# Patient Record
Sex: MALE | Race: WHITE | NOT HISPANIC OR LATINO
[De-identification: names, ages, dates, MRNs, and addresses within clinical notes are randomized per-mention and may not be internally consistent; named-entity substitution may affect disease eponyms.]

---

## 2018-10-04 ENCOUNTER — FORM ENCOUNTER (OUTPATIENT)
Age: 53
End: 2018-10-04

## 2018-10-05 ENCOUNTER — OUTPATIENT (OUTPATIENT)
Dept: OUTPATIENT SERVICES | Facility: HOSPITAL | Age: 53
LOS: 1 days | End: 2018-10-05
Payer: COMMERCIAL

## 2018-10-05 ENCOUNTER — APPOINTMENT (OUTPATIENT)
Dept: RADIOLOGY | Facility: CLINIC | Age: 53
End: 2018-10-05

## 2018-10-05 ENCOUNTER — APPOINTMENT (OUTPATIENT)
Dept: ORTHOPEDIC SURGERY | Facility: CLINIC | Age: 53
End: 2018-10-05
Payer: COMMERCIAL

## 2018-10-05 VITALS — WEIGHT: 204 LBS | HEIGHT: 71 IN | BODY MASS INDEX: 28.56 KG/M2 | RESPIRATION RATE: 16 BRPM

## 2018-10-05 DIAGNOSIS — Z78.9 OTHER SPECIFIED HEALTH STATUS: ICD-10-CM

## 2018-10-05 DIAGNOSIS — Z80.9 FAMILY HISTORY OF MALIGNANT NEOPLASM, UNSPECIFIED: ICD-10-CM

## 2018-10-05 DIAGNOSIS — F17.200 NICOTINE DEPENDENCE, UNSPECIFIED, UNCOMPLICATED: ICD-10-CM

## 2018-10-05 DIAGNOSIS — Z82.61 FAMILY HISTORY OF ARTHRITIS: ICD-10-CM

## 2018-10-05 DIAGNOSIS — Z86.79 PERSONAL HISTORY OF OTHER DISEASES OF THE CIRCULATORY SYSTEM: ICD-10-CM

## 2018-10-05 DIAGNOSIS — M23.52 CHRONIC INSTABILITY OF KNEE, LEFT KNEE: ICD-10-CM

## 2018-10-05 DIAGNOSIS — S83.512A SPRAIN OF ANTERIOR CRUCIATE LIGAMENT OF LEFT KNEE, INITIAL ENCOUNTER: ICD-10-CM

## 2018-10-05 PROCEDURE — 73564 X-RAY EXAM KNEE 4 OR MORE: CPT | Mod: 26,LT

## 2018-10-05 PROCEDURE — 73564 X-RAY EXAM KNEE 4 OR MORE: CPT

## 2018-10-05 PROCEDURE — 99204 OFFICE O/P NEW MOD 45 MIN: CPT

## 2018-10-09 PROBLEM — M23.52: Status: ACTIVE | Noted: 2018-10-09

## 2018-10-09 PROBLEM — S83.512A CHRONIC RUPTURE OF ACL OF LEFT KNEE: Status: ACTIVE | Noted: 2018-10-09

## 2018-10-09 RX ORDER — AMLODIPINE BESYLATE AND BENAZEPRIL HYDROCHLORIDE 10; 40 MG/1; MG/1
CAPSULE ORAL
Refills: 0 | Status: ACTIVE | COMMUNITY

## 2018-10-09 RX ORDER — LOSARTAN POTASSIUM 100 MG/1
TABLET, FILM COATED ORAL
Refills: 0 | Status: ACTIVE | COMMUNITY

## 2018-10-21 ENCOUNTER — FORM ENCOUNTER (OUTPATIENT)
Age: 53
End: 2018-10-21

## 2018-10-22 ENCOUNTER — APPOINTMENT (OUTPATIENT)
Dept: MRI IMAGING | Facility: CLINIC | Age: 53
End: 2018-10-22
Payer: COMMERCIAL

## 2018-10-22 ENCOUNTER — APPOINTMENT (OUTPATIENT)
Dept: ORTHOPEDIC SURGERY | Facility: CLINIC | Age: 53
End: 2018-10-22
Payer: COMMERCIAL

## 2018-10-22 ENCOUNTER — OUTPATIENT (OUTPATIENT)
Dept: OUTPATIENT SERVICES | Facility: HOSPITAL | Age: 53
LOS: 1 days | End: 2018-10-22

## 2018-10-22 VITALS — BODY MASS INDEX: 28.56 KG/M2 | WEIGHT: 204 LBS | RESPIRATION RATE: 16 BRPM | HEIGHT: 71 IN

## 2018-10-22 PROCEDURE — 73721 MRI JNT OF LWR EXTRE W/O DYE: CPT | Mod: 26,LT

## 2018-10-22 PROCEDURE — 99214 OFFICE O/P EST MOD 30 MIN: CPT

## 2018-11-05 ENCOUNTER — TRANSCRIPTION ENCOUNTER (OUTPATIENT)
Age: 53
End: 2018-11-05

## 2018-11-05 RX ORDER — DOCUSATE SODIUM 100 MG/1
100 CAPSULE ORAL TWICE DAILY
Qty: 30 | Refills: 0 | Status: ACTIVE | COMMUNITY
Start: 2018-11-05 | End: 1900-01-01

## 2018-11-05 RX ORDER — HYDROCODONE BITARTRATE AND ACETAMINOPHEN 5; 325 MG/1; MG/1
5-325 TABLET ORAL
Qty: 20 | Refills: 0 | Status: ACTIVE | COMMUNITY
Start: 2018-11-05 | End: 1900-01-01

## 2018-11-05 RX ORDER — ASPIRIN 325 MG/1
325 TABLET, FILM COATED ORAL DAILY
Qty: 15 | Refills: 0 | Status: ACTIVE | COMMUNITY
Start: 2018-11-05 | End: 1900-01-01

## 2018-11-06 ENCOUNTER — OUTPATIENT (OUTPATIENT)
Dept: OUTPATIENT SERVICES | Facility: HOSPITAL | Age: 53
LOS: 1 days | Discharge: HOME | End: 2018-11-06
Payer: COMMERCIAL

## 2018-11-06 ENCOUNTER — APPOINTMENT (OUTPATIENT)
Dept: ORTHOPEDIC SURGERY | Facility: AMBULATORY SURGERY CENTER | Age: 53
End: 2018-11-06

## 2018-11-06 PROCEDURE — 29880 ARTHRS KNE SRG MNISECTMY M&L: CPT | Mod: RT

## 2018-11-06 RX ORDER — ASPIRIN 325 MG/1
325 TABLET, FILM COATED ORAL DAILY
Qty: 15 | Refills: 0 | Status: ACTIVE | COMMUNITY
Start: 2018-11-06 | End: 1900-01-01

## 2018-11-06 RX ORDER — HYDROCODONE BITARTRATE AND ACETAMINOPHEN 5; 325 MG/1; MG/1
5-325 TABLET ORAL
Qty: 20 | Refills: 0 | Status: ACTIVE | COMMUNITY
Start: 2018-11-06 | End: 1900-01-01

## 2018-11-06 RX ORDER — DOCUSATE SODIUM 100 MG/1
100 CAPSULE ORAL TWICE DAILY
Qty: 30 | Refills: 0 | Status: ACTIVE | COMMUNITY
Start: 2018-11-06 | End: 1900-01-01

## 2018-11-30 ENCOUNTER — APPOINTMENT (OUTPATIENT)
Dept: ORTHOPEDIC SURGERY | Facility: CLINIC | Age: 53
End: 2018-11-30
Payer: COMMERCIAL

## 2018-11-30 VITALS — RESPIRATION RATE: 16 BRPM | BODY MASS INDEX: 28.56 KG/M2 | HEIGHT: 71 IN | WEIGHT: 204 LBS

## 2018-11-30 DIAGNOSIS — S83.272A COMPLEX TEAR OF LATERAL MENISCUS, CURRENT INJURY, LEFT KNEE, INITIAL ENCOUNTER: ICD-10-CM

## 2018-11-30 DIAGNOSIS — S83.232A COMPLEX TEAR OF MEDIAL MENISCUS, CURRENT INJURY, LEFT KNEE, INITIAL ENCOUNTER: ICD-10-CM

## 2018-11-30 DIAGNOSIS — M23.42 LOOSE BODY IN KNEE, LEFT KNEE: ICD-10-CM

## 2018-11-30 PROCEDURE — 99024 POSTOP FOLLOW-UP VISIT: CPT

## 2019-01-04 ENCOUNTER — APPOINTMENT (OUTPATIENT)
Dept: ORTHOPEDIC SURGERY | Facility: CLINIC | Age: 54
End: 2019-01-04

## 2019-11-06 ENCOUNTER — INPATIENT (INPATIENT)
Facility: HOSPITAL | Age: 54
LOS: 1 days | Discharge: HOME | DRG: 894 | End: 2019-11-08
Attending: HOSPITALIST | Admitting: HOSPITALIST
Payer: COMMERCIAL

## 2019-11-06 VITALS
RESPIRATION RATE: 18 BRPM | HEIGHT: 72 IN | TEMPERATURE: 97 F | OXYGEN SATURATION: 98 % | HEART RATE: 83 BPM | SYSTOLIC BLOOD PRESSURE: 153 MMHG | DIASTOLIC BLOOD PRESSURE: 92 MMHG | WEIGHT: 184.97 LBS

## 2019-11-06 LAB
ALBUMIN SERPL ELPH-MCNC: 4.2 G/DL — SIGNIFICANT CHANGE UP (ref 3.3–5)
ALP SERPL-CCNC: 133 U/L — HIGH (ref 40–120)
ALT FLD-CCNC: 47 U/L — HIGH (ref 10–45)
ANION GAP SERPL CALC-SCNC: 16 MMOL/L — SIGNIFICANT CHANGE UP (ref 5–17)
APAP SERPL-MCNC: <15 UG/ML — SIGNIFICANT CHANGE UP (ref 10–30)
AST SERPL-CCNC: 77 U/L — HIGH (ref 10–40)
BASOPHILS # BLD AUTO: 0.03 K/UL — SIGNIFICANT CHANGE UP (ref 0–0.2)
BASOPHILS NFR BLD AUTO: 0.4 % — SIGNIFICANT CHANGE UP (ref 0–2)
BILIRUB SERPL-MCNC: 0.3 MG/DL — SIGNIFICANT CHANGE UP (ref 0.2–1.2)
BUN SERPL-MCNC: 4 MG/DL — LOW (ref 7–23)
CALCIUM SERPL-MCNC: 9.3 MG/DL — SIGNIFICANT CHANGE UP (ref 8.4–10.5)
CHLORIDE SERPL-SCNC: 97 MMOL/L — SIGNIFICANT CHANGE UP (ref 96–108)
CO2 SERPL-SCNC: 22 MMOL/L — SIGNIFICANT CHANGE UP (ref 22–31)
CREAT SERPL-MCNC: 0.8 MG/DL — SIGNIFICANT CHANGE UP (ref 0.5–1.3)
EOSINOPHIL # BLD AUTO: 0.06 K/UL — SIGNIFICANT CHANGE UP (ref 0–0.5)
EOSINOPHIL NFR BLD AUTO: 0.8 % — SIGNIFICANT CHANGE UP (ref 0–6)
ETHANOL SERPL-MCNC: 332 MG/DL — HIGH (ref 0–10)
GAS PNL BLDV: SIGNIFICANT CHANGE UP
GLUCOSE SERPL-MCNC: 88 MG/DL — SIGNIFICANT CHANGE UP (ref 70–99)
HCT VFR BLD CALC: 49.6 % — SIGNIFICANT CHANGE UP (ref 39–50)
HGB BLD-MCNC: 17.2 G/DL — HIGH (ref 13–17)
IMM GRANULOCYTES NFR BLD AUTO: 0.3 % — SIGNIFICANT CHANGE UP (ref 0–1.5)
LYMPHOCYTES # BLD AUTO: 4.17 K/UL — HIGH (ref 1–3.3)
LYMPHOCYTES # BLD AUTO: 53.6 % — HIGH (ref 13–44)
MCHC RBC-ENTMCNC: 33.3 PG — SIGNIFICANT CHANGE UP (ref 27–34)
MCHC RBC-ENTMCNC: 34.7 GM/DL — SIGNIFICANT CHANGE UP (ref 32–36)
MCV RBC AUTO: 96.1 FL — SIGNIFICANT CHANGE UP (ref 80–100)
MONOCYTES # BLD AUTO: 0.35 K/UL — SIGNIFICANT CHANGE UP (ref 0–0.9)
MONOCYTES NFR BLD AUTO: 4.5 % — SIGNIFICANT CHANGE UP (ref 2–14)
NEUTROPHILS # BLD AUTO: 3.15 K/UL — SIGNIFICANT CHANGE UP (ref 1.8–7.4)
NEUTROPHILS NFR BLD AUTO: 40.4 % — LOW (ref 43–77)
NRBC # BLD: 0 /100 WBCS — SIGNIFICANT CHANGE UP (ref 0–0)
PLATELET # BLD AUTO: 182 K/UL — SIGNIFICANT CHANGE UP (ref 150–400)
POTASSIUM SERPL-MCNC: 3.6 MMOL/L — SIGNIFICANT CHANGE UP (ref 3.5–5.3)
POTASSIUM SERPL-SCNC: 3.6 MMOL/L — SIGNIFICANT CHANGE UP (ref 3.5–5.3)
PROT SERPL-MCNC: 7.8 G/DL — SIGNIFICANT CHANGE UP (ref 6–8.3)
RBC # BLD: 5.16 M/UL — SIGNIFICANT CHANGE UP (ref 4.2–5.8)
RBC # FLD: 13.4 % — SIGNIFICANT CHANGE UP (ref 10.3–14.5)
SALICYLATES SERPL-MCNC: <2 MG/DL — LOW (ref 15–30)
SODIUM SERPL-SCNC: 135 MMOL/L — SIGNIFICANT CHANGE UP (ref 135–145)
WBC # BLD: 7.78 K/UL — SIGNIFICANT CHANGE UP (ref 3.8–10.5)
WBC # FLD AUTO: 7.78 K/UL — SIGNIFICANT CHANGE UP (ref 3.8–10.5)

## 2019-11-06 PROCEDURE — 99285 EMERGENCY DEPT VISIT HI MDM: CPT

## 2019-11-06 PROCEDURE — 93010 ELECTROCARDIOGRAM REPORT: CPT

## 2019-11-06 RX ORDER — SODIUM CHLORIDE 9 MG/ML
1000 INJECTION INTRAMUSCULAR; INTRAVENOUS; SUBCUTANEOUS ONCE
Refills: 0 | Status: COMPLETED | OUTPATIENT
Start: 2019-11-06 | End: 2019-11-06

## 2019-11-06 RX ADMIN — SODIUM CHLORIDE 1000 MILLILITER(S): 9 INJECTION INTRAMUSCULAR; INTRAVENOUS; SUBCUTANEOUS at 21:18

## 2019-11-06 NOTE — ED PROVIDER NOTE - NS ED ROS FT
GENERAL: No fever or chills  EYES: no change in vision  HEENT: no trouble swallowing or speaking  CARDIAC: no chest pain or palpitations  PULMONARY: no cough or SOB  GI: no abdominal pain, nausea, vomiting, diarrhea, or constipation   : No changes in urination  SKIN: no rashes  NEURO: no headache, numbness, or weakness  MSK: No joint pain

## 2019-11-06 NOTE — ED PROVIDER NOTE - PROGRESS NOTE DETAILS
patient resting comfortably. No signs of withdrawal Patient clinically sober, steady gait. States he does not want to stay for detox any more. Wants to go home. Patient with capacity. Medicine team made aware.

## 2019-11-06 NOTE — ED PROVIDER NOTE - OBJECTIVE STATEMENT
54M with pmh HTN, HLD, ETOH abuse presenting with request of detox and withdrawal symptoms. Patient states he tried quitting on his own and began having shakes. Drank several glasses of wine to calm down 5 hours prior to arrival. Endorses hx of withdrawal seizures in the past, most recently 4months ago. Denies fever, chills, cp, sob, n/v/d, dizziness, headache, dysuria

## 2019-11-06 NOTE — ED ADULT NURSE NOTE - NSIMPLEMENTINTERV_GEN_ALL_ED
Implemented All Universal Safety Interventions:  [place] to call system. Call bell, personal items and telephone within reach. Instruct patient to call for assistance. Room bathroom lighting operational. Non-slip footwear when patient is off stretcher. Physically safe environment: no spills, clutter or unnecessary equipment. Stretcher in lowest position, wheels locked, appropriate side rails in place.

## 2019-11-06 NOTE — ED ADULT NURSE NOTE - OBJECTIVE STATEMENT
55 y/o male A&Ox3 presents to ED with family for detox. As per wife and family friend at bedside, pt drinks excessively daily and has "night terrors"/ hallucinations with "shaking" episodes. Wife reports that pt had a seizure in August while detox- ing. Currently, pt denies any shaking or hallucinations. Pt reports last drink was 1 hour ago and included wine and spiked seltzers. Wife reports that pt drinks hard liquor daily, either whiskey and vodka. Pt denies any thoughts or hurting self or others, no depressive or hopeless thoughts. Non labored respirations, +productive cough, no noted wheezing or cyanosis. Abdomen soft, non distended and non tender, no n/v/d. Pt denies CP, SOB, fevers, weakness and numbness. Safety measures maintained with wife and friend at bedside.

## 2019-11-06 NOTE — ED ADULT TRIAGE NOTE - CHIEF COMPLAINT QUOTE
requesting detox. last drink 5 hours ago. denies headache, anxiety, NVD. hx of seizures from withdrawal.

## 2019-11-06 NOTE — ED PROVIDER NOTE - PHYSICAL EXAMINATION
GEN: NAD, intoxicated   HEENT: NCAT, MMM, normal conjunctiva, perrl  CHEST/LUNGS: Non-tachypneic, CTAB, bilateral breath sounds  CARDIAC: Non-tachycardic, s1s2, normal perfusion, no peripheral edema  ABDOMEN: Soft, NTND, No rebound/guarding  MSK: No joint tenderness, no gross deformity of extremities  SKIN: No rashes, no petechiae, no vesicles  NEURO: CN grossly intact, normal coordination, no focal motor or sensory deficits

## 2019-11-06 NOTE — ED PROVIDER NOTE - ATTENDING CONTRIBUTION TO CARE
pt is a 53 y/o male with h/o etoh abuse acutely intoxicated last drink 5 hrs ago wants detox but actively intoxicated, h/o etoh withdrawal sz, ivf given, no signs of withdrawal yet, labs, etoh level, reassess. vss. pt is a 53 y/o male with h/o etoh abuse acutely intoxicated last drink 5 hrs ago wants detox but actively intoxicated, h/o etoh withdrawal sz, ivf given, no signs of withdrawal yet, labs, etoh level, reassess. vss. pt wants to be admitted since he couldn't handle withdrawal at home and drank more wine.

## 2019-11-07 DIAGNOSIS — Z90.49 ACQUIRED ABSENCE OF OTHER SPECIFIED PARTS OF DIGESTIVE TRACT: Chronic | ICD-10-CM

## 2019-11-07 DIAGNOSIS — I10 ESSENTIAL (PRIMARY) HYPERTENSION: ICD-10-CM

## 2019-11-07 DIAGNOSIS — F10.929 ALCOHOL USE, UNSPECIFIED WITH INTOXICATION, UNSPECIFIED: ICD-10-CM

## 2019-11-07 DIAGNOSIS — D12.6 BENIGN NEOPLASM OF COLON, UNSPECIFIED: ICD-10-CM

## 2019-11-07 DIAGNOSIS — F10.230 ALCOHOL DEPENDENCE WITH WITHDRAWAL, UNCOMPLICATED: ICD-10-CM

## 2019-11-07 DIAGNOSIS — R20.0 ANESTHESIA OF SKIN: ICD-10-CM

## 2019-11-07 DIAGNOSIS — R74.0 NONSPECIFIC ELEVATION OF LEVELS OF TRANSAMINASE AND LACTIC ACID DEHYDROGENASE [LDH]: ICD-10-CM

## 2019-11-07 LAB
24R-OH-CALCIDIOL SERPL-MCNC: 32 NG/ML — SIGNIFICANT CHANGE UP (ref 30–80)
FOLATE SERPL-MCNC: >20 NG/ML — SIGNIFICANT CHANGE UP
TSH SERPL-MCNC: 1.53 UIU/ML — SIGNIFICANT CHANGE UP (ref 0.27–4.2)
VIT B12 SERPL-MCNC: 479 PG/ML — SIGNIFICANT CHANGE UP (ref 232–1245)

## 2019-11-07 PROCEDURE — 99223 1ST HOSP IP/OBS HIGH 75: CPT

## 2019-11-07 RX ORDER — AMLODIPINE BESYLATE 2.5 MG/1
10 TABLET ORAL DAILY
Refills: 0 | Status: DISCONTINUED | OUTPATIENT
Start: 2019-11-07 | End: 2019-11-08

## 2019-11-07 RX ORDER — LOSARTAN POTASSIUM 100 MG/1
100 TABLET, FILM COATED ORAL DAILY
Refills: 0 | Status: DISCONTINUED | OUTPATIENT
Start: 2019-11-07 | End: 2019-11-08

## 2019-11-07 RX ORDER — METOPROLOL TARTRATE 50 MG
25 TABLET ORAL DAILY
Refills: 0 | Status: DISCONTINUED | OUTPATIENT
Start: 2019-11-07 | End: 2019-11-08

## 2019-11-07 RX ORDER — LANOLIN ALCOHOL/MO/W.PET/CERES
3 CREAM (GRAM) TOPICAL AT BEDTIME
Refills: 0 | Status: DISCONTINUED | OUTPATIENT
Start: 2019-11-07 | End: 2019-11-08

## 2019-11-07 RX ORDER — SODIUM CHLORIDE 9 MG/ML
1000 INJECTION, SOLUTION INTRAVENOUS
Refills: 0 | Status: DISCONTINUED | OUTPATIENT
Start: 2019-11-07 | End: 2019-11-08

## 2019-11-07 RX ORDER — THIAMINE MONONITRATE (VIT B1) 100 MG
100 TABLET ORAL DAILY
Refills: 0 | Status: DISCONTINUED | OUTPATIENT
Start: 2019-11-07 | End: 2019-11-08

## 2019-11-07 RX ADMIN — LOSARTAN POTASSIUM 100 MILLIGRAM(S): 100 TABLET, FILM COATED ORAL at 06:07

## 2019-11-07 RX ADMIN — Medication 2 MILLIGRAM(S): at 20:02

## 2019-11-07 RX ADMIN — Medication 2 MILLIGRAM(S): at 03:42

## 2019-11-07 RX ADMIN — Medication 2 MILLIGRAM(S): at 08:28

## 2019-11-07 RX ADMIN — Medication 100 MILLIGRAM(S): at 12:20

## 2019-11-07 RX ADMIN — Medication 2 MILLIGRAM(S): at 12:20

## 2019-11-07 RX ADMIN — Medication 3 MILLIGRAM(S): at 21:36

## 2019-11-07 RX ADMIN — Medication 2 MILLIGRAM(S): at 16:44

## 2019-11-07 RX ADMIN — SODIUM CHLORIDE 100 MILLILITER(S): 9 INJECTION, SOLUTION INTRAVENOUS at 06:07

## 2019-11-07 RX ADMIN — Medication 3 MILLIGRAM(S): at 02:41

## 2019-11-07 RX ADMIN — Medication 25 MILLIGRAM(S): at 06:07

## 2019-11-07 RX ADMIN — AMLODIPINE BESYLATE 10 MILLIGRAM(S): 2.5 TABLET ORAL at 06:06

## 2019-11-07 RX ADMIN — Medication 2 MILLIGRAM(S): at 03:39

## 2019-11-07 NOTE — PROGRESS NOTE ADULT - SUBJECTIVE AND OBJECTIVE BOX
PROGRESS NOTE:   Authoted by Dr. Karli Cotto MD  Pager [phone]     Patient is a 54y old  Male who presents with a chief complaint of Alcohol withdrawal (07 Nov 2019 04:06)      SUBJECTIVE / OVERNIGHT EVENTS: Patient seen and examined at bedside - resting in bed, says he feels a little bit better but very tired and shaky. c/o LLE 2nd and 3rd toe numbness for a few months    ADDITIONAL REVIEW OF SYSTEMS: as above    MEDICATIONS  (STANDING):  amLODIPine   Tablet 10 milliGRAM(s) Oral daily  LORazepam   Injectable 2 milliGRAM(s) IV Push every 4 hours  LORazepam   Injectable   IV Push   losartan 100 milliGRAM(s) Oral daily  melatonin 3 milliGRAM(s) Oral at bedtime  metoprolol succinate ER 25 milliGRAM(s) Oral daily  multivitamin/thiamine/folic acid in sodium chloride 0.9% 1000 milliLiter(s) (100 mL/Hr) IV Continuous <Continuous>  thiamine 100 milliGRAM(s) Oral daily    MEDICATIONS  (PRN):  LORazepam   Injectable 2 milliGRAM(s) IV Push every 2 hours PRN CIWA-Ar score increase by 2 points and a total score of 7 or less  LORazepam   Injectable 2 milliGRAM(s) IV Push every 1 hour PRN CIWA-Ar score 8 or greater      CAPILLARY BLOOD GLUCOSE        I&O's Summary    07 Nov 2019 07:01  -  07 Nov 2019 12:10  --------------------------------------------------------  IN: 120 mL / OUT: 0 mL / NET: 120 mL        PHYSICAL EXAM:  Vital Signs Last 24 Hrs  T(C): 36.8 (07 Nov 2019 10:55), Max: 37 (06 Nov 2019 22:42)  T(F): 98.2 (07 Nov 2019 10:55), Max: 98.6 (06 Nov 2019 22:42)  HR: 74 (07 Nov 2019 10:55) (74 - 96)  BP: 131/74 (07 Nov 2019 10:55) (106/68 - 153/92)  BP(mean): --  RR: 18 (07 Nov 2019 10:55) (16 - 18)  SpO2: 94% (07 Nov 2019 10:55) (94% - 98%)    CONSTITUTIONAL: NAD  RESPIRATORY: Normal respiratory effort; lungs are clear to auscultation bilaterally  CARDIOVASCULAR: Regular rate and rhythm, normal S1 and S2, no murmur/rub/gallop; No lower extremity edema; Peripheral pulses are 2+ bilaterally  ABDOMEN: Nontender to palpation, normoactive bowel sounds, no rebound/guarding; No hepatosplenomegaly  MUSCLOSKELETAL: no clubbing or cyanosis of digits; no joint swelling or tenderness to palpation  PSYCH: A+O to person, place, and time; affect appropriate    CIWA 4-5    LABS:                        17.2   7.78  )-----------( 182      ( 06 Nov 2019 21:20 )             49.6     11-06    135  |  97  |  4<L>  ----------------------------<  88  3.6   |  22  |  0.80    Ca    9.3      06 Nov 2019 21:20    TPro  7.8  /  Alb  4.2  /  TBili  0.3  /  DBili  x   /  AST  77<H>  /  ALT  47<H>  /  AlkPhos  133<H>  11-06                RADIOLOGY & ADDITIONAL TESTS:  Results Reviewed:   Imaging Personally Reviewed:  Electrocardiogram Personally Reviewed:    COORDINATION OF CARE:  Care Discussed with Consultants/Other Providers [Y/N]:  Prior or Outpatient Records Reviewed [Y/N]:

## 2019-11-07 NOTE — H&P ADULT - PROBLEM SELECTOR PLAN 1
Pt with alcohol withdrawal despite high alcohol level in blood  started on taper with ativan, prn given as well for total 4mg iv now  social work consult

## 2019-11-07 NOTE — ED ADULT NURSE REASSESSMENT NOTE - NS ED NURSE REASSESS COMMENT FT1
Pt requesting medication to aid in sleeping, MAR resident at bedside to discuss plan of care with pt.

## 2019-11-07 NOTE — H&P ADULT - NSHPPHYSICALEXAM_GEN_ALL_CORE
Vital Signs Last 24 Hrs  T(C): 36.5 (07 Nov 2019 03:41), Max: 37 (06 Nov 2019 22:42)  T(F): 97.7 (07 Nov 2019 03:41), Max: 98.6 (06 Nov 2019 22:42)  HR: 85 (07 Nov 2019 03:41) (77 - 96)  BP: 137/86 (07 Nov 2019 03:41) (111/67 - 153/92)  BP(mean): --  RR: 18 (07 Nov 2019 03:41) (16 - 18)  SpO2: 94% (07 Nov 2019 03:41) (94% - 98%)    GENERAL: Pt agitated at time of interview, upset that he cannot sleep due to withdrawal and is tremulous  HEAD:  Atraumatic, Normocephalic  EYES: EOMI, PERRLA, conjunctiva and sclera clear  ENT: Oral mucosa moist  NECK: Neck supple  CHEST/LUNG: Clear to auscultation bilaterally; No wheeze, no rales, no rhonchi.    HEART: Regular rate and rhythm; No murmurs, rubs, or gallops  ABDOMEN: Soft, Nontender, Nondistended; Bowel sounds present  EXTREMITIES:  No clubbing, cyanosis, or edema  VASCULAR: Posterior tibialis pulses intact bilaterally  PSYCH: pt is agitated as above but otherwise with appropriate affect and behavior  NEUROLOGY: AAOx3  SKIN: grossly warm and dry

## 2019-11-07 NOTE — H&P ADULT - ASSESSMENT
53yo M w/pmhx of HTN, FAP s/p colectomy, ETOH with hx of withdrawal seizures now presenting with ETOH withdrawal in the setting of 2 days of attempting to cut down ETOH use.

## 2019-11-07 NOTE — H&P ADULT - NSHPLABSRESULTS_GEN_ALL_CORE
Labs personally reviewed:                        17.2   7.78  )-----------( 182      ( 06 Nov 2019 21:20 )             49.6       11-06    135  |  97  |  4<L>  ----------------------------<  88  3.6   |  22  |  0.80    Ca    9.3      06 Nov 2019 21:20    TPro  7.8  /  Alb  4.2  /  TBili  0.3  /  DBili  x   /  AST  77<H>  /  ALT  47<H>  /  AlkPhos  133<H>  11-06    LIVER FUNCTIONS - ( 06 Nov 2019 21:20 )  Alb: 4.2 g/dL / Pro: 7.8 g/dL / ALK PHOS: 133 U/L / ALT: 47 U/L / AST: 77 U/L / GGT: x    EKG

## 2019-11-07 NOTE — H&P ADULT - HISTORY OF PRESENT ILLNESS
55yo M w/pmhx of HTN, FAP s/p colectomy, ETOH with hx of withdrawal seizures now presenting with ETOH withdrawal in the setting of 2 days of attempting to cut down ETOH use. Patient reports that at baseline he drinks "5-6" drinks daily-however these drinks are glasses of vodka or whiskey and not single shots that he mixes with some mixers. In addition patient admits to drinking significantly more on weekends though there is no specific number. Patient reports that with his attempt to cut down he had significant insomnia and then today began to feel very shaky and tremulous. He also had intermittent nausea. Because of his worsening tremulousness today he became worried that he was going into withdrawal and was worried about seizing. Patient then drank about 6 glasses of wine to self treat the symptoms and then came to the ED for further help. Denies fevers/chills. Some mild chronic loose stools s/p colectomy but well controlled. No vomiting today. No palpitations or chest pain or sob.

## 2019-11-08 ENCOUNTER — TRANSCRIPTION ENCOUNTER (OUTPATIENT)
Age: 54
End: 2019-11-08

## 2019-11-08 VITALS
SYSTOLIC BLOOD PRESSURE: 141 MMHG | HEART RATE: 71 BPM | RESPIRATION RATE: 17 BRPM | DIASTOLIC BLOOD PRESSURE: 89 MMHG | OXYGEN SATURATION: 97 % | TEMPERATURE: 98 F

## 2019-11-08 DIAGNOSIS — T78.3XXA ANGIONEUROTIC EDEMA, INITIAL ENCOUNTER: ICD-10-CM

## 2019-11-08 DIAGNOSIS — J04.30 SUPRAGLOTTITIS, UNSPECIFIED, WITHOUT OBSTRUCTION: ICD-10-CM

## 2019-11-08 DIAGNOSIS — T78.3XXD ANGIONEUROTIC EDEMA, SUBSEQUENT ENCOUNTER: ICD-10-CM

## 2019-11-08 DIAGNOSIS — K21.9 GASTRO-ESOPHAGEAL REFLUX DISEASE WITHOUT ESOPHAGITIS: ICD-10-CM

## 2019-11-08 LAB
ALBUMIN SERPL ELPH-MCNC: 3.5 G/DL — SIGNIFICANT CHANGE UP (ref 3.3–5)
ALP SERPL-CCNC: 102 U/L — SIGNIFICANT CHANGE UP (ref 40–120)
ALT FLD-CCNC: 36 U/L — SIGNIFICANT CHANGE UP (ref 10–45)
ANION GAP SERPL CALC-SCNC: 11 MMOL/L — SIGNIFICANT CHANGE UP (ref 5–17)
AST SERPL-CCNC: 51 U/L — HIGH (ref 10–40)
BILIRUB SERPL-MCNC: 0.4 MG/DL — SIGNIFICANT CHANGE UP (ref 0.2–1.2)
BUN SERPL-MCNC: 12 MG/DL — SIGNIFICANT CHANGE UP (ref 7–23)
CALCIUM SERPL-MCNC: 9.4 MG/DL — SIGNIFICANT CHANGE UP (ref 8.4–10.5)
CHLORIDE SERPL-SCNC: 103 MMOL/L — SIGNIFICANT CHANGE UP (ref 96–108)
CO2 SERPL-SCNC: 21 MMOL/L — LOW (ref 22–31)
CREAT SERPL-MCNC: 0.73 MG/DL — SIGNIFICANT CHANGE UP (ref 0.5–1.3)
GLUCOSE SERPL-MCNC: 89 MG/DL — SIGNIFICANT CHANGE UP (ref 70–99)
HCT VFR BLD CALC: 44.9 % — SIGNIFICANT CHANGE UP (ref 39–50)
HCV AB S/CO SERPL IA: 0.2 S/CO — SIGNIFICANT CHANGE UP (ref 0–0.99)
HCV AB SERPL-IMP: SIGNIFICANT CHANGE UP
HGB BLD-MCNC: 15.6 G/DL — SIGNIFICANT CHANGE UP (ref 13–17)
MAGNESIUM SERPL-MCNC: 1.3 MG/DL — LOW (ref 1.6–2.6)
MCHC RBC-ENTMCNC: 34.1 PG — HIGH (ref 27–34)
MCHC RBC-ENTMCNC: 34.7 GM/DL — SIGNIFICANT CHANGE UP (ref 32–36)
MCV RBC AUTO: 98.2 FL — SIGNIFICANT CHANGE UP (ref 80–100)
PHOSPHATE SERPL-MCNC: 3.3 MG/DL — SIGNIFICANT CHANGE UP (ref 2.5–4.5)
PLATELET # BLD AUTO: 148 K/UL — LOW (ref 150–400)
POTASSIUM SERPL-MCNC: 3.2 MMOL/L — LOW (ref 3.5–5.3)
POTASSIUM SERPL-SCNC: 3.2 MMOL/L — LOW (ref 3.5–5.3)
PROT SERPL-MCNC: 6.9 G/DL — SIGNIFICANT CHANGE UP (ref 6–8.3)
RBC # BLD: 4.57 M/UL — SIGNIFICANT CHANGE UP (ref 4.2–5.8)
RBC # FLD: 13.4 % — SIGNIFICANT CHANGE UP (ref 10.3–14.5)
SODIUM SERPL-SCNC: 135 MMOL/L — SIGNIFICANT CHANGE UP (ref 135–145)
WBC # BLD: 7.65 K/UL — SIGNIFICANT CHANGE UP (ref 3.8–10.5)
WBC # FLD AUTO: 7.65 K/UL — SIGNIFICANT CHANGE UP (ref 3.8–10.5)

## 2019-11-08 PROCEDURE — 82947 ASSAY GLUCOSE BLOOD QUANT: CPT

## 2019-11-08 PROCEDURE — 82330 ASSAY OF CALCIUM: CPT

## 2019-11-08 PROCEDURE — 82607 VITAMIN B-12: CPT

## 2019-11-08 PROCEDURE — 84443 ASSAY THYROID STIM HORMONE: CPT

## 2019-11-08 PROCEDURE — 82746 ASSAY OF FOLIC ACID SERUM: CPT

## 2019-11-08 PROCEDURE — 93005 ELECTROCARDIOGRAM TRACING: CPT

## 2019-11-08 PROCEDURE — 31575 DIAGNOSTIC LARYNGOSCOPY: CPT

## 2019-11-08 PROCEDURE — 82306 VITAMIN D 25 HYDROXY: CPT

## 2019-11-08 PROCEDURE — 85027 COMPLETE CBC AUTOMATED: CPT

## 2019-11-08 PROCEDURE — 99223 1ST HOSP IP/OBS HIGH 75: CPT | Mod: 25

## 2019-11-08 PROCEDURE — 82435 ASSAY OF BLOOD CHLORIDE: CPT

## 2019-11-08 PROCEDURE — 80307 DRUG TEST PRSMV CHEM ANLYZR: CPT

## 2019-11-08 PROCEDURE — 84295 ASSAY OF SERUM SODIUM: CPT

## 2019-11-08 PROCEDURE — 99233 SBSQ HOSP IP/OBS HIGH 50: CPT

## 2019-11-08 PROCEDURE — 80053 COMPREHEN METABOLIC PANEL: CPT

## 2019-11-08 PROCEDURE — 85014 HEMATOCRIT: CPT

## 2019-11-08 PROCEDURE — 83735 ASSAY OF MAGNESIUM: CPT

## 2019-11-08 PROCEDURE — 83605 ASSAY OF LACTIC ACID: CPT

## 2019-11-08 PROCEDURE — 84132 ASSAY OF SERUM POTASSIUM: CPT

## 2019-11-08 PROCEDURE — 82803 BLOOD GASES ANY COMBINATION: CPT

## 2019-11-08 PROCEDURE — 86803 HEPATITIS C AB TEST: CPT

## 2019-11-08 PROCEDURE — 99285 EMERGENCY DEPT VISIT HI MDM: CPT

## 2019-11-08 PROCEDURE — 84100 ASSAY OF PHOSPHORUS: CPT

## 2019-11-08 RX ORDER — FAMOTIDINE 10 MG/ML
20 INJECTION INTRAVENOUS ONCE
Refills: 0 | Status: DISCONTINUED | OUTPATIENT
Start: 2019-11-08 | End: 2019-11-08

## 2019-11-08 RX ORDER — HYDROCHLOROTHIAZIDE 25 MG
1 TABLET ORAL
Qty: 30 | Refills: 0
Start: 2019-11-08 | End: 2019-12-07

## 2019-11-08 RX ORDER — FAMOTIDINE 10 MG/ML
20 INJECTION INTRAVENOUS ONCE
Refills: 0 | Status: COMPLETED | OUTPATIENT
Start: 2019-11-08 | End: 2019-11-08

## 2019-11-08 RX ORDER — POTASSIUM CHLORIDE 20 MEQ
40 PACKET (EA) ORAL ONCE
Refills: 0 | Status: COMPLETED | OUTPATIENT
Start: 2019-11-08 | End: 2019-11-08

## 2019-11-08 RX ORDER — CEFTRIAXONE 500 MG/1
INJECTION, POWDER, FOR SOLUTION INTRAMUSCULAR; INTRAVENOUS
Refills: 0 | Status: DISCONTINUED | OUTPATIENT
Start: 2019-11-08 | End: 2019-11-08

## 2019-11-08 RX ORDER — DIPHENHYDRAMINE HCL 50 MG
25 CAPSULE ORAL EVERY 6 HOURS
Refills: 0 | Status: DISCONTINUED | OUTPATIENT
Start: 2019-11-08 | End: 2019-11-08

## 2019-11-08 RX ORDER — MAGNESIUM SULFATE 500 MG/ML
1 VIAL (ML) INJECTION ONCE
Refills: 0 | Status: COMPLETED | OUTPATIENT
Start: 2019-11-08 | End: 2019-11-08

## 2019-11-08 RX ORDER — INFLUENZA VIRUS VACCINE 15; 15; 15; 15 UG/.5ML; UG/.5ML; UG/.5ML; UG/.5ML
0.5 SUSPENSION INTRAMUSCULAR ONCE
Refills: 0 | Status: DISCONTINUED | OUTPATIENT
Start: 2019-11-08 | End: 2019-11-08

## 2019-11-08 RX ORDER — CEFTRIAXONE 500 MG/1
1000 INJECTION, POWDER, FOR SOLUTION INTRAMUSCULAR; INTRAVENOUS EVERY 24 HOURS
Refills: 0 | Status: DISCONTINUED | OUTPATIENT
Start: 2019-11-09 | End: 2019-11-08

## 2019-11-08 RX ORDER — AZITHROMYCIN 500 MG/1
500 TABLET, FILM COATED ORAL ONCE
Refills: 0 | Status: COMPLETED | OUTPATIENT
Start: 2019-11-08 | End: 2019-11-08

## 2019-11-08 RX ORDER — FAMOTIDINE 10 MG/ML
20 INJECTION INTRAVENOUS
Refills: 0 | Status: DISCONTINUED | OUTPATIENT
Start: 2019-11-08 | End: 2019-11-08

## 2019-11-08 RX ORDER — DIPHENHYDRAMINE HCL 50 MG
50 CAPSULE ORAL ONCE
Refills: 0 | Status: DISCONTINUED | OUTPATIENT
Start: 2019-11-08 | End: 2019-11-08

## 2019-11-08 RX ORDER — AZITHROMYCIN 500 MG/1
1 TABLET, FILM COATED ORAL
Qty: 7 | Refills: 0
Start: 2019-11-08 | End: 2019-11-14

## 2019-11-08 RX ORDER — AZITHROMYCIN 500 MG/1
500 TABLET, FILM COATED ORAL EVERY 24 HOURS
Refills: 0 | Status: DISCONTINUED | OUTPATIENT
Start: 2019-11-09 | End: 2019-11-08

## 2019-11-08 RX ORDER — DEXAMETHASONE 0.5 MG/5ML
10 ELIXIR ORAL EVERY 8 HOURS
Refills: 0 | Status: DISCONTINUED | OUTPATIENT
Start: 2019-11-08 | End: 2019-11-08

## 2019-11-08 RX ORDER — CEFTRIAXONE 500 MG/1
1000 INJECTION, POWDER, FOR SOLUTION INTRAMUSCULAR; INTRAVENOUS ONCE
Refills: 0 | Status: COMPLETED | OUTPATIENT
Start: 2019-11-08 | End: 2019-11-08

## 2019-11-08 RX ORDER — DIPHENHYDRAMINE HCL 50 MG
50 CAPSULE ORAL ONCE
Refills: 0 | Status: COMPLETED | OUTPATIENT
Start: 2019-11-08 | End: 2019-11-08

## 2019-11-08 RX ORDER — AZITHROMYCIN 500 MG/1
TABLET, FILM COATED ORAL
Refills: 0 | Status: DISCONTINUED | OUTPATIENT
Start: 2019-11-08 | End: 2019-11-08

## 2019-11-08 RX ORDER — FOLIC ACID 0.8 MG
1 TABLET ORAL DAILY
Refills: 0 | Status: DISCONTINUED | OUTPATIENT
Start: 2019-11-08 | End: 2019-11-08

## 2019-11-08 RX ORDER — HYDROCHLOROTHIAZIDE 25 MG
25 TABLET ORAL DAILY
Refills: 0 | Status: DISCONTINUED | OUTPATIENT
Start: 2019-11-09 | End: 2019-11-08

## 2019-11-08 RX ORDER — LOSARTAN POTASSIUM 100 MG/1
1 TABLET, FILM COATED ORAL
Qty: 0 | Refills: 0 | DISCHARGE

## 2019-11-08 RX ORDER — SODIUM CHLORIDE 9 MG/ML
1000 INJECTION, SOLUTION INTRAVENOUS
Refills: 0 | Status: DISCONTINUED | OUTPATIENT
Start: 2019-11-08 | End: 2019-11-08

## 2019-11-08 RX ADMIN — Medication 1.5 MILLIGRAM(S): at 09:26

## 2019-11-08 RX ADMIN — Medication 50 MILLIGRAM(S): at 06:27

## 2019-11-08 RX ADMIN — Medication 40 MILLIEQUIVALENT(S): at 11:06

## 2019-11-08 RX ADMIN — Medication 1.5 MILLIGRAM(S): at 12:52

## 2019-11-08 RX ADMIN — FAMOTIDINE 20 MILLIGRAM(S): 10 INJECTION INTRAVENOUS at 09:53

## 2019-11-08 RX ADMIN — Medication 25 MILLIGRAM(S): at 05:02

## 2019-11-08 RX ADMIN — LOSARTAN POTASSIUM 100 MILLIGRAM(S): 100 TABLET, FILM COATED ORAL at 05:02

## 2019-11-08 RX ADMIN — Medication 102 MILLIGRAM(S): at 14:29

## 2019-11-08 RX ADMIN — CEFTRIAXONE 100 MILLIGRAM(S): 500 INJECTION, POWDER, FOR SOLUTION INTRAMUSCULAR; INTRAVENOUS at 11:07

## 2019-11-08 RX ADMIN — Medication 100 MILLIGRAM(S): at 12:52

## 2019-11-08 RX ADMIN — Medication 2 MILLIGRAM(S): at 00:04

## 2019-11-08 RX ADMIN — Medication 100 GRAM(S): at 09:53

## 2019-11-08 RX ADMIN — AMLODIPINE BESYLATE 10 MILLIGRAM(S): 2.5 TABLET ORAL at 05:02

## 2019-11-08 RX ADMIN — Medication 1.5 MILLIGRAM(S): at 05:02

## 2019-11-08 RX ADMIN — SODIUM CHLORIDE 100 MILLILITER(S): 9 INJECTION, SOLUTION INTRAVENOUS at 06:07

## 2019-11-08 RX ADMIN — AZITHROMYCIN 250 MILLIGRAM(S): 500 TABLET, FILM COATED ORAL at 11:41

## 2019-11-08 NOTE — PROGRESS NOTE ADULT - PROBLEM SELECTOR PLAN 6
- c/o RLE toe numbness ; on exam R foot is warm , palpable pulses  -  Likely neuropathy due to ETOH and/or vitamin deficiencies  -  B12/folate/TSH/ vitamin D wnl

## 2019-11-08 NOTE — PROGRESS NOTE ADULT - PROBLEM SELECTOR PLAN 5
likely due to ETOH - as AST>ALT - monitor LFTs
Losartan d/samuel for angioedema  Metoprolol 25mg er daily  Amlodipine 10mg po daily

## 2019-11-08 NOTE — DISCHARGE NOTE PROVIDER - NSDCCPCAREPLAN_GEN_ALL_CORE_FT
PRINCIPAL DISCHARGE DIAGNOSIS  Diagnosis: Alcohol intoxication  Assessment and Plan of Treatment: You came to the hospital due to alcohol intoxication. You were given medications to help you during your withdrawal period. You should use the resources the  gave you regarding alcohol cessation.      SECONDARY DISCHARGE DIAGNOSES  Diagnosis: Supraglottitis without airway obstruction  Assessment and Plan of Treatment: Your airway became swollen while you were in the hospital. You were seen by the ENT doctors who prescribed steroids and antibiotics, which you should take as directed. Make a follow-up appointment with the ENT doctors within 1 week.

## 2019-11-08 NOTE — CHART NOTE - NSCHARTNOTEFT_GEN_A_CORE
Was called by RN due to patient wanting to sign out AMA. Asked patient why she wanted to leave, reports pt no longer wants to receive treatment in hospital.     Patient is AAO x 3. I explained at length to the patient the risks of signing out AMA including but not limited to harm, injury or death. I explained the risks, benefits and alternatives to treatment as well as the attendant risks of refusing treatment at this time. I offered to answer any questions and fully answered any such questions. We believe that the patient fully understands what has been explained and answered. I informed hospitalist Dr. Reyes, of this, aware. Patient signed form to sign out AMA and accepts responsibility for any and all results of this decision.      Margarita Arzate MD, PGY-1  Department of Medicine Was called by RN due to patient wanting to sign out AMA. Asked patient why he wanted to leave, reports pt no longer wants to receive treatment in hospital.     Patient is AAO x 3. I explained at length to the patient the risks of signing out AMA including but not limited to harm, injury or death. I explained the risks, benefits and alternatives to treatment as well as the attendant risks of refusing treatment at this time. I offered to answer any questions and fully answered any such questions. We believe that the patient fully understands what has been explained and answered. I informed hospitalist Dr. Reyes, of this, aware. Patient signed form to sign out AMA and accepts responsibility for any and all results of this decision.      Margarita Arzate MD, PGY-1  Department of Medicine

## 2019-11-08 NOTE — DISCHARGE NOTE PROVIDER - NSDCCAREPROVSEEN_GEN_ALL_CORE_FT
[facility], 5M Care Model Team A

## 2019-11-08 NOTE — CONSULT NOTE ADULT - ASSESSMENT
53yo male with right lip and facial swelling. Indirect laryngoscopy shows right BOT and epiglottic edema. No arytenoid or vocal cord edema noted. Airway patent. Pachydermia also noted, likely due to GERD. 55yo male with right lip and facial swelling. Indirect laryngoscopy shows epiglottic edema and erythema. Possible supraglottitis. No arytenoid or vocal cord edema noted. Airway patent. Pachydermia also noted, likely due to GERD. 55yo male with right lip and facial swelling. Indirect laryngoscopy shows epiglottic edema and erythema. Possible supraglottitis. No arytenoid or vocal cord edema noted. Airway patent. Pachydermia also noted, likely due to GERD. Plan to rescope in afternoon.

## 2019-11-08 NOTE — CONSULT NOTE ADULT - ATTENDING COMMENTS
clinical history consistent with angioedema either bradykinin mediated, allergic or hereditary.  However, swelling on scope exam is consistent with infectious supraglottitis appearance.  Given presence of airway edema would treat for both as described above.  Pt needs continuous pulse ox and should be maintained on clears only until we scope again later this afternoon.  If repeat scope shows no progression or improvement can resume diet and c/w treatment.  If repeat scope shows progression of airway edema, will make NPO and recommend MICU eval.

## 2019-11-08 NOTE — CHART NOTE - NSCHARTNOTEFT_GEN_A_CORE
Patient is a 54y old  Male who presents with a chief complaint of Alcohol withdrawal (07 Nov 2019 12:10)      Vital Signs Last 24 Hrs  T(C): 36.5 (08 Nov 2019 05:00), Max: 36.8 (07 Nov 2019 10:55)  T(F): 97.7 (08 Nov 2019 05:00), Max: 98.2 (07 Nov 2019 10:55)  HR: 67 (08 Nov 2019 05:00) (67 - 77)  BP: 154/97 (08 Nov 2019 05:00) (131/74 - 154/97)  BP(mean): --  RR: 18 (08 Nov 2019 05:00) (18 - 18)  SpO2: 94% (08 Nov 2019 05:00) (94% - 96%)      Labs:                          17.2   7.78  )-----------( 182      ( 06 Nov 2019 21:20 )             49.6     11-06    135  |  97  |  4<L>  ----------------------------<  88  3.6   |  22  |  0.80    Ca    9.3      06 Nov 2019 21:20    TPro  7.8  /  Alb  4.2  /  TBili  0.3  /  DBili  x   /  AST  77<H>  /  ALT  47<H>  /  AlkPhos  133<H>  11-06          Physical Exam:  General: WN/WD NAD  Neurology: A&Ox3, nonfocal, FRYE x 4  Head:  Normocephalic, atraumatic, lips and tongue mildly swollen and lightly blue  Respiratory: CTA B/L  CV: RRR, S1S2, no murmur  Abdominal: Soft, NT, ND no palpable mass  MSK: No edema, + peripheral pulses, FROM all 4 extremity    Assessment & Plan:  HPI:  55yo M w/pmhx of HTN, FAP s/p colectomy, ETOH with hx of withdrawal seizures now presenting with ETOH withdrawal in the setting of 2 days of attempting to cut down ETOH use. Patient reports that at baseline he drinks "5-6" drinks daily-however these drinks are glasses of vodka or whiskey and not single shots that he mixes with some mixers. In addition patient admits to drinking significantly more on weekends though there is no specific number. Patient reports that with his attempt to cut down he had significant insomnia and then today began to feel very shaky and tremulous. He also had intermittent nausea. Because of his worsening tremulousness today he became worried that he was going into withdrawal and was worried about seizing. Patient then drank about 6 glasses of wine to self treat the symptoms and then came to the ED for further help. Denies fevers/chills. Some mild chronic loose stools s/p colectomy but well controlled. No vomiting today. No palpitations or chest pain or sob. (07 Nov 2019 04:06)      Problem #1- Facial and lip swelling   - Rn to [place]- Pt is c/o swollen lips   - Pt reports he has swelling in the face and throat 1-2x a month for unknown reasons. Swelling proceeds to get worse to the point where he finds it difficult to breathe and then alleviates on it's own. Pt has seen his PMD multiple times for this issue with no resolution. Pt at this moment is able to breathe normally.   - D/w hospitalist- stat IV benadryl and call for ENT consult  - ENT consult called  - monitor Pt's breathing and facial swelling status  - F/u with morning team in AM    TAWANDA Kowalski  [id number]

## 2019-11-08 NOTE — CONSULT NOTE ADULT - PROBLEM SELECTOR RECOMMENDATION 9
- decadron 10mg q 8h  - benadryl 50mg bid  - pepcid 40mg bid  - HOB elevation  - call ENT [id number] if symptoms worsen or pt develops stridor - ceftriaxone and azithromycin   - decadron 10mg q 8h  - benadryl 50mg bid  - pepcid 40mg bid  - continuous pulse ox   - HOB elevation  - call ENT [id number] if symptoms worsen or pt develops stridor

## 2019-11-08 NOTE — PROGRESS NOTE ADULT - PROBLEM SELECTOR PLAN 3
Losartan 100mg po daily  Metoprolol 25mg er daily  Amlodipine 10mg po daily
- noted on fiberoptic Indirect laryngoscopy  - start Ceftriaxone, Azithromycin per ENT reccs  - for transfer to Medical floor with continous pulse oximetry capability as above

## 2019-11-08 NOTE — DISCHARGE NOTE PROVIDER - NSDCMRMEDTOKEN_GEN_ALL_CORE_FT
amLODIPine 10 mg oral tablet: 1 tab(s) orally once a day  Augmentin 875 mg-125 mg oral tablet: 1 tab(s) orally every 12 hours  hydroCHLOROthiazide 25 mg oral tablet: 1 tab(s) orally once a day  metoprolol succinate 25 mg oral tablet, extended release: 1 tab(s) orally once a day  predniSONE 20 mg oral tablet: 2 tab(s) orally once a day x 7 days   Zithromax 500 mg oral tablet: 1 tab(s) orally once a day x 7 days

## 2019-11-08 NOTE — DISCHARGE NOTE PROVIDER - HOSPITAL COURSE
Pt was transferred to [address] [place] [address] for observation following angioedema and EtOH withdrawal. Pt left AMA and was discharged on 7-day course of abx and 3-day course of steroids with outpatient follow-up with ENT. 53yo M w/pmhx of HTN, FAP s/p colectomy, ETOH with hx of withdrawal seizures now presenting with ETOH withdrawal in the setting of 2 days of attempting to cut down ETOH use.         Admitted for ETOH withdrawal; placed on CIWA protocol. Found to have angioedema & supraglotitis on scope by ENT; anti-inflammatory & antibiotics started as per ENT reccs. Patient txfrd to [address] [place] [address] for observation and continuous pulse ox monitoring.        Pt was transferred to [address] [place] [address] for observation following angioedema and EtOH withdrawal. Pt left AMA and was discharged on 7-day course of abx and 3-day course of steroids with outpatient follow-up with ENT.

## 2019-11-08 NOTE — DISCHARGE NOTE PROVIDER - CARE PROVIDER_API CALL
ENT,   Otolaryngology (ENT)  [address] [place] [address]  Phone: [phone]  Fax: (   )    -  Follow Up Time: 1 week

## 2019-11-08 NOTE — CHART NOTE - NSCHARTNOTEFT_GEN_A_CORE
Patient c/o facial swelling overnight; seen by ENT; dx with angioedema  ENT consult appreciated    55yo M w/pmhx of HTN, FAP s/p colectomy, ETOH with hx of withdrawal seizures now presenting with ETOH withdrawal in the setting of 2 days of attempting to cut down ETOH use.    Denies SOB, difficulty swallowing    P/E:  AA&O, conversant & appropriate, no focal neuro deficits  LCTA B/L; No dyspnea observed    Vital Signs Last 24 Hrs  T(C): 36.7 (08 Nov 2019 10:47), Max: 36.7 (08 Nov 2019 10:47)  T(F): 98.1 (08 Nov 2019 10:47), Max: 98.1 (08 Nov 2019 10:47)  HR: 70 (08 Nov 2019 10:47) (67 - 77)  BP: 132/89 (08 Nov 2019 10:47) (124/36 - 154/97)  BP(mean): --  RR: 18 (08 Nov 2019 10:47) (18 - 18)  SpO2: 94% (08 Nov 2019 10:47) (94% - 96%) on Room air    A/P:  Angioedema- Decadron x24H; GI prophylaxis; & benadryl  Supraglotitis- Ceftriaxone & Zmax started at ENT reccs  ETOH w/d on CIWA protocol  HTN- Losartan dc'd; HCTZ to start 11/9  Continuous pulse Ox    Above d/w Med Attdg covering today (Dr Mark) and pt's wife at bedside    NP Deejay [id number]

## 2019-11-08 NOTE — DISCHARGE NOTE NURSING/CASE MANAGEMENT/SOCIAL WORK - NSDCPEEMAIL_GEN_ALL_CORE
[facility] for Tobacco Control email [email or web address]

## 2019-11-08 NOTE — CONSULT NOTE ADULT - PROBLEM SELECTOR RECOMMENDATION 2
- recommend PPI  - reflux precautions (avoid spicy foods and caffeine, sit upright for 3 hours after eating, etc)

## 2019-11-08 NOTE — CHART NOTE - NSCHARTNOTEFT_GEN_A_CORE
Repeat Laryngoscopy 4pm:  Patient was unable to cooperate with mirror.  Nasopharynx, oropharynx, and hypopharynx clear, no bleeding. Improved epiglottic edema and erythema from this am - almost resolved. Posterior pharyngeal wall, vallecula, and subglottis appear normal. + pachydermia (likely due to GERD). No other erythema, edema, pooling of secretions, masses or lesions. Airway patent, no foreign body visualized. True vocal cords, arytenoids, vestibular folds, ventricles, pyriform sinuses, and aryepiglottic folds appear normal bilaterally. Vocal cords mobile with good contact b/l.    Scope improved from this morning and afternoon. Continue abx and decadron as pt is responding well. - plan discussed with team

## 2019-11-08 NOTE — CONSULT NOTE ADULT - SUBJECTIVE AND OBJECTIVE BOX
CC: lip welling, sensation of throat closing    HPI: 55yo male with PMHx HTN, FAP s/p colectomy, ETOH with hx of withdrawal seizures, admitted for ETOH withdrawal. ENT called for airway evaluation and lip swelling. Pt states he feels like the right side of his face and lip are swollen. He states he gets these episodes of lip and facial swelling every 2 months, treated with meds from PCP ([facility] name of meds). He denies any known allergies. He states his PCP has changed his medications a few times, but he continues to experience these episodes. He currently admits to mild hoarseness, but denies any pain, difficulty breathing or noisy breathing. He is tolerating a regular diet. Pt denies fever, chills, n/v, HA, SOB, dysphagia, odynophagia, hemoptysis, unintentional weight loss.     PAST MEDICAL & SURGICAL HISTORY:  FAP (familial adenomatous polyposis)  HTN (hypertension)  H/O colectomy    Allergies    No Known Allergies    Intolerances      MEDICATIONS  (STANDING):  amLODIPine   Tablet 10 milliGRAM(s) Oral daily  famotidine Injectable 20 milliGRAM(s) IV Push once  LORazepam   Injectable 1.5 milliGRAM(s) IV Push every 4 hours  LORazepam   Injectable   IV Push   losartan 100 milliGRAM(s) Oral daily  melatonin 3 milliGRAM(s) Oral at bedtime  metoprolol succinate ER 25 milliGRAM(s) Oral daily  multivitamin/thiamine/folic acid in sodium chloride 0.9% 1000 milliLiter(s) (100 mL/Hr) IV Continuous <Continuous>  predniSONE   Tablet 10 milliGRAM(s) Oral every 8 hours  thiamine 100 milliGRAM(s) Oral daily    MEDICATIONS  (PRN):  LORazepam   Injectable 2 milliGRAM(s) IV Push every 2 hours PRN CIWA-Ar score increase by 2 points and a total score of 7 or less  LORazepam   Injectable 2 milliGRAM(s) IV Push every 1 hour PRN CIWA-Ar score 8 or greater      Social History: ETOH abuse    Family history: Pt denies any significant family history     ROS:   ENT: all negative except as noted in HPI   CV: denies palpitations  Pulm: denies SOB, cough, hemoptysis  GI: denies change in apetite, indigestion, n/v  : denies pertinent urinary symptoms, urgency  Neuro: denies numbness/tingling, loss of sensation  Psych: denies anxiety  MS: denies muscle weakness, instability  Heme: denies easy bruising or bleeding  Endo: denies heat/cold intolerance, excessive sweating  Vascular: denies LE edema    Vital Signs Last 24 Hrs  T(C): 36.5 (08 Nov 2019 05:00), Max: 36.8 (07 Nov 2019 10:55)  T(F): 97.7 (08 Nov 2019 05:00), Max: 98.2 (07 Nov 2019 10:55)  HR: 67 (08 Nov 2019 05:00) (67 - 77)  BP: 154/97 (08 Nov 2019 05:00) (131/74 - 154/97)  BP(mean): --  RR: 18 (08 Nov 2019 05:00) (18 - 18)  SpO2: 94% (08 Nov 2019 05:00) (94% - 96%)                          17.2   7.78  )-----------( 182      ( 06 Nov 2019 21:20 )             49.6    11-06    135  |  97  |  4<L>  ----------------------------<  88  3.6   |  22  |  0.80    Ca    9.3      06 Nov 2019 21:20    TPro  7.8  /  Alb  4.2  /  TBili  0.3  /  DBili  x   /  AST  77<H>  /  ALT  47<H>  /  AlkPhos  133<H>  11-06       PHYSICAL EXAM:  Gen: NAD  Skin: No rashes, bruises, or lesions  Head: Normocephalic, Atraumatic  Face: + mild edema of right side of face. No erythema, or fluctuance. Parotid glands soft without mass  Eyes: no scleral injection  Nose: Nares bilaterally patent, no discharge  Mouth: + mild edema of right upper lip. No Stridor / Drooling / Trismus. No FOM edema. Mucosa dry, tongue/uvula midline without edema, oropharynx clear, FOM soft  Neck: Flat, supple, no lymphadenopathy, trachea midline, no masses  Lymphatic: No lymphadenopathy  Resp: breathing easily, no stridor  CV: no peripheral edema/cyanosis  GI: nondistended   Peripheral vascular: no JVD or edema  Neuro: facial nerve intact, no obvious facial droop      Fiberoptic Indirect laryngoscopy:  (Scope #2 used)  Reason for Laryngoscopy: airway evaluation    Patient was unable to cooperate with mirror.  Nasopharynx, oropharynx, and hypopharynx clear, no bleeding. + right epiglottic edema and right BOT edema. Posterior pharyngeal wall, vallecula, and subglottis appear normal. + pachydermia (likely due to GERD). No other erythema, edema, pooling of secretions, masses or lesions. Airway patent, no foreign body visualized. True vocal cords, arytenoids, vestibular folds, ventricles, pyriform sinuses, and aryepiglottic folds appear normal bilaterally. Vocal cords mobile with good contact b/l. CC: lip welling, sensation of throat closing    HPI: 55yo male with PMHx HTN, FAP s/p colectomy, ETOH with hx of withdrawal seizures, admitted for ETOH withdrawal. ENT called for airway evaluation and lip swelling. Pt states he feels like the right side of his face and lip are swollen. He states he gets these episodes of lip and facial swelling every 2 months, treated with meds from PCP ([facility] name of meds). He denies any known allergies. He states his PCP has changed his medications a few times, but he continues to experience these episodes. He currently admits to mild hoarseness, but denies any pain, difficulty breathing or noisy breathing. He is tolerating a regular diet. Pt denies fever, chills, n/v, HA, SOB, dysphagia, odynophagia, hemoptysis, unintentional weight loss.     PAST MEDICAL & SURGICAL HISTORY:  FAP (familial adenomatous polyposis)  HTN (hypertension)  H/O colectomy    Allergies    No Known Allergies    Intolerances      MEDICATIONS  (STANDING):  amLODIPine   Tablet 10 milliGRAM(s) Oral daily  famotidine Injectable 20 milliGRAM(s) IV Push once  LORazepam   Injectable 1.5 milliGRAM(s) IV Push every 4 hours  LORazepam   Injectable   IV Push   losartan 100 milliGRAM(s) Oral daily  melatonin 3 milliGRAM(s) Oral at bedtime  metoprolol succinate ER 25 milliGRAM(s) Oral daily  multivitamin/thiamine/folic acid in sodium chloride 0.9% 1000 milliLiter(s) (100 mL/Hr) IV Continuous <Continuous>  predniSONE   Tablet 10 milliGRAM(s) Oral every 8 hours  thiamine 100 milliGRAM(s) Oral daily    MEDICATIONS  (PRN):  LORazepam   Injectable 2 milliGRAM(s) IV Push every 2 hours PRN CIWA-Ar score increase by 2 points and a total score of 7 or less  LORazepam   Injectable 2 milliGRAM(s) IV Push every 1 hour PRN CIWA-Ar score 8 or greater      Social History: ETOH abuse    Family history: Pt denies any significant family history     ROS:   ENT: all negative except as noted in HPI   CV: denies palpitations  Pulm: denies SOB, cough, hemoptysis  GI: denies change in apetite, indigestion, n/v  : denies pertinent urinary symptoms, urgency  Neuro: denies numbness/tingling, loss of sensation  Psych: denies anxiety  MS: denies muscle weakness, instability  Heme: denies easy bruising or bleeding  Endo: denies heat/cold intolerance, excessive sweating  Vascular: denies LE edema    Vital Signs Last 24 Hrs  T(C): 36.5 (08 Nov 2019 05:00), Max: 36.8 (07 Nov 2019 10:55)  T(F): 97.7 (08 Nov 2019 05:00), Max: 98.2 (07 Nov 2019 10:55)  HR: 67 (08 Nov 2019 05:00) (67 - 77)  BP: 154/97 (08 Nov 2019 05:00) (131/74 - 154/97)  BP(mean): --  RR: 18 (08 Nov 2019 05:00) (18 - 18)  SpO2: 94% (08 Nov 2019 05:00) (94% - 96%)                          17.2   7.78  )-----------( 182      ( 06 Nov 2019 21:20 )             49.6    11-06    135  |  97  |  4<L>  ----------------------------<  88  3.6   |  22  |  0.80    Ca    9.3      06 Nov 2019 21:20    TPro  7.8  /  Alb  4.2  /  TBili  0.3  /  DBili  x   /  AST  77<H>  /  ALT  47<H>  /  AlkPhos  133<H>  11-06       PHYSICAL EXAM:  Gen: NAD  Skin: No rashes, bruises, or lesions  Head: Normocephalic, Atraumatic  Face: + mild edema of right side of face. No erythema, or fluctuance. Parotid glands soft without mass  Eyes: no scleral injection  Nose: Nares bilaterally patent, no discharge  Mouth: + mild edema of right upper lip. No Stridor / Drooling / Trismus. No FOM edema. Mucosa dry, tongue/uvula midline without edema, oropharynx clear, FOM soft  Neck: Flat, supple, no lymphadenopathy, trachea midline, no masses  Lymphatic: No lymphadenopathy  Resp: breathing easily, no stridor  CV: no peripheral edema/cyanosis  GI: nondistended   Peripheral vascular: no JVD or edema  Neuro: facial nerve intact, no obvious facial droop      Fiberoptic Indirect laryngoscopy:  (Scope #2 used)  Reason for Laryngoscopy: airway evaluation    Patient was unable to cooperate with mirror.  Nasopharynx, oropharynx, and hypopharynx clear, no bleeding. + epiglottic edema and erythema, + scattered purulence. Posterior pharyngeal wall, vallecula, and subglottis appear normal. + pachydermia (likely due to GERD). No other erythema, edema, pooling of secretions, masses or lesions. Airway patent, no foreign body visualized. True vocal cords, arytenoids, vestibular folds, ventricles, pyriform sinuses, and aryepiglottic folds appear normal bilaterally. Vocal cords mobile with good contact b/l. CC: lip welling, sensation of throat closing    HPI: 53yo male with PMHx HTN, FAP s/p colectomy, ETOH with hx of withdrawal seizures, admitted for ETOH withdrawal. ENT called for airway evaluation and lip swelling. Pt states he feels like the right side of his face and lip are swollen. He states he gets these episodes of lip and facial swelling every 2 months, treated with meds from PCP ([facility] name of meds). He denies any known allergies. He states his PCP has changed his medications a few times, but he continues to experience these episodes. He currently admits to mild hoarseness, but denies any pain, difficulty breathing or noisy breathing. He is tolerating a regular diet. Pt denies fever, chills, n/v, HA, SOB, dysphagia, odynophagia, hemoptysis, unintentional weight loss.     PAST MEDICAL & SURGICAL HISTORY:  FAP (familial adenomatous polyposis)  HTN (hypertension)  H/O colectomy    Allergies    No Known Allergies    Intolerances      MEDICATIONS  (STANDING):  amLODIPine   Tablet 10 milliGRAM(s) Oral daily  famotidine Injectable 20 milliGRAM(s) IV Push once  LORazepam   Injectable 1.5 milliGRAM(s) IV Push every 4 hours  LORazepam   Injectable   IV Push   losartan 100 milliGRAM(s) Oral daily  melatonin 3 milliGRAM(s) Oral at bedtime  metoprolol succinate ER 25 milliGRAM(s) Oral daily  multivitamin/thiamine/folic acid in sodium chloride 0.9% 1000 milliLiter(s) (100 mL/Hr) IV Continuous <Continuous>  predniSONE   Tablet 10 milliGRAM(s) Oral every 8 hours  thiamine 100 milliGRAM(s) Oral daily    MEDICATIONS  (PRN):  LORazepam   Injectable 2 milliGRAM(s) IV Push every 2 hours PRN CIWA-Ar score increase by 2 points and a total score of 7 or less  LORazepam   Injectable 2 milliGRAM(s) IV Push every 1 hour PRN CIWA-Ar score 8 or greater      Social History: ETOH abuse    Family history: Pt denies any significant family history     ROS:   ENT: all negative except as noted in HPI   CV: denies palpitations  Pulm: denies SOB, cough, hemoptysis  GI: denies change in apetite, indigestion, n/v  : denies pertinent urinary symptoms, urgency  Neuro: denies numbness/tingling, loss of sensation  Psych: denies anxiety  MS: denies muscle weakness, instability  Heme: denies easy bruising or bleeding  Endo: denies heat/cold intolerance, excessive sweating  Vascular: denies LE edema    Vital Signs Last 24 Hrs  T(C): 36.5 (08 Nov 2019 05:00), Max: 36.8 (07 Nov 2019 10:55)  T(F): 97.7 (08 Nov 2019 05:00), Max: 98.2 (07 Nov 2019 10:55)  HR: 67 (08 Nov 2019 05:00) (67 - 77)  BP: 154/97 (08 Nov 2019 05:00) (131/74 - 154/97)  BP(mean): --  RR: 18 (08 Nov 2019 05:00) (18 - 18)  SpO2: 94% (08 Nov 2019 05:00) (94% - 96%)                          17.2   7.78  )-----------( 182      ( 06 Nov 2019 21:20 )             49.6    11-06    135  |  97  |  4<L>  ----------------------------<  88  3.6   |  22  |  0.80    Ca    9.3      06 Nov 2019 21:20    TPro  7.8  /  Alb  4.2  /  TBili  0.3  /  DBili  x   /  AST  77<H>  /  ALT  47<H>  /  AlkPhos  133<H>  11-06       PHYSICAL EXAM:  Gen: NAD  Skin: No rashes, bruises, or lesions  Head: Normocephalic, Atraumatic  Face: + mild edema of right side of face. No erythema, or fluctuance. Parotid glands soft without mass  Eyes: no scleral injection  Nose: Nares bilaterally patent, no discharge  Mouth: + mild edema of right upper lip. No Stridor / Drooling / Trismus. No FOM edema. Mucosa dry, tongue/uvula midline without edema, oropharynx clear, FOM soft  Neck: Flat, supple, no lymphadenopathy, trachea midline, no masses  Lymphatic: No lymphadenopathy  Resp: breathing easily, no stridor  CV: no peripheral edema/cyanosis  GI: nondistended   Peripheral vascular: no JVD or edema  Neuro: facial nerve intact, no obvious facial droop      Fiberoptic Indirect laryngoscopy:  (Scope #2 used)  Reason for Laryngoscopy: airway evaluation    Patient was unable to cooperate with mirror.  Nasopharynx, oropharynx, and hypopharynx clear, no bleeding. + mild epiglottic edema and erythema, + scattered purulence. Posterior pharyngeal wall, vallecula, and subglottis appear normal. + pachydermia (likely due to GERD). No other erythema, edema, pooling of secretions, masses or lesions. Airway patent, no foreign body visualized. True vocal cords, arytenoids, vestibular folds, ventricles, pyriform sinuses, and aryepiglottic folds appear normal bilaterally. Vocal cords mobile with good contact b/l.

## 2019-11-08 NOTE — PROGRESS NOTE ADULT - PROBLEM SELECTOR PLAN 4
RLE toe numbness - good blood flow, warn. Likely neuropathy due to ETOH and/or vitamin deficiencies, check B12, folate, TSH and vitamin D
s/p colectomy  doing well, monitor for diarrhea

## 2019-11-08 NOTE — PROGRESS NOTE ADULT - SUBJECTIVE AND OBJECTIVE BOX
Patient is a 54y old  Male who presents with a chief complaint of Alcohol withdrawal (08 Nov 2019 07:37)      SUBJECTIVE / OVERNIGHT EVENTS:    MEDICATIONS  (STANDING):  amLODIPine   Tablet 10 milliGRAM(s) Oral daily  azithromycin  IVPB      cefTRIAXone   IVPB      dexAMETHasone  IVPB 10 milliGRAM(s) IV Intermittent every 8 hours  famotidine    Tablet 20 milliGRAM(s) Oral two times a day  LORazepam   Injectable 1.5 milliGRAM(s) IV Push every 4 hours  LORazepam   Injectable   IV Push   melatonin 3 milliGRAM(s) Oral at bedtime  metoprolol succinate ER 25 milliGRAM(s) Oral daily  multivitamin/thiamine/folic acid in sodium chloride 0.9% 1000 milliLiter(s) (100 mL/Hr) IV Continuous <Continuous>  potassium chloride    Tablet ER 40 milliEquivalent(s) Oral once  thiamine 100 milliGRAM(s) Oral daily    MEDICATIONS  (PRN):  diphenhydrAMINE   Injectable 25 milliGRAM(s) IV Push every 6 hours PRN Allergy symptoms  LORazepam   Injectable 2 milliGRAM(s) IV Push every 2 hours PRN CIWA-Ar score increase by 2 points and a total score of 7 or less  LORazepam   Injectable 2 milliGRAM(s) IV Push every 1 hour PRN CIWA-Ar score 8 or greater      Vital Signs Last 24 Hrs  T(C): 36.7 (08 Nov 2019 10:47), Max: 36.8 (07 Nov 2019 10:55)  T(F): 98.1 (08 Nov 2019 10:47), Max: 98.2 (07 Nov 2019 10:55)  HR: 70 (08 Nov 2019 10:47) (67 - 77)  BP: 132/89 (08 Nov 2019 10:47) (124/36 - 154/97)  BP(mean): --  RR: 18 (08 Nov 2019 10:47) (18 - 18)  SpO2: 94% (08 Nov 2019 10:47) (94% - 96%)  CAPILLARY BLOOD GLUCOSE        I&O's Summary    07 Nov 2019 07:01  -  08 Nov 2019 07:00  --------------------------------------------------------  IN: 1480 mL / OUT: 2 mL / NET: 1478 mL        PHYSICAL EXAM:  GENERAL: NAD, well-developed  HEAD:  Atraumatic, Normocephalic  EYES: EOMI, PERRLA, conjunctiva and sclera clear  NECK: Supple, No JVD  CHEST/LUNG: Clear to auscultation bilaterally; No wheeze  HEART: Regular rate and rhythm; No murmurs, rubs, or gallops  ABDOMEN: Soft, Nontender, Nondistended; Bowel sounds present  EXTREMITIES:  2+ Peripheral Pulses, No clubbing, cyanosis, or edema  PSYCH: AAOx3  NEUROLOGY: non-focal  SKIN: No rashes or lesions    LABS:                        17.2   7.78  )-----------( 182      ( 06 Nov 2019 21:20 )             49.6     11-08    135  |  103  |  12  ----------------------------<  89  3.2<L>   |  21<L>  |  0.73    Ca    9.4      08 Nov 2019 07:41  Phos  3.3     11-08  Mg     1.3     11-08    TPro  6.9  /  Alb  3.5  /  TBili  0.4  /  DBili  x   /  AST  51<H>  /  ALT  36  /  AlkPhos  102  11-08              RADIOLOGY & ADDITIONAL TESTS:    Imaging Personally Reviewed:    Consultant(s) Notes Reviewed:      Care Discussed with Consultants/Other Providers: Patient is a 54y old  Male who presents with a chief complaint of Alcohol withdrawal (08 Nov 2019 07:37)      SUBJECTIVE / OVERNIGHT EVENTS:  Pt seen and examined. No acute events overnight. He reports improvement in lip and tongue swelling. Denies sob. Seen by ENT ; found to have supraglottitis on indirect laryngoscopy. Reccs that pt should be on continuous pulse oximetry.    MEDICATIONS  (STANDING):  amLODIPine   Tablet 10 milliGRAM(s) Oral daily  azithromycin  IVPB      cefTRIAXone   IVPB      dexAMETHasone  IVPB 10 milliGRAM(s) IV Intermittent every 8 hours  famotidine    Tablet 20 milliGRAM(s) Oral two times a day  LORazepam   Injectable 1.5 milliGRAM(s) IV Push every 4 hours  LORazepam   Injectable   IV Push   melatonin 3 milliGRAM(s) Oral at bedtime  metoprolol succinate ER 25 milliGRAM(s) Oral daily  multivitamin/thiamine/folic acid in sodium chloride 0.9% 1000 milliLiter(s) (100 mL/Hr) IV Continuous <Continuous>  potassium chloride    Tablet ER 40 milliEquivalent(s) Oral once  thiamine 100 milliGRAM(s) Oral daily    MEDICATIONS  (PRN):  diphenhydrAMINE   Injectable 25 milliGRAM(s) IV Push every 6 hours PRN Allergy symptoms  LORazepam   Injectable 2 milliGRAM(s) IV Push every 2 hours PRN CIWA-Ar score increase by 2 points and a total score of 7 or less  LORazepam   Injectable 2 milliGRAM(s) IV Push every 1 hour PRN CIWA-Ar score 8 or greater      Vital Signs Last 24 Hrs  T(C): 36.7 (08 Nov 2019 10:47), Max: 36.8 (07 Nov 2019 10:55)  T(F): 98.1 (08 Nov 2019 10:47), Max: 98.2 (07 Nov 2019 10:55)  HR: 70 (08 Nov 2019 10:47) (67 - 77)  BP: 132/89 (08 Nov 2019 10:47) (124/36 - 154/97)  BP(mean): --  RR: 18 (08 Nov 2019 10:47) (18 - 18)  SpO2: 94% (08 Nov 2019 10:47) (94% - 96%)  CAPILLARY BLOOD GLUCOSE        I&O's Summary    07 Nov 2019 07:01  -  08 Nov 2019 07:00  --------------------------------------------------------  IN: 1480 mL / OUT: 2 mL / NET: 1478 mL        PHYSICAL EXAM:  GENERAL: NAD, anicteric, afebrile  HEAD:  Atraumatic, Normocephalic  EYES: EOMI, PERRLA, conjunctiva and sclera clear  ENT : +tongue fasciculations  NECK: Supple, No JVD  CHEST/LUNG: Clear to auscultation bilaterally; No wheeze, no stridor  HEART: Regular rate and rhythm; No murmurs, rubs, or gallops  ABDOMEN: Soft, Nontender, Nondistended; Bowel sounds present  EXTREMITIES:  2+ Peripheral Pulses, No clubbing, cyanosis, or edema  PSYCH: AAOx3  NEUROLOGY: non-focal , mild tremors  SKIN: No rashes or lesions    LABS:                        17.2   7.78  )-----------( 182      ( 06 Nov 2019 21:20 )             49.6     11-08    135  |  103  |  12  ----------------------------<  89  3.2<L>   |  21<L>  |  0.73    Ca    9.4      08 Nov 2019 07:41  Phos  3.3     11-08  Mg     1.3     11-08    TPro  6.9  /  Alb  3.5  /  TBili  0.4  /  DBili  x   /  AST  51<H>  /  ALT  36  /  AlkPhos  102  11-08                Consultant(s) Notes Reviewed:  ENT ; found to have supraglottitis on indirect laryngoscopy. Reccs that pt should be on continuous pulse oximetry.

## 2019-11-08 NOTE — PROGRESS NOTE ADULT - PROBLEM SELECTOR PLAN 1
DISPLAY PLAN FREE TEXT
Pt with alcohol withdrawal despite high alcohol level in blood  started on standing ativan taper as well as symptom triggered ativan  social work consult still pending
Pt with alcohol withdrawal despite high alcohol level in blood  c/w  ativan taper as well as symptom triggered ativan  social work consult to facilitate with Etoh cessation program

## 2019-11-08 NOTE — DISCHARGE NOTE PROVIDER - PROVIDER TOKENS
FREE:[LAST:[ENT],PHONE:[phone]],FAX:[(   [phone]    -],ADDRESS:[Otolaryngology (ENT)  [address] [place] [address]],FOLLOWUP:[1 week]]

## 2019-11-08 NOTE — DISCHARGE NOTE NURSING/CASE MANAGEMENT/SOCIAL WORK - PATIENT PORTAL LINK FT
You can access the FollowMyHealth Patient Portal offered by [facility] by registering at the following website: [email or web address]. By joining [facility]’s FollowMyHealth portal, you will also be able to view your health information using other applications (apps) compatible with our system.

## 2019-11-08 NOTE — PROGRESS NOTE ADULT - PROBLEM SELECTOR PLAN 2
s/p colectomy  doing well, monitor for diarrhea
-  lip, tongue swelling much improved  - ? 2/2 ARB ; Losartan d/samuel  - ENT reccs appreciated  - c/w decadron  - for transfer to Medical floor with continous pulse oximetry capability per ENT reccs

## 2019-11-08 NOTE — DISCHARGE NOTE NURSING/CASE MANAGEMENT/SOCIAL WORK - NSDCPEWEB_GEN_ALL_CORE
[place] website --- [email or web address] Tobacco [email or web address] website --- [email or web address]

## 2019-11-08 NOTE — DISCHARGE NOTE NURSING/CASE MANAGEMENT/SOCIAL WORK - NSFLUVACAGEDISCH_IMM_ALL_CORE
Health Maintenance Summary     Topic Due On Due Status Completed On    MAMMOGRAM - BREAST CANCER SCREENING Nov 18, 2018 Not Due Nov 18, 2016    Colorectal Cancer Screening - Colonoscopy May 14, 2024 Not Due May 14, 2014    Pap Smear - Cervical Cancer Screening  Mar 3, 2021 Not Due Mar 3, 2016    Immunization - TDAP Pregnancy  Hidden     IMMUNIZATION - DTaP/Tdap/Td Jul 21, 2020 Not Due Jul 21, 2010    Immunization-Influenza  Completed Nov 1, 2016          Patient is up to date, no discussion needed .       Adult

## 2019-11-08 NOTE — PROGRESS NOTE ADULT - ASSESSMENT
53yo M w/pmhx of HTN, FAP s/p colectomy, ETOH with hx of withdrawal seizures a/w ETOH withdrawal in the setting of 2 days of attempting to cut down ETOH use.
53yo M w/pmhx of HTN, FAP s/p colectomy, ETOH with hx of withdrawal seizures a/w ETOH withdrawal in the setting of 2 days of attempting to cut down ETOH use.

## 2019-11-08 NOTE — PROVIDER CONTACT NOTE (OTHER) - ASSESSMENT
pt a&ox4. pt c/o of swelling on his lips and mouth as per pt this happens before and as when it did happen it affected his breathing.

## 2021-06-02 NOTE — PATIENT PROFILE ADULT - NSASFALLNEEDSASSISTWITH_GEN_A_NUR
Anesthesia Evaluation     Patient summary reviewed   no history of anesthetic complications:  NPO Solid Status: > 8 hours  NPO Liquid Status: > 8 hours           Airway   Mallampati: III  TM distance: >3 FB  Neck ROM: full  Dental    (+) poor dentition        Pulmonary - normal exam    breath sounds clear to auscultation  (+) a smoker (1 ppd) Current Abstained day of surgery, COPD,   (-) asthma, recent URI, sleep apnea, no home oxygen  Cardiovascular - normal exam  Exercise tolerance: good (4-7 METS)    ECG reviewed  Rhythm: regular  Rate: normal    (-) pacemaker, hypertension, past MI, angina, cardiac stents, CABG      Neuro/Psych  (-) seizures, TIA, CVA  GI/Hepatic/Renal/Endo    (+)  GERD,    (-) liver disease, no renal disease, diabetes    Musculoskeletal     Abdominal    Substance History      OB/GYN          Other                          Anesthesia Plan    ASA 2     MAC     intravenous induction     Anesthetic plan, all risks, benefits, and alternatives have been provided, discussed and informed consent has been obtained with: patient.      
standing

## 2022-09-21 ENCOUNTER — OUTPATIENT (OUTPATIENT)
Dept: OUTPATIENT SERVICES | Facility: HOSPITAL | Age: 57
LOS: 1 days | Discharge: STILL ADMITTED OR ADMINISTRATIVE | End: 2022-09-21

## 2022-09-21 DIAGNOSIS — Z90.49 ACQUIRED ABSENCE OF OTHER SPECIFIED PARTS OF DIGESTIVE TRACT: Chronic | ICD-10-CM

## 2022-09-21 PROCEDURE — 99214 OFFICE O/P EST MOD 30 MIN: CPT | Mod: 95

## 2022-09-21 PROCEDURE — 90839 PSYTX CRISIS INITIAL 60 MIN: CPT | Mod: 95

## 2022-09-22 DIAGNOSIS — F10.20 ALCOHOL DEPENDENCE, UNCOMPLICATED: ICD-10-CM

## 2022-10-27 NOTE — CHART NOTE - NSCHARTNOTEFT_GEN_A_CORE
Impression: Age-related nuclear cataract, bilateral: H25.13. Plan: Discussed cataract diagnosis with the patient. Discussed and reviewed treatment options for cataracts. Surgical treatment is required for cataracts. Risks and benefits of surgical treatment were discussed and understood. Patient elects surgical treatment. Recommend surgery OU, OD first. PLAN STD LENS, DISTANCE AIM, NO UPGRADES, NO DINA, PLAN LRI. PATIENT UNDERSTANDS THE NEED FOR GLASSES POST-OP FOR BEST OVER ALL VISION. Discussed limitations to vision post op due to ERM OD, VIT DEGEN, NIDDM,  If first eye doing well, ok to proceed with second eye surgery. Dianne Dong Repeat Laryngoscopy 12pm:   Patient was unable to cooperate with mirror.  Nasopharynx, oropharynx, and hypopharynx clear, no bleeding. + mild epiglottic edema and erythema, + scattered purulence. Posterior pharyngeal wall, vallecula, and subglottis appear normal. + pachydermia (likely due to GERD). No other erythema, edema, pooling of secretions, masses or lesions. Airway patent, no foreign body visualized. True vocal cords, arytenoids, vestibular folds, ventricles, pyriform sinuses, and aryepiglottic folds appear normal bilaterally. Vocal cords mobile with good contact b/l.     Scope is unchanged from this am. Continue abx and decadron treatment Repeat Laryngoscopy 12pm:   Patient was unable to cooperate with mirror.  Nasopharynx, oropharynx, and hypopharynx clear, no bleeding. + mild epiglottic edema and erythema, + scattered purulence. Posterior pharyngeal wall, vallecula, and subglottis appear normal. + pachydermia (likely due to GERD). No other erythema, edema, pooling of secretions, masses or lesions. Airway patent, no foreign body visualized. True vocal cords, arytenoids, vestibular folds, ventricles, pyriform sinuses, and aryepiglottic folds appear normal bilaterally. Vocal cords mobile with good contact b/l.     Scope is unchanged from this am. Continue abx and decadron treatment - Plan discussed with team

## 2022-12-01 NOTE — ED ADULT NURSE NOTE - TEMPLATE LIST FOR HEAD TO TOE ASSESSMENT
Toxicology Number Of Freeze-Thaw Cycles: 2 freeze-thaw cycles Render Post-Care Instructions In Note?: no Consent: The patient's consent was obtained including but not limited to risks of crusting, scabbing, blistering, scarring, darker or lighter pigmentary change, recurrence, incomplete removal and infection. Show Aperture Variable?: Yes Detail Level: Detailed Duration Of Freeze Thaw-Cycle (Seconds): 10 Post-Care Instructions: I reviewed with the patient in detail post-care instructions. Patient is to wear sunprotection, and avoid picking at any of the treated lesions. Pt may apply Vaseline to crusted or scabbing areas.

## 2023-11-29 ENCOUNTER — INPATIENT (INPATIENT)
Facility: HOSPITAL | Age: 58
LOS: 5 days | Discharge: HOME | DRG: 287 | End: 2023-12-05
Attending: STUDENT IN AN ORGANIZED HEALTH CARE EDUCATION/TRAINING PROGRAM | Admitting: STUDENT IN AN ORGANIZED HEALTH CARE EDUCATION/TRAINING PROGRAM
Payer: COMMERCIAL

## 2023-11-29 VITALS
DIASTOLIC BLOOD PRESSURE: 89 MMHG | HEIGHT: 73 IN | RESPIRATION RATE: 16 BRPM | OXYGEN SATURATION: 98 % | HEART RATE: 104 BPM | SYSTOLIC BLOOD PRESSURE: 127 MMHG | TEMPERATURE: 98 F | WEIGHT: 179.9 LBS

## 2023-11-29 DIAGNOSIS — Z90.49 ACQUIRED ABSENCE OF OTHER SPECIFIED PARTS OF DIGESTIVE TRACT: Chronic | ICD-10-CM

## 2023-11-29 PROBLEM — D12.6 BENIGN NEOPLASM OF COLON, UNSPECIFIED: Chronic | Status: ACTIVE | Noted: 2019-11-07

## 2023-11-29 PROBLEM — I10 ESSENTIAL (PRIMARY) HYPERTENSION: Chronic | Status: ACTIVE | Noted: 2019-11-06

## 2023-11-29 LAB
ALBUMIN SERPL ELPH-MCNC: 4.8 G/DL — SIGNIFICANT CHANGE UP (ref 3.3–5)
ALBUMIN SERPL ELPH-MCNC: 4.8 G/DL — SIGNIFICANT CHANGE UP (ref 3.3–5)
ALP SERPL-CCNC: 167 U/L — HIGH (ref 40–120)
ALP SERPL-CCNC: 167 U/L — HIGH (ref 40–120)
ALT FLD-CCNC: 68 U/L — HIGH (ref 10–45)
ALT FLD-CCNC: 68 U/L — HIGH (ref 10–45)
ANION GAP SERPL CALC-SCNC: 19 MMOL/L — HIGH (ref 5–17)
ANION GAP SERPL CALC-SCNC: 19 MMOL/L — HIGH (ref 5–17)
APTT BLD: 33.6 SEC — SIGNIFICANT CHANGE UP (ref 24.5–35.6)
APTT BLD: 33.6 SEC — SIGNIFICANT CHANGE UP (ref 24.5–35.6)
AST SERPL-CCNC: 53 U/L — HIGH (ref 10–40)
AST SERPL-CCNC: 53 U/L — HIGH (ref 10–40)
BASE EXCESS BLDV CALC-SCNC: -4.5 MMOL/L — LOW (ref -2–3)
BASE EXCESS BLDV CALC-SCNC: -4.5 MMOL/L — LOW (ref -2–3)
BASOPHILS # BLD AUTO: 0.11 K/UL — SIGNIFICANT CHANGE UP (ref 0–0.2)
BASOPHILS # BLD AUTO: 0.11 K/UL — SIGNIFICANT CHANGE UP (ref 0–0.2)
BASOPHILS NFR BLD AUTO: 0.9 % — SIGNIFICANT CHANGE UP (ref 0–2)
BASOPHILS NFR BLD AUTO: 0.9 % — SIGNIFICANT CHANGE UP (ref 0–2)
BILIRUB SERPL-MCNC: 0.3 MG/DL — SIGNIFICANT CHANGE UP (ref 0.2–1.2)
BILIRUB SERPL-MCNC: 0.3 MG/DL — SIGNIFICANT CHANGE UP (ref 0.2–1.2)
BLD GP AB SCN SERPL QL: NEGATIVE — SIGNIFICANT CHANGE UP
BLD GP AB SCN SERPL QL: NEGATIVE — SIGNIFICANT CHANGE UP
BUN SERPL-MCNC: 7 MG/DL — SIGNIFICANT CHANGE UP (ref 7–23)
BUN SERPL-MCNC: 7 MG/DL — SIGNIFICANT CHANGE UP (ref 7–23)
CA-I SERPL-SCNC: 1.18 MMOL/L — SIGNIFICANT CHANGE UP (ref 1.15–1.33)
CA-I SERPL-SCNC: 1.18 MMOL/L — SIGNIFICANT CHANGE UP (ref 1.15–1.33)
CALCIUM SERPL-MCNC: 9.7 MG/DL — SIGNIFICANT CHANGE UP (ref 8.4–10.5)
CALCIUM SERPL-MCNC: 9.7 MG/DL — SIGNIFICANT CHANGE UP (ref 8.4–10.5)
CHLORIDE BLDV-SCNC: 108 MMOL/L — SIGNIFICANT CHANGE UP (ref 96–108)
CHLORIDE BLDV-SCNC: 108 MMOL/L — SIGNIFICANT CHANGE UP (ref 96–108)
CHLORIDE SERPL-SCNC: 107 MMOL/L — SIGNIFICANT CHANGE UP (ref 96–108)
CHLORIDE SERPL-SCNC: 107 MMOL/L — SIGNIFICANT CHANGE UP (ref 96–108)
CO2 BLDV-SCNC: 22 MMOL/L — SIGNIFICANT CHANGE UP (ref 22–26)
CO2 BLDV-SCNC: 22 MMOL/L — SIGNIFICANT CHANGE UP (ref 22–26)
CO2 SERPL-SCNC: 19 MMOL/L — LOW (ref 22–31)
CO2 SERPL-SCNC: 19 MMOL/L — LOW (ref 22–31)
CREAT SERPL-MCNC: 0.73 MG/DL — SIGNIFICANT CHANGE UP (ref 0.5–1.3)
CREAT SERPL-MCNC: 0.73 MG/DL — SIGNIFICANT CHANGE UP (ref 0.5–1.3)
EGFR: 105 ML/MIN/1.73M2 — SIGNIFICANT CHANGE UP
EGFR: 105 ML/MIN/1.73M2 — SIGNIFICANT CHANGE UP
EOSINOPHIL # BLD AUTO: 0.22 K/UL — SIGNIFICANT CHANGE UP (ref 0–0.5)
EOSINOPHIL # BLD AUTO: 0.22 K/UL — SIGNIFICANT CHANGE UP (ref 0–0.5)
EOSINOPHIL NFR BLD AUTO: 1.7 % — SIGNIFICANT CHANGE UP (ref 0–6)
EOSINOPHIL NFR BLD AUTO: 1.7 % — SIGNIFICANT CHANGE UP (ref 0–6)
GAS PNL BLDV: 142 MMOL/L — SIGNIFICANT CHANGE UP (ref 136–145)
GAS PNL BLDV: 142 MMOL/L — SIGNIFICANT CHANGE UP (ref 136–145)
GAS PNL BLDV: SIGNIFICANT CHANGE UP
GLUCOSE BLDV-MCNC: 79 MG/DL — SIGNIFICANT CHANGE UP (ref 70–99)
GLUCOSE BLDV-MCNC: 79 MG/DL — SIGNIFICANT CHANGE UP (ref 70–99)
GLUCOSE SERPL-MCNC: 84 MG/DL — SIGNIFICANT CHANGE UP (ref 70–99)
GLUCOSE SERPL-MCNC: 84 MG/DL — SIGNIFICANT CHANGE UP (ref 70–99)
HCO3 BLDV-SCNC: 21 MMOL/L — LOW (ref 22–29)
HCO3 BLDV-SCNC: 21 MMOL/L — LOW (ref 22–29)
HCT VFR BLD CALC: 49.4 % — SIGNIFICANT CHANGE UP (ref 39–50)
HCT VFR BLD CALC: 49.4 % — SIGNIFICANT CHANGE UP (ref 39–50)
HCT VFR BLDA CALC: 51 % — SIGNIFICANT CHANGE UP (ref 39–51)
HCT VFR BLDA CALC: 51 % — SIGNIFICANT CHANGE UP (ref 39–51)
HGB BLD CALC-MCNC: 17 G/DL — SIGNIFICANT CHANGE UP (ref 12.6–17.4)
HGB BLD CALC-MCNC: 17 G/DL — SIGNIFICANT CHANGE UP (ref 12.6–17.4)
HGB BLD-MCNC: 16.9 G/DL — SIGNIFICANT CHANGE UP (ref 13–17)
HGB BLD-MCNC: 16.9 G/DL — SIGNIFICANT CHANGE UP (ref 13–17)
INR BLD: 1.02 RATIO — SIGNIFICANT CHANGE UP (ref 0.85–1.18)
INR BLD: 1.02 RATIO — SIGNIFICANT CHANGE UP (ref 0.85–1.18)
LACTATE BLDV-MCNC: 3.9 MMOL/L — HIGH (ref 0.5–2)
LACTATE BLDV-MCNC: 3.9 MMOL/L — HIGH (ref 0.5–2)
LYMPHOCYTES # BLD AUTO: 37.3 % — SIGNIFICANT CHANGE UP (ref 13–44)
LYMPHOCYTES # BLD AUTO: 37.3 % — SIGNIFICANT CHANGE UP (ref 13–44)
LYMPHOCYTES # BLD AUTO: 4.76 K/UL — HIGH (ref 1–3.3)
LYMPHOCYTES # BLD AUTO: 4.76 K/UL — HIGH (ref 1–3.3)
MAGNESIUM SERPL-MCNC: 2 MG/DL — SIGNIFICANT CHANGE UP (ref 1.6–2.6)
MAGNESIUM SERPL-MCNC: 2 MG/DL — SIGNIFICANT CHANGE UP (ref 1.6–2.6)
MANUAL SMEAR VERIFICATION: SIGNIFICANT CHANGE UP
MANUAL SMEAR VERIFICATION: SIGNIFICANT CHANGE UP
MCHC RBC-ENTMCNC: 31.7 PG — SIGNIFICANT CHANGE UP (ref 27–34)
MCHC RBC-ENTMCNC: 31.7 PG — SIGNIFICANT CHANGE UP (ref 27–34)
MCHC RBC-ENTMCNC: 34.2 GM/DL — SIGNIFICANT CHANGE UP (ref 32–36)
MCHC RBC-ENTMCNC: 34.2 GM/DL — SIGNIFICANT CHANGE UP (ref 32–36)
MCV RBC AUTO: 92.7 FL — SIGNIFICANT CHANGE UP (ref 80–100)
MCV RBC AUTO: 92.7 FL — SIGNIFICANT CHANGE UP (ref 80–100)
MONOCYTES # BLD AUTO: 0.32 K/UL — SIGNIFICANT CHANGE UP (ref 0–0.9)
MONOCYTES # BLD AUTO: 0.32 K/UL — SIGNIFICANT CHANGE UP (ref 0–0.9)
MONOCYTES NFR BLD AUTO: 2.5 % — SIGNIFICANT CHANGE UP (ref 2–14)
MONOCYTES NFR BLD AUTO: 2.5 % — SIGNIFICANT CHANGE UP (ref 2–14)
NEUTROPHILS # BLD AUTO: 7.36 K/UL — SIGNIFICANT CHANGE UP (ref 1.8–7.4)
NEUTROPHILS # BLD AUTO: 7.36 K/UL — SIGNIFICANT CHANGE UP (ref 1.8–7.4)
NEUTROPHILS NFR BLD AUTO: 57.6 % — SIGNIFICANT CHANGE UP (ref 43–77)
NEUTROPHILS NFR BLD AUTO: 57.6 % — SIGNIFICANT CHANGE UP (ref 43–77)
PCO2 BLDV: 40 MMHG — LOW (ref 42–55)
PCO2 BLDV: 40 MMHG — LOW (ref 42–55)
PH BLDV: 7.33 — SIGNIFICANT CHANGE UP (ref 7.32–7.43)
PH BLDV: 7.33 — SIGNIFICANT CHANGE UP (ref 7.32–7.43)
PLAT MORPH BLD: NORMAL — SIGNIFICANT CHANGE UP
PLAT MORPH BLD: NORMAL — SIGNIFICANT CHANGE UP
PLATELET # BLD AUTO: 424 K/UL — HIGH (ref 150–400)
PLATELET # BLD AUTO: 424 K/UL — HIGH (ref 150–400)
PO2 BLDV: 54 MMHG — HIGH (ref 25–45)
PO2 BLDV: 54 MMHG — HIGH (ref 25–45)
POTASSIUM BLDV-SCNC: 4.2 MMOL/L — SIGNIFICANT CHANGE UP (ref 3.5–5.1)
POTASSIUM BLDV-SCNC: 4.2 MMOL/L — SIGNIFICANT CHANGE UP (ref 3.5–5.1)
POTASSIUM SERPL-MCNC: 4.1 MMOL/L — SIGNIFICANT CHANGE UP (ref 3.5–5.3)
POTASSIUM SERPL-MCNC: 4.1 MMOL/L — SIGNIFICANT CHANGE UP (ref 3.5–5.3)
POTASSIUM SERPL-SCNC: 4.1 MMOL/L — SIGNIFICANT CHANGE UP (ref 3.5–5.3)
POTASSIUM SERPL-SCNC: 4.1 MMOL/L — SIGNIFICANT CHANGE UP (ref 3.5–5.3)
PROT SERPL-MCNC: 8.8 G/DL — HIGH (ref 6–8.3)
PROT SERPL-MCNC: 8.8 G/DL — HIGH (ref 6–8.3)
PROTHROM AB SERPL-ACNC: 11.2 SEC — SIGNIFICANT CHANGE UP (ref 9.5–13)
PROTHROM AB SERPL-ACNC: 11.2 SEC — SIGNIFICANT CHANGE UP (ref 9.5–13)
RBC # BLD: 5.33 M/UL — SIGNIFICANT CHANGE UP (ref 4.2–5.8)
RBC # BLD: 5.33 M/UL — SIGNIFICANT CHANGE UP (ref 4.2–5.8)
RBC # FLD: 13.3 % — SIGNIFICANT CHANGE UP (ref 10.3–14.5)
RBC # FLD: 13.3 % — SIGNIFICANT CHANGE UP (ref 10.3–14.5)
RBC BLD AUTO: SIGNIFICANT CHANGE UP
RBC BLD AUTO: SIGNIFICANT CHANGE UP
RH IG SCN BLD-IMP: POSITIVE — SIGNIFICANT CHANGE UP
RH IG SCN BLD-IMP: POSITIVE — SIGNIFICANT CHANGE UP
SAO2 % BLDV: 79.1 % — SIGNIFICANT CHANGE UP (ref 67–88)
SAO2 % BLDV: 79.1 % — SIGNIFICANT CHANGE UP (ref 67–88)
SODIUM SERPL-SCNC: 145 MMOL/L — SIGNIFICANT CHANGE UP (ref 135–145)
SODIUM SERPL-SCNC: 145 MMOL/L — SIGNIFICANT CHANGE UP (ref 135–145)
WBC # BLD: 12.77 K/UL — HIGH (ref 3.8–10.5)
WBC # BLD: 12.77 K/UL — HIGH (ref 3.8–10.5)
WBC # FLD AUTO: 12.77 K/UL — HIGH (ref 3.8–10.5)
WBC # FLD AUTO: 12.77 K/UL — HIGH (ref 3.8–10.5)

## 2023-11-29 PROCEDURE — 75635 CT ANGIO ABDOMINAL ARTERIES: CPT | Mod: 26,MA

## 2023-11-29 PROCEDURE — 93926 LOWER EXTREMITY STUDY: CPT | Mod: 26,LT

## 2023-11-29 PROCEDURE — 93971 EXTREMITY STUDY: CPT | Mod: 26,LT

## 2023-11-29 PROCEDURE — 99291 CRITICAL CARE FIRST HOUR: CPT

## 2023-11-29 NOTE — ED PROVIDER NOTE - ATTENDING CONTRIBUTION TO CARE
Anesthesia Post Evaluation    Patient: Josefina Izquierdo    Procedure(s) Performed: Procedure(s) (LRB):  LAPAROSCOPY, DIAGNOSTIC (N/A)    Final Anesthesia Type: general      Patient location during evaluation: PACU  Patient participation: Yes- Able to Participate  Level of consciousness: awake and sedated  Post-procedure vital signs: reviewed and stable  Pain management: adequate  Airway patency: patent    PONV status at discharge: No PONV  Anesthetic complications: no      Cardiovascular status: blood pressure returned to baseline  Respiratory status: unassisted  Hydration status: euvolemic  Follow-up not needed.          Vitals Value Taken Time   /90 01/05/22 1310   Temp 37 °C (98.6 °F) 01/05/22 1245   Pulse 76 01/05/22 1311   Resp 14 01/05/22 1330   SpO2 94 % 01/05/22 1311   Vitals shown include unvalidated device data.      Event Time   Out of Recovery 01/05/2022 13:12:00         Pain/Soniya Score: Pain Rating Prior to Med Admin: 8 (1/5/2022  1:30 PM)  Pain Rating Post Med Admin: 3 (1/5/2022  4:40 AM)  Soniya Score: 10 (1/5/2022  1:10 PM)        
58-year-old gentleman with history of hypertension with 4 to 5 months of progressive left leg pain and discomfort feeling cold presented here with left leg pain and discomfort and was sent for an ultrasound which reveals acute occlusive disease of the left arterial side patient feels the extremity is cold however does not feel loss of sensation also able to ambulate on physical examination the leg appears pale hold no gangrenous changes but has some mild skin changes in the sole of the foot unable to palpate the popliteal artery  Given this I have entered a consult was placed with vascular surgery who recommended CT angiography to better evaluate we will discuss whether we should start heparinized and will be notified obtain that.  Is a frequent consumer of alcohol and is an active smoker patient will likely need surgical intervention

## 2023-11-29 NOTE — ED ADULT NURSE NOTE - OBJECTIVE STATEMENT
58y M A&Ox4 c/o left lower leg pain and discoloration. PT states for the past 2 weeks he has been having left lower calf pain with numbness. Pt also states that his left food has been cold and has become discolored. Upon assessment pt endorsing pain to the left calf with movement. Pt left foot appearing modeled however pulse motor and sensor intact. Denies any fever,N/V/D/CP/SOB/Gi/Gu symptoms. PMH of HTN. PSH of colon removal and left knee repair. VSS

## 2023-11-29 NOTE — ED PROVIDER NOTE - CLINICAL SUMMARY MEDICAL DECISION MAKING FREE TEXT BOX
59 y/o M with PMHx of HTN, familial adenomatous polyposis s/p colectomy in 2019 presents to ED for evaluation of L lower extremity pain x 6 months. Physical exam concerning for acute arterial insufficiency. Labs and duplex done by initial team, found to have SFA occlusion and severe stenosis in left external iliac and left common femoral. Will order CTA with aorta and consult vascular. +/- heparin based on vascular recs. Will require admission.

## 2023-11-29 NOTE — ED PROVIDER NOTE - PHYSICAL EXAMINATION
Gen: well appearing, in no acute distress   Head: normal appearing  HEENT: normal conjunctiva, oral mucosa moist, vision grossly intact   Lung: no respiratory distress, speaking in full sentences, CTA b/l, no wheeze, crackles or rhonchi   CV: regular rate and rhythm, no murmurs  Abd: soft, non distended, non tender   MSK: no visible deformities, LLE with muscle wasting compared to RLE, DP/PT pulses faint but palpable in LLE and cooler to touch compared to right   Neuro: No focal deficits, AAOx3  Skin: Warm, no rashes   Psych: normal affect

## 2023-11-29 NOTE — ED PROVIDER NOTE - PROGRESS NOTE DETAILS
Denisa PGY1: vascular surgery consulted, pending CTA aorta with runoff to further assess. duplex with L SFA occlusion, stenosis of the distal left external iliac and left common femoral. will require admission. Denisa PGY1: re-contacted general surg/vasc surg to see if they would recommend heparin. resident is going to speak with fellow, and then will call back with further recs. Denisa PGY1: still awaiting recommendations from vascular surgery, made patient NPO. resident okay with heparin, stated drip but no bolus Denisa PGY1: received call back, would rather hold heparin drip, will admit to vascular surgery

## 2023-11-29 NOTE — ED PROVIDER NOTE - OBJECTIVE STATEMENT
59 y/o M with PMHx of HTN, familial adenomatous polyposis s/p colectomy in 2019 presents to ED for evaluation of L lower extremity pain x 6 months. States the pain initially began as an occasional ache, worse with movement. Now the pain also occurs at rest, the left foot is cold to touch and the left leg appears smaller than the right. Is a daily smoker, about 1ppd. Denies fevers, chills, syncope, chest pain/tightness, shortness of breath, palpitations, dizziness, recent falls, recent long travel, abdominal pain or n/v/d. NKDA.

## 2023-11-29 NOTE — ED PROVIDER NOTE - IV ALTEPLASE EXCL ABS HIDDEN
hematologic stability: Assess for signs and symptoms of bleeding or hemorrhage     Problem: Chronic Conditions and Co-morbidities  Goal: Patient's chronic conditions and co-morbidity symptoms are monitored and maintained or improved  Outcome: Progressing  Flowsheets (Taken 8/1/2022 1130)  Care Plan - Patient's Chronic Conditions and Co-Morbidity Symptoms are Monitored and Maintained or Improved: Monitor and assess patient's chronic conditions and comorbid symptoms for stability, deterioration, or improvement     Problem: Nutrition Deficit:  Goal: Optimize nutritional status  Outcome: Progressing     Problem: Skin/Tissue Integrity  Goal: Absence of new skin breakdown  Description: 1. Monitor for areas of redness and/or skin breakdown  2. Assess vascular access sites hourly  3. Every 4-6 hours minimum:  Change oxygen saturation probe site  4. Every 4-6 hours:  If on nasal continuous positive airway pressure, respiratory therapy assess nares and determine need for appliance change or resting period.   Outcome: Progressing show

## 2023-11-29 NOTE — ED PROVIDER NOTE - RAPID ASSESSMENT
58-year-old male with no significant past medical history, daily smoker presenting with left lower extremity pain.  Patient reports that he has had intermittent left lower extremity pain for the past 5 months.  Symptoms have been worsening over the last 2 months.  Patient complaining of coldness to the left foot, left calf pain and decreased size of the left lower extremity compared to the right.  Patient states pain is worse with ambulation.    **Patient was rapidly assessed by Luis Carlos iyer PA-C. A limited history was obtained. The patient will be seen and further examined/worked up in the main ED and their care will be completed by the main ED team. Receiving team will follow up on labs, analgesia, any clinical imaging, and perform reassessment and disposition of the patient as clinically indicated. All decisions regarding the progression of care will be made at their discretion. 58-year-old male with no significant past medical history, daily smoker presenting with left lower extremity pain.  Patient reports that he has had intermittent left lower extremity pain for the past 5 months.  Symptoms have been worsening over the last 2 months.  Patient complaining of coldness to the left foot, left calf pain and decreased size of the left lower extremity compared to the right.  Patient states pain is worse with ambulation.    **Patient was rapidly assessed by me, Luis Carlos Storm PA-C. A limited history was obtained. The patient will be seen and further examined/worked up in the main ED and their care will be completed by the main ED team. Receiving team will follow up on labs, analgesia, any clinical imaging, and perform reassessment and disposition of the patient as clinically indicated. All decisions regarding the progression of care will be made at their discretion.    Franchesca YUAN: This patient was seen and orders were placed by the PA as per our department's QPA model.  I was not consulted in regards to this patient although I was present and available in the Emergency Department to the PA.  Patient was to be sent to main ED for full medical evaluation and receiving team was to follow up on any labs, analgesia, clinical imaging ordered by the PA.  Any reassessment and disposition decisions were to be made by receiving team as clinically indicated, all decisions regarding the progression of care to be made at their discretion.  I did not perform a comprehensive history and physical on this patient unless stated otherwise in this note.

## 2023-11-30 DIAGNOSIS — I99.8 OTHER DISORDER OF CIRCULATORY SYSTEM: ICD-10-CM

## 2023-11-30 DIAGNOSIS — I10 ESSENTIAL (PRIMARY) HYPERTENSION: ICD-10-CM

## 2023-11-30 DIAGNOSIS — F10.11 ALCOHOL ABUSE, IN REMISSION: ICD-10-CM

## 2023-11-30 DIAGNOSIS — F10.10 ALCOHOL ABUSE, UNCOMPLICATED: ICD-10-CM

## 2023-11-30 LAB
ANION GAP SERPL CALC-SCNC: 13 MMOL/L — SIGNIFICANT CHANGE UP (ref 5–17)
ANION GAP SERPL CALC-SCNC: 13 MMOL/L — SIGNIFICANT CHANGE UP (ref 5–17)
BUN SERPL-MCNC: 14 MG/DL — SIGNIFICANT CHANGE UP (ref 7–23)
BUN SERPL-MCNC: 14 MG/DL — SIGNIFICANT CHANGE UP (ref 7–23)
CALCIUM SERPL-MCNC: 9.4 MG/DL — SIGNIFICANT CHANGE UP (ref 8.4–10.5)
CALCIUM SERPL-MCNC: 9.4 MG/DL — SIGNIFICANT CHANGE UP (ref 8.4–10.5)
CHLORIDE SERPL-SCNC: 105 MMOL/L — SIGNIFICANT CHANGE UP (ref 96–108)
CHLORIDE SERPL-SCNC: 105 MMOL/L — SIGNIFICANT CHANGE UP (ref 96–108)
CO2 SERPL-SCNC: 23 MMOL/L — SIGNIFICANT CHANGE UP (ref 22–31)
CO2 SERPL-SCNC: 23 MMOL/L — SIGNIFICANT CHANGE UP (ref 22–31)
CREAT SERPL-MCNC: 0.76 MG/DL — SIGNIFICANT CHANGE UP (ref 0.5–1.3)
CREAT SERPL-MCNC: 0.76 MG/DL — SIGNIFICANT CHANGE UP (ref 0.5–1.3)
EGFR: 104 ML/MIN/1.73M2 — SIGNIFICANT CHANGE UP
EGFR: 104 ML/MIN/1.73M2 — SIGNIFICANT CHANGE UP
ETHANOL SERPL-MCNC: <10 MG/DL — SIGNIFICANT CHANGE UP (ref 0–10)
ETHANOL SERPL-MCNC: <10 MG/DL — SIGNIFICANT CHANGE UP (ref 0–10)
GLUCOSE SERPL-MCNC: 89 MG/DL — SIGNIFICANT CHANGE UP (ref 70–99)
GLUCOSE SERPL-MCNC: 89 MG/DL — SIGNIFICANT CHANGE UP (ref 70–99)
HCT VFR BLD CALC: 42.5 % — SIGNIFICANT CHANGE UP (ref 39–50)
HCT VFR BLD CALC: 42.5 % — SIGNIFICANT CHANGE UP (ref 39–50)
HGB BLD-MCNC: 14.8 G/DL — SIGNIFICANT CHANGE UP (ref 13–17)
HGB BLD-MCNC: 14.8 G/DL — SIGNIFICANT CHANGE UP (ref 13–17)
MCHC RBC-ENTMCNC: 32.3 PG — SIGNIFICANT CHANGE UP (ref 27–34)
MCHC RBC-ENTMCNC: 32.3 PG — SIGNIFICANT CHANGE UP (ref 27–34)
MCHC RBC-ENTMCNC: 34.8 GM/DL — SIGNIFICANT CHANGE UP (ref 32–36)
MCHC RBC-ENTMCNC: 34.8 GM/DL — SIGNIFICANT CHANGE UP (ref 32–36)
MCV RBC AUTO: 92.8 FL — SIGNIFICANT CHANGE UP (ref 80–100)
MCV RBC AUTO: 92.8 FL — SIGNIFICANT CHANGE UP (ref 80–100)
NRBC # BLD: 0 /100 WBCS — SIGNIFICANT CHANGE UP (ref 0–0)
NRBC # BLD: 0 /100 WBCS — SIGNIFICANT CHANGE UP (ref 0–0)
PLATELET # BLD AUTO: 353 K/UL — SIGNIFICANT CHANGE UP (ref 150–400)
PLATELET # BLD AUTO: 353 K/UL — SIGNIFICANT CHANGE UP (ref 150–400)
POTASSIUM SERPL-MCNC: 3.9 MMOL/L — SIGNIFICANT CHANGE UP (ref 3.5–5.3)
POTASSIUM SERPL-MCNC: 3.9 MMOL/L — SIGNIFICANT CHANGE UP (ref 3.5–5.3)
POTASSIUM SERPL-SCNC: 3.9 MMOL/L — SIGNIFICANT CHANGE UP (ref 3.5–5.3)
POTASSIUM SERPL-SCNC: 3.9 MMOL/L — SIGNIFICANT CHANGE UP (ref 3.5–5.3)
RBC # BLD: 4.58 M/UL — SIGNIFICANT CHANGE UP (ref 4.2–5.8)
RBC # BLD: 4.58 M/UL — SIGNIFICANT CHANGE UP (ref 4.2–5.8)
RBC # FLD: 13.4 % — SIGNIFICANT CHANGE UP (ref 10.3–14.5)
RBC # FLD: 13.4 % — SIGNIFICANT CHANGE UP (ref 10.3–14.5)
SODIUM SERPL-SCNC: 141 MMOL/L — SIGNIFICANT CHANGE UP (ref 135–145)
SODIUM SERPL-SCNC: 141 MMOL/L — SIGNIFICANT CHANGE UP (ref 135–145)
WBC # BLD: 9.29 K/UL — SIGNIFICANT CHANGE UP (ref 3.8–10.5)
WBC # BLD: 9.29 K/UL — SIGNIFICANT CHANGE UP (ref 3.8–10.5)
WBC # FLD AUTO: 9.29 K/UL — SIGNIFICANT CHANGE UP (ref 3.8–10.5)
WBC # FLD AUTO: 9.29 K/UL — SIGNIFICANT CHANGE UP (ref 3.8–10.5)

## 2023-11-30 PROCEDURE — 99223 1ST HOSP IP/OBS HIGH 75: CPT

## 2023-11-30 PROCEDURE — 99221 1ST HOSP IP/OBS SF/LOW 40: CPT

## 2023-11-30 PROCEDURE — 93010 ELECTROCARDIOGRAM REPORT: CPT

## 2023-11-30 RX ORDER — AMLODIPINE BESYLATE 2.5 MG/1
10 TABLET ORAL DAILY
Refills: 0 | Status: DISCONTINUED | OUTPATIENT
Start: 2023-11-30 | End: 2023-12-05

## 2023-11-30 RX ORDER — ASPIRIN/CALCIUM CARB/MAGNESIUM 324 MG
81 TABLET ORAL DAILY
Refills: 0 | Status: DISCONTINUED | OUTPATIENT
Start: 2023-11-30 | End: 2023-12-05

## 2023-11-30 RX ORDER — NICOTINE POLACRILEX 2 MG
1 GUM BUCCAL DAILY
Refills: 0 | Status: DISCONTINUED | OUTPATIENT
Start: 2023-11-30 | End: 2023-12-05

## 2023-11-30 RX ORDER — HEPARIN SODIUM 5000 [USP'U]/ML
5000 INJECTION INTRAVENOUS; SUBCUTANEOUS EVERY 8 HOURS
Refills: 0 | Status: DISCONTINUED | OUTPATIENT
Start: 2023-11-30 | End: 2023-12-05

## 2023-11-30 RX ORDER — METOPROLOL TARTRATE 50 MG
1 TABLET ORAL
Qty: 0 | Refills: 0 | DISCHARGE

## 2023-11-30 RX ORDER — OXYCODONE HYDROCHLORIDE 5 MG/1
2.5 TABLET ORAL EVERY 4 HOURS
Refills: 0 | Status: DISCONTINUED | OUTPATIENT
Start: 2023-11-30 | End: 2023-12-05

## 2023-11-30 RX ORDER — INFLUENZA VIRUS VACCINE 15; 15; 15; 15 UG/.5ML; UG/.5ML; UG/.5ML; UG/.5ML
0.5 SUSPENSION INTRAMUSCULAR ONCE
Refills: 0 | Status: DISCONTINUED | OUTPATIENT
Start: 2023-11-30 | End: 2023-12-05

## 2023-11-30 RX ORDER — ATORVASTATIN CALCIUM 80 MG/1
80 TABLET, FILM COATED ORAL AT BEDTIME
Refills: 0 | Status: DISCONTINUED | OUTPATIENT
Start: 2023-11-30 | End: 2023-12-05

## 2023-11-30 RX ORDER — HEPARIN SODIUM 5000 [USP'U]/ML
1400 INJECTION INTRAVENOUS; SUBCUTANEOUS
Qty: 25000 | Refills: 0 | Status: DISCONTINUED | OUTPATIENT
Start: 2023-11-30 | End: 2023-11-30

## 2023-11-30 RX ORDER — ACETAMINOPHEN 500 MG
1000 TABLET ORAL EVERY 6 HOURS
Refills: 0 | Status: DISCONTINUED | OUTPATIENT
Start: 2023-11-30 | End: 2023-11-30

## 2023-11-30 RX ORDER — ACETAMINOPHEN 500 MG
1000 TABLET ORAL EVERY 6 HOURS
Refills: 0 | Status: DISCONTINUED | OUTPATIENT
Start: 2023-11-30 | End: 2023-12-05

## 2023-11-30 RX ORDER — OXYCODONE HYDROCHLORIDE 5 MG/1
5 TABLET ORAL EVERY 4 HOURS
Refills: 0 | Status: DISCONTINUED | OUTPATIENT
Start: 2023-11-30 | End: 2023-12-05

## 2023-11-30 RX ADMIN — ATORVASTATIN CALCIUM 80 MILLIGRAM(S): 80 TABLET, FILM COATED ORAL at 22:29

## 2023-11-30 RX ADMIN — Medication 1 PATCH: at 13:55

## 2023-11-30 RX ADMIN — Medication 1000 MILLIGRAM(S): at 08:43

## 2023-11-30 RX ADMIN — Medication 1000 MILLIGRAM(S): at 06:04

## 2023-11-30 RX ADMIN — Medication 81 MILLIGRAM(S): at 12:32

## 2023-11-30 RX ADMIN — HEPARIN SODIUM 5000 UNIT(S): 5000 INJECTION INTRAVENOUS; SUBCUTANEOUS at 13:55

## 2023-11-30 RX ADMIN — AMLODIPINE BESYLATE 10 MILLIGRAM(S): 2.5 TABLET ORAL at 06:04

## 2023-11-30 RX ADMIN — HEPARIN SODIUM 5000 UNIT(S): 5000 INJECTION INTRAVENOUS; SUBCUTANEOUS at 22:29

## 2023-11-30 RX ADMIN — HEPARIN SODIUM 5000 UNIT(S): 5000 INJECTION INTRAVENOUS; SUBCUTANEOUS at 06:04

## 2023-11-30 NOTE — H&P ADULT - ATTENDING COMMENTS
as above  rest pain  near occluded L FUNMILAYO, bilat femoral stenosis  aorta is ectatic    pt may be a candidate for aortobifemoral bypass, needs cardiac optimization

## 2023-11-30 NOTE — CONSULT NOTE ADULT - SUBJECTIVE AND OBJECTIVE BOX
Date of service 11/30/23    · Subjective and Objective:   Patient is a 58y old  Male who presents with a chief complaint of limb ischemia     HPI:  59yo M PMHx of HTN, EToH abuse c/b DTs did not require intubation and daily smoker who presents with 3 month hx of intermittent LLE pain and claudication. Pt endorses these symptoms have worsened over last 3 weeks, complains of additional coolness to his L leg. Can only walk about 1 block before having pain.    Of note patient was recently admitted to inpatient alcohol rehab ~2 months ago.     PMHx:  HTN  EtOH abuse   familial adenomatous polyposis    PSHx:  s/p colectomy     FHx:  colon ca    SHx:  currently smoked 1 PPD for the last ~40 years  h/o alcohol abuse, sober for ~ 2month  no illicit drug use     REVIEW OF SYSTEMS:  CONSTITUTIONAL: No weakness, fevers or chills  EYES: no blurry vision or eye pain.   ENT: No throat pain. No dysphagia.    NECK: No pain or stiffness  RESPIRATORY: No cough, wheezing, hemoptysis; No shortness of breath  CARDIOVASCULAR: No chest pain or palpitations.  GASTROINTESTINAL: No abdominal pain. No nausea or vomiting; No diarrhea or constipation. No melena or hematochezia.  GENITOURINARY: No dysuria, frequency or hematuria  NEUROLOGICAL: No numbness or weakness. No dizziness or falls.   SKIN: No itching, burning, rashes, or lesions.   LYMPHATIC: No masses or swelling.   All other review of systems is negative unless indicated above.    Allergies  No Known Allergies    Intolerances        MEDICATIONS  (STANDING):  acetaminophen     Tablet .. 1000 milliGRAM(s) Oral every 6 hours  amLODIPine   Tablet 10 milliGRAM(s) Oral daily  aspirin  chewable 81 milliGRAM(s) Oral daily  atorvastatin 80 milliGRAM(s) Oral at bedtime  heparin   Injectable 5000 Unit(s) SubCutaneous every 8 hours  influenza   Vaccine 0.5 milliLiter(s) IntraMuscular once    MEDICATIONS  (PRN):  oxyCODONE    IR 2.5 milliGRAM(s) Oral every 4 hours PRN Moderate Pain (4 - 6)  oxyCODONE    IR 5 milliGRAM(s) Oral every 4 hours PRN Severe Pain (7 - 10)      Vital Signs Last 24 Hrs  T(C): 36.7 (30 Nov 2023 11:40), Max: 37 (30 Nov 2023 03:58)  T(F): 98.1 (30 Nov 2023 11:40), Max: 98.6 (30 Nov 2023 03:58)  HR: 69 (30 Nov 2023 11:40) (69 - 104)  BP: 143/82 (30 Nov 2023 11:40) (126/78 - 143/82)  BP(mean): --  RR: 16 (30 Nov 2023 11:40) (16 - 18)  SpO2: 96% (30 Nov 2023 11:40) (96% - 99%)    Parameters below as of 30 Nov 2023 11:40  Patient On (Oxygen Delivery Method): room air      CAPILLARY BLOOD GLUCOSE        I&O's Summary      PHYSICAL EXAM:  GENERAL: NAD, well-developed  HEAD:  Atraumatic, Normocephalic  EYES: EOMI, PERRLA, conjunctiva and sclera clear  NECK: Supple, No JVD  CHEST/LUNG: Clear to auscultation bilaterally; No wheeze  HEART: Regular rate and rhythm; No murmurs, rubs, or gallops  ABDOMEN: Soft, Nontender, Nondistended; Bowel sounds present  EXTREMITIES: No edema  NEUROLOGY: AAOx3, non-focal      LABS:                        14.8   9.29  )-----------( 353      ( 30 Nov 2023 06:56 )             42.5     11-30    141  |  105  |  14  ----------------------------<  89  3.9   |  23  |  0.76    Ca    9.4      30 Nov 2023 06:56  Mg     2.0     11-29    TPro  8.8<H>  /  Alb  4.8  /  TBili  0.3  /  DBili  x   /  AST  53<H>  /  ALT  68<H>  /  AlkPhos  167<H>  11-29    PT/INR - ( 29 Nov 2023 17:51 )   PT: 11.2 sec;   INR: 1.02 ratio         PTT - ( 29 Nov 2023 17:51 )  PTT:33.6 sec      Urinalysis Basic - ( 30 Nov 2023 06:56 )    Color: x / Appearance: x / SG: x / pH: x  Gluc: 89 mg/dL / Ketone: x  / Bili: x / Urobili: x   Blood: x / Protein: x / Nitrite: x   Leuk Esterase: x / RBC: x / WBC x   Sq Epi: x / Non Sq Epi: x / Bacteria: x              Assessment and Recommendation:   59yo M PMHx of HTN, EToH abuse c/b DTs did not require intubation and daily smoker who presents with 3 month hx of intermittent LLE pain and claudication         Problem/Recommendation - 1:  ·  Problem: Preop risk stratification  ·  Plan: Reports functional capacity limited by claudication   - risk factors include heavy smoking history and PAD  - EKG and echo ordered and pending   - if planned for open bypass will require ischemic eval, CTA heart  - RCRI score 0: 3.9 % 30-day risk of MACE     Problem/Recommendation - 2:  ·  Problem: HTN (hypertension).   ·  Plan: - c/w home Amlodipine     Problem/Recommendation - 3:  ·  Problem: Smoking.   ·  Plan: Nicotine patch

## 2023-11-30 NOTE — CONSULT NOTE ADULT - ASSESSMENT
59yo M PMHx of HTN, EToH abuse c/b DTs did not require intubation and daily smoker who presents with 3 month hx of intermittent LLE pain and claudication

## 2023-11-30 NOTE — PATIENT PROFILE ADULT - FALL HARM RISK - HARM RISK INTERVENTIONS

## 2023-11-30 NOTE — CONSULT NOTE ADULT - PROBLEM SELECTOR RECOMMENDATION 9
also noted to have mild stenosis of renal artery stenosis and mild infrarenal AAA 2.8 cm  - RCRI score 0: 3.9 % 30-day risk of death, MI, or cardiac arrest  - reached out to PCP Dr. Velazco for baseline EKG, routine outpt labs awaiting call back   - rec checking EKG given significant PAD and long time h/o smoking  - start nicotine patch

## 2023-11-30 NOTE — H&P ADULT - HISTORY OF PRESENT ILLNESS
Patient is a 58y old  Male who presents with a chief complaint of     HPI: 57yo M w pmhx of HLD and daily smoker who presents with 3 month hx of intermittent LLE pain and claudication. Pt endorses these symptoms have worsened over last 3 weeks, complains of additional coolness to his L leg. Can only walk approx 1 block before having pain.    Patient denies fevers/chills, denies lightheadedness/dizziness, denies SOB/chest pain, denies nausea/vomiting, denies constipation/diarrhea.        PAST MEDICAL & SURGICAL HISTORY:  HTN (hypertension)      FAP (familial adenomatous polyposis)      H/O colectomy        FAMILY HISTORY:  Family history of FAP (familial adenomatous polyposis)      [] Family history not pertinent as reviewed with the patient and family    SOCIAL HISTORY:  alcohol use    ALLERGIES: No Known Allergies      HOME MEDICATIONS: ASA 81    ROS: 10-system review is otherwise negative except HPI above.      T(C): 36.5 (11-29-23 @ 17:25), Max: 36.5 (11-29-23 @ 17:25)  HR: 104 (11-29-23 @ 17:25) (104 - 104)  BP: 127/89 (11-29-23 @ 17:25) (127/89 - 127/89)  RR: 16 (11-29-23 @ 17:25) (16 - 16)  SpO2: 98% (11-29-23 @ 17:25) (98% - 98%)    PHYSICAL EXAM  GENERAL: NAD, lying in bed comfortably  CHEST: nonlabored, no increased WOB  ABDOMEN: Soft, Nontender, Nondistended. Incision sites clean, dry with no erythema or induration.  EXTREMITIES:   - RLE: palpable fem, popliteal with DP, PT signals. No wounds.   - LLE: nonpalpable fem, pt with signals, no DP, PT, or AT signal. evidence of hair loss on LLE, no wounds  NERVOUS SYSTEM:  Alert & Oriented X3      OBJECTIVE  No Known Allergies    I&O's Summary    I&O's Detail    LABS                        16.9   12.77 )-----------( 424      ( 29 Nov 2023 17:51 )             49.4     11-29    145  |  107  |  7   ----------------------------<  84  4.1   |  19<L>  |  0.73    Ca    9.7      29 Nov 2023 17:51  Mg     2.0     11-29    TPro  8.8<H>  /  Alb  4.8  /  TBili  0.3  /  DBili  x   /  AST  53<H>  /  ALT  68<H>  /  AlkPhos  167<H>  11-29    PT/INR - ( 29 Nov 2023 17:51 )   PT: 11.2 sec;   INR: 1.02 ratio         PTT - ( 29 Nov 2023 17:51 )  PTT:33.6 sec  Urinalysis Basic - ( 29 Nov 2023 17:51 )    Color: x / Appearance: x / SG: x / pH: x  Gluc: 84 mg/dL / Ketone: x  / Bili: x / Urobili: x   Blood: x / Protein: x / Nitrite: x   Leuk Esterase: x / RBC: x / WBC x   Sq Epi: x / Non Sq Epi: x / Bacteria: x          IMAGING  < from: CT Angio Abd Aorta w/run-off w/ IV Cont (11.29.23 @ 22:28) >  IMPRESSION:  1.   Left lower extremity demonstrate severe stenosis versus occlusion   focally  involving the proximal left common iliac artery with diffuse moderate   stenosis  of the left external iliac artery. Diffuse thrombosis of the left   superficial  femoral artery which may be chronic. Reconstitution markedly stenotic and  heavily calcified popliteal artery. There may be three-vessel runoff to   the  left foot though evaluation limited by heavy calcification proximally.  2.   Right lower extremity demonstrate moderate stenosis focally   involving the  proximal right external iliac artery with moderate diffuse stenosis of the  right SMA and popliteal artery. There appears to be three-vessel runoff   to the  right foot though evaluation slightly limited by of running of the   contrast  bolus involving the distal peroneal artery.  3.   Mild aneurysmal dilatation infrarenal abdominal aorta up to 2.8 cm   which  demonstrates moderate atherosclerotic wall calcification.  4.   Mild stenosis involving the origin of the celiac axis, SMA and renal  arteries.        ******PRELIMINARY REPORT******      ******PRELIMINARY REPORT******         LONNIE DOTSON M.D.;GINA RADIOLOGIST  This document is a PRELIMINARY interpretation and is pending final   attending approval. Nov 29 2023 11:01PM    < end of copied text >

## 2023-11-30 NOTE — CONSULT NOTE ADULT - SUBJECTIVE AND OBJECTIVE BOX
Patient is a 58y old  Male who presents with a chief complaint of     HPI:  57yo M PMHx of HTN, EToH abuse c/b DTs did not require intubation and daily smoker who presents with 3 month hx of intermittent LLE pain and claudication. Pt endorses these symptoms have worsened over last 3 weeks, complains of additional coolness to his L leg. Can only walk about 1 block before having pain.    Of note patient was recently admitted to inpatient alcohol rehab ~2 months ago.     PMHx:  HTN  EtOH abuse   familial adenomatous polyposis  PSHx:  s/p colectomy     FHx:  colon ca    SHx:  currently smoked 1 PPD for the last ~40 years  h/o alcohol abuse, sober for ~ 2month  no illicit drug use     REVIEW OF SYSTEMS:  CONSTITUTIONAL: No weakness, fevers or chills  EYES: no blurry vision or eye pain.   ENT: No throat pain. No dysphagia.    NECK: No pain or stiffness  RESPIRATORY: No cough, wheezing, hemoptysis; No shortness of breath  CARDIOVASCULAR: No chest pain or palpitations.  GASTROINTESTINAL: No abdominal pain. No nausea or vomiting; No diarrhea or constipation. No melena or hematochezia.  GENITOURINARY: No dysuria, frequency or hematuria  NEUROLOGICAL: No numbness or weakness. No dizziness or falls.   SKIN: No itching, burning, rashes, or lesions.   LYMPHATIC: No masses or swelling.   All other review of systems is negative unless indicated above.    Allergies  No Known Allergies    Intolerances        MEDICATIONS  (STANDING):  acetaminophen     Tablet .. 1000 milliGRAM(s) Oral every 6 hours  amLODIPine   Tablet 10 milliGRAM(s) Oral daily  aspirin  chewable 81 milliGRAM(s) Oral daily  atorvastatin 80 milliGRAM(s) Oral at bedtime  heparin   Injectable 5000 Unit(s) SubCutaneous every 8 hours  influenza   Vaccine 0.5 milliLiter(s) IntraMuscular once    MEDICATIONS  (PRN):  oxyCODONE    IR 2.5 milliGRAM(s) Oral every 4 hours PRN Moderate Pain (4 - 6)  oxyCODONE    IR 5 milliGRAM(s) Oral every 4 hours PRN Severe Pain (7 - 10)      Vital Signs Last 24 Hrs  T(C): 36.7 (30 Nov 2023 11:40), Max: 37 (30 Nov 2023 03:58)  T(F): 98.1 (30 Nov 2023 11:40), Max: 98.6 (30 Nov 2023 03:58)  HR: 69 (30 Nov 2023 11:40) (69 - 104)  BP: 143/82 (30 Nov 2023 11:40) (126/78 - 143/82)  BP(mean): --  RR: 16 (30 Nov 2023 11:40) (16 - 18)  SpO2: 96% (30 Nov 2023 11:40) (96% - 99%)    Parameters below as of 30 Nov 2023 11:40  Patient On (Oxygen Delivery Method): room air      CAPILLARY BLOOD GLUCOSE        I&O's Summary      PHYSICAL EXAM:  GENERAL: NAD, well-developed  HEAD:  Atraumatic, Normocephalic  EYES: EOMI, PERRLA, conjunctiva and sclera clear  NECK: Supple, No JVD  CHEST/LUNG: Clear to auscultation bilaterally; No wheeze  HEART: Regular rate and rhythm; No murmurs, rubs, or gallops  ABDOMEN: Soft, Nontender, Nondistended; Bowel sounds present  EXTREMITIES: No edema  NEUROLOGY: AAOx3, non-focal      LABS:                        14.8   9.29  )-----------( 353      ( 30 Nov 2023 06:56 )             42.5     11-30    141  |  105  |  14  ----------------------------<  89  3.9   |  23  |  0.76    Ca    9.4      30 Nov 2023 06:56  Mg     2.0     11-29    TPro  8.8<H>  /  Alb  4.8  /  TBili  0.3  /  DBili  x   /  AST  53<H>  /  ALT  68<H>  /  AlkPhos  167<H>  11-29    PT/INR - ( 29 Nov 2023 17:51 )   PT: 11.2 sec;   INR: 1.02 ratio         PTT - ( 29 Nov 2023 17:51 )  PTT:33.6 sec      Urinalysis Basic - ( 30 Nov 2023 06:56 )    Color: x / Appearance: x / SG: x / pH: x  Gluc: 89 mg/dL / Ketone: x  / Bili: x / Urobili: x   Blood: x / Protein: x / Nitrite: x   Leuk Esterase: x / RBC: x / WBC x   Sq Epi: x / Non Sq Epi: x / Bacteria: x         Patient is a 58y old  Male who presents with a chief complaint of     HPI:  59yo M PMHx of HTN, EToH abuse c/b DTs did not require intubation and daily smoker who presents with 3 month hx of intermittent LLE pain and claudication. Pt endorses these symptoms have worsened over last 3 weeks, complains of additional coolness to his L leg. Can only walk about 1 block before having pain.    Of note patient was recently admitted to inpatient alcohol rehab ~2 months ago.     PMHx:  HTN  EtOH abuse   familial adenomatous polyposis    PSHx:  s/p colectomy     FHx:  colon ca    SHx:  currently smoked 1 PPD for the last ~40 years  h/o alcohol abuse, sober for ~ 2month  no illicit drug use     REVIEW OF SYSTEMS:  CONSTITUTIONAL: No weakness, fevers or chills  EYES: no blurry vision or eye pain.   ENT: No throat pain. No dysphagia.    NECK: No pain or stiffness  RESPIRATORY: No cough, wheezing, hemoptysis; No shortness of breath  CARDIOVASCULAR: No chest pain or palpitations.  GASTROINTESTINAL: No abdominal pain. No nausea or vomiting; No diarrhea or constipation. No melena or hematochezia.  GENITOURINARY: No dysuria, frequency or hematuria  NEUROLOGICAL: No numbness or weakness. No dizziness or falls.   SKIN: No itching, burning, rashes, or lesions.   LYMPHATIC: No masses or swelling.   All other review of systems is negative unless indicated above.    Allergies  No Known Allergies    Intolerances        MEDICATIONS  (STANDING):  acetaminophen     Tablet .. 1000 milliGRAM(s) Oral every 6 hours  amLODIPine   Tablet 10 milliGRAM(s) Oral daily  aspirin  chewable 81 milliGRAM(s) Oral daily  atorvastatin 80 milliGRAM(s) Oral at bedtime  heparin   Injectable 5000 Unit(s) SubCutaneous every 8 hours  influenza   Vaccine 0.5 milliLiter(s) IntraMuscular once    MEDICATIONS  (PRN):  oxyCODONE    IR 2.5 milliGRAM(s) Oral every 4 hours PRN Moderate Pain (4 - 6)  oxyCODONE    IR 5 milliGRAM(s) Oral every 4 hours PRN Severe Pain (7 - 10)      Vital Signs Last 24 Hrs  T(C): 36.7 (30 Nov 2023 11:40), Max: 37 (30 Nov 2023 03:58)  T(F): 98.1 (30 Nov 2023 11:40), Max: 98.6 (30 Nov 2023 03:58)  HR: 69 (30 Nov 2023 11:40) (69 - 104)  BP: 143/82 (30 Nov 2023 11:40) (126/78 - 143/82)  BP(mean): --  RR: 16 (30 Nov 2023 11:40) (16 - 18)  SpO2: 96% (30 Nov 2023 11:40) (96% - 99%)    Parameters below as of 30 Nov 2023 11:40  Patient On (Oxygen Delivery Method): room air      CAPILLARY BLOOD GLUCOSE        I&O's Summary      PHYSICAL EXAM:  GENERAL: NAD, well-developed  HEAD:  Atraumatic, Normocephalic  EYES: EOMI, PERRLA, conjunctiva and sclera clear  NECK: Supple, No JVD  CHEST/LUNG: Clear to auscultation bilaterally; No wheeze  HEART: Regular rate and rhythm; No murmurs, rubs, or gallops  ABDOMEN: Soft, Nontender, Nondistended; Bowel sounds present  EXTREMITIES: No edema  NEUROLOGY: AAOx3, non-focal      LABS:                        14.8   9.29  )-----------( 353      ( 30 Nov 2023 06:56 )             42.5     11-30    141  |  105  |  14  ----------------------------<  89  3.9   |  23  |  0.76    Ca    9.4      30 Nov 2023 06:56  Mg     2.0     11-29    TPro  8.8<H>  /  Alb  4.8  /  TBili  0.3  /  DBili  x   /  AST  53<H>  /  ALT  68<H>  /  AlkPhos  167<H>  11-29    PT/INR - ( 29 Nov 2023 17:51 )   PT: 11.2 sec;   INR: 1.02 ratio         PTT - ( 29 Nov 2023 17:51 )  PTT:33.6 sec      Urinalysis Basic - ( 30 Nov 2023 06:56 )    Color: x / Appearance: x / SG: x / pH: x  Gluc: 89 mg/dL / Ketone: x  / Bili: x / Urobili: x   Blood: x / Protein: x / Nitrite: x   Leuk Esterase: x / RBC: x / WBC x   Sq Epi: x / Non Sq Epi: x / Bacteria: x

## 2023-11-30 NOTE — H&P ADULT - ASSESSMENT
ASSESSMENT: 59yo M w pmhx of HTN and 40pack-yr smoking hx who presents with 3 months of LLE claudication, no DP/PT signals present on examination. Findings c/w [place] 4 CLTI.     PLAN:   - Admit to vascular surgery under Dr. Heredia  - ASA, high-dose statin  - NPO, consider restarting diet in AM  - Angio planning    Discussed with surgical fellow on behalf of attending     Vascular Surgery [id number]

## 2023-11-30 NOTE — CONSULT NOTE ADULT - PROBLEM SELECTOR RECOMMENDATION 3
c/b DTs did not require intubation admitted to inpatient alcohol rehab ~2 months ago.   - check alcohol level      Medicine will continue to follow for optimization

## 2023-11-30 NOTE — PROGRESS NOTE ADULT - ASSESSMENT
59yo M w pmhx of HTN and 40pack-yr smoking hx who presents with 3 months of LLE claudication, no DP/PT signals present on examination. Findings c/w [place] 4 CLTI.       - ASA, high-dose statin  - regular diet   - medicine consult for operative clearance   - c/w amlodipine for HTN   - VTE ppx   - Angio planning    Vascular Surgery   [id number]

## 2023-11-30 NOTE — ED ADULT NURSE REASSESSMENT NOTE - NS ED NURSE REASSESS COMMENT FT1
Pt placed on NPO and educated on the risks in the event of surgery however pt continues to drink water from the faucet. Pt reminded again by both RN and resident that any oral intake may delay care.

## 2023-11-30 NOTE — PROGRESS NOTE ADULT - SUBJECTIVE AND OBJECTIVE BOX
SURGERY DAILY PROGRESS NOTE:       SUBJECTIVE/ROS: Patient seen and evaluated on AM rounds.   Patient reports unchanged numbness in his left leg and right toes. Can ambulate.       OBJECTIVE:    Vital Signs Last 24 Hrs  T(C): 36.7 (30 Nov 2023 06:45), Max: 37 (30 Nov 2023 03:58)  T(F): 98.1 (30 Nov 2023 06:45), Max: 98.6 (30 Nov 2023 03:58)  HR: 82 (30 Nov 2023 06:45) (82 - 104)  BP: 138/84 (30 Nov 2023 06:45) (126/78 - 138/84)  BP(mean): --  RR: 18 (30 Nov 2023 06:45) (16 - 18)  SpO2: 96% (30 Nov 2023 06:45) (96% - 99%)    Parameters below as of 30 Nov 2023 06:45  Patient On (Oxygen Delivery Method): room air      I&O's Detail    Daily Height in cm: 185.42 (29 Nov 2023 17:25)    Daily   MEDICATIONS  (STANDING):  acetaminophen     Tablet .. 1000 milliGRAM(s) Oral every 6 hours  amLODIPine   Tablet 10 milliGRAM(s) Oral daily  aspirin  chewable 81 milliGRAM(s) Oral daily  atorvastatin 80 milliGRAM(s) Oral at bedtime  heparin   Injectable 5000 Unit(s) SubCutaneous every 8 hours    MEDICATIONS  (PRN):  oxyCODONE    IR 2.5 milliGRAM(s) Oral every 4 hours PRN Moderate Pain (4 - 6)  oxyCODONE    IR 5 milliGRAM(s) Oral every 4 hours PRN Severe Pain (7 - 10)      LABS:                        14.8   9.29  )-----------( 353      ( 30 Nov 2023 06:56 )             42.5     11-30    141  |  105  |  14  ----------------------------<  89  3.9   |  23  |  0.76    Ca    9.4      30 Nov 2023 06:56  Mg     2.0     11-29    TPro  8.8<H>  /  Alb  4.8  /  TBili  0.3  /  DBili  x   /  AST  53<H>  /  ALT  68<H>  /  AlkPhos  167<H>  11-29    PT/INR - ( 29 Nov 2023 17:51 )   PT: 11.2 sec;   INR: 1.02 ratio         PTT - ( 29 Nov 2023 17:51 )  PTT:33.6 sec  Urinalysis Basic - ( 30 Nov 2023 06:56 )    Color: x / Appearance: x / SG: x / pH: x  Gluc: 89 mg/dL / Ketone: x  / Bili: x / Urobili: x   Blood: x / Protein: x / Nitrite: x   Leuk Esterase: x / RBC: x / WBC x   Sq Epi: x / Non Sq Epi: x / Bacteria: x          PHYSICAL EXAM  GENERAL: NAD, lying in bed comfortably  CHEST: nonlabored on room air   EXTREMITIES:   - RLE: DP and PT signal, no wounds   - LLE: DP signal, no wounds   NERVOUS SYSTEM:  Alert & Oriented X3

## 2023-12-01 DIAGNOSIS — I73.9 PERIPHERAL VASCULAR DISEASE, UNSPECIFIED: ICD-10-CM

## 2023-12-01 DIAGNOSIS — Z01.818 ENCOUNTER FOR OTHER PREPROCEDURAL EXAMINATION: ICD-10-CM

## 2023-12-01 DIAGNOSIS — F17.200 NICOTINE DEPENDENCE, UNSPECIFIED, UNCOMPLICATED: ICD-10-CM

## 2023-12-01 LAB
ANION GAP SERPL CALC-SCNC: 12 MMOL/L — SIGNIFICANT CHANGE UP (ref 5–17)
ANION GAP SERPL CALC-SCNC: 12 MMOL/L — SIGNIFICANT CHANGE UP (ref 5–17)
BASOPHILS # BLD AUTO: 0.09 K/UL — SIGNIFICANT CHANGE UP (ref 0–0.2)
BASOPHILS # BLD AUTO: 0.09 K/UL — SIGNIFICANT CHANGE UP (ref 0–0.2)
BASOPHILS NFR BLD AUTO: 1.5 % — SIGNIFICANT CHANGE UP (ref 0–2)
BASOPHILS NFR BLD AUTO: 1.5 % — SIGNIFICANT CHANGE UP (ref 0–2)
BUN SERPL-MCNC: 11 MG/DL — SIGNIFICANT CHANGE UP (ref 7–23)
BUN SERPL-MCNC: 11 MG/DL — SIGNIFICANT CHANGE UP (ref 7–23)
CALCIUM SERPL-MCNC: 9.5 MG/DL — SIGNIFICANT CHANGE UP (ref 8.4–10.5)
CALCIUM SERPL-MCNC: 9.5 MG/DL — SIGNIFICANT CHANGE UP (ref 8.4–10.5)
CHLORIDE SERPL-SCNC: 104 MMOL/L — SIGNIFICANT CHANGE UP (ref 96–108)
CHLORIDE SERPL-SCNC: 104 MMOL/L — SIGNIFICANT CHANGE UP (ref 96–108)
CO2 SERPL-SCNC: 22 MMOL/L — SIGNIFICANT CHANGE UP (ref 22–31)
CO2 SERPL-SCNC: 22 MMOL/L — SIGNIFICANT CHANGE UP (ref 22–31)
CREAT SERPL-MCNC: 0.66 MG/DL — SIGNIFICANT CHANGE UP (ref 0.5–1.3)
CREAT SERPL-MCNC: 0.66 MG/DL — SIGNIFICANT CHANGE UP (ref 0.5–1.3)
EGFR: 109 ML/MIN/1.73M2 — SIGNIFICANT CHANGE UP
EGFR: 109 ML/MIN/1.73M2 — SIGNIFICANT CHANGE UP
EOSINOPHIL # BLD AUTO: 0.1 K/UL — SIGNIFICANT CHANGE UP (ref 0–0.5)
EOSINOPHIL # BLD AUTO: 0.1 K/UL — SIGNIFICANT CHANGE UP (ref 0–0.5)
EOSINOPHIL NFR BLD AUTO: 1.6 % — SIGNIFICANT CHANGE UP (ref 0–6)
EOSINOPHIL NFR BLD AUTO: 1.6 % — SIGNIFICANT CHANGE UP (ref 0–6)
GLUCOSE SERPL-MCNC: 87 MG/DL — SIGNIFICANT CHANGE UP (ref 70–99)
GLUCOSE SERPL-MCNC: 87 MG/DL — SIGNIFICANT CHANGE UP (ref 70–99)
HCT VFR BLD CALC: 45.2 % — SIGNIFICANT CHANGE UP (ref 39–50)
HCT VFR BLD CALC: 45.2 % — SIGNIFICANT CHANGE UP (ref 39–50)
HGB BLD-MCNC: 15 G/DL — SIGNIFICANT CHANGE UP (ref 13–17)
HGB BLD-MCNC: 15 G/DL — SIGNIFICANT CHANGE UP (ref 13–17)
IMM GRANULOCYTES NFR BLD AUTO: 0.3 % — SIGNIFICANT CHANGE UP (ref 0–0.9)
IMM GRANULOCYTES NFR BLD AUTO: 0.3 % — SIGNIFICANT CHANGE UP (ref 0–0.9)
LYMPHOCYTES # BLD AUTO: 2.74 K/UL — SIGNIFICANT CHANGE UP (ref 1–3.3)
LYMPHOCYTES # BLD AUTO: 2.74 K/UL — SIGNIFICANT CHANGE UP (ref 1–3.3)
LYMPHOCYTES # BLD AUTO: 44.3 % — HIGH (ref 13–44)
LYMPHOCYTES # BLD AUTO: 44.3 % — HIGH (ref 13–44)
MAGNESIUM SERPL-MCNC: 2 MG/DL — SIGNIFICANT CHANGE UP (ref 1.6–2.6)
MAGNESIUM SERPL-MCNC: 2 MG/DL — SIGNIFICANT CHANGE UP (ref 1.6–2.6)
MCHC RBC-ENTMCNC: 31.1 PG — SIGNIFICANT CHANGE UP (ref 27–34)
MCHC RBC-ENTMCNC: 31.1 PG — SIGNIFICANT CHANGE UP (ref 27–34)
MCHC RBC-ENTMCNC: 33.2 GM/DL — SIGNIFICANT CHANGE UP (ref 32–36)
MCHC RBC-ENTMCNC: 33.2 GM/DL — SIGNIFICANT CHANGE UP (ref 32–36)
MCV RBC AUTO: 93.8 FL — SIGNIFICANT CHANGE UP (ref 80–100)
MCV RBC AUTO: 93.8 FL — SIGNIFICANT CHANGE UP (ref 80–100)
MONOCYTES # BLD AUTO: 0.55 K/UL — SIGNIFICANT CHANGE UP (ref 0–0.9)
MONOCYTES # BLD AUTO: 0.55 K/UL — SIGNIFICANT CHANGE UP (ref 0–0.9)
MONOCYTES NFR BLD AUTO: 8.9 % — SIGNIFICANT CHANGE UP (ref 2–14)
MONOCYTES NFR BLD AUTO: 8.9 % — SIGNIFICANT CHANGE UP (ref 2–14)
NEUTROPHILS # BLD AUTO: 2.68 K/UL — SIGNIFICANT CHANGE UP (ref 1.8–7.4)
NEUTROPHILS # BLD AUTO: 2.68 K/UL — SIGNIFICANT CHANGE UP (ref 1.8–7.4)
NEUTROPHILS NFR BLD AUTO: 43.4 % — SIGNIFICANT CHANGE UP (ref 43–77)
NEUTROPHILS NFR BLD AUTO: 43.4 % — SIGNIFICANT CHANGE UP (ref 43–77)
NRBC # BLD: 0 /100 WBCS — SIGNIFICANT CHANGE UP (ref 0–0)
NRBC # BLD: 0 /100 WBCS — SIGNIFICANT CHANGE UP (ref 0–0)
PHOSPHATE SERPL-MCNC: 2.9 MG/DL — SIGNIFICANT CHANGE UP (ref 2.5–4.5)
PHOSPHATE SERPL-MCNC: 2.9 MG/DL — SIGNIFICANT CHANGE UP (ref 2.5–4.5)
PLATELET # BLD AUTO: 325 K/UL — SIGNIFICANT CHANGE UP (ref 150–400)
PLATELET # BLD AUTO: 325 K/UL — SIGNIFICANT CHANGE UP (ref 150–400)
POTASSIUM SERPL-MCNC: 4.4 MMOL/L — SIGNIFICANT CHANGE UP (ref 3.5–5.3)
POTASSIUM SERPL-MCNC: 4.4 MMOL/L — SIGNIFICANT CHANGE UP (ref 3.5–5.3)
POTASSIUM SERPL-SCNC: 4.4 MMOL/L — SIGNIFICANT CHANGE UP (ref 3.5–5.3)
POTASSIUM SERPL-SCNC: 4.4 MMOL/L — SIGNIFICANT CHANGE UP (ref 3.5–5.3)
RBC # BLD: 4.82 M/UL — SIGNIFICANT CHANGE UP (ref 4.2–5.8)
RBC # BLD: 4.82 M/UL — SIGNIFICANT CHANGE UP (ref 4.2–5.8)
RBC # FLD: 13.1 % — SIGNIFICANT CHANGE UP (ref 10.3–14.5)
RBC # FLD: 13.1 % — SIGNIFICANT CHANGE UP (ref 10.3–14.5)
SODIUM SERPL-SCNC: 138 MMOL/L — SIGNIFICANT CHANGE UP (ref 135–145)
SODIUM SERPL-SCNC: 138 MMOL/L — SIGNIFICANT CHANGE UP (ref 135–145)
WBC # BLD: 6.18 K/UL — SIGNIFICANT CHANGE UP (ref 3.8–10.5)
WBC # BLD: 6.18 K/UL — SIGNIFICANT CHANGE UP (ref 3.8–10.5)
WBC # FLD AUTO: 6.18 K/UL — SIGNIFICANT CHANGE UP (ref 3.8–10.5)
WBC # FLD AUTO: 6.18 K/UL — SIGNIFICANT CHANGE UP (ref 3.8–10.5)

## 2023-12-01 PROCEDURE — 99232 SBSQ HOSP IP/OBS MODERATE 35: CPT

## 2023-12-01 PROCEDURE — 93454 CORONARY ARTERY ANGIO S&I: CPT | Mod: 26

## 2023-12-01 PROCEDURE — 99152 MOD SED SAME PHYS/QHP 5/>YRS: CPT

## 2023-12-01 RX ORDER — SODIUM CHLORIDE 9 MG/ML
250 INJECTION INTRAMUSCULAR; INTRAVENOUS; SUBCUTANEOUS ONCE
Refills: 0 | Status: COMPLETED | OUTPATIENT
Start: 2023-12-01 | End: 2023-12-01

## 2023-12-01 RX ORDER — SODIUM CHLORIDE 9 MG/ML
1000 INJECTION INTRAMUSCULAR; INTRAVENOUS; SUBCUTANEOUS
Refills: 0 | Status: DISCONTINUED | OUTPATIENT
Start: 2023-12-01 | End: 2023-12-02

## 2023-12-01 RX ADMIN — HEPARIN SODIUM 5000 UNIT(S): 5000 INJECTION INTRAVENOUS; SUBCUTANEOUS at 06:33

## 2023-12-01 RX ADMIN — Medication 1 PATCH: at 15:43

## 2023-12-01 RX ADMIN — ATORVASTATIN CALCIUM 80 MILLIGRAM(S): 80 TABLET, FILM COATED ORAL at 21:45

## 2023-12-01 RX ADMIN — AMLODIPINE BESYLATE 10 MILLIGRAM(S): 2.5 TABLET ORAL at 06:32

## 2023-12-01 RX ADMIN — HEPARIN SODIUM 5000 UNIT(S): 5000 INJECTION INTRAVENOUS; SUBCUTANEOUS at 21:45

## 2023-12-01 RX ADMIN — Medication 81 MILLIGRAM(S): at 11:10

## 2023-12-01 RX ADMIN — Medication 1 PATCH: at 19:50

## 2023-12-01 RX ADMIN — SODIUM CHLORIDE 750 MILLILITER(S): 9 INJECTION INTRAMUSCULAR; INTRAVENOUS; SUBCUTANEOUS at 12:10

## 2023-12-01 RX ADMIN — Medication 1 PATCH: at 08:00

## 2023-12-01 RX ADMIN — Medication 1 PATCH: at 15:42

## 2023-12-01 NOTE — PROGRESS NOTE ADULT - ASSESSMENT
59yo M w pmhx of HTN and 40pack-yr smoking hx who presents with 3 months of LLE claudication, no DP/PT signals present on examination. Findings c/w [place] 4 CLTI.     - ASA, high-dose statin  - regular diet   - medicine consult for operative clearance   - c/w amlodipine for HTN   - VTE ppx   - Angio planning pending ischemic eval     Vascular Surgery   [id number]   59yo M w pmhx of HTN and 40pack-yr smoking hx who presents with 3 months of LLE claudication, no DP/PT signals present on examination. Findings c/w [place] 4 CLTI.     - ASA, high-dose statin  - regular diet   - medicine consult for operative clearance   - c/w amlodipine for HTN   - VTE ppx   - Angio planning pending ischemic eval     Vascular Surgery   [id number]   57yo M w pmhx of HTN and 40pack-yr smoking hx who presents with 3 months of LLE claudication, no DP/PT signals present on examination. Findings c/w [place] 4 CLTI.     - ASA, high-dose statin  - regular diet   - medicine consult for operative clearance   - c/w amlodipine for HTN   - VTE ppx   - Angio planning pending ischemic eval     Vascular Surgery   [id number]   57yo M w pmhx of HTN and 40pack-yr smoking hx who presents with 3 months of LLE claudication, no DP/PT signals present on examination. Findings c/w [place] 4 CLTI.     - Planning for aorto-bifemoral bypass   - ASA, high-dose statin  - regular diet   - medicine consult for operative clearance   - c/w amlodipine for HTN   - VTE ppx   - Requires ischemic evaluation prior to OR    Vascular Surgery   [id number]

## 2023-12-01 NOTE — PROGRESS NOTE ADULT - SUBJECTIVE AND OBJECTIVE BOX
VASCULAR SURGERY DAILY PROGRESS NOTE:     SUBJECTIVE/ROS: Patient feels well, hes is resting comfortably. no new complaints.     MEDICATIONS  (STANDING):  amLODIPine   Tablet 10 milliGRAM(s) Oral daily  aspirin  chewable 81 milliGRAM(s) Oral daily  atorvastatin 80 milliGRAM(s) Oral at bedtime  heparin   Injectable 5000 Unit(s) SubCutaneous every 8 hours  influenza   Vaccine 0.5 milliLiter(s) IntraMuscular once  nicotine - 21 mG/24Hr(s) Patch 1 Patch Transdermal daily    MEDICATIONS  (PRN):  acetaminophen     Tablet .. 1000 milliGRAM(s) Oral every 6 hours PRN Mild Pain (1 - 3)  oxyCODONE    IR 2.5 milliGRAM(s) Oral every 4 hours PRN Moderate Pain (4 - 6)  oxyCODONE    IR 5 milliGRAM(s) Oral every 4 hours PRN Severe Pain (7 - 10)      OBJECTIVE:    Vital Signs Last 24 Hrs  T(C): 36.9 (01 Dec 2023 05:16), Max: 37.1 (30 Nov 2023 20:20)  T(F): 98.5 (01 Dec 2023 05:16), Max: 98.8 (30 Nov 2023 20:20)  HR: 54 (01 Dec 2023 05:16) (54 - 99)  BP: 129/75 (01 Dec 2023 05:16) (123/73 - 153/87)  BP(mean): --  RR: 18 (01 Dec 2023 05:16) (16 - 18)  SpO2: 97% (01 Dec 2023 05:16) (95% - 98%)    Parameters below as of 01 Dec 2023 05:16  Patient On (Oxygen Delivery Method): room air            I&O's Detail    30 Nov 2023 07:01  -  01 Dec 2023 07:00  --------------------------------------------------------  IN:    Oral Fluid: 240 mL  Total IN: 240 mL    OUT:  Total OUT: 0 mL    Total NET: 240 mL          Daily     Daily     LABS:                        15.0   6.18  )-----------( 325      ( 01 Dec 2023 06:42 )             45.2     12-01    138  |  104  |  11  ----------------------------<  87  4.4   |  22  |  0.66    Ca    9.5      01 Dec 2023 06:42  Phos  2.9     12-01  Mg     2.0     12-01    TPro  8.8<H>  /  Alb  4.8  /  TBili  0.3  /  DBili  x   /  AST  53<H>  /  ALT  68<H>  /  AlkPhos  167<H>  11-29    PT/INR - ( 29 Nov 2023 17:51 )   PT: 11.2 sec;   INR: 1.02 ratio         PTT - ( 29 Nov 2023 17:51 )  PTT:33.6 sec  Urinalysis Basic - ( 01 Dec 2023 06:42 )    Color: x / Appearance: x / SG: x / pH: x  Gluc: 87 mg/dL / Ketone: x  / Bili: x / Urobili: x   Blood: x / Protein: x / Nitrite: x   Leuk Esterase: x / RBC: x / WBC x   Sq Epi: x / Non Sq Epi: x / Bacteria: x                PHYSICAL EXAM:    GENERAL: NAD, lying in bed comfortably  CHEST: nonlabored on room air   EXTREMITIES:   - RLE: DP and PT signal, no wounds   - LLE: DP signal, no wounds   NERVOUS SYSTEM:  Alert & Oriented X3

## 2023-12-01 NOTE — PROGRESS NOTE ADULT - SUBJECTIVE AND OBJECTIVE BOX
Tremaine Ospina MD  Division of Hospital Medicine  Available via MS teams  ---------------------------------------------------------    TAB CHA  58y  Male      Patient is a 58y old  Male who presents with a chief complaint of PAD (30 Nov 2023 18:06)      INTERVAL HPI/OVERNIGHT EVENTS:  Seen at bedside. Overall feels well. Has some numbness and tingling in extremities.       REVIEW OF SYSTEMS: 10 point ROS negative unless listed above    T(C): 36.9 (12-01-23 @ 05:16), Max: 37.1 (11-30-23 @ 20:20)  HR: 54 (12-01-23 @ 05:16) (54 - 99)  BP: 129/75 (12-01-23 @ 05:16) (123/73 - 153/87)  RR: 18 (12-01-23 @ 05:16) (16 - 18)  SpO2: 97% (12-01-23 @ 05:16) (95% - 98%)  Wt(kg): --Vital Signs Last 24 Hrs  T(C): 36.9 (01 Dec 2023 05:16), Max: 37.1 (30 Nov 2023 20:20)  T(F): 98.5 (01 Dec 2023 05:16), Max: 98.8 (30 Nov 2023 20:20)  HR: 54 (01 Dec 2023 05:16) (54 - 99)  BP: 129/75 (01 Dec 2023 05:16) (123/73 - 153/87)  BP(mean): --  RR: 18 (01 Dec 2023 05:16) (16 - 18)  SpO2: 97% (01 Dec 2023 05:16) (95% - 98%)    Parameters below as of 01 Dec 2023 05:16  Patient On (Oxygen Delivery Method): room air        PHYSICAL EXAM:  GENERAL: NAD, well-developed  HEAD:  Atraumatic, Normocephalic  EYES: EOMI, PERRLA, conjunctiva and sclera clear  NECK: Supple, No JVD  CHEST/LUNG: Clear to auscultation bilaterally; No wheeze  HEART: Regular rate and rhythm; No murmurs, rubs, or gallops  ABDOMEN: Soft, Nontender, Nondistended; Bowel sounds present  EXTREMITIES: No edema  NEUROLOGY: AAOx3, non-focal        LABS:                        15.0   6.18  )-----------( 325      ( 01 Dec 2023 06:42 )             45.2     12-01    138  |  104  |  11  ----------------------------<  87  4.4   |  22  |  0.66    Ca    9.5      01 Dec 2023 06:42  Phos  2.9     12-01  Mg     2.0     12-01    TPro  8.8<H>  /  Alb  4.8  /  TBili  0.3  /  DBili  x   /  AST  53<H>  /  ALT  68<H>  /  AlkPhos  167<H>  11-29    PT/INR - ( 29 Nov 2023 17:51 )   PT: 11.2 sec;   INR: 1.02 ratio         PTT - ( 29 Nov 2023 17:51 )  PTT:33.6 sec  Urinalysis Basic - ( 01 Dec 2023 06:42 )    Color: x / Appearance: x / SG: x / pH: x  Gluc: 87 mg/dL / Ketone: x  / Bili: x / Urobili: x   Blood: x / Protein: x / Nitrite: x   Leuk Esterase: x / RBC: x / WBC x   Sq Epi: x / Non Sq Epi: x / Bacteria: x      CAPILLARY BLOOD GLUCOSE            Urinalysis Basic - ( 01 Dec 2023 06:42 )    Color: x / Appearance: x / SG: x / pH: x  Gluc: 87 mg/dL / Ketone: x  / Bili: x / Urobili: x   Blood: x / Protein: x / Nitrite: x   Leuk Esterase: x / RBC: x / WBC x   Sq Epi: x / Non Sq Epi: x / Bacteria: x        RADIOLOGY & ADDITIONAL TESTS:    Imaging Personally Reviewed:  [ ] YES  [ ] NO

## 2023-12-01 NOTE — PROGRESS NOTE ADULT - SUBJECTIVE AND OBJECTIVE BOX
DATE OF SERVICE: 12-01-23 @ 08:12    Patient is a 58y old  Male who presents with a chief complaint of PAD (30 Nov 2023 18:06)      INTERVAL HISTORY: Feels ok.     REVIEW OF SYSTEMS:  CONSTITUTIONAL: No weakness  EYES/ENT: No visual changes;  No throat pain   NECK: No pain or stiffness  RESPIRATORY: No cough, wheezing; No shortness of breath  CARDIOVASCULAR: No chest pain or palpitations  GASTROINTESTINAL: No abdominal  pain. No nausea, vomiting, or hematemesis  GENITOURINARY: No dysuria, frequency or hematuria  NEUROLOGICAL: No stroke like symptoms  SKIN: No rashes    	  MEDICATIONS:  amLODIPine   Tablet 10 milliGRAM(s) Oral daily        PHYSICAL EXAM:  T(C): 36.9 (12-01-23 @ 05:16), Max: 37.1 (11-30-23 @ 20:20)  HR: 54 (12-01-23 @ 05:16) (54 - 99)  BP: 129/75 (12-01-23 @ 05:16) (123/73 - 153/87)  RR: 18 (12-01-23 @ 05:16) (16 - 18)  SpO2: 97% (12-01-23 @ 05:16) (95% - 98%)  Wt(kg): --  I&O's Summary        Appearance: In no distress	  HEENT:    PERRL, EOMI	  Cardiovascular:  S1 S2, No JVD  Respiratory: Lungs clear to auscultation	  Gastrointestinal:  Soft, Non-tender, + BS	  Vascularature:  No edema of LE  Psychiatric: Appropriate affect   Neuro: no acute focal deficits                               15.0   6.18  )-----------( 325      ( 01 Dec 2023 06:42 )             45.2     12-01    138  |  104  |  11  ----------------------------<  87  4.4   |  22  |  0.66    Ca    9.5      01 Dec 2023 06:42  Phos  2.9     12-01  Mg     2.0     12-01    TPro  8.8<H>  /  Alb  4.8  /  TBili  0.3  /  DBili  x   /  AST  53<H>  /  ALT  68<H>  /  AlkPhos  167<H>  11-29        Labs personally reviewed      ASSESSMENT/PLAN: 	    57yo M PMHx of HTN, EToH abuse c/b DTs did not require intubation and daily smoker who presents with 3 month hx of intermittent LLE pain and claudication         Problem/Recommendation - 1:  ·  Problem: Preop risk stratification  ·  Plan: Reports functional capacity limited by claudication   - risk factors include heavy smoking history and PAD  - EKG NSR  - Echo ordered and pending   - if planned for open bypass will require ischemic eval, CTA heart  - RCRI score 0: 3.9 % 30-day risk of MACE     Problem/Recommendation - 2:  ·  Problem: HTN (hypertension).   ·  Plan: - c/w home Amlodipine     Problem/Recommendation - 3:  ·  Problem: Smoking.   ·  Plan: Nicotine patch             DENISSE Gonzalez-NP   [place] DO [facility]  Cardiovascular Medicine  [address] [place] [address], [address]  Available through call or text on [employer] TE[employer]  Office: [phone]   DATE OF SERVICE: 12-01-23 @ 08:12    Patient is a 58y old  Male who presents with a chief complaint of PAD (30 Nov 2023 18:06)      INTERVAL HISTORY: Feels ok.     REVIEW OF SYSTEMS:  CONSTITUTIONAL: No weakness  EYES/ENT: No visual changes;  No throat pain   NECK: No pain or stiffness  RESPIRATORY: No cough, wheezing; No shortness of breath  CARDIOVASCULAR: No chest pain or palpitations  GASTROINTESTINAL: No abdominal  pain. No nausea, vomiting, or hematemesis  GENITOURINARY: No dysuria, frequency or hematuria  NEUROLOGICAL: No stroke like symptoms  SKIN: No rashes    	  MEDICATIONS:  amLODIPine   Tablet 10 milliGRAM(s) Oral daily        PHYSICAL EXAM:  T(C): 36.9 (12-01-23 @ 05:16), Max: 37.1 (11-30-23 @ 20:20)  HR: 54 (12-01-23 @ 05:16) (54 - 99)  BP: 129/75 (12-01-23 @ 05:16) (123/73 - 153/87)  RR: 18 (12-01-23 @ 05:16) (16 - 18)  SpO2: 97% (12-01-23 @ 05:16) (95% - 98%)  Wt(kg): --  I&O's Summary        Appearance: In no distress	  HEENT:    PERRL, EOMI	  Cardiovascular:  S1 S2, No JVD  Respiratory: Lungs clear to auscultation	  Gastrointestinal:  Soft, Non-tender, + BS	  Vascularature:  No edema of LE  Psychiatric: Appropriate affect   Neuro: no acute focal deficits                               15.0   6.18  )-----------( 325      ( 01 Dec 2023 06:42 )             45.2     12-01    138  |  104  |  11  ----------------------------<  87  4.4   |  22  |  0.66    Ca    9.5      01 Dec 2023 06:42  Phos  2.9     12-01  Mg     2.0     12-01    TPro  8.8<H>  /  Alb  4.8  /  TBili  0.3  /  DBili  x   /  AST  53<H>  /  ALT  68<H>  /  AlkPhos  167<H>  11-29        Labs personally reviewed          ASSESSMENT/PLAN: 	    59yo M PMHx of HTN, EToH abuse c/b DTs did not require intubation and daily smoker who presents with 3 month hx of intermittent LLE pain and claudication         Problem/Recommendation - 1:  ·  Problem: Preop risk stratification  ·  Plan: Reports functional capacity limited by claudication   - risk factors include heavy smoking history and PAD  - EKG NSR  - Echo ordered   - s/p cath 12/1, CTA of RCA and OM, otherwise patent cors. Continue with med management   - RCRI score 0: 3.9 % 30-day risk of MACE  - Mod risk for high risk open vascular bypass, no cardiac contraindication to proceed     Problem/Recommendation - 2:  ·  Problem: HTN (hypertension).   ·  Plan: - c/w home Amlodipine     Problem/Recommendation - 3:  ·  Problem: Smoking.   ·  Plan: Nicotine patch             DENISSE Gonzalez-SOLO [facility]  Cardiovascular Medicine  [address] [place] [address], [address]  Available through call or text on [employer] TE[employer]  Office: [phone]

## 2023-12-01 NOTE — PROGRESS NOTE ADULT - PROBLEM SELECTOR PLAN 1
Pt presented with 3 months of LLE claudication, no DP/PT signals present on examination as per vascular with findings c/w [place] 4 CLTI. Also noted to have mild stenosis of renal artery stenosis and mild infrarenal AAA 2.8 cm  - Operative planning as per vascular

## 2023-12-02 LAB
ANION GAP SERPL CALC-SCNC: 11 MMOL/L — SIGNIFICANT CHANGE UP (ref 5–17)
ANION GAP SERPL CALC-SCNC: 11 MMOL/L — SIGNIFICANT CHANGE UP (ref 5–17)
BUN SERPL-MCNC: 9 MG/DL — SIGNIFICANT CHANGE UP (ref 7–23)
BUN SERPL-MCNC: 9 MG/DL — SIGNIFICANT CHANGE UP (ref 7–23)
CALCIUM SERPL-MCNC: 9.4 MG/DL — SIGNIFICANT CHANGE UP (ref 8.4–10.5)
CALCIUM SERPL-MCNC: 9.4 MG/DL — SIGNIFICANT CHANGE UP (ref 8.4–10.5)
CHLORIDE SERPL-SCNC: 104 MMOL/L — SIGNIFICANT CHANGE UP (ref 96–108)
CHLORIDE SERPL-SCNC: 104 MMOL/L — SIGNIFICANT CHANGE UP (ref 96–108)
CO2 SERPL-SCNC: 23 MMOL/L — SIGNIFICANT CHANGE UP (ref 22–31)
CO2 SERPL-SCNC: 23 MMOL/L — SIGNIFICANT CHANGE UP (ref 22–31)
CREAT SERPL-MCNC: 0.7 MG/DL — SIGNIFICANT CHANGE UP (ref 0.5–1.3)
CREAT SERPL-MCNC: 0.7 MG/DL — SIGNIFICANT CHANGE UP (ref 0.5–1.3)
EGFR: 107 ML/MIN/1.73M2 — SIGNIFICANT CHANGE UP
EGFR: 107 ML/MIN/1.73M2 — SIGNIFICANT CHANGE UP
GLUCOSE SERPL-MCNC: 100 MG/DL — HIGH (ref 70–99)
GLUCOSE SERPL-MCNC: 100 MG/DL — HIGH (ref 70–99)
HCT VFR BLD CALC: 42.2 % — SIGNIFICANT CHANGE UP (ref 39–50)
HCT VFR BLD CALC: 42.2 % — SIGNIFICANT CHANGE UP (ref 39–50)
HGB BLD-MCNC: 14.4 G/DL — SIGNIFICANT CHANGE UP (ref 13–17)
HGB BLD-MCNC: 14.4 G/DL — SIGNIFICANT CHANGE UP (ref 13–17)
MAGNESIUM SERPL-MCNC: 1.9 MG/DL — SIGNIFICANT CHANGE UP (ref 1.6–2.6)
MAGNESIUM SERPL-MCNC: 1.9 MG/DL — SIGNIFICANT CHANGE UP (ref 1.6–2.6)
MCHC RBC-ENTMCNC: 31.7 PG — SIGNIFICANT CHANGE UP (ref 27–34)
MCHC RBC-ENTMCNC: 31.7 PG — SIGNIFICANT CHANGE UP (ref 27–34)
MCHC RBC-ENTMCNC: 34.1 GM/DL — SIGNIFICANT CHANGE UP (ref 32–36)
MCHC RBC-ENTMCNC: 34.1 GM/DL — SIGNIFICANT CHANGE UP (ref 32–36)
MCV RBC AUTO: 93 FL — SIGNIFICANT CHANGE UP (ref 80–100)
MCV RBC AUTO: 93 FL — SIGNIFICANT CHANGE UP (ref 80–100)
NRBC # BLD: 0 /100 WBCS — SIGNIFICANT CHANGE UP (ref 0–0)
NRBC # BLD: 0 /100 WBCS — SIGNIFICANT CHANGE UP (ref 0–0)
PHOSPHATE SERPL-MCNC: 2.8 MG/DL — SIGNIFICANT CHANGE UP (ref 2.5–4.5)
PHOSPHATE SERPL-MCNC: 2.8 MG/DL — SIGNIFICANT CHANGE UP (ref 2.5–4.5)
PLATELET # BLD AUTO: 277 K/UL — SIGNIFICANT CHANGE UP (ref 150–400)
PLATELET # BLD AUTO: 277 K/UL — SIGNIFICANT CHANGE UP (ref 150–400)
POTASSIUM SERPL-MCNC: 4 MMOL/L — SIGNIFICANT CHANGE UP (ref 3.5–5.3)
POTASSIUM SERPL-MCNC: 4 MMOL/L — SIGNIFICANT CHANGE UP (ref 3.5–5.3)
POTASSIUM SERPL-SCNC: 4 MMOL/L — SIGNIFICANT CHANGE UP (ref 3.5–5.3)
POTASSIUM SERPL-SCNC: 4 MMOL/L — SIGNIFICANT CHANGE UP (ref 3.5–5.3)
RBC # BLD: 4.54 M/UL — SIGNIFICANT CHANGE UP (ref 4.2–5.8)
RBC # BLD: 4.54 M/UL — SIGNIFICANT CHANGE UP (ref 4.2–5.8)
RBC # FLD: 13 % — SIGNIFICANT CHANGE UP (ref 10.3–14.5)
RBC # FLD: 13 % — SIGNIFICANT CHANGE UP (ref 10.3–14.5)
SODIUM SERPL-SCNC: 138 MMOL/L — SIGNIFICANT CHANGE UP (ref 135–145)
SODIUM SERPL-SCNC: 138 MMOL/L — SIGNIFICANT CHANGE UP (ref 135–145)
WBC # BLD: 6.83 K/UL — SIGNIFICANT CHANGE UP (ref 3.8–10.5)
WBC # BLD: 6.83 K/UL — SIGNIFICANT CHANGE UP (ref 3.8–10.5)
WBC # FLD AUTO: 6.83 K/UL — SIGNIFICANT CHANGE UP (ref 3.8–10.5)
WBC # FLD AUTO: 6.83 K/UL — SIGNIFICANT CHANGE UP (ref 3.8–10.5)

## 2023-12-02 PROCEDURE — 99232 SBSQ HOSP IP/OBS MODERATE 35: CPT

## 2023-12-02 RX ADMIN — HEPARIN SODIUM 5000 UNIT(S): 5000 INJECTION INTRAVENOUS; SUBCUTANEOUS at 21:59

## 2023-12-02 RX ADMIN — ATORVASTATIN CALCIUM 80 MILLIGRAM(S): 80 TABLET, FILM COATED ORAL at 21:59

## 2023-12-02 RX ADMIN — HEPARIN SODIUM 5000 UNIT(S): 5000 INJECTION INTRAVENOUS; SUBCUTANEOUS at 14:07

## 2023-12-02 RX ADMIN — Medication 1 PATCH: at 11:54

## 2023-12-02 RX ADMIN — Medication 81 MILLIGRAM(S): at 11:53

## 2023-12-02 RX ADMIN — Medication 1 PATCH: at 19:55

## 2023-12-02 RX ADMIN — Medication 1 PATCH: at 11:55

## 2023-12-02 RX ADMIN — Medication 1 PATCH: at 08:00

## 2023-12-02 RX ADMIN — HEPARIN SODIUM 5000 UNIT(S): 5000 INJECTION INTRAVENOUS; SUBCUTANEOUS at 05:28

## 2023-12-02 RX ADMIN — AMLODIPINE BESYLATE 10 MILLIGRAM(S): 2.5 TABLET ORAL at 05:28

## 2023-12-02 NOTE — PROGRESS NOTE ADULT - SUBJECTIVE AND OBJECTIVE BOX
DATE OF SERVICE: 12-02-23 @ 16:14    Patient is a 58y old  Male who presents with a chief complaint of limb ischemia (02 Dec 2023 12:22)      INTERVAL HISTORY:     REVIEW OF SYSTEMS:  CONSTITUTIONAL: No weakness  EYES/ENT: No visual changes;  No throat pain   NECK: No pain or stiffness  RESPIRATORY: No cough, wheezing; No shortness of breath  CARDIOVASCULAR: No chest pain or palpitations  GASTROINTESTINAL: No abdominal  pain. No nausea, vomiting, or hematemesis  GENITOURINARY: No dysuria, frequency or hematuria  NEUROLOGICAL: No stroke like symptoms  SKIN: No rashes    	  MEDICATIONS:  amLODIPine   Tablet 10 milliGRAM(s) Oral daily        PHYSICAL EXAM:  T(C): 36.5 (12-02-23 @ 13:09), Max: 37 (12-02-23 @ 09:15)  HR: 59 (12-02-23 @ 13:09) (57 - 90)  BP: 118/76 (12-02-23 @ 13:09) (118/76 - 142/86)  RR: 18 (12-02-23 @ 13:09) (18 - 18)  SpO2: 97% (12-02-23 @ 13:09) (93% - 98%)  Wt(kg): --  I&O's Summary    01 Dec 2023 07:01  -  02 Dec 2023 07:00  --------------------------------------------------------  IN: 750 mL / OUT: 0 mL / NET: 750 mL    02 Dec 2023 07:01  -  02 Dec 2023 16:14  --------------------------------------------------------  IN: 600 mL / OUT: 0 mL / NET: 600 mL          Appearance: In no distress	  HEENT:    PERRL, EOMI	  Cardiovascular:  S1 S2, No JVD  Respiratory: Lungs clear to auscultation	  Gastrointestinal:  Soft, Non-tender, + BS	  Vascularature:  No edema of LE  Psychiatric: Appropriate affect   Neuro: no acute focal deficits                               14.4   6.83  )-----------( 277      ( 02 Dec 2023 07:22 )             42.2     12-02    138  |  104  |  9   ----------------------------<  100<H>  4.0   |  23  |  0.70    Ca    9.4      02 Dec 2023 07:20  Phos  2.8     12-02  Mg     1.9     12-02          Labs personally reviewed      ASSESSMENT/PLAN: 	    57yo M PMHx of HTN, EToH abuse c/b DTs did not require intubation and daily smoker who presents with 3 month hx of intermittent LLE pain and claudication         Problem/Recommendation - 1:  ·  Problem: Preop risk stratification  ·  Plan: Reports functional capacity limited by claudication   - risk factors include heavy smoking history and PAD  - EKG NSR  - Echo pending   - s/p cath 12/1, CTA of RCA and OM, otherwise patent cors. Continue with med management   - RCRI score 0: 3.9 % 30-day risk of MACE  - Mod risk for high risk open vascular bypass, no cardiac contraindication to proceed     Problem/Recommendation - 2:  ·  Problem: HTN (hypertension) Controlled.   ·  Plan: - c/w home Amlodipine     Problem/Recommendation - 3:  ·  Problem: Smoking.   ·  Plan: Nicotine patch          SOLO Ordaz, DO [facility]  Cardiovascular Medicine  [address] [place] [address], [address]  Available through call or text on [employer] TE[employer]  Office: [phone]   DATE OF SERVICE: 12-02-23 @ 16:14    Patient is a 58y old  Male who presents with a chief complaint of limb ischemia (02 Dec 2023 12:22)      INTERVAL HISTORY:     REVIEW OF SYSTEMS:  CONSTITUTIONAL: No weakness  EYES/ENT: No visual changes;  No throat pain   NECK: No pain or stiffness  RESPIRATORY: No cough, wheezing; No shortness of breath  CARDIOVASCULAR: No chest pain or palpitations  GASTROINTESTINAL: No abdominal  pain. No nausea, vomiting, or hematemesis  GENITOURINARY: No dysuria, frequency or hematuria  NEUROLOGICAL: No stroke like symptoms  SKIN: No rashes    	  MEDICATIONS:  amLODIPine   Tablet 10 milliGRAM(s) Oral daily        PHYSICAL EXAM:  T(C): 36.5 (12-02-23 @ 13:09), Max: 37 (12-02-23 @ 09:15)  HR: 59 (12-02-23 @ 13:09) (57 - 90)  BP: 118/76 (12-02-23 @ 13:09) (118/76 - 142/86)  RR: 18 (12-02-23 @ 13:09) (18 - 18)  SpO2: 97% (12-02-23 @ 13:09) (93% - 98%)  Wt(kg): --  I&O's Summary    01 Dec 2023 07:01  -  02 Dec 2023 07:00  --------------------------------------------------------  IN: 750 mL / OUT: 0 mL / NET: 750 mL    02 Dec 2023 07:01  -  02 Dec 2023 16:14  --------------------------------------------------------  IN: 600 mL / OUT: 0 mL / NET: 600 mL          Appearance: In no distress	  HEENT:    PERRL, EOMI	  Cardiovascular:  S1 S2, No JVD  Respiratory: Lungs clear to auscultation	  Gastrointestinal:  Soft, Non-tender, + BS	  Vascularature:  No edema of LE  Psychiatric: Appropriate affect   Neuro: no acute focal deficits                               14.4   6.83  )-----------( 277      ( 02 Dec 2023 07:22 )             42.2     12-02    138  |  104  |  9   ----------------------------<  100<H>  4.0   |  23  |  0.70    Ca    9.4      02 Dec 2023 07:20  Phos  2.8     12-02  Mg     1.9     12-02          Labs personally reviewed      ASSESSMENT/PLAN: 	    57yo M PMHx of HTN, EToH abuse c/b DTs did not require intubation and daily smoker who presents with 3 month hx of intermittent LLE pain and claudication         Problem/Recommendation - 1:  ·  Problem: Preop risk stratification  ·  Plan: Reports functional capacity limited by claudication   - risk factors include heavy smoking history and PAD  - EKG NSR  - Echo pending   - s/p cath 12/1, CTA of RCA and OM, otherwise patent cors. Continue with med management   - RCRI score 0: 3.9 % 30-day risk of MACE  - Mod risk for high risk open vascular bypass, no cardiac contraindication to proceed     Problem/Recommendation - 2:  ·  Problem: HTN (hypertension) Controlled.   ·  Plan: - c/w home Amlodipine     Problem/Recommendation - 3:  ·  Problem: Smoking.   ·  Plan: Nicotine patch          SOLO Ordaz, DO [facility]  Cardiovascular Medicine  [address] [place] [address], [address]  Available through call or text on [employer] TEAM[employer]  Office: [phone]   DATE OF SERVICE: 12-02-23 @ 16:14    Patient is a 58y old  Male who presents with a chief complaint of limb ischemia (02 Dec 2023 12:22)      INTERVAL HISTORY:     REVIEW OF SYSTEMS:  CONSTITUTIONAL: No weakness  EYES/ENT: No visual changes;  No throat pain   NECK: No pain or stiffness  RESPIRATORY: No cough, wheezing; No shortness of breath  CARDIOVASCULAR: No chest pain or palpitations  GASTROINTESTINAL: No abdominal  pain. No nausea, vomiting, or hematemesis  GENITOURINARY: No dysuria, frequency or hematuria  NEUROLOGICAL: No stroke like symptoms  SKIN: No rashes    	  MEDICATIONS:  amLODIPine   Tablet 10 milliGRAM(s) Oral daily        PHYSICAL EXAM:  T(C): 36.5 (12-02-23 @ 13:09), Max: 37 (12-02-23 @ 09:15)  HR: 59 (12-02-23 @ 13:09) (57 - 90)  BP: 118/76 (12-02-23 @ 13:09) (118/76 - 142/86)  RR: 18 (12-02-23 @ 13:09) (18 - 18)  SpO2: 97% (12-02-23 @ 13:09) (93% - 98%)  Wt(kg): --  I&O's Summary    01 Dec 2023 07:01  -  02 Dec 2023 07:00  --------------------------------------------------------  IN: 750 mL / OUT: 0 mL / NET: 750 mL    02 Dec 2023 07:01  -  02 Dec 2023 16:14  --------------------------------------------------------  IN: 600 mL / OUT: 0 mL / NET: 600 mL          Appearance: In no distress	  HEENT:    PERRL, EOMI	  Cardiovascular:  S1 S2, No JVD  Respiratory: Lungs clear to auscultation	  Gastrointestinal:  Soft, Non-tender, + BS	  Vascularature:  No edema of LE  Psychiatric: Appropriate affect   Neuro: no acute focal deficits                               14.4   6.83  )-----------( 277      ( 02 Dec 2023 07:22 )             42.2     12-02    138  |  104  |  9   ----------------------------<  100<H>  4.0   |  23  |  0.70    Ca    9.4      02 Dec 2023 07:20  Phos  2.8     12-02  Mg     1.9     12-02          Labs personally reviewed      ASSESSMENT/PLAN: 	    59yo M PMHx of HTN, EToH abuse c/b DTs did not require intubation and daily smoker who presents with 3 month hx of intermittent LLE pain and claudication         Problem/Recommendation - 1:  ·  Problem: Preop risk stratification  ·  Plan: Reports functional capacity limited by claudication   - risk factors include heavy smoking history and PAD  - EKG NSR  - Echo pending   - s/p cath 12/1, CTA of RCA and OM, otherwise patent cors. Continue with med management   - RCRI score 0: 3.9 % 30-day risk of MACE  - Mod risk for high risk open vascular bypass, no cardiac contraindication to proceed     Problem/Recommendation - 2:  ·  Problem: HTN (hypertension) Controlled.   ·  Plan: - c/w home Amlodipine     Problem/Recommendation - 3:  ·  Problem: Smoking.   ·  Plan: Nicotine patch          SOLO Ordaz, DO [facility]  Cardiovascular Medicine  [address] [place] [address], [address]  Available through call or text on [employer] TEAM[employer]  Office: [phone]

## 2023-12-02 NOTE — PROGRESS NOTE ADULT - ASSESSMENT
59yo M w pmhx of HTN and 40pack-yr smoking hx who presents with 3 months of LLE claudication, no DP/PT signals present on examination. Findings c/w [place] 4 CLTI. Hemodynamically stable on the floor, recovered appropriately s/p diagnostic cardiac cath 12/1.    Plan:  - Planning for aorto-bifemoral bypass   - ASA, high-dose statin  - Medicine consulted for optimization, appreciate recs  - Cardiology consulted, optimized s/p cath 12/1  - Continue amlodipine  - Regular diet  - VTE ppx     Vascular Surgery   [id number]

## 2023-12-02 NOTE — PROGRESS NOTE ADULT - PROBLEM SELECTOR PLAN 1
Pt presented with 3 months of LLE claudication, no DP/PT signals present on examination as per vascular with findings c/w [place] 4 CLTI. Also noted to have mild stenosis of renal artery stenosis and mild infrarenal AAA 2.8 cm  - Operative planning as per vascular

## 2023-12-02 NOTE — PROGRESS NOTE ADULT - SUBJECTIVE AND OBJECTIVE BOX
SURGERY DAILY PROGRESS NOTE    SUBJECTIVE: Patient seen and examined on AM rounds. Reports he is doing well with no new complaints, has been walking around the halls. Denies right hand pain, weakness, or numbness s/p hearth cath 12/1.      OBJECTIVE:  Vital Signs Last 24 Hrs  T(C): 36.9 (02 Dec 2023 05:12), Max: 37 (01 Dec 2023 15:35)  T(F): 98.5 (02 Dec 2023 05:12), Max: 98.6 (01 Dec 2023 15:35)  HR: 57 (02 Dec 2023 05:12) (57 - 90)  BP: 142/83 (02 Dec 2023 05:12) (115/68 - 142/86)  BP(mean): --  RR: 18 (02 Dec 2023 05:12) (16 - 18)  SpO2: 98% (02 Dec 2023 05:12) (93% - 98%)    Parameters below as of 02 Dec 2023 05:12  Patient On (Oxygen Delivery Method): room air        I&O's Summary    01 Dec 2023 07:01  -  02 Dec 2023 07:00  --------------------------------------------------------  IN: 750 mL / OUT: 0 mL / NET: 750 mL        Physical Exam:  General Appearance: Appears well, NAD   Chest: Nonlabored breathing on RA  CV: RRR  Extremities: Grossly symmetric  Vascular: Both feet WWP. R radial pulse palpable, hand WWP with normal  strength, sensation/motor function grossly intact    LABS:                        14.4   6.83  )-----------( 277      ( 02 Dec 2023 07:22 )             42.2     12-02    138  |  104  |  9   ----------------------------<  100<H>  4.0   |  23  |  0.70    Ca    9.4      02 Dec 2023 07:20  Phos  2.8     12-02  Mg     1.9     12-02        Urinalysis Basic - ( 02 Dec 2023 07:20 )    Color: x / Appearance: x / SG: x / pH: x  Gluc: 100 mg/dL / Ketone: x  / Bili: x / Urobili: x   Blood: x / Protein: x / Nitrite: x   Leuk Esterase: x / RBC: x / WBC x   Sq Epi: x / Non Sq Epi: x / Bacteria: x

## 2023-12-02 NOTE — PROGRESS NOTE ADULT - SUBJECTIVE AND OBJECTIVE BOX
[facility] Division of Hospital Medicine  Can Yeager MD  Available via MS Teams    SUBJECTIVE / OVERNIGHT EVENTS:  pt feeling well overall  denies CP, SOB, abd pain, n/v  some tingling in b/l toes, but much better than prior, states feet are no as cold as on admission     ADDITIONAL REVIEW OF SYSTEMS:  14 point ROS negative except as noted above     MEDICATIONS  (STANDING):  amLODIPine   Tablet 10 milliGRAM(s) Oral daily  aspirin  chewable 81 milliGRAM(s) Oral daily  atorvastatin 80 milliGRAM(s) Oral at bedtime  heparin   Injectable 5000 Unit(s) SubCutaneous every 8 hours  influenza   Vaccine 0.5 milliLiter(s) IntraMuscular once  nicotine - 21 mG/24Hr(s) Patch 1 Patch Transdermal daily    MEDICATIONS  (PRN):  acetaminophen     Tablet .. 1000 milliGRAM(s) Oral every 6 hours PRN Mild Pain (1 - 3)  oxyCODONE    IR 2.5 milliGRAM(s) Oral every 4 hours PRN Moderate Pain (4 - 6)  oxyCODONE    IR 5 milliGRAM(s) Oral every 4 hours PRN Severe Pain (7 - 10)      I&O's Summary    01 Dec 2023 07:01  -  02 Dec 2023 07:00  --------------------------------------------------------  IN: 750 mL / OUT: 0 mL / NET: 750 mL    02 Dec 2023 07:01  -  02 Dec 2023 12:22  --------------------------------------------------------  IN: 360 mL / OUT: 0 mL / NET: 360 mL        PHYSICAL EXAM:  Vital Signs Last 24 Hrs  T(C): 36.9 (02 Dec 2023 05:12), Max: 37 (01 Dec 2023 15:35)  T(F): 98.5 (02 Dec 2023 05:12), Max: 98.6 (01 Dec 2023 15:35)  HR: 57 (02 Dec 2023 05:12) (57 - 90)  BP: 142/83 (02 Dec 2023 05:12) (117/68 - 142/86)  BP(mean): --  RR: 18 (02 Dec 2023 05:12) (16 - 18)  SpO2: 98% (02 Dec 2023 05:12) (93% - 98%)    Parameters below as of 02 Dec 2023 05:12  Patient On (Oxygen Delivery Method): room air      PHYSICAL EXAM:  GENERAL: NAD, well-developed  HEAD:  Atraumatic, normocephalic  EYES: EOMI, conjunctiva and sclera clear  NECK: Supple, no JVD  CHEST/LUNG: Clear to auscultation bilaterally; no wheezing or rales  HEART: Regular rate and rhythm; no murmurs, no LE edema   ABDOMEN: Soft, nontender, nondistended; bowel sounds present  EXTREMITIES:  no pulses palpated, but warm forefoot, no clubbing   PSYCH: AAOx3, calm affect, not anxious  SKIN: No rashes or lesions      LABS:                        14.4   6.83  )-----------( 277      ( 02 Dec 2023 07:22 )             42.2     12-02    138  |  104  |  9   ----------------------------<  100<H>  4.0   |  23  |  0.70    Ca    9.4      02 Dec 2023 07:20  Phos  2.8     12-02  Mg     1.9     12-02            Urinalysis Basic - ( 02 Dec 2023 07:20 )    Color: x / Appearance: x / SG: x / pH: x  Gluc: 100 mg/dL / Ketone: x  / Bili: x / Urobili: x   Blood: x / Protein: x / Nitrite: x   Leuk Esterase: x / RBC: x / WBC x   Sq Epi: x / Non Sq Epi: x / Bacteria: x            RADIOLOGY & ADDITIONAL TESTS:  New Imaging Personally Reviewed Today:  New Electrocardiogram Personally Reviewed Today:  Other Results Reviewed Today:   Prior or Outpatient Records Reviewed Today with Summary:    COORDINATION OF CARE:  Consultant Communication and Details of Discussion (where applicable):

## 2023-12-03 PROCEDURE — 99232 SBSQ HOSP IP/OBS MODERATE 35: CPT | Mod: GC

## 2023-12-03 RX ORDER — FAMOTIDINE 10 MG/ML
20 INJECTION INTRAVENOUS ONCE
Refills: 0 | Status: COMPLETED | OUTPATIENT
Start: 2023-12-03 | End: 2023-12-04

## 2023-12-03 RX ORDER — CALCIUM CARBONATE 500(1250)
1 TABLET ORAL EVERY 4 HOURS
Refills: 0 | Status: DISCONTINUED | OUTPATIENT
Start: 2023-12-03 | End: 2023-12-05

## 2023-12-03 RX ADMIN — AMLODIPINE BESYLATE 10 MILLIGRAM(S): 2.5 TABLET ORAL at 05:29

## 2023-12-03 RX ADMIN — ATORVASTATIN CALCIUM 80 MILLIGRAM(S): 80 TABLET, FILM COATED ORAL at 21:44

## 2023-12-03 RX ADMIN — HEPARIN SODIUM 5000 UNIT(S): 5000 INJECTION INTRAVENOUS; SUBCUTANEOUS at 21:44

## 2023-12-03 RX ADMIN — Medication 1 PATCH: at 11:08

## 2023-12-03 RX ADMIN — Medication 1 TABLET(S): at 19:46

## 2023-12-03 RX ADMIN — Medication 1 PATCH: at 19:00

## 2023-12-03 RX ADMIN — Medication 1 PATCH: at 07:30

## 2023-12-03 RX ADMIN — Medication 1 PATCH: at 11:11

## 2023-12-03 RX ADMIN — HEPARIN SODIUM 5000 UNIT(S): 5000 INJECTION INTRAVENOUS; SUBCUTANEOUS at 13:18

## 2023-12-03 RX ADMIN — Medication 81 MILLIGRAM(S): at 11:10

## 2023-12-03 RX ADMIN — HEPARIN SODIUM 5000 UNIT(S): 5000 INJECTION INTRAVENOUS; SUBCUTANEOUS at 05:30

## 2023-12-03 NOTE — PROGRESS NOTE ADULT - PROBLEM SELECTOR PLAN 4
Has had withdrawal in past with DT's that did not require intubation. Was admitted to inpatient alcohol rehab ~2 months ago and sober since  - alcohol level negative  - No signs of withdrawal, monitor for now  - Could check PETH to assess for possible ETOH usage

## 2023-12-03 NOTE — PROGRESS NOTE ADULT - PROBLEM SELECTOR PLAN 1
Pt presented with 3 months of LLE claudication, no DP/PT signals present on examination as per vascular with findings c/w [place] 4 CLTI. Also noted to have mild stenosis of renal artery stenosis and mild infrarenal AAA 2.8 cm  - Operative planning as per vascular Pt presented with 3 months of LLE claudication, no DP/PT signals present on examination as per vascular with findings c/w [place] 4 CLTI. Also noted to have mild stenosis of renal artery stenosis and mild infrarenal AAA 2.8 cm  - Operative planning as per vascular Pt presented with 3 months of LLE claudication, no DP/PT signals present on examination as per vascular with findings c/w [place] 4 CLTI. Also noted to have mild stenosis of renal artery stenosis and mild infrarenal AAA 2.8 cm  - Operative planning as per vascular

## 2023-12-03 NOTE — PROGRESS NOTE ADULT - PROBLEM SELECTOR PLAN 5
With limb ischemia as above  - c/w Aspirin and Lipitor 80mg

## 2023-12-03 NOTE — PROGRESS NOTE ADULT - SUBJECTIVE AND OBJECTIVE BOX
[facility] Division of Hospital Medicine  Can Yeager MD  Available via MS Teams    SUBJECTIVE / OVERNIGHT EVENTS:  no acute events overnight   feeling well generally, endorsing ongoing numbness/tingling in b/l toes  no pain in LE, forefoot     ADDITIONAL REVIEW OF SYSTEMS:  14 point ROS negative except as noted above     MEDICATIONS  (STANDING):  amLODIPine   Tablet 10 milliGRAM(s) Oral daily  aspirin  chewable 81 milliGRAM(s) Oral daily  atorvastatin 80 milliGRAM(s) Oral at bedtime  heparin   Injectable 5000 Unit(s) SubCutaneous every 8 hours  influenza   Vaccine 0.5 milliLiter(s) IntraMuscular once  nicotine - 21 mG/24Hr(s) Patch 1 Patch Transdermal daily    MEDICATIONS  (PRN):  acetaminophen     Tablet .. 1000 milliGRAM(s) Oral every 6 hours PRN Mild Pain (1 - 3)  oxyCODONE    IR 2.5 milliGRAM(s) Oral every 4 hours PRN Moderate Pain (4 - 6)  oxyCODONE    IR 5 milliGRAM(s) Oral every 4 hours PRN Severe Pain (7 - 10)      I&O's Summary    02 Dec 2023 07:01  -  03 Dec 2023 07:00  --------------------------------------------------------  IN: 1320 mL / OUT: 0 mL / NET: 1320 mL    03 Dec 2023 07:01  -  03 Dec 2023 11:23  --------------------------------------------------------  IN: 240 mL / OUT: 0 mL / NET: 240 mL        PHYSICAL EXAM:  Vital Signs Last 24 Hrs  T(C): 36.6 (03 Dec 2023 10:23), Max: 36.9 (02 Dec 2023 16:35)  T(F): 97.9 (03 Dec 2023 10:23), Max: 98.5 (02 Dec 2023 16:35)  HR: 67 (03 Dec 2023 10:23) (58 - 67)  BP: 143/88 (03 Dec 2023 10:23) (118/76 - 144/80)  BP(mean): --  RR: 18 (03 Dec 2023 10:23) (18 - 18)  SpO2: 98% (03 Dec 2023 10:23) (95% - 99%)    Parameters below as of 03 Dec 2023 10:23  Patient On (Oxygen Delivery Method): room air      PHYSICAL EXAM:  GENERAL: NAD, well-developed  HEAD:  Atraumatic, normocephalic  EYES: EOMI, conjunctiva and sclera clear  NECK: Supple, no JVD  CHEST/LUNG: Clear to auscultation bilaterally; no wheezing or rales  HEART: Regular rate and rhythm; no murmurs, no LE edema   ABDOMEN: Soft, nontender, nondistended; bowel sounds present  EXTREMITIES:  2+ Peripheral Pulses, no clubbing, cyanosis  PSYCH: AAOx3, calm affect, not anxious  NEUROLOGY: non-focal, movnig all extrmities equally, sensation grossly intact   SKIN: No rashes or lesions  MUSCULOSKELETAL: no back pain, moving all extremities    LABS:                        14.4   6.83  )-----------( 277      ( 02 Dec 2023 07:22 )             42.2     12-02    138  |  104  |  9   ----------------------------<  100<H>  4.0   |  23  |  0.70    Ca    9.4      02 Dec 2023 07:20  Phos  2.8     12-02  Mg     1.9     12-02            Urinalysis Basic - ( 02 Dec 2023 07:20 )    Color: x / Appearance: x / SG: x / pH: x  Gluc: 100 mg/dL / Ketone: x  / Bili: x / Urobili: x   Blood: x / Protein: x / Nitrite: x   Leuk Esterase: x / RBC: x / WBC x   Sq Epi: x / Non Sq Epi: x / Bacteria: x            RADIOLOGY & ADDITIONAL TESTS:  New Imaging Personally Reviewed Today:  New Electrocardiogram Personally Reviewed Today:  Other Results Reviewed Today:   Prior or Outpatient Records Reviewed Today with Summary:    COORDINATION OF CARE:  Consultant Communication and Details of Discussion (where applicable):     [facility] Division of Hospital Medicine  Can Yeager MD  Available via MS Teams    SUBJECTIVE / OVERNIGHT EVENTS:  no acute events overnight   feeling well generally, endorsing ongoing numbness/tingling in b/l toes  no pain in LE, forefoot     ADDITIONAL REVIEW OF SYSTEMS:  14 point ROS negative except as noted above     MEDICATIONS  (STANDING):  amLODIPine   Tablet 10 milliGRAM(s) Oral daily  aspirin  chewable 81 milliGRAM(s) Oral daily  atorvastatin 80 milliGRAM(s) Oral at bedtime  heparin   Injectable 5000 Unit(s) SubCutaneous every 8 hours  influenza   Vaccine 0.5 milliLiter(s) IntraMuscular once  nicotine - 21 mG/24Hr(s) Patch 1 Patch Transdermal daily    MEDICATIONS  (PRN):  acetaminophen     Tablet .. 1000 milliGRAM(s) Oral every 6 hours PRN Mild Pain (1 - 3)  oxyCODONE    IR 2.5 milliGRAM(s) Oral every 4 hours PRN Moderate Pain (4 - 6)  oxyCODONE    IR 5 milliGRAM(s) Oral every 4 hours PRN Severe Pain (7 - 10)      I&O's Summary    02 Dec 2023 07:01  -  03 Dec 2023 07:00  --------------------------------------------------------  IN: 1320 mL / OUT: 0 mL / NET: 1320 mL    03 Dec 2023 07:01  -  03 Dec 2023 11:23  --------------------------------------------------------  IN: 240 mL / OUT: 0 mL / NET: 240 mL        PHYSICAL EXAM:  Vital Signs Last 24 Hrs  T(C): 36.6 (03 Dec 2023 10:23), Max: 36.9 (02 Dec 2023 16:35)  T(F): 97.9 (03 Dec 2023 10:23), Max: 98.5 (02 Dec 2023 16:35)  HR: 67 (03 Dec 2023 10:23) (58 - 67)  BP: 143/88 (03 Dec 2023 10:23) (118/76 - 144/80)  BP(mean): --  RR: 18 (03 Dec 2023 10:23) (18 - 18)  SpO2: 98% (03 Dec 2023 10:23) (95% - 99%)    Parameters below as of 03 Dec 2023 10:23  Patient On (Oxygen Delivery Method): room air      PHYSICAL EXAM:  GENERAL: NAD, well-developed  HEAD:  Atraumatic, normocephalic  EYES: EOMI, conjunctiva and sclera clear  NECK: Supple, no JVD  CHEST/LUNG: Clear to auscultation bilaterally; no wheezing or rales  HEART: Regular rate and rhythm; no murmurs, no LE edema   ABDOMEN: Soft, nontender, nondistended; bowel sounds present  EXTREMITIES:  2+ Peripheral Pulses, no clubbing, cyanosis  PSYCH: AAOx3, calm affect, not anxious  NEUROLOGY: non-focal, movnig all extrmities equally, sensation grossly intact   SKIN: No rashes or lesions  MUSCULOSKELETAL: no back pain, moving all extremities    LABS:                        14.4   6.83  )-----------( 277      ( 02 Dec 2023 07:22 )             42.2     12-02    138  |  104  |  9   ----------------------------<  100<H>  4.0   |  23  |  0.70    Ca    9.4      02 Dec 2023 07:20  Phos  2.8     12-02  Mg     1.9     12-02            Urinalysis Basic - ( 02 Dec 2023 07:20 )    Color: x / Appearance: x / SG: x / pH: x  Gluc: 100 mg/dL / Ketone: x  / Bili: x / Urobili: x   Blood: x / Protein: x / Nitrite: x   Leuk Esterase: x / RBC: x / WBC x   Sq Epi: x / Non Sq Epi: x / Bacteria: x            RADIOLOGY & ADDITIONAL TESTS:  New Imaging Personally Reviewed Today:  New Electrocardiogram Personally Reviewed Today:  Other Results Reviewed Today:   Prior or Outpatient Records Reviewed Today with Summary:    COORDINATION OF CARE:  Consultant Communication and Details of Discussion (where applicable):     [facility] Division of Hospital Medicine  Can Yeager MD  Available via MS Teams    SUBJECTIVE / OVERNIGHT EVENTS:  no acute events overnight   feeling well generally, endorsing ongoing numbness/tingling in b/l toes  no pain in LE, forefoot     ADDITIONAL REVIEW OF SYSTEMS:  14 point ROS negative except as noted above     MEDICATIONS  (STANDING):  amLODIPine   Tablet 10 milliGRAM(s) Oral daily  aspirin  chewable 81 milliGRAM(s) Oral daily  atorvastatin 80 milliGRAM(s) Oral at bedtime  heparin   Injectable 5000 Unit(s) SubCutaneous every 8 hours  influenza   Vaccine 0.5 milliLiter(s) IntraMuscular once  nicotine - 21 mG/24Hr(s) Patch 1 Patch Transdermal daily    MEDICATIONS  (PRN):  acetaminophen     Tablet .. 1000 milliGRAM(s) Oral every 6 hours PRN Mild Pain (1 - 3)  oxyCODONE    IR 2.5 milliGRAM(s) Oral every 4 hours PRN Moderate Pain (4 - 6)  oxyCODONE    IR 5 milliGRAM(s) Oral every 4 hours PRN Severe Pain (7 - 10)      I&O's Summary    02 Dec 2023 07:01  -  03 Dec 2023 07:00  --------------------------------------------------------  IN: 1320 mL / OUT: 0 mL / NET: 1320 mL    03 Dec 2023 07:01  -  03 Dec 2023 11:23  --------------------------------------------------------  IN: 240 mL / OUT: 0 mL / NET: 240 mL        PHYSICAL EXAM:  Vital Signs Last 24 Hrs  T(C): 36.6 (03 Dec 2023 10:23), Max: 36.9 (02 Dec 2023 16:35)  T(F): 97.9 (03 Dec 2023 10:23), Max: 98.5 (02 Dec 2023 16:35)  HR: 67 (03 Dec 2023 10:23) (58 - 67)  BP: 143/88 (03 Dec 2023 10:23) (118/76 - 144/80)  BP(mean): --  RR: 18 (03 Dec 2023 10:23) (18 - 18)  SpO2: 98% (03 Dec 2023 10:23) (95% - 99%)    Parameters below as of 03 Dec 2023 10:23  Patient On (Oxygen Delivery Method): room air      PHYSICAL EXAM:  GENERAL: NAD, well-developed  HEAD:  Atraumatic, normocephalic  EYES: EOMI, conjunctiva and sclera clear  NECK: Supple, no JVD  CHEST/LUNG: Clear to auscultation bilaterally; no wheezing or rales  HEART: Regular rate and rhythm; no murmurs, no LE edema   ABDOMEN: Soft, nontender, nondistended; bowel sounds present  EXTREMITIES:  2+ Peripheral Pulses, no clubbing, cyanosis  PSYCH: AAOx3, calm affect, not anxious  NEUROLOGY: non-focal, movnig all extrmities equally, sensation grossly intact   SKIN: No rashes or lesions  MUSCULOSKELETAL: no back pain, moving all extremities    LABS:                        14.4   6.83  )-----------( 277      ( 02 Dec 2023 07:22 )             42.2     12-02    138  |  104  |  9   ----------------------------<  100<H>  4.0   |  23  |  0.70    Ca    9.4      02 Dec 2023 07:20  Phos  2.8     12-02  Mg     1.9     12-02            Urinalysis Basic - ( 02 Dec 2023 07:20 )    Color: x / Appearance: x / SG: x / pH: x  Gluc: 100 mg/dL / Ketone: x  / Bili: x / Urobili: x   Blood: x / Protein: x / Nitrite: x   Leuk Esterase: x / RBC: x / WBC x   Sq Epi: x / Non Sq Epi: x / Bacteria: x            RADIOLOGY & ADDITIONAL TESTS:  New Imaging Personally Reviewed Today:  New Electrocardiogram Personally Reviewed Today:  Other Results Reviewed Today:   Prior or Outpatient Records Reviewed Today with Summary:    COORDINATION OF CARE:  Consultant Communication and Details of Discussion (where applicable):

## 2023-12-03 NOTE — PROGRESS NOTE ADULT - ASSESSMENT
57yo M PMHx of HTN, EToH abuse c/b DTs did not require intubation and daily smoker who presents with 3 month hx of intermittent LLE pain and claudication

## 2023-12-03 NOTE — PROGRESS NOTE ADULT - PROBLEM SELECTOR PLAN 2
Pt able to walk one block and up one flight of stair before having to stop with claudication pain. Denies chest pain or SOB.   - RCRI score 0: 3.9 % 30-day risk of death, MI, or cardiac arrest  - Mets difficult to assess given limited functional status 2/2 PAD  - s/p cardiac cath 12/1 showing  of RCA and OM, otherwise patent coronaries   - cardiology following   - pending TTE  - pt is moderate risk for high risk open bypass, no medical contraindication to proceed to OR
Pt able to walk one block and up one flight of stair before having to stop with claudication pain. Denies chest pain or SOB.   - RCRI score 0: 3.9 % 30-day risk of death, MI, or cardiac arrest  - Mets difficult to assess given limited functional status 2/2 PAD  - TTE and CTA coronaries pending  - Cardiology following  - Medical optimization pending TTE and CT coronaries
Pt able to walk one block and up one flight of stair before having to stop with claudication pain. Denies chest pain or SOB.   - RCRI score 0: 3.9 % 30-day risk of death, MI, or cardiac arrest  - Mets difficult to assess given limited functional status 2/2 PAD  - s/p cardiac cath 12/1 showing  of RCA and OM with good collaterals; otherwise patent coronaries   - cardiology following   - pending TTE  - pt is moderate risk for high risk open bypass, no medical contraindication to proceed to OR

## 2023-12-03 NOTE — PROGRESS NOTE ADULT - SUBJECTIVE AND OBJECTIVE BOX
SUBJECTIVE: Patient seen and examined on AM rounds. Patient reports that they're feeling well. Denies fever, chills. Reports pain as controlled. No complaints at this time.     Vital Signs Last 24 Hrs  T(C): 36.7 (03 Dec 2023 13:19), Max: 36.9 (02 Dec 2023 16:35)  T(F): 98.1 (03 Dec 2023 13:19), Max: 98.5 (02 Dec 2023 16:35)  HR: 72 (03 Dec 2023 13:19) (58 - 72)  BP: 135/80 (03 Dec 2023 13:19) (129/80 - 144/80)  BP(mean): --  RR: 18 (03 Dec 2023 13:19) (18 - 18)  SpO2: 95% (03 Dec 2023 13:19) (95% - 99%)    Parameters below as of 03 Dec 2023 13:19  Patient On (Oxygen Delivery Method): room air      Physical Exam:  General Appearance: Appears well, NAD   Chest: Nonlabored breathing on RA  CV: RRR  Extremities: Grossly symmetric  Vascular: Both feet WWP. R radial pulse palpable, hand WWP with normal  strength, sensation/motor function grossly intact    I&O's Summary    02 Dec 2023 07:01  -  03 Dec 2023 07:00  --------------------------------------------------------  IN: 1320 mL / OUT: 0 mL / NET: 1320 mL    03 Dec 2023 07:01  -  03 Dec 2023 15:26  --------------------------------------------------------  IN: 480 mL / OUT: 0 mL / NET: 480 mL      I&O's Detail    02 Dec 2023 07:01  -  03 Dec 2023 07:00  --------------------------------------------------------  IN:    Oral Fluid: 1320 mL  Total IN: 1320 mL    OUT:  Total OUT: 0 mL    Total NET: 1320 mL      03 Dec 2023 07:01  -  03 Dec 2023 15:26  --------------------------------------------------------  IN:    Oral Fluid: 480 mL  Total IN: 480 mL    OUT:  Total OUT: 0 mL    Total NET: 480 mL          LABS:                        14.4   6.83  )-----------( 277      ( 02 Dec 2023 07:22 )             42.2     12-02    138  |  104  |  9   ----------------------------<  100<H>  4.0   |  23  |  0.70    Ca    9.4      02 Dec 2023 07:20  Phos  2.8     12-02  Mg     1.9     12-02        Urinalysis Basic - ( 02 Dec 2023 07:20 )    Color: x / Appearance: x / SG: x / pH: x  Gluc: 100 mg/dL / Ketone: x  / Bili: x / Urobili: x   Blood: x / Protein: x / Nitrite: x   Leuk Esterase: x / RBC: x / WBC x   Sq Epi: x / Non Sq Epi: x / Bacteria: x        RADIOLOGY & ADDITIONAL STUDIES:

## 2023-12-03 NOTE — PROGRESS NOTE ADULT - SUBJECTIVE AND OBJECTIVE BOX
DATE OF SERVICE: 12-03-23 @ 12:51    Patient is a 58y old  Male who presents with a chief complaint of limb ischemia (03 Dec 2023 11:22)      INTERVAL HISTORY:     REVIEW OF SYSTEMS:  CONSTITUTIONAL: No weakness  EYES/ENT: No visual changes;  No throat pain   NECK: No pain or stiffness  RESPIRATORY: No cough, wheezing; No shortness of breath  CARDIOVASCULAR: No chest pain or palpitations  GASTROINTESTINAL: No abdominal  pain. No nausea, vomiting, or hematemesis  GENITOURINARY: No dysuria, frequency or hematuria  NEUROLOGICAL: No stroke like symptoms  SKIN: No rashes    	  MEDICATIONS:  amLODIPine   Tablet 10 milliGRAM(s) Oral daily        PHYSICAL EXAM:  T(C): 36.6 (12-03-23 @ 10:23), Max: 36.9 (12-02-23 @ 16:35)  HR: 67 (12-03-23 @ 10:23) (58 - 67)  BP: 143/88 (12-03-23 @ 10:23) (118/76 - 144/80)  RR: 18 (12-03-23 @ 10:23) (18 - 18)  SpO2: 98% (12-03-23 @ 10:23) (95% - 99%)  Wt(kg): --  I&O's Summary    02 Dec 2023 07:01  -  03 Dec 2023 07:00  --------------------------------------------------------  IN: 1320 mL / OUT: 0 mL / NET: 1320 mL    03 Dec 2023 07:01  -  03 Dec 2023 12:51  --------------------------------------------------------  IN: 240 mL / OUT: 0 mL / NET: 240 mL          Appearance: In no distress	  HEENT:    PERRL, EOMI	  Cardiovascular:  S1 S2, No JVD  Respiratory: Lungs clear to auscultation	  Gastrointestinal:  Soft, Non-tender, + BS	  Vascularature:  No edema of LE  Psychiatric: Appropriate affect   Neuro: no acute focal deficits                               14.4   6.83  )-----------( 277      ( 02 Dec 2023 07:22 )             42.2     12-02    138  |  104  |  9   ----------------------------<  100<H>  4.0   |  23  |  0.70    Ca    9.4      02 Dec 2023 07:20  Phos  2.8     12-02  Mg     1.9     12-02        Labs personally reviewed      ASSESSMENT/PLAN: 	  57yo M PMHx of HTN, EToH abuse c/b DTs did not require intubation and daily smoker who presents with 3 month hx of intermittent LLE pain and claudication         Problem/Recommendation - 1:  ·  Problem: Preop risk stratification  ·  Plan: Reports functional capacity limited by claudication   - risk factors include heavy smoking history and PAD  - EKG NSR  - Echo ordered   - s/p cath 12/1, CTA of RCA and OM, otherwise patent cors. Continue with med management   - RCRI score 0: 3.9 % 30-day risk of MACE  - Mod risk for high risk open vascular bypass, no cardiac contraindication to proceed     Problem/Recommendation - 2:  ·  Problem: HTN (hypertension).   ·  Plan: - c/w home Amlodipine     Problem/Recommendation - 3:  ·  Problem: Smoking.   ·  Plan: Nicotine patch          SOLO Ordaz, DO [facility]  Cardiovascular Medicine  [address], [address]  Available through call or text on [employer]  Office: [phone]   DATE OF SERVICE: 12-03-23 @ 12:51    Patient is a 58y old  Male who presents with a chief complaint of limb ischemia (03 Dec 2023 11:22)      INTERVAL HISTORY:     REVIEW OF SYSTEMS:  CONSTITUTIONAL: No weakness  EYES/ENT: No visual changes;  No throat pain   NECK: No pain or stiffness  RESPIRATORY: No cough, wheezing; No shortness of breath  CARDIOVASCULAR: No chest pain or palpitations  GASTROINTESTINAL: No abdominal  pain. No nausea, vomiting, or hematemesis  GENITOURINARY: No dysuria, frequency or hematuria  NEUROLOGICAL: No stroke like symptoms  SKIN: No rashes    	  MEDICATIONS:  amLODIPine   Tablet 10 milliGRAM(s) Oral daily        PHYSICAL EXAM:  T(C): 36.6 (12-03-23 @ 10:23), Max: 36.9 (12-02-23 @ 16:35)  HR: 67 (12-03-23 @ 10:23) (58 - 67)  BP: 143/88 (12-03-23 @ 10:23) (118/76 - 144/80)  RR: 18 (12-03-23 @ 10:23) (18 - 18)  SpO2: 98% (12-03-23 @ 10:23) (95% - 99%)  Wt(kg): --  I&O's Summary    02 Dec 2023 07:01  -  03 Dec 2023 07:00  --------------------------------------------------------  IN: 1320 mL / OUT: 0 mL / NET: 1320 mL    03 Dec 2023 07:01  -  03 Dec 2023 12:51  --------------------------------------------------------  IN: 240 mL / OUT: 0 mL / NET: 240 mL          Appearance: In no distress	  HEENT:    PERRL, EOMI	  Cardiovascular:  S1 S2, No JVD  Respiratory: Lungs clear to auscultation	  Gastrointestinal:  Soft, Non-tender, + BS	  Vascularature:  No edema of LE  Psychiatric: Appropriate affect   Neuro: no acute focal deficits                               14.4   6.83  )-----------( 277      ( 02 Dec 2023 07:22 )             42.2     12-02    138  |  104  |  9   ----------------------------<  100<H>  4.0   |  23  |  0.70    Ca    9.4      02 Dec 2023 07:20  Phos  2.8     12-02  Mg     1.9     12-02        Labs personally reviewed      ASSESSMENT/PLAN: 	  59yo M PMHx of HTN, EToH abuse c/b DTs did not require intubation and daily smoker who presents with 3 month hx of intermittent LLE pain and claudication         Problem/Recommendation - 1:  ·  Problem: Preop risk stratification  ·  Plan: Reports functional capacity limited by claudication   - risk factors include heavy smoking history and PAD  - EKG NSR  - Echo ordered   - s/p cath 12/1, CTA of RCA and OM, otherwise patent cors. Continue with med management   - RCRI score 0: 3.9 % 30-day risk of MACE  - Mod risk for high risk open vascular bypass, no cardiac contraindication to proceed     Problem/Recommendation - 2:  ·  Problem: HTN (hypertension).   ·  Plan: - c/w home Amlodipine     Problem/Recommendation - 3:  ·  Problem: Smoking.   ·  Plan: Nicotine patch          SOLO Ordaz, DO [facility]  Cardiovascular Medicine  [address], [address]  Available through call or text on [employer] TE[employer]  Office: [phone]   DATE OF SERVICE: 12-03-23 @ 12:51    Patient is a 58y old  Male who presents with a chief complaint of limb ischemia (03 Dec 2023 11:22)      INTERVAL HISTORY:     REVIEW OF SYSTEMS:  CONSTITUTIONAL: No weakness  EYES/ENT: No visual changes;  No throat pain   NECK: No pain or stiffness  RESPIRATORY: No cough, wheezing; No shortness of breath  CARDIOVASCULAR: No chest pain or palpitations  GASTROINTESTINAL: No abdominal  pain. No nausea, vomiting, or hematemesis  GENITOURINARY: No dysuria, frequency or hematuria  NEUROLOGICAL: No stroke like symptoms  SKIN: No rashes    	  MEDICATIONS:  amLODIPine   Tablet 10 milliGRAM(s) Oral daily        PHYSICAL EXAM:  T(C): 36.6 (12-03-23 @ 10:23), Max: 36.9 (12-02-23 @ 16:35)  HR: 67 (12-03-23 @ 10:23) (58 - 67)  BP: 143/88 (12-03-23 @ 10:23) (118/76 - 144/80)  RR: 18 (12-03-23 @ 10:23) (18 - 18)  SpO2: 98% (12-03-23 @ 10:23) (95% - 99%)  Wt(kg): --  I&O's Summary    02 Dec 2023 07:01  -  03 Dec 2023 07:00  --------------------------------------------------------  IN: 1320 mL / OUT: 0 mL / NET: 1320 mL    03 Dec 2023 07:01  -  03 Dec 2023 12:51  --------------------------------------------------------  IN: 240 mL / OUT: 0 mL / NET: 240 mL          Appearance: In no distress	  HEENT:    PERRL, EOMI	  Cardiovascular:  S1 S2, No JVD  Respiratory: Lungs clear to auscultation	  Gastrointestinal:  Soft, Non-tender, + BS	  Vascularature:  No edema of LE  Psychiatric: Appropriate affect   Neuro: no acute focal deficits                               14.4   6.83  )-----------( 277      ( 02 Dec 2023 07:22 )             42.2     12-02    138  |  104  |  9   ----------------------------<  100<H>  4.0   |  23  |  0.70    Ca    9.4      02 Dec 2023 07:20  Phos  2.8     12-02  Mg     1.9     12-02        Labs personally reviewed      ASSESSMENT/PLAN: 	  59yo M PMHx of HTN, EToH abuse c/b DTs did not require intubation and daily smoker who presents with 3 month hx of intermittent LLE pain and claudication         Problem/Recommendation - 1:  ·  Problem: Preop risk stratification  ·  Plan: Reports functional capacity limited by claudication   - risk factors include heavy smoking history and PAD  - EKG NSR  - Echo ordered   - s/p cath 12/1, CTA of RCA and OM, otherwise patent cors. Continue with med management   - RCRI score 0: 3.9 % 30-day risk of MACE  - Mod risk for high risk open vascular bypass, no cardiac contraindication to proceed     Problem/Recommendation - 2:  ·  Problem: HTN (hypertension).   ·  Plan: - c/w home Amlodipine     Problem/Recommendation - 3:  ·  Problem: Smoking.   ·  Plan: Nicotine patch          SOLO Ordaz, DO [facility]  Cardiovascular Medicine  [address], [address]  Available through call or text on [employer] TE[employer]  Office: [phone]

## 2023-12-04 LAB
ANION GAP SERPL CALC-SCNC: 12 MMOL/L — SIGNIFICANT CHANGE UP (ref 5–17)
ANION GAP SERPL CALC-SCNC: 12 MMOL/L — SIGNIFICANT CHANGE UP (ref 5–17)
BUN SERPL-MCNC: 10 MG/DL — SIGNIFICANT CHANGE UP (ref 7–23)
BUN SERPL-MCNC: 10 MG/DL — SIGNIFICANT CHANGE UP (ref 7–23)
CALCIUM SERPL-MCNC: 9.6 MG/DL — SIGNIFICANT CHANGE UP (ref 8.4–10.5)
CALCIUM SERPL-MCNC: 9.6 MG/DL — SIGNIFICANT CHANGE UP (ref 8.4–10.5)
CHLORIDE SERPL-SCNC: 104 MMOL/L — SIGNIFICANT CHANGE UP (ref 96–108)
CHLORIDE SERPL-SCNC: 104 MMOL/L — SIGNIFICANT CHANGE UP (ref 96–108)
CO2 SERPL-SCNC: 21 MMOL/L — LOW (ref 22–31)
CO2 SERPL-SCNC: 21 MMOL/L — LOW (ref 22–31)
CREAT SERPL-MCNC: 0.68 MG/DL — SIGNIFICANT CHANGE UP (ref 0.5–1.3)
CREAT SERPL-MCNC: 0.68 MG/DL — SIGNIFICANT CHANGE UP (ref 0.5–1.3)
EGFR: 108 ML/MIN/1.73M2 — SIGNIFICANT CHANGE UP
EGFR: 108 ML/MIN/1.73M2 — SIGNIFICANT CHANGE UP
GLUCOSE SERPL-MCNC: 85 MG/DL — SIGNIFICANT CHANGE UP (ref 70–99)
GLUCOSE SERPL-MCNC: 85 MG/DL — SIGNIFICANT CHANGE UP (ref 70–99)
HCT VFR BLD CALC: 46.1 % — SIGNIFICANT CHANGE UP (ref 39–50)
HCT VFR BLD CALC: 46.1 % — SIGNIFICANT CHANGE UP (ref 39–50)
HGB BLD-MCNC: 15 G/DL — SIGNIFICANT CHANGE UP (ref 13–17)
HGB BLD-MCNC: 15 G/DL — SIGNIFICANT CHANGE UP (ref 13–17)
MAGNESIUM SERPL-MCNC: 2 MG/DL — SIGNIFICANT CHANGE UP (ref 1.6–2.6)
MAGNESIUM SERPL-MCNC: 2 MG/DL — SIGNIFICANT CHANGE UP (ref 1.6–2.6)
MCHC RBC-ENTMCNC: 31.1 PG — SIGNIFICANT CHANGE UP (ref 27–34)
MCHC RBC-ENTMCNC: 31.1 PG — SIGNIFICANT CHANGE UP (ref 27–34)
MCHC RBC-ENTMCNC: 32.5 GM/DL — SIGNIFICANT CHANGE UP (ref 32–36)
MCHC RBC-ENTMCNC: 32.5 GM/DL — SIGNIFICANT CHANGE UP (ref 32–36)
MCV RBC AUTO: 95.4 FL — SIGNIFICANT CHANGE UP (ref 80–100)
MCV RBC AUTO: 95.4 FL — SIGNIFICANT CHANGE UP (ref 80–100)
NRBC # BLD: 0 /100 WBCS — SIGNIFICANT CHANGE UP (ref 0–0)
NRBC # BLD: 0 /100 WBCS — SIGNIFICANT CHANGE UP (ref 0–0)
PHOSPHATE SERPL-MCNC: 3.4 MG/DL — SIGNIFICANT CHANGE UP (ref 2.5–4.5)
PHOSPHATE SERPL-MCNC: 3.4 MG/DL — SIGNIFICANT CHANGE UP (ref 2.5–4.5)
PLATELET # BLD AUTO: 246 K/UL — SIGNIFICANT CHANGE UP (ref 150–400)
PLATELET # BLD AUTO: 246 K/UL — SIGNIFICANT CHANGE UP (ref 150–400)
POTASSIUM SERPL-MCNC: 4.1 MMOL/L — SIGNIFICANT CHANGE UP (ref 3.5–5.3)
POTASSIUM SERPL-MCNC: 4.1 MMOL/L — SIGNIFICANT CHANGE UP (ref 3.5–5.3)
POTASSIUM SERPL-SCNC: 4.1 MMOL/L — SIGNIFICANT CHANGE UP (ref 3.5–5.3)
POTASSIUM SERPL-SCNC: 4.1 MMOL/L — SIGNIFICANT CHANGE UP (ref 3.5–5.3)
RBC # BLD: 4.83 M/UL — SIGNIFICANT CHANGE UP (ref 4.2–5.8)
RBC # BLD: 4.83 M/UL — SIGNIFICANT CHANGE UP (ref 4.2–5.8)
RBC # FLD: 13 % — SIGNIFICANT CHANGE UP (ref 10.3–14.5)
RBC # FLD: 13 % — SIGNIFICANT CHANGE UP (ref 10.3–14.5)
SODIUM SERPL-SCNC: 137 MMOL/L — SIGNIFICANT CHANGE UP (ref 135–145)
SODIUM SERPL-SCNC: 137 MMOL/L — SIGNIFICANT CHANGE UP (ref 135–145)
WBC # BLD: 9.86 K/UL — SIGNIFICANT CHANGE UP (ref 3.8–10.5)
WBC # BLD: 9.86 K/UL — SIGNIFICANT CHANGE UP (ref 3.8–10.5)
WBC # FLD AUTO: 9.86 K/UL — SIGNIFICANT CHANGE UP (ref 3.8–10.5)
WBC # FLD AUTO: 9.86 K/UL — SIGNIFICANT CHANGE UP (ref 3.8–10.5)

## 2023-12-04 PROCEDURE — 93306 TTE W/DOPPLER COMPLETE: CPT | Mod: 26

## 2023-12-04 PROCEDURE — 99232 SBSQ HOSP IP/OBS MODERATE 35: CPT

## 2023-12-04 RX ADMIN — FAMOTIDINE 20 MILLIGRAM(S): 10 INJECTION INTRAVENOUS at 05:39

## 2023-12-04 RX ADMIN — Medication 1 PATCH: at 19:30

## 2023-12-04 RX ADMIN — Medication 1 PATCH: at 12:05

## 2023-12-04 RX ADMIN — Medication 1 PATCH: at 12:00

## 2023-12-04 RX ADMIN — ATORVASTATIN CALCIUM 80 MILLIGRAM(S): 80 TABLET, FILM COATED ORAL at 21:26

## 2023-12-04 RX ADMIN — HEPARIN SODIUM 5000 UNIT(S): 5000 INJECTION INTRAVENOUS; SUBCUTANEOUS at 21:26

## 2023-12-04 RX ADMIN — Medication 1 PATCH: at 07:30

## 2023-12-04 RX ADMIN — HEPARIN SODIUM 5000 UNIT(S): 5000 INJECTION INTRAVENOUS; SUBCUTANEOUS at 13:56

## 2023-12-04 RX ADMIN — Medication 81 MILLIGRAM(S): at 11:50

## 2023-12-04 RX ADMIN — AMLODIPINE BESYLATE 10 MILLIGRAM(S): 2.5 TABLET ORAL at 05:39

## 2023-12-04 RX ADMIN — HEPARIN SODIUM 5000 UNIT(S): 5000 INJECTION INTRAVENOUS; SUBCUTANEOUS at 05:39

## 2023-12-04 NOTE — PROGRESS NOTE ADULT - ASSESSMENT
59yo M w pmhx of HTN and 40pack-yr smoking hx who presents with 3 months of LLE claudication, no DP/PT signals present on examination. Findings c/w Ama 4 CLTI. Hemodynamically stable on the floor, recovered appropriately s/p diagnostic cardiac cath 12/1.    Plan:  - Planning for aorto-bifemoral bypass, possible outpatient  - ASA, high-dose statin  - Medicine consulted for optimization, appreciate recs  - Cardiology consulted, optimized s/p cath 12/1  - echo planning   - Continue amlodipine  - Regular diet  - VTE ppx     Vascular Surgery   [id number]     59yo M w pmhx of HTN and 40pack-yr smoking hx who presents with 3 months of LLE claudication, no DP/PT signals present on examination. Findings c/w Aam 4 CLTI. Hemodynamically stable on the floor, recovered appropriately s/p diagnostic cardiac cath 12/1.    Plan:  - Planning for aorto-bifemoral bypass, possible outpatient  - ASA, high-dose statin  - Medicine consulted for optimization, appreciate recs  - Cardiology consulted, optimized s/p cath 12/1  - echo planning   - Continue amlodipine  - Regular diet  - VTE ppx     Vascular Surgery   [id number]

## 2023-12-04 NOTE — PROGRESS NOTE ADULT - SUBJECTIVE AND OBJECTIVE BOX
DATE OF SERVICE: 12-04-23 @ 14:46    Patient is a 58y old  Male who presents with a chief complaint of limb ischemia (03 Dec 2023 11:22)      INTERVAL HISTORY:     REVIEW OF SYSTEMS:  CONSTITUTIONAL: No weakness  EYES/ENT: No visual changes;  No throat pain   NECK: No pain or stiffness  RESPIRATORY: No cough, wheezing; No shortness of breath  CARDIOVASCULAR: No chest pain or palpitations  GASTROINTESTINAL: No abdominal  pain. No nausea, vomiting, or hematemesis  GENITOURINARY: No dysuria, frequency or hematuria  NEUROLOGICAL: No stroke like symptoms  SKIN: No rashes    	  MEDICATIONS:  amLODIPine   Tablet 10 milliGRAM(s) Oral daily        PHYSICAL EXAM:  T(C): 36.1 (12-04-23 @ 13:47), Max: 37 (12-03-23 @ 21:47)  HR: 74 (12-04-23 @ 13:47) (62 - 77)  BP: 140/79 (12-04-23 @ 13:47) (131/84 - 150/84)  RR: 18 (12-04-23 @ 13:47) (18 - 18)  SpO2: 96% (12-04-23 @ 09:32) (96% - 97%)  Wt(kg): --  I&O's Summary    03 Dec 2023 07:01  -  04 Dec 2023 07:00  --------------------------------------------------------  IN: 1140 mL / OUT: 0 mL / NET: 1140 mL    04 Dec 2023 07:01  -  04 Dec 2023 14:46  --------------------------------------------------------  IN: 580 mL / OUT: 0 mL / NET: 580 mL          Appearance: In no distress	  HEENT:    PERRL, EOMI	  Cardiovascular:  S1 S2, No JVD  Respiratory: Lungs clear to auscultation	  Gastrointestinal:  Soft, Non-tender, + BS	  Vascularature:  No edema of LE  Psychiatric: Appropriate affect   Neuro: no acute focal deficits                               15.0   9.86  )-----------( 246      ( 04 Dec 2023 07:14 )             46.1     12-04    137  |  104  |  10  ----------------------------<  85  4.1   |  21<L>  |  0.68    Ca    9.6      04 Dec 2023 07:12  Phos  3.4     12-04  Mg     2.0     12-04        echo  Labs personally reviewed      ASSESSMENT/PLAN: 	  59yo M PMHx of HTN, EToH abuse c/b DTs did not require intubation and daily smoker who presents with 3 month hx of intermittent LLE pain and claudication       1. Preop risk stratification  - Reports functional capacity limited by claudication   - risk factors include heavy smoking history and PAD  - EKG NSR  - Echo ordered and pending results   - s/p cath 12/1, CTA of RCA and OM, otherwise patent cors. Continue with med management   - RCRI score 0: 3.9 % 30-day risk of MACE  - Mod risk for high risk open vascular bypass, no cardiac contraindication to proceed     2. HTN (hypertension).   140/79 hr 74  - c/w home Amlodipine 10mg   - If b/p not controlled can add small dose Lopressor 12.5mg BID      Problem/Recommendation - 3:  ·  Problem: Smoking.   ·  Plan: Nicotine patch        SOLO Ordaz DO [facility]  Cardiovascular Medicine  [address] [place] [address], [address]  Available through call or text on [employer] TEAM[employer]  Office: [phone]   DATE OF SERVICE: 12-04-23 @ 14:46    Patient is a 58y old  Male who presents with a chief complaint of limb ischemia (03 Dec 2023 11:22)      INTERVAL HISTORY:     REVIEW OF SYSTEMS:  CONSTITUTIONAL: No weakness  EYES/ENT: No visual changes;  No throat pain   NECK: No pain or stiffness  RESPIRATORY: No cough, wheezing; No shortness of breath  CARDIOVASCULAR: No chest pain or palpitations  GASTROINTESTINAL: No abdominal  pain. No nausea, vomiting, or hematemesis  GENITOURINARY: No dysuria, frequency or hematuria  NEUROLOGICAL: No stroke like symptoms  SKIN: No rashes    	  MEDICATIONS:  amLODIPine   Tablet 10 milliGRAM(s) Oral daily        PHYSICAL EXAM:  T(C): 36.1 (12-04-23 @ 13:47), Max: 37 (12-03-23 @ 21:47)  HR: 74 (12-04-23 @ 13:47) (62 - 77)  BP: 140/79 (12-04-23 @ 13:47) (131/84 - 150/84)  RR: 18 (12-04-23 @ 13:47) (18 - 18)  SpO2: 96% (12-04-23 @ 09:32) (96% - 97%)  Wt(kg): --  I&O's Summary    03 Dec 2023 07:01  -  04 Dec 2023 07:00  --------------------------------------------------------  IN: 1140 mL / OUT: 0 mL / NET: 1140 mL    04 Dec 2023 07:01  -  04 Dec 2023 14:46  --------------------------------------------------------  IN: 580 mL / OUT: 0 mL / NET: 580 mL          Appearance: In no distress	  HEENT:    PERRL, EOMI	  Cardiovascular:  S1 S2, No JVD  Respiratory: Lungs clear to auscultation	  Gastrointestinal:  Soft, Non-tender, + BS	  Vascularature:  No edema of LE  Psychiatric: Appropriate affect   Neuro: no acute focal deficits                               15.0   9.86  )-----------( 246      ( 04 Dec 2023 07:14 )             46.1     12-04    137  |  104  |  10  ----------------------------<  85  4.1   |  21<L>  |  0.68    Ca    9.6      04 Dec 2023 07:12  Phos  3.4     12-04  Mg     2.0     12-04        echo  Labs personally reviewed      ASSESSMENT/PLAN: 	  57yo M PMHx of HTN, EToH abuse c/b DTs did not require intubation and daily smoker who presents with 3 month hx of intermittent LLE pain and claudication       1. Preop risk stratification  - Reports functional capacity limited by claudication   - risk factors include heavy smoking history and PAD  - EKG NSR  - Echo ordered and pending results   - s/p cath 12/1, CTA of RCA and OM, otherwise patent cors. Continue with med management   - RCRI score 0: 3.9 % 30-day risk of MACE  - Mod risk for high risk open vascular bypass, no cardiac contraindication to proceed     2. HTN (hypertension).   140/79 hr 74  - c/w home Amlodipine 10mg   - If b/p not controlled can add small dose Lopressor 12.5mg BID      Problem/Recommendation - 3:  ·  Problem: Smoking.   ·  Plan: Nicotine patch        SOLO Ordaz DO [facility]  Cardiovascular Medicine  [address] [place] [address], [address]  Available through call or text on [employer] TEAM[employer]  Office: [phone]   DATE OF SERVICE: 12-04-23 @ 14:46    Patient is a 58y old  Male who presents with a chief complaint of limb ischemia (03 Dec 2023 11:22)      INTERVAL HISTORY:     REVIEW OF SYSTEMS:  CONSTITUTIONAL: No weakness  EYES/ENT: No visual changes;  No throat pain   NECK: No pain or stiffness  RESPIRATORY: No cough, wheezing; No shortness of breath  CARDIOVASCULAR: No chest pain or palpitations  GASTROINTESTINAL: No abdominal  pain. No nausea, vomiting, or hematemesis  GENITOURINARY: No dysuria, frequency or hematuria  NEUROLOGICAL: No stroke like symptoms  SKIN: No rashes    	  MEDICATIONS:  amLODIPine   Tablet 10 milliGRAM(s) Oral daily        PHYSICAL EXAM:  T(C): 36.1 (12-04-23 @ 13:47), Max: 37 (12-03-23 @ 21:47)  HR: 74 (12-04-23 @ 13:47) (62 - 77)  BP: 140/79 (12-04-23 @ 13:47) (131/84 - 150/84)  RR: 18 (12-04-23 @ 13:47) (18 - 18)  SpO2: 96% (12-04-23 @ 09:32) (96% - 97%)  Wt(kg): --  I&O's Summary    03 Dec 2023 07:01  -  04 Dec 2023 07:00  --------------------------------------------------------  IN: 1140 mL / OUT: 0 mL / NET: 1140 mL    04 Dec 2023 07:01  -  04 Dec 2023 14:46  --------------------------------------------------------  IN: 580 mL / OUT: 0 mL / NET: 580 mL          Appearance: In no distress	  HEENT:    PERRL, EOMI	  Cardiovascular:  S1 S2, No JVD  Respiratory: Lungs clear to auscultation	  Gastrointestinal:  Soft, Non-tender, + BS	  Vascularature:  No edema of LE  Psychiatric: Appropriate affect   Neuro: no acute focal deficits                               15.0   9.86  )-----------( 246      ( 04 Dec 2023 07:14 )             46.1     12-04    137  |  104  |  10  ----------------------------<  85  4.1   |  21<L>  |  0.68    Ca    9.6      04 Dec 2023 07:12  Phos  3.4     12-04  Mg     2.0     12-04        echo  Labs personally reviewed      ASSESSMENT/PLAN: 	  57yo M PMHx of HTN, EToH abuse c/b DTs did not require intubation and daily smoker who presents with 3 month hx of intermittent LLE pain and claudication       1. Preop risk stratification  - Reports functional capacity limited by claudication   - risk factors include heavy smoking history and PAD  - EKG NSR  - TTE with preserved EF  - s/p cath 12/1, CTA of RCA and OM, otherwise patent cors. Continue with med management   - RCRI score 0: 3.9 % 30-day risk of MACE  - Mod risk for high risk open vascular bypass, no cardiac contraindication to proceed     2. HTN (hypertension).   140/79 hr 74  - c/w home Amlodipine 10mg   - If b/p not controlled can add small dose Lopressor 12.5mg BID      Problem/Recommendation - 3:  ·  Problem: Smoking.   ·  Plan: Nicotine patch        SOLO Ordaz DO [facility]  Cardiovascular Medicine  [address] [facility], [address]  Available through call or text on [employer] TEAMs  Office: [phone]   DATE OF SERVICE: 12-04-23 @ 14:46    Patient is a 58y old  Male who presents with a chief complaint of limb ischemia (03 Dec 2023 11:22)      INTERVAL HISTORY:     REVIEW OF SYSTEMS:  CONSTITUTIONAL: No weakness  EYES/ENT: No visual changes;  No throat pain   NECK: No pain or stiffness  RESPIRATORY: No cough, wheezing; No shortness of breath  CARDIOVASCULAR: No chest pain or palpitations  GASTROINTESTINAL: No abdominal  pain. No nausea, vomiting, or hematemesis  GENITOURINARY: No dysuria, frequency or hematuria  NEUROLOGICAL: No stroke like symptoms  SKIN: No rashes    	  MEDICATIONS:  amLODIPine   Tablet 10 milliGRAM(s) Oral daily        PHYSICAL EXAM:  T(C): 36.1 (12-04-23 @ 13:47), Max: 37 (12-03-23 @ 21:47)  HR: 74 (12-04-23 @ 13:47) (62 - 77)  BP: 140/79 (12-04-23 @ 13:47) (131/84 - 150/84)  RR: 18 (12-04-23 @ 13:47) (18 - 18)  SpO2: 96% (12-04-23 @ 09:32) (96% - 97%)  Wt(kg): --  I&O's Summary    03 Dec 2023 07:01  -  04 Dec 2023 07:00  --------------------------------------------------------  IN: 1140 mL / OUT: 0 mL / NET: 1140 mL    04 Dec 2023 07:01  -  04 Dec 2023 14:46  --------------------------------------------------------  IN: 580 mL / OUT: 0 mL / NET: 580 mL          Appearance: In no distress	  HEENT:    PERRL, EOMI	  Cardiovascular:  S1 S2, No JVD  Respiratory: Lungs clear to auscultation	  Gastrointestinal:  Soft, Non-tender, + BS	  Vascularature:  No edema of LE  Psychiatric: Appropriate affect   Neuro: no acute focal deficits                               15.0   9.86  )-----------( 246      ( 04 Dec 2023 07:14 )             46.1     12-04    137  |  104  |  10  ----------------------------<  85  4.1   |  21<L>  |  0.68    Ca    9.6      04 Dec 2023 07:12  Phos  3.4     12-04  Mg     2.0     12-04        echo  Labs personally reviewed      ASSESSMENT/PLAN: 	  57yo M PMHx of HTN, EToH abuse c/b DTs did not require intubation and daily smoker who presents with 3 month hx of intermittent LLE pain and claudication       1. Preop risk stratification  - Reports functional capacity limited by claudication   - risk factors include heavy smoking history and PAD  - EKG NSR  - TTE with preserved EF  - s/p cath 12/1, CTA of RCA and OM, otherwise patent cors. Continue with med management   - RCRI score 0: 3.9 % 30-day risk of MACE  - Mod risk for high risk open vascular bypass, no cardiac contraindication to proceed     2. HTN (hypertension).   140/79 hr 74  - c/w home Amlodipine 10mg   - If b/p not controlled can add small dose Lopressor 12.5mg BID      Problem/Recommendation - 3:  ·  Problem: Smoking.   ·  Plan: Nicotine patch        SOLO Ordaz DO [facility]  Cardiovascular Medicine  [address] [facility], [address]  Available through call or text on [employer] TEAM[employer]  Office: [phone]

## 2023-12-04 NOTE — PROGRESS NOTE ADULT - SUBJECTIVE AND OBJECTIVE BOX
VASCULAR SURGERY DAILY PROGRESS NOTE:     SUBJECTIVE/ROS: Patient seen and examined. He is resting comfortably without complaints.      MEDICATIONS  (STANDING):  amLODIPine   Tablet 10 milliGRAM(s) Oral daily  aspirin  chewable 81 milliGRAM(s) Oral daily  atorvastatin 80 milliGRAM(s) Oral at bedtime  heparin   Injectable 5000 Unit(s) SubCutaneous every 8 hours  influenza   Vaccine 0.5 milliLiter(s) IntraMuscular once  nicotine - 21 mG/24Hr(s) Patch 1 Patch Transdermal daily    MEDICATIONS  (PRN):  acetaminophen     Tablet .. 1000 milliGRAM(s) Oral every 6 hours PRN Mild Pain (1 - 3)  calcium carbonate    500 mG (Tums) Chewable 1 Tablet(s) Chew every 4 hours PRN Indigestion  oxyCODONE    IR 2.5 milliGRAM(s) Oral every 4 hours PRN Moderate Pain (4 - 6)  oxyCODONE    IR 5 milliGRAM(s) Oral every 4 hours PRN Severe Pain (7 - 10)      OBJECTIVE:    Vital Signs Last 24 Hrs  T(C): 36.7 (04 Dec 2023 05:37), Max: 37 (03 Dec 2023 21:47)  T(F): 98.1 (04 Dec 2023 05:37), Max: 98.6 (03 Dec 2023 21:47)  HR: 70 (04 Dec 2023 05:37) (62 - 72)  BP: 131/84 (04 Dec 2023 05:37) (131/84 - 143/88)  BP(mean): --  RR: 18 (04 Dec 2023 05:37) (18 - 18)  SpO2: 97% (04 Dec 2023 05:37) (95% - 98%)    Parameters below as of 04 Dec 2023 05:37  Patient On (Oxygen Delivery Method): room air            I&O's Detail    03 Dec 2023 07:01  -  04 Dec 2023 07:00  --------------------------------------------------------  IN:    Oral Fluid: 1140 mL  Total IN: 1140 mL    OUT:  Total OUT: 0 mL    Total NET: 1140 mL          Daily     Daily     LABS:                        15.0   9.86  )-----------( 246      ( 04 Dec 2023 07:14 )             46.1                         PHYSICAL EXAM:  General Appearance: Appears well, NAD   Chest: Nonlabored breathing on RA  CV: RRR  Extremities: Grossly symmetric  Vascular: Both feet WWP. R radial pulse palpable, hand WWP with normal  strength, sensation/motor function grossly intact

## 2023-12-05 ENCOUNTER — TRANSCRIPTION ENCOUNTER (OUTPATIENT)
Age: 58
End: 2023-12-05

## 2023-12-05 VITALS
OXYGEN SATURATION: 99 % | SYSTOLIC BLOOD PRESSURE: 120 MMHG | HEART RATE: 60 BPM | TEMPERATURE: 98 F | DIASTOLIC BLOOD PRESSURE: 84 MMHG | RESPIRATION RATE: 18 BRPM

## 2023-12-05 LAB
ANION GAP SERPL CALC-SCNC: 10 MMOL/L — SIGNIFICANT CHANGE UP (ref 5–17)
ANION GAP SERPL CALC-SCNC: 10 MMOL/L — SIGNIFICANT CHANGE UP (ref 5–17)
BUN SERPL-MCNC: 13 MG/DL — SIGNIFICANT CHANGE UP (ref 7–23)
BUN SERPL-MCNC: 13 MG/DL — SIGNIFICANT CHANGE UP (ref 7–23)
CALCIUM SERPL-MCNC: 9.7 MG/DL — SIGNIFICANT CHANGE UP (ref 8.4–10.5)
CALCIUM SERPL-MCNC: 9.7 MG/DL — SIGNIFICANT CHANGE UP (ref 8.4–10.5)
CHLORIDE SERPL-SCNC: 105 MMOL/L — SIGNIFICANT CHANGE UP (ref 96–108)
CHLORIDE SERPL-SCNC: 105 MMOL/L — SIGNIFICANT CHANGE UP (ref 96–108)
CO2 SERPL-SCNC: 23 MMOL/L — SIGNIFICANT CHANGE UP (ref 22–31)
CO2 SERPL-SCNC: 23 MMOL/L — SIGNIFICANT CHANGE UP (ref 22–31)
CREAT SERPL-MCNC: 0.73 MG/DL — SIGNIFICANT CHANGE UP (ref 0.5–1.3)
CREAT SERPL-MCNC: 0.73 MG/DL — SIGNIFICANT CHANGE UP (ref 0.5–1.3)
EGFR: 105 ML/MIN/1.73M2 — SIGNIFICANT CHANGE UP
EGFR: 105 ML/MIN/1.73M2 — SIGNIFICANT CHANGE UP
GLUCOSE SERPL-MCNC: 95 MG/DL — SIGNIFICANT CHANGE UP (ref 70–99)
GLUCOSE SERPL-MCNC: 95 MG/DL — SIGNIFICANT CHANGE UP (ref 70–99)
HCT VFR BLD CALC: 46 % — SIGNIFICANT CHANGE UP (ref 39–50)
HCT VFR BLD CALC: 46 % — SIGNIFICANT CHANGE UP (ref 39–50)
HGB BLD-MCNC: 15.4 G/DL — SIGNIFICANT CHANGE UP (ref 13–17)
HGB BLD-MCNC: 15.4 G/DL — SIGNIFICANT CHANGE UP (ref 13–17)
MAGNESIUM SERPL-MCNC: 2 MG/DL — SIGNIFICANT CHANGE UP (ref 1.6–2.6)
MAGNESIUM SERPL-MCNC: 2 MG/DL — SIGNIFICANT CHANGE UP (ref 1.6–2.6)
MCHC RBC-ENTMCNC: 31.7 PG — SIGNIFICANT CHANGE UP (ref 27–34)
MCHC RBC-ENTMCNC: 31.7 PG — SIGNIFICANT CHANGE UP (ref 27–34)
MCHC RBC-ENTMCNC: 33.5 GM/DL — SIGNIFICANT CHANGE UP (ref 32–36)
MCHC RBC-ENTMCNC: 33.5 GM/DL — SIGNIFICANT CHANGE UP (ref 32–36)
MCV RBC AUTO: 94.7 FL — SIGNIFICANT CHANGE UP (ref 80–100)
MCV RBC AUTO: 94.7 FL — SIGNIFICANT CHANGE UP (ref 80–100)
NRBC # BLD: 0 /100 WBCS — SIGNIFICANT CHANGE UP (ref 0–0)
NRBC # BLD: 0 /100 WBCS — SIGNIFICANT CHANGE UP (ref 0–0)
PHOSPHATE SERPL-MCNC: 3.1 MG/DL — SIGNIFICANT CHANGE UP (ref 2.5–4.5)
PHOSPHATE SERPL-MCNC: 3.1 MG/DL — SIGNIFICANT CHANGE UP (ref 2.5–4.5)
PLATELET # BLD AUTO: 235 K/UL — SIGNIFICANT CHANGE UP (ref 150–400)
PLATELET # BLD AUTO: 235 K/UL — SIGNIFICANT CHANGE UP (ref 150–400)
POTASSIUM SERPL-MCNC: 4.2 MMOL/L — SIGNIFICANT CHANGE UP (ref 3.5–5.3)
POTASSIUM SERPL-MCNC: 4.2 MMOL/L — SIGNIFICANT CHANGE UP (ref 3.5–5.3)
POTASSIUM SERPL-SCNC: 4.2 MMOL/L — SIGNIFICANT CHANGE UP (ref 3.5–5.3)
POTASSIUM SERPL-SCNC: 4.2 MMOL/L — SIGNIFICANT CHANGE UP (ref 3.5–5.3)
RBC # BLD: 4.86 M/UL — SIGNIFICANT CHANGE UP (ref 4.2–5.8)
RBC # BLD: 4.86 M/UL — SIGNIFICANT CHANGE UP (ref 4.2–5.8)
RBC # FLD: 13.1 % — SIGNIFICANT CHANGE UP (ref 10.3–14.5)
RBC # FLD: 13.1 % — SIGNIFICANT CHANGE UP (ref 10.3–14.5)
SODIUM SERPL-SCNC: 138 MMOL/L — SIGNIFICANT CHANGE UP (ref 135–145)
SODIUM SERPL-SCNC: 138 MMOL/L — SIGNIFICANT CHANGE UP (ref 135–145)
WBC # BLD: 9.13 K/UL — SIGNIFICANT CHANGE UP (ref 3.8–10.5)
WBC # BLD: 9.13 K/UL — SIGNIFICANT CHANGE UP (ref 3.8–10.5)
WBC # FLD AUTO: 9.13 K/UL — SIGNIFICANT CHANGE UP (ref 3.8–10.5)
WBC # FLD AUTO: 9.13 K/UL — SIGNIFICANT CHANGE UP (ref 3.8–10.5)

## 2023-12-05 PROCEDURE — 99232 SBSQ HOSP IP/OBS MODERATE 35: CPT

## 2023-12-05 RX ORDER — NICOTINE POLACRILEX 2 MG
1 GUM BUCCAL
Qty: 1 | Refills: 0
Start: 2023-12-05 | End: 2024-01-03

## 2023-12-05 RX ORDER — ACETAMINOPHEN 500 MG
2 TABLET ORAL
Qty: 0 | Refills: 0 | DISCHARGE
Start: 2023-12-05

## 2023-12-05 RX ORDER — ATORVASTATIN CALCIUM 80 MG/1
1 TABLET, FILM COATED ORAL
Qty: 30 | Refills: 0
Start: 2023-12-05 | End: 2024-01-03

## 2023-12-05 RX ADMIN — Medication 1 PATCH: at 07:49

## 2023-12-05 RX ADMIN — Medication 81 MILLIGRAM(S): at 12:35

## 2023-12-05 RX ADMIN — Medication 1 PATCH: at 12:05

## 2023-12-05 RX ADMIN — Medication 1 PATCH: at 12:35

## 2023-12-05 RX ADMIN — AMLODIPINE BESYLATE 10 MILLIGRAM(S): 2.5 TABLET ORAL at 06:59

## 2023-12-05 RX ADMIN — HEPARIN SODIUM 5000 UNIT(S): 5000 INJECTION INTRAVENOUS; SUBCUTANEOUS at 06:59

## 2023-12-05 NOTE — DISCHARGE NOTE NURSING/CASE MANAGEMENT/SOCIAL WORK - NSDCPEEMAIL_GEN_ALL_CORE
[facility] for Tobacco Control email [email or web address] for [email or web address]

## 2023-12-05 NOTE — PROGRESS NOTE ADULT - PROVIDER SPECIALTY LIST ADULT
Cardiology
Vascular Surgery
Cardiology
Hospitalist
Vascular Surgery
Cardiology
Vascular Surgery
Vascular Surgery
Internal Medicine
Hospitalist

## 2023-12-05 NOTE — PROGRESS NOTE ADULT - ASSESSMENT
59yo M w pmhx of HTN and 40pack-yr smoking hx who presents with 3 months of LLE claudication, no DP/PT signals present on examination. Findings c/w Ama 4 CLTI. Hemodynamically stable on the floor, recovered appropriately s/p diagnostic cardiac cath 12/1.     Plan:  - Planning for aorto-bifemoral bypass, possible outpatient  - ASA, high-dose statin  - Medicine consulted for optimization, appreciate recs  - Cardiology consulted, optimized s/p cath 12/1  - echo done 12/4   - Continue amlodipine  - Regular diet  - VTE ppx     Vascular Surgery   [id number]   57yo M w pmhx of HTN and 40pack-yr smoking hx who presents with 3 months of LLE claudication, no DP/PT signals present on examination. Findings c/w Ama 4 CLTI. Hemodynamically stable on the floor, recovered appropriately s/p diagnostic cardiac cath 12/1.     Plan:  - Planning for aorto-bifemoral bypass, possible outpatient  - ASA, high-dose statin  - Medicine consulted for optimization, appreciate recs  - Cardiology consulted, optimized s/p cath 12/1  - echo done 12/4   - Continue amlodipine  - Regular diet  - VTE ppx     Vascular Surgery   [id number]

## 2023-12-05 NOTE — DISCHARGE NOTE PROVIDER - CARE PROVIDER_API CALL
Dilip Heredia  Vascular Surgery  [address] [id number]  [place] [address]  Phone: [phone]  Fax: [phone]  Follow Up Time: 2 weeks   Dilip Heredia  Vascular Surgery  [address] [facility]  [place] [address]  Phone: [phone]  Fax: [phone]  Follow Up Time: 2 weeks

## 2023-12-05 NOTE — DISCHARGE NOTE NURSING/CASE MANAGEMENT/SOCIAL WORK - NSDCPEHOTLINE_GEN_ALL_CORE
[facility] Smokers Quitline 6-369-KPEIPGM ([phone]) [facility] Smokers Quitline 6-058-MKNRIZB ([phone])

## 2023-12-05 NOTE — DISCHARGE NOTE PROVIDER - PROVIDER TOKENS
PROVIDER:[TOKEN:[[id number]],FOLLOWUP:[2 weeks]] PROVIDER:[TOKEN:[[id number]],FOLLOWUP:[2 weeks]]

## 2023-12-05 NOTE — DISCHARGE NOTE PROVIDER - NSDCCPCAREPLAN_GEN_ALL_CORE_FT
PRINCIPAL DISCHARGE DIAGNOSIS  Diagnosis: Acute lower limb ischemia  Assessment and Plan of Treatment: outpatient surgical planning

## 2023-12-05 NOTE — DISCHARGE NOTE PROVIDER - CARE PROVIDERS DIRECT ADDRESSES
[email or web address]

## 2023-12-05 NOTE — DISCHARGE NOTE PROVIDER - NSDCMRMEDTOKEN_GEN_ALL_CORE_FT
amLODIPine 10 mg oral tablet: 1 tab(s) orally once a day  aspirin 81 mg oral tablet: 1 tab(s) orally once a day (at bedtime)   acetaminophen 500 mg oral tablet: 2 tab(s) orally every 6 hours As needed Mild Pain (1 - 3)  amLODIPine 10 mg oral tablet: 1 tab(s) orally once a day  aspirin 81 mg oral tablet: 1 tab(s) orally once a day (at bedtime)  atorvastatin 80 mg oral tablet: 1 tab(s) orally once a day (at bedtime)  nicotine 21 mg/24 hr transdermal film, extended release: 1 transdermal once a day

## 2023-12-05 NOTE — DISCHARGE NOTE PROVIDER - NSDCCPTREATMENT_GEN_ALL_CORE_FT
PRINCIPAL PROCEDURE  Procedure: Left heart cardiac cath  Findings and Treatment: - s/p cath 12/1, CTA of RCA and OM, otherwise patent cors. Continue with med management   - RCRI score 0: 3.9 % 30-day risk of MACE  - Mod risk for high risk open vascular bypass, no cardiac contraindication to proceed

## 2023-12-05 NOTE — PROGRESS NOTE ADULT - SUBJECTIVE AND OBJECTIVE BOX
VASCULAR SURGERY DAILY PROGRESS NOTE:     SUBJECTIVE/ROS: Patient seen and examined.      MEDICATIONS  (STANDING):  amLODIPine   Tablet 10 milliGRAM(s) Oral daily  aspirin  chewable 81 milliGRAM(s) Oral daily  atorvastatin 80 milliGRAM(s) Oral at bedtime  heparin   Injectable 5000 Unit(s) SubCutaneous every 8 hours  influenza   Vaccine 0.5 milliLiter(s) IntraMuscular once  nicotine - 21 mG/24Hr(s) Patch 1 Patch Transdermal daily    MEDICATIONS  (PRN):  acetaminophen     Tablet .. 1000 milliGRAM(s) Oral every 6 hours PRN Mild Pain (1 - 3)  calcium carbonate    500 mG (Tums) Chewable 1 Tablet(s) Chew every 4 hours PRN Indigestion  oxyCODONE    IR 2.5 milliGRAM(s) Oral every 4 hours PRN Moderate Pain (4 - 6)  oxyCODONE    IR 5 milliGRAM(s) Oral every 4 hours PRN Severe Pain (7 - 10)      OBJECTIVE:    Vital Signs Last 24 Hrs  T(C): 36.9 (05 Dec 2023 06:58), Max: 36.9 (05 Dec 2023 06:58)  T(F): 98.5 (05 Dec 2023 06:58), Max: 98.5 (05 Dec 2023 06:58)  HR: 66 (05 Dec 2023 06:58) (66 - 86)  BP: 126/78 (05 Dec 2023 06:58) (107/83 - 150/84)  BP(mean): --  RR: 18 (05 Dec 2023 06:58) (18 - 18)  SpO2: 96% (05 Dec 2023 06:58) (95% - 98%)    Parameters below as of 05 Dec 2023 06:58  Patient On (Oxygen Delivery Method): room air            I&O's Detail    04 Dec 2023 07:01  -  05 Dec 2023 07:00  --------------------------------------------------------  IN:    Oral Fluid: 820 mL  Total IN: 820 mL    OUT:  Total OUT: 0 mL    Total NET: 820 mL          Daily     Daily     LABS:                        15.0   9.86  )-----------( 246      ( 04 Dec 2023 07:14 )             46.1     12-04    137  |  104  |  10  ----------------------------<  85  4.1   |  21<L>  |  0.68    Ca    9.6      04 Dec 2023 07:12  Phos  3.4     12-04  Mg     2.0     12-04        Urinalysis Basic - ( 04 Dec 2023 07:12 )    Color: x / Appearance: x / SG: x / pH: x  Gluc: 85 mg/dL / Ketone: x  / Bili: x / Urobili: x   Blood: x / Protein: x / Nitrite: x   Leuk Esterase: x / RBC: x / WBC x   Sq Epi: x / Non Sq Epi: x / Bacteria: x                PHYSICAL EXAM:  Constitutional: well developed, well nourished, NAD  Eyes: anicteric  ENMT: normal facies, symmetric  Neck: supple  Respiratory: CTA bilaterally  Cardiovascular: RRR  Gastrointestinal: abdomen soft, nontender, nondistended. No obvious masses. No peritonitis  Extremities: FROM, warm  Neurological: intact, non-focal  Skin: no gross lesions  Lymph Nodes: no gross adenopathy  Musculoskeletal: equal strength bilateral upper and lower extremities  Psychiatric: oriented x 3; appropriate         VASCULAR SURGERY DAILY PROGRESS NOTE:     SUBJECTIVE/ROS: Patient seen and examined. No new complaints, patient is resting comfortably.      MEDICATIONS  (STANDING):  amLODIPine   Tablet 10 milliGRAM(s) Oral daily  aspirin  chewable 81 milliGRAM(s) Oral daily  atorvastatin 80 milliGRAM(s) Oral at bedtime  heparin   Injectable 5000 Unit(s) SubCutaneous every 8 hours  influenza   Vaccine 0.5 milliLiter(s) IntraMuscular once  nicotine - 21 mG/24Hr(s) Patch 1 Patch Transdermal daily    MEDICATIONS  (PRN):  acetaminophen     Tablet .. 1000 milliGRAM(s) Oral every 6 hours PRN Mild Pain (1 - 3)  calcium carbonate    500 mG (Tums) Chewable 1 Tablet(s) Chew every 4 hours PRN Indigestion  oxyCODONE    IR 2.5 milliGRAM(s) Oral every 4 hours PRN Moderate Pain (4 - 6)  oxyCODONE    IR 5 milliGRAM(s) Oral every 4 hours PRN Severe Pain (7 - 10)      OBJECTIVE:    Vital Signs Last 24 Hrs  T(C): 36.9 (05 Dec 2023 06:58), Max: 36.9 (05 Dec 2023 06:58)  T(F): 98.5 (05 Dec 2023 06:58), Max: 98.5 (05 Dec 2023 06:58)  HR: 66 (05 Dec 2023 06:58) (66 - 86)  BP: 126/78 (05 Dec 2023 06:58) (107/83 - 150/84)  BP(mean): --  RR: 18 (05 Dec 2023 06:58) (18 - 18)  SpO2: 96% (05 Dec 2023 06:58) (95% - 98%)    Parameters below as of 05 Dec 2023 06:58  Patient On (Oxygen Delivery Method): room air            I&O's Detail    04 Dec 2023 07:01  -  05 Dec 2023 07:00  --------------------------------------------------------  IN:    Oral Fluid: 820 mL  Total IN: 820 mL    OUT:  Total OUT: 0 mL    Total NET: 820 mL          Daily     Daily     LABS:                        15.0   9.86  )-----------( 246      ( 04 Dec 2023 07:14 )             46.1     12-04    137  |  104  |  10  ----------------------------<  85  4.1   |  21<L>  |  0.68    Ca    9.6      04 Dec 2023 07:12  Phos  3.4     12-04  Mg     2.0     12-04        Urinalysis Basic - ( 04 Dec 2023 07:12 )    Color: x / Appearance: x / SG: x / pH: x  Gluc: 85 mg/dL / Ketone: x  / Bili: x / Urobili: x   Blood: x / Protein: x / Nitrite: x   Leuk Esterase: x / RBC: x / WBC x   Sq Epi: x / Non Sq Epi: x / Bacteria: x                PHYSICAL EXAM:    General Appearance: Appears well, NAD   Chest: Nonlabored breathing on RA  CV: RRR  Extremities: Grossly symmetric  Vascular: Both feet WWP. R radial pulse palpable, hand WWP with normal  strength, sensation/motor function grossly intact

## 2023-12-05 NOTE — PROGRESS NOTE ADULT - SUBJECTIVE AND OBJECTIVE BOX
DATE OF SERVICE: 12-05-23 @ 13:32    Patient is a 58y old  Male who presents with a chief complaint of limb ischemia (03 Dec 2023 11:22)      INTERVAL HISTORY:     REVIEW OF SYSTEMS:  CONSTITUTIONAL: No weakness  EYES/ENT: No visual changes;  No throat pain   NECK: No pain or stiffness  RESPIRATORY: No cough, wheezing; No shortness of breath  CARDIOVASCULAR: No chest pain or palpitations  GASTROINTESTINAL: No abdominal  pain. No nausea, vomiting, or hematemesis  GENITOURINARY: No dysuria, frequency or hematuria  NEUROLOGICAL: No stroke like symptoms  SKIN: No rashes    TELEMETRY Personally reviewed:  	  MEDICATIONS:  amLODIPine   Tablet 10 milliGRAM(s) Oral daily        PHYSICAL EXAM:  T(C): 36.5 (12-05-23 @ 08:42), Max: 36.9 (12-05-23 @ 06:58)  HR: 58 (12-05-23 @ 08:42) (58 - 86)  BP: 123/84 (12-05-23 @ 08:42) (107/83 - 140/79)  RR: 18 (12-05-23 @ 08:42) (18 - 18)  SpO2: 99% (12-05-23 @ 08:42) (95% - 99%)  Wt(kg): --  I&O's Summary    04 Dec 2023 07:01  -  05 Dec 2023 07:00  --------------------------------------------------------  IN: 870 mL / OUT: 0 mL / NET: 870 mL    05 Dec 2023 07:01  -  05 Dec 2023 13:32  --------------------------------------------------------  IN: 400 mL / OUT: 0 mL / NET: 400 mL          Appearance: In no distress	  HEENT:    PERRL, EOMI	  Cardiovascular:  S1 S2, No JVD  Respiratory: Lungs clear to auscultation	  Gastrointestinal:  Soft, Non-tender, + BS	  Vascularature:  No edema of LE  Psychiatric: Appropriate affect   Neuro: no acute focal deficits                               15.4   9.13  )-----------( 235      ( 05 Dec 2023 08:34 )             46.0     12-05    138  |  105  |  13  ----------------------------<  95  4.2   |  23  |  0.73    Ca    9.7      05 Dec 2023 08:34  Phos  3.1     12-05  Mg     2.0     12-05          Labs personally reviewed      ASSESSMENT/PLAN: 	  59yo M PMHx of HTN, EToH abuse c/b DTs did not require intubation and daily smoker who presents with 3 month hx of intermittent LLE pain and claudication       1. Preop risk stratification  - Reports functional capacity limited by claudication   - risk factors include heavy smoking history and PAD  - EKG NSR  - TTE with preserved EF  - s/p cath 12/1, CTA of RCA and OM, otherwise patent cors. Continue with med management   - RCRI score 0: 3.9 % 30-day risk of MACE  - Mod risk for high risk open vascular bypass, no cardiac contraindication to proceed     2. HTN (hypertension).   -improved   - c/w home Amlodipine 10mg   - If b/p not controlled can add small dose Lopressor 12.5mg BID      Problem/Recommendation - 3:  ·  Problem: Smoking.   ·  Plan: Nicotine patch            SOLO Ordaz DO Located within [facility]  Cardiovascular Medicine  [address] [place] [address], [address]  Available through call or text on [employer] TEAM[employer]  Office: [phone]   DATE OF SERVICE: 12-05-23 @ 13:32    Patient is a 58y old  Male who presents with a chief complaint of limb ischemia (03 Dec 2023 11:22)      INTERVAL HISTORY:     REVIEW OF SYSTEMS:  CONSTITUTIONAL: No weakness  EYES/ENT: No visual changes;  No throat pain   NECK: No pain or stiffness  RESPIRATORY: No cough, wheezing; No shortness of breath  CARDIOVASCULAR: No chest pain or palpitations  GASTROINTESTINAL: No abdominal  pain. No nausea, vomiting, or hematemesis  GENITOURINARY: No dysuria, frequency or hematuria  NEUROLOGICAL: No stroke like symptoms  SKIN: No rashes    TELEMETRY Personally reviewed:  	  MEDICATIONS:  amLODIPine   Tablet 10 milliGRAM(s) Oral daily        PHYSICAL EXAM:  T(C): 36.5 (12-05-23 @ 08:42), Max: 36.9 (12-05-23 @ 06:58)  HR: 58 (12-05-23 @ 08:42) (58 - 86)  BP: 123/84 (12-05-23 @ 08:42) (107/83 - 140/79)  RR: 18 (12-05-23 @ 08:42) (18 - 18)  SpO2: 99% (12-05-23 @ 08:42) (95% - 99%)  Wt(kg): --  I&O's Summary    04 Dec 2023 07:01  -  05 Dec 2023 07:00  --------------------------------------------------------  IN: 870 mL / OUT: 0 mL / NET: 870 mL    05 Dec 2023 07:01  -  05 Dec 2023 13:32  --------------------------------------------------------  IN: 400 mL / OUT: 0 mL / NET: 400 mL          Appearance: In no distress	  HEENT:    PERRL, EOMI	  Cardiovascular:  S1 S2, No JVD  Respiratory: Lungs clear to auscultation	  Gastrointestinal:  Soft, Non-tender, + BS	  Vascularature:  No edema of LE  Psychiatric: Appropriate affect   Neuro: no acute focal deficits                               15.4   9.13  )-----------( 235      ( 05 Dec 2023 08:34 )             46.0     12-05    138  |  105  |  13  ----------------------------<  95  4.2   |  23  |  0.73    Ca    9.7      05 Dec 2023 08:34  Phos  3.1     12-05  Mg     2.0     12-05          Labs personally reviewed      ASSESSMENT/PLAN: 	  59yo M PMHx of HTN, EToH abuse c/b DTs did not require intubation and daily smoker who presents with 3 month hx of intermittent LLE pain and claudication       1. Preop risk stratification  - Reports functional capacity limited by claudication   - risk factors include heavy smoking history and PAD  - EKG NSR  - TTE with preserved EF  - s/p cath 12/1, CTA of RCA and OM, otherwise patent cors. Continue with med management   - RCRI score 0: 3.9 % 30-day risk of MACE  - Mod risk for high risk open vascular bypass, no cardiac contraindication to proceed     2. HTN (hypertension).   -improved   - c/w home Amlodipine 10mg   - If b/p not controlled can add small dose Lopressor 12.5mg BID      Problem/Recommendation - 3:  ·  Problem: Smoking.   ·  Plan: Nicotine patch            SOLO Ordaz DO [facility]  Cardiovascular Medicine  [address] [place] [address], [address]  Available through call or text on [employer] TEAMs  Office: [phone]

## 2023-12-05 NOTE — DISCHARGE NOTE NURSING/CASE MANAGEMENT/SOCIAL WORK - NSDCPEFALRISK_GEN_ALL_CORE
For information on Fall & Injury Prevention, visit: [email or web address] OR  [email or web address] OR  https://www.cdc.gov/steadi/patient.html For information on Fall & Injury Prevention, visit: [email or web address] OR  [email or web address] OR  https://www.cdc.gov/steadi/patient.html

## 2023-12-05 NOTE — DISCHARGE NOTE PROVIDER - HOSPITAL COURSE
59yo M w pmhx of HLD and daily smoker who presents with 3 month hx of intermittent LLE pain and claudication. Pt endorses these symptoms have worsened over last 3 weeks, complains of additional coolness to his L leg. Can only walk approx 1 block before having pain. Patient denies fevers/chills, denies lightheadedness/dizziness, denies SOB/chest pain, denies nausea/vomiting, denies constipation/diarrhea. 57yo M w pmhx of HLD and daily smoker who presents with 3 month hx of intermittent LLE pain and claudication. Pt endorses these symptoms have worsened over last 3 weeks, complains of additional coolness to his L leg. Can only walk approx 1 block before having pain. Patient denies fevers/chills, denies lightheadedness/dizziness, denies SOB/chest pain, denies nausea/vomiting, denies constipation/diarrhea. 57yo M w pmhx of HLD and daily smoker who presents with 3 month hx of intermittent LLE pain and claudication. Pt endorses these symptoms have worsened over last 3 weeks, complains of additional coolness to his L leg. Can only walk approx 1 block before having pain. Patient denies fevers/chills, denies lightheadedness/dizziness, denies SOB/chest pain, denies nausea/vomiting, denies constipation/diarrhea.      Findings c/w [place] 4 CLTI. Patient requires aorto-bifemoral bypass. Patient underwent ischemic work up. Patient underwent cardiac cath 12/1, CTA of RCA and OM, otherwise patent cors. Patient at moderate risk for high risk open vascular bypass, no cardiac contraindication to proceed. Echocardiogram was done prior to discharge. Patient to be discharged home [facility] outpatient planning for surgery. patient to follow up in office with Dr. Heredia in 1-2 weeks. 59yo M w pmhx of HLD and daily smoker who presents with 3 month hx of intermittent LLE pain and claudication. Pt endorses these symptoms have worsened over last 3 weeks, complains of additional coolness to his L leg. Can only walk approx 1 block before having pain. Patient denies fevers/chills, denies lightheadedness/dizziness, denies SOB/chest pain, denies nausea/vomiting, denies constipation/diarrhea.      Findings c/w [place] 4 CLTI. Patient requires aorto-bifemoral bypass. Patient underwent ischemic work up. Patient underwent cardiac cath 12/1, CTA of RCA and OM, otherwise patent cors. Patient at moderate risk for high risk open vascular bypass, no cardiac contraindication to proceed. Echocardiogram was done prior to discharge. Patient to be discharged home [facility] outpatient planning for surgery. patient to follow up in office with Dr. Heredia in 1-2 weeks.

## 2023-12-05 NOTE — PROGRESS NOTE ADULT - NS ATTEND AMEND GEN_ALL_CORE FT
Patient care and plan discussed and reviewed with Advanced Care Provider. Plan as outlined above edited by me to reflect our discussion.
as above
Patient care and plan discussed and reviewed with Advanced Care Provider. Plan as outlined above edited by me to reflect our discussion.
as above
Patient care and plan discussed and reviewed with Advanced Care Provider. Plan as outlined above edited by me to reflect our discussion.
as above, preop for open bypass

## 2023-12-05 NOTE — DISCHARGE NOTE NURSING/CASE MANAGEMENT/SOCIAL WORK - NSDCPEPAMP_GEN_ALL_CORE
“[facility] for Tobacco Control” pamphlet given “[facility] for Tobacco Control” pamphlet given

## 2023-12-05 NOTE — DISCHARGE NOTE NURSING/CASE MANAGEMENT/SOCIAL WORK - PATIENT PORTAL LINK FT
You can access the FollowMyHealth Patient Portal offered by [facility] by registering at the following website: [email or web address]. By joining [employer]’s FollowMyHealth portal, you will also be able to view your health information using other applications (apps) compatible with our system. You can access the FollowMyHealth Patient Portal offered by [facility] by registering at the following website: [email or web address]. By joining [employer]’s FollowMyHealth portal, you will also be able to view your health information using other applications (apps) compatible with our system.

## 2023-12-05 NOTE — DISCHARGE NOTE NURSING/CASE MANAGEMENT/SOCIAL WORK - NSTRANSFERBELONGINGSDISPO_GEN_A_NUR
not currently have glasses while on [employer]. [facility] reading glasses provided not currently have glasses while on [employer]. [facility] reading glasses provided

## 2023-12-08 ENCOUNTER — INPATIENT (INPATIENT)
Facility: HOSPITAL | Age: 58
LOS: 9 days | Discharge: NURSING, REHAB OR LONG-TERM CARE | DRG: 271 | End: 2023-12-18
Attending: STUDENT IN AN ORGANIZED HEALTH CARE EDUCATION/TRAINING PROGRAM | Admitting: STUDENT IN AN ORGANIZED HEALTH CARE EDUCATION/TRAINING PROGRAM
Payer: COMMERCIAL

## 2023-12-08 VITALS
SYSTOLIC BLOOD PRESSURE: 143 MMHG | HEIGHT: 73 IN | RESPIRATION RATE: 20 BRPM | DIASTOLIC BLOOD PRESSURE: 80 MMHG | WEIGHT: 173.94 LBS | OXYGEN SATURATION: 96 % | TEMPERATURE: 98 F | HEART RATE: 109 BPM

## 2023-12-08 DIAGNOSIS — Z90.49 ACQUIRED ABSENCE OF OTHER SPECIFIED PARTS OF DIGESTIVE TRACT: Chronic | ICD-10-CM

## 2023-12-08 LAB
ALBUMIN SERPL ELPH-MCNC: 4.3 G/DL — SIGNIFICANT CHANGE UP (ref 3.3–5)
ALBUMIN SERPL ELPH-MCNC: 4.3 G/DL — SIGNIFICANT CHANGE UP (ref 3.3–5)
ALP SERPL-CCNC: 134 U/L — HIGH (ref 40–120)
ALP SERPL-CCNC: 134 U/L — HIGH (ref 40–120)
ALT FLD-CCNC: 62 U/L — HIGH (ref 10–45)
ALT FLD-CCNC: 62 U/L — HIGH (ref 10–45)
ANION GAP SERPL CALC-SCNC: 21 MMOL/L — HIGH (ref 5–17)
ANION GAP SERPL CALC-SCNC: 21 MMOL/L — HIGH (ref 5–17)
APTT BLD: 30.6 SEC — SIGNIFICANT CHANGE UP (ref 24.5–35.6)
APTT BLD: 30.6 SEC — SIGNIFICANT CHANGE UP (ref 24.5–35.6)
AST SERPL-CCNC: 44 U/L — HIGH (ref 10–40)
AST SERPL-CCNC: 44 U/L — HIGH (ref 10–40)
BASOPHILS # BLD AUTO: 0.09 K/UL — SIGNIFICANT CHANGE UP (ref 0–0.2)
BASOPHILS # BLD AUTO: 0.09 K/UL — SIGNIFICANT CHANGE UP (ref 0–0.2)
BASOPHILS NFR BLD AUTO: 0.9 % — SIGNIFICANT CHANGE UP (ref 0–2)
BASOPHILS NFR BLD AUTO: 0.9 % — SIGNIFICANT CHANGE UP (ref 0–2)
BILIRUB SERPL-MCNC: 0.2 MG/DL — SIGNIFICANT CHANGE UP (ref 0.2–1.2)
BILIRUB SERPL-MCNC: 0.2 MG/DL — SIGNIFICANT CHANGE UP (ref 0.2–1.2)
BLD GP AB SCN SERPL QL: NEGATIVE — SIGNIFICANT CHANGE UP
BLD GP AB SCN SERPL QL: NEGATIVE — SIGNIFICANT CHANGE UP
BUN SERPL-MCNC: 11 MG/DL — SIGNIFICANT CHANGE UP (ref 7–23)
BUN SERPL-MCNC: 11 MG/DL — SIGNIFICANT CHANGE UP (ref 7–23)
CALCIUM SERPL-MCNC: 9.3 MG/DL — SIGNIFICANT CHANGE UP (ref 8.4–10.5)
CALCIUM SERPL-MCNC: 9.3 MG/DL — SIGNIFICANT CHANGE UP (ref 8.4–10.5)
CHLORIDE SERPL-SCNC: 101 MMOL/L — SIGNIFICANT CHANGE UP (ref 96–108)
CHLORIDE SERPL-SCNC: 101 MMOL/L — SIGNIFICANT CHANGE UP (ref 96–108)
CO2 SERPL-SCNC: 19 MMOL/L — LOW (ref 22–31)
CO2 SERPL-SCNC: 19 MMOL/L — LOW (ref 22–31)
CREAT SERPL-MCNC: 0.62 MG/DL — SIGNIFICANT CHANGE UP (ref 0.5–1.3)
CREAT SERPL-MCNC: 0.62 MG/DL — SIGNIFICANT CHANGE UP (ref 0.5–1.3)
EGFR: 111 ML/MIN/1.73M2 — SIGNIFICANT CHANGE UP
EGFR: 111 ML/MIN/1.73M2 — SIGNIFICANT CHANGE UP
EOSINOPHIL # BLD AUTO: 0.09 K/UL — SIGNIFICANT CHANGE UP (ref 0–0.5)
EOSINOPHIL # BLD AUTO: 0.09 K/UL — SIGNIFICANT CHANGE UP (ref 0–0.5)
EOSINOPHIL NFR BLD AUTO: 0.9 % — SIGNIFICANT CHANGE UP (ref 0–6)
EOSINOPHIL NFR BLD AUTO: 0.9 % — SIGNIFICANT CHANGE UP (ref 0–6)
GLUCOSE SERPL-MCNC: 134 MG/DL — HIGH (ref 70–99)
GLUCOSE SERPL-MCNC: 134 MG/DL — HIGH (ref 70–99)
HCT VFR BLD CALC: 43.1 % — SIGNIFICANT CHANGE UP (ref 39–50)
HCT VFR BLD CALC: 43.1 % — SIGNIFICANT CHANGE UP (ref 39–50)
HGB BLD-MCNC: 15.3 G/DL — SIGNIFICANT CHANGE UP (ref 13–17)
HGB BLD-MCNC: 15.3 G/DL — SIGNIFICANT CHANGE UP (ref 13–17)
IMM GRANULOCYTES NFR BLD AUTO: 0.9 % — SIGNIFICANT CHANGE UP (ref 0–0.9)
IMM GRANULOCYTES NFR BLD AUTO: 0.9 % — SIGNIFICANT CHANGE UP (ref 0–0.9)
INR BLD: 1.04 RATIO — SIGNIFICANT CHANGE UP (ref 0.85–1.18)
INR BLD: 1.04 RATIO — SIGNIFICANT CHANGE UP (ref 0.85–1.18)
LYMPHOCYTES # BLD AUTO: 4.75 K/UL — HIGH (ref 1–3.3)
LYMPHOCYTES # BLD AUTO: 4.75 K/UL — HIGH (ref 1–3.3)
LYMPHOCYTES # BLD AUTO: 46.8 % — HIGH (ref 13–44)
LYMPHOCYTES # BLD AUTO: 46.8 % — HIGH (ref 13–44)
MAGNESIUM SERPL-MCNC: 1.7 MG/DL — SIGNIFICANT CHANGE UP (ref 1.6–2.6)
MAGNESIUM SERPL-MCNC: 1.7 MG/DL — SIGNIFICANT CHANGE UP (ref 1.6–2.6)
MCHC RBC-ENTMCNC: 32.1 PG — SIGNIFICANT CHANGE UP (ref 27–34)
MCHC RBC-ENTMCNC: 32.1 PG — SIGNIFICANT CHANGE UP (ref 27–34)
MCHC RBC-ENTMCNC: 35.5 GM/DL — SIGNIFICANT CHANGE UP (ref 32–36)
MCHC RBC-ENTMCNC: 35.5 GM/DL — SIGNIFICANT CHANGE UP (ref 32–36)
MCV RBC AUTO: 90.5 FL — SIGNIFICANT CHANGE UP (ref 80–100)
MCV RBC AUTO: 90.5 FL — SIGNIFICANT CHANGE UP (ref 80–100)
MONOCYTES # BLD AUTO: 0.9 K/UL — SIGNIFICANT CHANGE UP (ref 0–0.9)
MONOCYTES # BLD AUTO: 0.9 K/UL — SIGNIFICANT CHANGE UP (ref 0–0.9)
MONOCYTES NFR BLD AUTO: 8.9 % — SIGNIFICANT CHANGE UP (ref 2–14)
MONOCYTES NFR BLD AUTO: 8.9 % — SIGNIFICANT CHANGE UP (ref 2–14)
NEUTROPHILS # BLD AUTO: 4.22 K/UL — SIGNIFICANT CHANGE UP (ref 1.8–7.4)
NEUTROPHILS # BLD AUTO: 4.22 K/UL — SIGNIFICANT CHANGE UP (ref 1.8–7.4)
NEUTROPHILS NFR BLD AUTO: 41.6 % — LOW (ref 43–77)
NEUTROPHILS NFR BLD AUTO: 41.6 % — LOW (ref 43–77)
NRBC # BLD: 0 /100 WBCS — SIGNIFICANT CHANGE UP (ref 0–0)
NRBC # BLD: 0 /100 WBCS — SIGNIFICANT CHANGE UP (ref 0–0)
PHOSPHATE SERPL-MCNC: 3.3 MG/DL — SIGNIFICANT CHANGE UP (ref 2.5–4.5)
PHOSPHATE SERPL-MCNC: 3.3 MG/DL — SIGNIFICANT CHANGE UP (ref 2.5–4.5)
PLATELET # BLD AUTO: 200 K/UL — SIGNIFICANT CHANGE UP (ref 150–400)
PLATELET # BLD AUTO: 200 K/UL — SIGNIFICANT CHANGE UP (ref 150–400)
POTASSIUM SERPL-MCNC: 3.9 MMOL/L — SIGNIFICANT CHANGE UP (ref 3.5–5.3)
POTASSIUM SERPL-MCNC: 3.9 MMOL/L — SIGNIFICANT CHANGE UP (ref 3.5–5.3)
POTASSIUM SERPL-SCNC: 3.9 MMOL/L — SIGNIFICANT CHANGE UP (ref 3.5–5.3)
POTASSIUM SERPL-SCNC: 3.9 MMOL/L — SIGNIFICANT CHANGE UP (ref 3.5–5.3)
PROCALCITONIN SERPL-MCNC: 0.05 NG/ML — SIGNIFICANT CHANGE UP (ref 0.02–0.1)
PROCALCITONIN SERPL-MCNC: 0.05 NG/ML — SIGNIFICANT CHANGE UP (ref 0.02–0.1)
PROT SERPL-MCNC: 7.8 G/DL — SIGNIFICANT CHANGE UP (ref 6–8.3)
PROT SERPL-MCNC: 7.8 G/DL — SIGNIFICANT CHANGE UP (ref 6–8.3)
PROTHROM AB SERPL-ACNC: 10.9 SEC — SIGNIFICANT CHANGE UP (ref 9.5–13)
PROTHROM AB SERPL-ACNC: 10.9 SEC — SIGNIFICANT CHANGE UP (ref 9.5–13)
RBC # BLD: 4.76 M/UL — SIGNIFICANT CHANGE UP (ref 4.2–5.8)
RBC # BLD: 4.76 M/UL — SIGNIFICANT CHANGE UP (ref 4.2–5.8)
RBC # FLD: 13.1 % — SIGNIFICANT CHANGE UP (ref 10.3–14.5)
RBC # FLD: 13.1 % — SIGNIFICANT CHANGE UP (ref 10.3–14.5)
RH IG SCN BLD-IMP: POSITIVE — SIGNIFICANT CHANGE UP
RH IG SCN BLD-IMP: POSITIVE — SIGNIFICANT CHANGE UP
SODIUM SERPL-SCNC: 141 MMOL/L — SIGNIFICANT CHANGE UP (ref 135–145)
SODIUM SERPL-SCNC: 141 MMOL/L — SIGNIFICANT CHANGE UP (ref 135–145)
WBC # BLD: 10.14 K/UL — SIGNIFICANT CHANGE UP (ref 3.8–10.5)
WBC # BLD: 10.14 K/UL — SIGNIFICANT CHANGE UP (ref 3.8–10.5)
WBC # FLD AUTO: 10.14 K/UL — SIGNIFICANT CHANGE UP (ref 3.8–10.5)
WBC # FLD AUTO: 10.14 K/UL — SIGNIFICANT CHANGE UP (ref 3.8–10.5)

## 2023-12-08 PROCEDURE — 71046 X-RAY EXAM CHEST 2 VIEWS: CPT | Mod: 26

## 2023-12-08 PROCEDURE — 99292 CRITICAL CARE ADDL 30 MIN: CPT

## 2023-12-08 PROCEDURE — 99291 CRITICAL CARE FIRST HOUR: CPT

## 2023-12-08 RX ORDER — FOLIC ACID 0.8 MG
1 TABLET ORAL ONCE
Refills: 0 | Status: COMPLETED | OUTPATIENT
Start: 2023-12-08 | End: 2023-12-08

## 2023-12-08 RX ORDER — SODIUM CHLORIDE 9 MG/ML
1000 INJECTION, SOLUTION INTRAVENOUS ONCE
Refills: 0 | Status: COMPLETED | OUTPATIENT
Start: 2023-12-08 | End: 2023-12-08

## 2023-12-08 RX ORDER — THIAMINE MONONITRATE (VIT B1) 100 MG
100 TABLET ORAL ONCE
Refills: 0 | Status: COMPLETED | OUTPATIENT
Start: 2023-12-08 | End: 2023-12-08

## 2023-12-08 RX ORDER — DIAZEPAM 5 MG
5 TABLET ORAL ONCE
Refills: 0 | Status: DISCONTINUED | OUTPATIENT
Start: 2023-12-08 | End: 2023-12-09

## 2023-12-08 RX ORDER — SODIUM CHLORIDE 9 MG/ML
1000 INJECTION, SOLUTION INTRAVENOUS
Refills: 0 | Status: DISCONTINUED | OUTPATIENT
Start: 2023-12-08 | End: 2023-12-10

## 2023-12-08 RX ORDER — DIAZEPAM 5 MG
10 TABLET ORAL ONCE
Refills: 0 | Status: DISCONTINUED | OUTPATIENT
Start: 2023-12-08 | End: 2023-12-08

## 2023-12-08 RX ADMIN — Medication 100 MILLIGRAM(S): at 21:03

## 2023-12-08 RX ADMIN — SODIUM CHLORIDE 150 MILLILITER(S): 9 INJECTION, SOLUTION INTRAVENOUS at 22:54

## 2023-12-08 RX ADMIN — Medication 50 MILLIGRAM(S): at 20:20

## 2023-12-08 RX ADMIN — Medication 10 MILLIGRAM(S): at 20:19

## 2023-12-08 RX ADMIN — SODIUM CHLORIDE 2000 MILLILITER(S): 9 INJECTION, SOLUTION INTRAVENOUS at 20:29

## 2023-12-08 RX ADMIN — Medication 1 MILLIGRAM(S): at 22:54

## 2023-12-08 NOTE — ED PROVIDER NOTE - INTERPRETATION
normal sinus rhythm, Normal axis, Normal VA interval and QRS complex. There are no acute ischemic ST or T-wave changes. normal sinus rhythm, Normal axis, Normal SC interval and QRS complex. There are no acute ischemic ST or T-wave changes.

## 2023-12-08 NOTE — ED PROVIDER NOTE - OBJECTIVE STATEMENT
59yo M w pmhx of HLD and daily smoker who presents with 3 month hx of intermittent LLE pain and claudication. Pt endorses these symptoms have worsened over last month.  Patient was admitted to this hospital  approximately 1 week ago for the same symptoms, was discharged with plans for stenting by vascular surgery this upcoming Monday.  and is returning today stating that he has been unable to follow his medication regimen, has been drinking large amounts of vodka every day.  Patient does have a history of EtOH withdrawal with seizures requiring admission.  Patient denies chest pain shortness of breath fever nausea vomiting. 57yo M w pmhx of HLD and daily smoker who presents with 3 month hx of intermittent LLE pain and claudication. Pt endorses these symptoms have worsened over last month.  Patient was admitted to this hospital  approximately 1 week ago for the same symptoms, was discharged with plans for stenting by vascular surgery this upcoming Monday.  and is returning today stating that he has been unable to follow his medication regimen, has been drinking large amounts of vodka every day.  Patient does have a history of EtOH withdrawal with seizures requiring admission.  Patient denies chest pain shortness of breath fever nausea vomiting. 57yo M w pmhx of HLD and daily smoker who presents with 3 month hx of intermittent LLE pain and claudication. Pt endorses these symptoms have worsened over last month.  Patient was admitted to this hospital  approximately 1 week ago for the same symptoms, was discharged with plans for stenting by vascular surgery this upcoming Monday.  and is returning today stating that he has been unable to follow his medication regimen, has been drinking large amounts of vodka every day. Last cup of vodka around noon today. Patient does have a history of EtOH withdrawal with seizures requiring admission.  Patient denies chest pain shortness of breath fever nausea vomiting. Stating he wants to quit drinking prior to surgery.

## 2023-12-08 NOTE — ED ADULT NURSE NOTE - OBJECTIVE STATEMENT
58y male A&OX4 coming in through triage complaining of lower leg pain. PMHX HTN, HLD, alcohol abuse. Pt states he has surg due on Monday and states she drinks every day vodka 500ml and last drink was at 11am. Pt states she feeling a mild headache and having leg pain and decided to get checked in. Pt is speaking in complete sentences. Pt denies chest pain, shortness of breath, abd pain. Labs was drawn and sent to lab. Pt pending dispo.

## 2023-12-08 NOTE — ED PROVIDER NOTE - ATTENDING CONTRIBUTION TO CARE
------------ATTENDING NOTE------------  pt w/ wife c/o increasing LLE pain from known PVD, complicated by continued alcoholism, lat drink 8 hrs ago, pt tachycardic / tremor / tongue fasciculations, h/o withdrawal seizures years ago, per wife pt not eating/staying hydrated or taking medications, complicated as planned Vascular bypass procedure on LLE in 3 days, awaiting labs/imaging and close reassessments -->  - Rk Blake MD   ---------------------------------------------- ------------ATTENDING NOTE------------  pt w/ wife c/o increasing LLE pain from known PVD, complicated by continued alcoholism, lat drink 8 hrs ago, pt tachycardic / tremor / tongue fasciculations, h/o withdrawal seizures years ago, per wife pt not eating/staying hydrated or taking medications, complicated as planned Vascular bypass procedure on LLE in 3 days, awaiting labs/imaging and close reassessments -->improved / stable w/ benzos / IVF, admitting for continued pierre, tx, optimize medical mgmt, surgical planning.  - Rk Blake MD   ----------------------------------------------

## 2023-12-08 NOTE — ED PROVIDER NOTE - PHYSICAL EXAMINATION
PHYSICAL EXAM:  CONSTITUTIONAL: Well appearing, awake, alert, oriented to person, place, time/situation and in no apparent distress.  HEAD: Atraumatic  EYES: Clear bilaterally, pupils equal, round and reactive to light.  ENMT:   Tongue fasciculations present  CARDIAC:  cardiac without appreciable murmur  RESPIRATORY: Breathing unlabored. Breath sounds clear and equal bilaterally.  ABDOMEN:  Soft, nontender, nondistended. No rebound tenderness or guarding.  NEUROLOGICAL: Alert and oriented, no focal deficits, no motor or sensory deficits. CN2-12 intact. Sensation intact x4 extremities.  LLE: not cold, faint dp present   SKIN: Skin warm and dry. No evidence of rashes or lesions.

## 2023-12-08 NOTE — ED ADULT NURSE NOTE - NSFALLUNIVINTERV_ED_ALL_ED
Bed/Stretcher in lowest position, wheels locked, appropriate side rails in place/Call bell, personal items and telephone in reach/Instruct patient to call for assistance before getting out of bed/chair/stretcher/Non-slip footwear applied when patient is off stretcher/[place] to call system/Physically safe environment - no spills, clutter or unnecessary equipment/Purposeful proactive rounding/Room/bathroom lighting operational, light cord in reach Bed/Stretcher in lowest position, wheels locked, appropriate side rails in place/Call bell, personal items and telephone in reach/Instruct patient to call for assistance before getting out of bed/chair/stretcher/Non-slip footwear applied when patient is off stretcher/[place] to call system/Physically safe environment - no spills, clutter or unnecessary equipment/Purposeful proactive rounding/Room/bathroom lighting operational, light cord in reach

## 2023-12-09 DIAGNOSIS — E78.5 HYPERLIPIDEMIA, UNSPECIFIED: ICD-10-CM

## 2023-12-09 DIAGNOSIS — F10.939 ALCOHOL USE, UNSPECIFIED WITH WITHDRAWAL, UNSPECIFIED: ICD-10-CM

## 2023-12-09 DIAGNOSIS — Z29.9 ENCOUNTER FOR PROPHYLACTIC MEASURES, UNSPECIFIED: ICD-10-CM

## 2023-12-09 DIAGNOSIS — I10 ESSENTIAL (PRIMARY) HYPERTENSION: ICD-10-CM

## 2023-12-09 DIAGNOSIS — I73.9 PERIPHERAL VASCULAR DISEASE, UNSPECIFIED: ICD-10-CM

## 2023-12-09 LAB
A1C WITH ESTIMATED AVERAGE GLUCOSE RESULT: 5.3 % — SIGNIFICANT CHANGE UP (ref 4–5.6)
A1C WITH ESTIMATED AVERAGE GLUCOSE RESULT: 5.3 % — SIGNIFICANT CHANGE UP (ref 4–5.6)
ALBUMIN SERPL ELPH-MCNC: 3.8 G/DL — SIGNIFICANT CHANGE UP (ref 3.3–5)
ALBUMIN SERPL ELPH-MCNC: 3.8 G/DL — SIGNIFICANT CHANGE UP (ref 3.3–5)
ALP SERPL-CCNC: 112 U/L — SIGNIFICANT CHANGE UP (ref 40–120)
ALP SERPL-CCNC: 112 U/L — SIGNIFICANT CHANGE UP (ref 40–120)
ALT FLD-CCNC: 49 U/L — HIGH (ref 10–45)
ALT FLD-CCNC: 49 U/L — HIGH (ref 10–45)
ANION GAP SERPL CALC-SCNC: 12 MMOL/L — SIGNIFICANT CHANGE UP (ref 5–17)
ANION GAP SERPL CALC-SCNC: 12 MMOL/L — SIGNIFICANT CHANGE UP (ref 5–17)
AST SERPL-CCNC: 37 U/L — SIGNIFICANT CHANGE UP (ref 10–40)
AST SERPL-CCNC: 37 U/L — SIGNIFICANT CHANGE UP (ref 10–40)
BASOPHILS # BLD AUTO: 0.07 K/UL — SIGNIFICANT CHANGE UP (ref 0–0.2)
BASOPHILS # BLD AUTO: 0.07 K/UL — SIGNIFICANT CHANGE UP (ref 0–0.2)
BASOPHILS NFR BLD AUTO: 0.9 % — SIGNIFICANT CHANGE UP (ref 0–2)
BASOPHILS NFR BLD AUTO: 0.9 % — SIGNIFICANT CHANGE UP (ref 0–2)
BILIRUB DIRECT SERPL-MCNC: 0.1 MG/DL — SIGNIFICANT CHANGE UP (ref 0–0.3)
BILIRUB DIRECT SERPL-MCNC: 0.1 MG/DL — SIGNIFICANT CHANGE UP (ref 0–0.3)
BILIRUB INDIRECT FLD-MCNC: 0.3 MG/DL — SIGNIFICANT CHANGE UP (ref 0.2–1)
BILIRUB INDIRECT FLD-MCNC: 0.3 MG/DL — SIGNIFICANT CHANGE UP (ref 0.2–1)
BILIRUB SERPL-MCNC: 0.4 MG/DL — SIGNIFICANT CHANGE UP (ref 0.2–1.2)
BILIRUB SERPL-MCNC: 0.4 MG/DL — SIGNIFICANT CHANGE UP (ref 0.2–1.2)
BUN SERPL-MCNC: 10 MG/DL — SIGNIFICANT CHANGE UP (ref 7–23)
BUN SERPL-MCNC: 10 MG/DL — SIGNIFICANT CHANGE UP (ref 7–23)
CALCIUM SERPL-MCNC: 8.6 MG/DL — SIGNIFICANT CHANGE UP (ref 8.4–10.5)
CALCIUM SERPL-MCNC: 8.6 MG/DL — SIGNIFICANT CHANGE UP (ref 8.4–10.5)
CHLORIDE SERPL-SCNC: 103 MMOL/L — SIGNIFICANT CHANGE UP (ref 96–108)
CHLORIDE SERPL-SCNC: 103 MMOL/L — SIGNIFICANT CHANGE UP (ref 96–108)
CO2 SERPL-SCNC: 23 MMOL/L — SIGNIFICANT CHANGE UP (ref 22–31)
CO2 SERPL-SCNC: 23 MMOL/L — SIGNIFICANT CHANGE UP (ref 22–31)
CREAT SERPL-MCNC: 0.6 MG/DL — SIGNIFICANT CHANGE UP (ref 0.5–1.3)
CREAT SERPL-MCNC: 0.6 MG/DL — SIGNIFICANT CHANGE UP (ref 0.5–1.3)
EGFR: 112 ML/MIN/1.73M2 — SIGNIFICANT CHANGE UP
EGFR: 112 ML/MIN/1.73M2 — SIGNIFICANT CHANGE UP
EOSINOPHIL # BLD AUTO: 0.14 K/UL — SIGNIFICANT CHANGE UP (ref 0–0.5)
EOSINOPHIL # BLD AUTO: 0.14 K/UL — SIGNIFICANT CHANGE UP (ref 0–0.5)
EOSINOPHIL NFR BLD AUTO: 1.8 % — SIGNIFICANT CHANGE UP (ref 0–6)
EOSINOPHIL NFR BLD AUTO: 1.8 % — SIGNIFICANT CHANGE UP (ref 0–6)
ESTIMATED AVERAGE GLUCOSE: 105 MG/DL — SIGNIFICANT CHANGE UP (ref 68–114)
ESTIMATED AVERAGE GLUCOSE: 105 MG/DL — SIGNIFICANT CHANGE UP (ref 68–114)
GLUCOSE SERPL-MCNC: 81 MG/DL — SIGNIFICANT CHANGE UP (ref 70–99)
GLUCOSE SERPL-MCNC: 81 MG/DL — SIGNIFICANT CHANGE UP (ref 70–99)
HCT VFR BLD CALC: 39 % — SIGNIFICANT CHANGE UP (ref 39–50)
HCT VFR BLD CALC: 39 % — SIGNIFICANT CHANGE UP (ref 39–50)
HGB BLD-MCNC: 13.7 G/DL — SIGNIFICANT CHANGE UP (ref 13–17)
HGB BLD-MCNC: 13.7 G/DL — SIGNIFICANT CHANGE UP (ref 13–17)
IMM GRANULOCYTES NFR BLD AUTO: 0.5 % — SIGNIFICANT CHANGE UP (ref 0–0.9)
IMM GRANULOCYTES NFR BLD AUTO: 0.5 % — SIGNIFICANT CHANGE UP (ref 0–0.9)
LIDOCAIN IGE QN: 54 U/L — SIGNIFICANT CHANGE UP (ref 7–60)
LIDOCAIN IGE QN: 54 U/L — SIGNIFICANT CHANGE UP (ref 7–60)
LYMPHOCYTES # BLD AUTO: 3.74 K/UL — HIGH (ref 1–3.3)
LYMPHOCYTES # BLD AUTO: 3.74 K/UL — HIGH (ref 1–3.3)
LYMPHOCYTES # BLD AUTO: 47.2 % — HIGH (ref 13–44)
LYMPHOCYTES # BLD AUTO: 47.2 % — HIGH (ref 13–44)
MAGNESIUM SERPL-MCNC: 1.6 MG/DL — SIGNIFICANT CHANGE UP (ref 1.6–2.6)
MAGNESIUM SERPL-MCNC: 1.6 MG/DL — SIGNIFICANT CHANGE UP (ref 1.6–2.6)
MCHC RBC-ENTMCNC: 32 PG — SIGNIFICANT CHANGE UP (ref 27–34)
MCHC RBC-ENTMCNC: 32 PG — SIGNIFICANT CHANGE UP (ref 27–34)
MCHC RBC-ENTMCNC: 35.1 GM/DL — SIGNIFICANT CHANGE UP (ref 32–36)
MCHC RBC-ENTMCNC: 35.1 GM/DL — SIGNIFICANT CHANGE UP (ref 32–36)
MCV RBC AUTO: 91.1 FL — SIGNIFICANT CHANGE UP (ref 80–100)
MCV RBC AUTO: 91.1 FL — SIGNIFICANT CHANGE UP (ref 80–100)
MONOCYTES # BLD AUTO: 1.03 K/UL — HIGH (ref 0–0.9)
MONOCYTES # BLD AUTO: 1.03 K/UL — HIGH (ref 0–0.9)
MONOCYTES NFR BLD AUTO: 13 % — SIGNIFICANT CHANGE UP (ref 2–14)
MONOCYTES NFR BLD AUTO: 13 % — SIGNIFICANT CHANGE UP (ref 2–14)
NEUTROPHILS # BLD AUTO: 2.9 K/UL — SIGNIFICANT CHANGE UP (ref 1.8–7.4)
NEUTROPHILS # BLD AUTO: 2.9 K/UL — SIGNIFICANT CHANGE UP (ref 1.8–7.4)
NEUTROPHILS NFR BLD AUTO: 36.6 % — LOW (ref 43–77)
NEUTROPHILS NFR BLD AUTO: 36.6 % — LOW (ref 43–77)
NRBC # BLD: 0 /100 WBCS — SIGNIFICANT CHANGE UP (ref 0–0)
NRBC # BLD: 0 /100 WBCS — SIGNIFICANT CHANGE UP (ref 0–0)
PHOSPHATE SERPL-MCNC: 3.2 MG/DL — SIGNIFICANT CHANGE UP (ref 2.5–4.5)
PHOSPHATE SERPL-MCNC: 3.2 MG/DL — SIGNIFICANT CHANGE UP (ref 2.5–4.5)
PLATELET # BLD AUTO: 170 K/UL — SIGNIFICANT CHANGE UP (ref 150–400)
PLATELET # BLD AUTO: 170 K/UL — SIGNIFICANT CHANGE UP (ref 150–400)
POTASSIUM SERPL-MCNC: 3.7 MMOL/L — SIGNIFICANT CHANGE UP (ref 3.5–5.3)
POTASSIUM SERPL-MCNC: 3.7 MMOL/L — SIGNIFICANT CHANGE UP (ref 3.5–5.3)
POTASSIUM SERPL-SCNC: 3.7 MMOL/L — SIGNIFICANT CHANGE UP (ref 3.5–5.3)
POTASSIUM SERPL-SCNC: 3.7 MMOL/L — SIGNIFICANT CHANGE UP (ref 3.5–5.3)
PROT SERPL-MCNC: 6.5 G/DL — SIGNIFICANT CHANGE UP (ref 6–8.3)
PROT SERPL-MCNC: 6.5 G/DL — SIGNIFICANT CHANGE UP (ref 6–8.3)
RBC # BLD: 4.28 M/UL — SIGNIFICANT CHANGE UP (ref 4.2–5.8)
RBC # BLD: 4.28 M/UL — SIGNIFICANT CHANGE UP (ref 4.2–5.8)
RBC # FLD: 13.2 % — SIGNIFICANT CHANGE UP (ref 10.3–14.5)
RBC # FLD: 13.2 % — SIGNIFICANT CHANGE UP (ref 10.3–14.5)
SODIUM SERPL-SCNC: 138 MMOL/L — SIGNIFICANT CHANGE UP (ref 135–145)
SODIUM SERPL-SCNC: 138 MMOL/L — SIGNIFICANT CHANGE UP (ref 135–145)
WBC # BLD: 7.92 K/UL — SIGNIFICANT CHANGE UP (ref 3.8–10.5)
WBC # BLD: 7.92 K/UL — SIGNIFICANT CHANGE UP (ref 3.8–10.5)
WBC # FLD AUTO: 7.92 K/UL — SIGNIFICANT CHANGE UP (ref 3.8–10.5)
WBC # FLD AUTO: 7.92 K/UL — SIGNIFICANT CHANGE UP (ref 3.8–10.5)

## 2023-12-09 PROCEDURE — 99223 1ST HOSP IP/OBS HIGH 75: CPT

## 2023-12-09 PROCEDURE — 99232 SBSQ HOSP IP/OBS MODERATE 35: CPT | Mod: GC

## 2023-12-09 RX ORDER — ASPIRIN/CALCIUM CARB/MAGNESIUM 324 MG
81 TABLET ORAL DAILY
Refills: 0 | Status: DISCONTINUED | OUTPATIENT
Start: 2023-12-09 | End: 2023-12-10

## 2023-12-09 RX ORDER — FOLIC ACID 0.8 MG
1 TABLET ORAL DAILY
Refills: 0 | Status: DISCONTINUED | OUTPATIENT
Start: 2023-12-09 | End: 2023-12-13

## 2023-12-09 RX ORDER — AMLODIPINE BESYLATE 2.5 MG/1
10 TABLET ORAL DAILY
Refills: 0 | Status: DISCONTINUED | OUTPATIENT
Start: 2023-12-09 | End: 2023-12-13

## 2023-12-09 RX ORDER — ATORVASTATIN CALCIUM 80 MG/1
80 TABLET, FILM COATED ORAL AT BEDTIME
Refills: 0 | Status: DISCONTINUED | OUTPATIENT
Start: 2023-12-09 | End: 2023-12-13

## 2023-12-09 RX ORDER — THIAMINE MONONITRATE (VIT B1) 100 MG
100 TABLET ORAL DAILY
Refills: 0 | Status: COMPLETED | OUTPATIENT
Start: 2023-12-09 | End: 2023-12-11

## 2023-12-09 RX ORDER — LANOLIN ALCOHOL/MO/W.PET/CERES
3 CREAM (GRAM) TOPICAL AT BEDTIME
Refills: 0 | Status: DISCONTINUED | OUTPATIENT
Start: 2023-12-09 | End: 2023-12-13

## 2023-12-09 RX ORDER — ACETAMINOPHEN 500 MG
650 TABLET ORAL EVERY 6 HOURS
Refills: 0 | Status: DISCONTINUED | OUTPATIENT
Start: 2023-12-09 | End: 2023-12-13

## 2023-12-09 RX ADMIN — Medication 1 MILLIGRAM(S): at 12:07

## 2023-12-09 RX ADMIN — SODIUM CHLORIDE 150 MILLILITER(S): 9 INJECTION, SOLUTION INTRAVENOUS at 21:24

## 2023-12-09 RX ADMIN — AMLODIPINE BESYLATE 10 MILLIGRAM(S): 2.5 TABLET ORAL at 06:01

## 2023-12-09 RX ADMIN — Medication 100 MILLIGRAM(S): at 12:07

## 2023-12-09 RX ADMIN — Medication 81 MILLIGRAM(S): at 12:07

## 2023-12-09 RX ADMIN — ATORVASTATIN CALCIUM 80 MILLIGRAM(S): 80 TABLET, FILM COATED ORAL at 21:22

## 2023-12-09 NOTE — CHART NOTE - NSCHARTNOTEFT_GEN_A_CORE
57 y/o male w/ PMHx CLTI ([place] 4), alcohol abuse w/ history of withdrawal seizures, HLD and smoking presenting for both LE pain and inability to quit EtOH. Admitted for monitoring and treatment of EtOH withdrawal. Last Drink Tues 12/8. On exam, mild tremors in UE (tremors at baseline per patient) with mild tongue fasiculations.     #ETOH Withdrawal  #PAD    -continue with symptom trigger Ativan prn. CIWA monitoring  -plan for bypass with vascular Monday    Jennifer Oliva MD  Division of Hospital Medicine  Available on [employer] Teams 57 y/o male w/ PMHx CLTI ([place] 4), alcohol abuse w/ history of withdrawal seizures, HLD and smoking presenting for both LE pain and inability to quit EtOH. Admitted for monitoring and treatment of EtOH withdrawal. Last Drink Tues 12/8. On exam, mild tremors in UE (tremors at baseline per patient) with mild tongue fasiculations.     #ETOH Withdrawal  #PAD    -continue with symptom trigger Ativan prn. CIWA monitoring  -plan for bypass with vascular Monday    Jennifer Oliva MD  Division of Hospital Medicine  Available on [employer] Teams

## 2023-12-09 NOTE — H&P ADULT - ASSESSMENT
57 y/o male w/ PMHx CLTI ([place] 4), alcohol abuse w/ history of withdrawal seizures, HLD and smoking presenting for both LE pain and inability to quit EtOH. Admitted for monitoring and treatment of EtOH withdrawal. 59 y/o male w/ PMHx CLTI ([place] 4), alcohol abuse w/ history of withdrawal seizures, HLD and smoking presenting for both LE pain and inability to quit EtOH. Admitted for monitoring and treatment of EtOH withdrawal.

## 2023-12-09 NOTE — CONSULT NOTE ADULT - ASSESSMENT
57 y/o male w/ PMHx CLTI ([place] 4), alcohol abuse w/ history of withdrawal seizures, HLD and smoking who presents for alcohol withdrawal and pain in his legs. Aorta-bifem bypass planned for monday 12/11 with Dr. Heredia.    Plan/Recs  - Medical optimization.  - Pre-op labs Sunday night if patient remains stable.   - Vascular following.     Plan discussed with Vascular Surgery Fellow on call    Vascular Surgery  [phone]  57 y/o male w/ PMHx CLTI ([place] 4), alcohol abuse w/ history of withdrawal seizures, HLD and smoking who presents for alcohol withdrawal and pain in his legs. Aorta-bifem bypass planned for monday 12/11 with Dr. Heredia.    Plan/Recs  - Medical optimization.  - Pre-op labs Sunday night if patient remains stable.   - Vascular following.     Plan discussed with Vascular Surgery Fellow on call    Vascular Surgery  [phone]

## 2023-12-09 NOTE — H&P ADULT - PROBLEM SELECTOR PLAN 2
- C/w ASA 81mg daily  - C/w atorvastatin 80mg QHS - C/w ASA 81mg daily  - C/w atorvastatin 80mg QHS  - Per patient, he is supposed to have aorto-bifemoral bypass on Monday 12/11, please follow-up with vascular in AM if this is still the case  - Please discharge patient w/ supply of ASA and atorvastatin, he doesn't have these meds at home anymore

## 2023-12-09 NOTE — H&P ADULT - NSHPLABSRESULTS_GEN_ALL_CORE
15.3   10.14 )-----------( 200      ( 08 Dec 2023 20:30 )             43.1     12-08    141  |  101  |  11  ----------------------------<  134<H>  3.9   |  19<L>  |  0.62    Ca    9.3      08 Dec 2023 20:30  Phos  3.3     12-08  Mg     1.7     12-08    TPro  7.8  /  Alb  4.3  /  TBili  0.2  /  DBili  x   /  AST  44<H>  /  ALT  62<H>  /  AlkPhos  134<H>  12-08          LIVER FUNCTIONS - ( 08 Dec 2023 20:30 )  Alb: 4.3 g/dL / Pro: 7.8 g/dL / ALK PHOS: 134 U/L / ALT: 62 U/L / AST: 44 U/L / GGT: x           PT/INR - ( 08 Dec 2023 20:30 )   PT: 10.9 sec;   INR: 1.04 ratio         PTT - ( 08 Dec 2023 20:30 )  PTT:30.6 sec  Urinalysis Basic - ( 08 Dec 2023 20:30 )    Color: x / Appearance: x / SG: x / pH: x  Gluc: 134 mg/dL / Ketone: x  / Bili: x / Urobili: x   Blood: x / Protein: x / Nitrite: x   Leuk Esterase: x / RBC: x / WBC x   Sq Epi: x / Non Sq Epi: x / Bacteria: x

## 2023-12-09 NOTE — PATIENT PROFILE ADULT - FALL HARM RISK - HARM RISK INTERVENTIONS
Assistance with ambulation/Assistance OOB with selected safe patient handling equipment/Communicate Risk of Fall with Harm to all staff/Monitor for mental status changes/Monitor gait and stability/Reinforce activity limits and safety measures with patient and family/Tailored Fall Risk Interventions/Toileting schedule using arm’s reach rule for commode and bathroom/Use of alarms - bed, chair and/or voice tab/Visual Cue: Yellow wristband and red socks/Bed in lowest position, wheels locked, appropriate side rails in place/Call bell, personal items and telephone in reach/Instruct patient to call for assistance before getting out of bed or chair/Non-slip footwear when patient is out of bed/[place] to call system/Physically safe environment - no spills, clutter or unnecessary equipment/Purposeful Proactive Rounding/Room/bathroom lighting operational, light cord in reach Assistance with ambulation/Assistance OOB with selected safe patient handling equipment/Communicate Risk of Fall with Harm to all staff/Monitor for mental status changes/Monitor gait and stability/Reinforce activity limits and safety measures with patient and family/Tailored Fall Risk Interventions/Toileting schedule using arm’s reach rule for commode and bathroom/Use of alarms - bed, chair and/or voice tab/Visual Cue: Yellow wristband and red socks/Bed in lowest position, wheels locked, appropriate side rails in place/Call bell, personal items and telephone in reach/Instruct patient to call for assistance before getting out of bed or chair/Non-slip footwear when patient is out of bed/[place] to call system/Physically safe environment - no spills, clutter or unnecessary equipment/Purposeful Proactive Rounding/Room/bathroom lighting operational, light cord in reach

## 2023-12-09 NOTE — H&P ADULT - NSHPPHYSICALEXAM_GEN_ALL_CORE
Vital Signs Last 24 Hrs  T(C): 36.8 (08 Dec 2023 23:15), Max: 36.8 (08 Dec 2023 21:15)  T(F): 98.2 (08 Dec 2023 23:15), Max: 98.3 (08 Dec 2023 21:15)  HR: 95 (08 Dec 2023 23:15) (95 - 110)  BP: 124/76 (08 Dec 2023 23:15) (124/76 - 148/82)  BP(mean): --  RR: 20 (08 Dec 2023 23:15) (18 - 20)  SpO2: 96% (08 Dec 2023 23:15) (96% - 99%)    Parameters below as of 08 Dec 2023 23:15  Patient On (Oxygen Delivery Method): room air        CONSTITUTIONAL: Well-groomed, in no apparent distress  EYES: No conjunctival or scleral injection, non-icteric;   ENMT: No external nasal lesions; MMM  NECK: Trachea midline without palpable neck mass; thyroid not enlarged and non-tender  RESPIRATORY: Breathing comfortably; no dullness to percussion; lungs CTA without wheeze/rhonchi/rales  CARDIOVASCULAR: +S1S2, RRR, no M/G/R; pedal pulses full and symmetric; no lower extremity edema  GASTROINTESTINAL: No palpable masses or tenderness, +BS throughout, no rebound/guarding; no hepatosplenomegaly; no hernia palpated  LYMPHATIC: No cervical LAD or tenderness  SKIN: No rashes or ulcers noted  NEUROLOGIC: CN II-XII intact; sensation intact in LEs b/l to light touch  PSYCHIATRIC: A+O x 3; mood and affect appropriate; appropriate insight and judgment Vital Signs Last 24 Hrs  T(C): 36.8 (08 Dec 2023 23:15), Max: 36.8 (08 Dec 2023 21:15)  T(F): 98.2 (08 Dec 2023 23:15), Max: 98.3 (08 Dec 2023 21:15)  HR: 95 (08 Dec 2023 23:15) (95 - 110)  BP: 124/76 (08 Dec 2023 23:15) (124/76 - 148/82)  BP(mean): --  RR: 20 (08 Dec 2023 23:15) (18 - 20)  SpO2: 96% (08 Dec 2023 23:15) (96% - 99%)    Parameters below as of 08 Dec 2023 23:15  Patient On (Oxygen Delivery Method): room air    CONSTITUTIONAL: Well-groomed, in no apparent distress  EYES: No conjunctival or scleral injection, non-icteric; EOMI, no nystagmus  RESPIRATORY: Breathing comfortably; no dullness to percussion; lungs CTA without wheeze/rhonchi/rales  CARDIOVASCULAR: +S1S2, RRR, no M/G/R; no lower extremity edema  GASTROINTESTINAL: No palpable masses or tenderness, +BS throughout, no rebound/guarding; no hepatosplenomegaly; no hernia palpated  LYMPHATIC: No cervical LAD or tenderness  SKIN: No rashes or ulcers noted  NEUROLOGIC: Sensation intact in LEs b/l to light touch, bilateral tremors in UE, fasciculations of tongue noted  PSYCHIATRIC: A+O x 3; mood and affect appropriate; appropriate insight and judgment

## 2023-12-09 NOTE — H&P ADULT - PROBLEM SELECTOR PLAN 1
No - Start CIWA protocol, IVP Valium 5mg PRN for score 8 and up  - C/w thiamine 100mg daily  - C/w folic acid 1mg daily - Start CIWA protocol, IVP Lorazepam 2mg PRN for score 8 and up  - C/w thiamine 100mg daily  - C/w folic acid 1mg daily - Start CIWA protocol, IVP Lorazepam 2mg PRN for score 8 and up  - C/w thiamine 100mg daily  - C/w folic acid 1mg daily  - Seizure and aspiration precautions

## 2023-12-09 NOTE — H&P ADULT - PROBLEM SELECTOR PLAN 4
- C/w amlodipine 10mg daily - C/w amlodipine 10mg daily  - Patient also states he is on losartan 10mg daily?

## 2023-12-09 NOTE — PATIENT PROFILE ADULT - FUNCTIONAL ASSESSMENT - BASIC MOBILITY 6.
4-calculated by average/Not able to assess (calculate score using [facility] averaging method) 4-calculated by average/Not able to assess (calculate score using [facility] averaging method)

## 2023-12-09 NOTE — CONSULT NOTE ADULT - SUBJECTIVE AND OBJECTIVE BOX
Vascular Surgery Consult    Consulting attending: Giovanna      HPI:  This is a 59 y/o male w/ PMHx CLTI (Ama 4), alcohol abuse w/ history of withdrawal seizures, HLD and smoking who presents for alcohol withdrawal and pain in his legs. 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 He was apparently planned for the procedure this coming Monday 12/11, but has been drinking too much vodka and hasn't been able to stick to his medication regimen. He wants to quit drinking prior to surgery. He is still having ischemic leg pain at rest at times. His last drink was a half-liter of vodka on Tuesday, and his symptoms started on Thursday. Prior to the drink on Tuesday, his last drink before that was 3 weeks prior, which was also about another 0.5L of vodka. He states that he did get withdrawal symptoms between that and his last drink this Tuesday, but he suffered through it at home, and didn't have any seizures at the time. He states that he was in alcohol rehab for 2 months earlier this year, but started drinking again in September.     In the ED, he was afebrile and hemodynamically stable, saturating well on RA. Has mildly elevated AG of 21, and transaminitis presents w/ AST/ALT 44/62 and ALP of 134. CXR was negative for any acute pulmonary process. Received 1 dose librium 50 and IVP valium 5mg, along w/ folic acid and thiamine in the ED.  (09 Dec 2023 00:06)        PAST MEDICAL HISTORY:  HTN (hypertension)    FAP (familial adenomatous polyposis)    Alcohol abuse          PAST SURGICAL HISTORY:  H/O colectomy          MEDICATIONS:  acetaminophen     Tablet .. 650 milliGRAM(s) Oral every 6 hours PRN  amLODIPine   Tablet 10 milliGRAM(s) Oral daily  aspirin enteric coated 81 milliGRAM(s) Oral daily  atorvastatin 80 milliGRAM(s) Oral at bedtime  folic acid 1 milliGRAM(s) Oral daily  lactated ringers. 1000 milliLiter(s) IV Continuous <Continuous>  LORazepam   Injectable 2 milliGRAM(s) IV Push every 1 hour PRN  LORazepam   Injectable 1 milliGRAM(s) IV Push every 1 hour PRN  melatonin 3 milliGRAM(s) Oral at bedtime PRN  thiamine 100 milliGRAM(s) Oral daily        ALLERGIES:  No Known Allergies        VITALS & I/Os:  Vital Signs Last 24 Hrs  T(C): 36.8 (09 Dec 2023 04:00), Max: 36.8 (08 Dec 2023 21:15)  T(F): 98.2 (09 Dec 2023 04:00), Max: 98.3 (08 Dec 2023 21:15)  HR: 76 (09 Dec 2023 04:00) (76 - 110)  BP: 137/83 (09 Dec 2023 04:00) (124/76 - 161/75)  BP(mean): --  RR: 18 (09 Dec 2023 04:00) (18 - 20)  SpO2: 94% (09 Dec 2023 04:00) (93% - 99%)    Parameters below as of 09 Dec 2023 04:00  Patient On (Oxygen Delivery Method): room air        I&O's Summary        PHYSICAL EXAM:  General: No acute distress  Respiratory: Nonlabored  Cardiovascular: RRR  Abdominal: Soft, nondistended, nontender. No rebound or guarding. No organomegaly, no palpable mass.  Extremities: Warm  Vascular: Palpable radials b/l, palpable DP b/l, motor/sensation intact.    LABS:                        15.3   10.14 )-----------( 200      ( 08 Dec 2023 20:30 )             43.1     12-08    141  |  101  |  11  ----------------------------<  134<H>  3.9   |  19<L>  |  0.62    Ca    9.3      08 Dec 2023 20:30  Phos  3.3     12-08  Mg     1.7     12-08    TPro  7.8  /  Alb  4.3  /  TBili  0.2  /  DBili  x   /  AST  44<H>  /  ALT  62<H>  /  AlkPhos  134<H>  12-08    Lactate:    PT/INR - ( 08 Dec 2023 20:30 )   PT: 10.9 sec;   INR: 1.04 ratio         PTT - ( 08 Dec 2023 20:30 )  PTT:30.6 sec          Urinalysis Basic - ( 08 Dec 2023 20:30 )    Color: x / Appearance: x / SG: x / pH: x  Gluc: 134 mg/dL / Ketone: x  / Bili: x / Urobili: x   Blood: x / Protein: x / Nitrite: x   Leuk Esterase: x / RBC: x / WBC x   Sq Epi: x / Non Sq Epi: x / Bacteria: x          IMAGING:                                                                                               Vascular Surgery Consult    Consulting attending: Giovanna      HPI:  This is a 57 y/o male w/ PMHx CLTI (Ama 4), alcohol abuse w/ history of withdrawal seizures, HLD and smoking who presents for alcohol withdrawal and pain in his legs. 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 He was apparently planned for the procedure this coming Monday 12/11, but has been drinking too much vodka and hasn't been able to stick to his medication regimen. He wants to quit drinking prior to surgery. He is still having ischemic leg pain at rest at times. His last drink was a half-liter of vodka on Tuesday, and his symptoms started on Thursday. Prior to the drink on Tuesday, his last drink before that was 3 weeks prior, which was also about another 0.5L of vodka. He states that he did get withdrawal symptoms between that and his last drink this Tuesday, but he suffered through it at home, and didn't have any seizures at the time. He states that he was in alcohol rehab for 2 months earlier this year, but started drinking again in September.     In the ED, he was afebrile and hemodynamically stable, saturating well on RA. Has mildly elevated AG of 21, and transaminitis presents w/ AST/ALT 44/62 and ALP of 134. CXR was negative for any acute pulmonary process. Received 1 dose librium 50 and IVP valium 5mg, along w/ folic acid and thiamine in the ED.  (09 Dec 2023 00:06)        PAST MEDICAL HISTORY:  HTN (hypertension)    FAP (familial adenomatous polyposis)    Alcohol abuse          PAST SURGICAL HISTORY:  H/O colectomy          MEDICATIONS:  acetaminophen     Tablet .. 650 milliGRAM(s) Oral every 6 hours PRN  amLODIPine   Tablet 10 milliGRAM(s) Oral daily  aspirin enteric coated 81 milliGRAM(s) Oral daily  atorvastatin 80 milliGRAM(s) Oral at bedtime  folic acid 1 milliGRAM(s) Oral daily  lactated ringers. 1000 milliLiter(s) IV Continuous <Continuous>  LORazepam   Injectable 2 milliGRAM(s) IV Push every 1 hour PRN  LORazepam   Injectable 1 milliGRAM(s) IV Push every 1 hour PRN  melatonin 3 milliGRAM(s) Oral at bedtime PRN  thiamine 100 milliGRAM(s) Oral daily        ALLERGIES:  No Known Allergies        VITALS & I/Os:  Vital Signs Last 24 Hrs  T(C): 36.8 (09 Dec 2023 04:00), Max: 36.8 (08 Dec 2023 21:15)  T(F): 98.2 (09 Dec 2023 04:00), Max: 98.3 (08 Dec 2023 21:15)  HR: 76 (09 Dec 2023 04:00) (76 - 110)  BP: 137/83 (09 Dec 2023 04:00) (124/76 - 161/75)  BP(mean): --  RR: 18 (09 Dec 2023 04:00) (18 - 20)  SpO2: 94% (09 Dec 2023 04:00) (93% - 99%)    Parameters below as of 09 Dec 2023 04:00  Patient On (Oxygen Delivery Method): room air        I&O's Summary        PHYSICAL EXAM:  General: No acute distress  Respiratory: Nonlabored  Cardiovascular: RRR  Abdominal: Soft, nondistended, nontender. No rebound or guarding. No organomegaly, no palpable mass.  Extremities: Warm  Vascular: Palpable radials b/l, palpable DP b/l, motor/sensation intact.    LABS:                        15.3   10.14 )-----------( 200      ( 08 Dec 2023 20:30 )             43.1     12-08    141  |  101  |  11  ----------------------------<  134<H>  3.9   |  19<L>  |  0.62    Ca    9.3      08 Dec 2023 20:30  Phos  3.3     12-08  Mg     1.7     12-08    TPro  7.8  /  Alb  4.3  /  TBili  0.2  /  DBili  x   /  AST  44<H>  /  ALT  62<H>  /  AlkPhos  134<H>  12-08    Lactate:    PT/INR - ( 08 Dec 2023 20:30 )   PT: 10.9 sec;   INR: 1.04 ratio         PTT - ( 08 Dec 2023 20:30 )  PTT:30.6 sec          Urinalysis Basic - ( 08 Dec 2023 20:30 )    Color: x / Appearance: x / SG: x / pH: x  Gluc: 134 mg/dL / Ketone: x  / Bili: x / Urobili: x   Blood: x / Protein: x / Nitrite: x   Leuk Esterase: x / RBC: x / WBC x   Sq Epi: x / Non Sq Epi: x / Bacteria: x          IMAGING:

## 2023-12-09 NOTE — H&P ADULT - NSHPREVIEWOFSYSTEMS_GEN_ALL_CORE
Review of Systems:   CONSTITUTIONAL: No fever, weight loss  EYES: No eye pain, visual disturbances, or discharge  ENMT:  No difficulty hearing, tinnitus, vertigo; No sinus or throat pain  RESPIRATORY: No SOB. No cough, wheezing, chills or hemoptysis  CARDIOVASCULAR: No chest pain, palpitations, dizziness, or leg swelling  GASTROINTESTINAL: No abdominal or epigastric pain. No nausea, vomiting, or hematemesis; No diarrhea or constipation. No melena or hematochezia.  GENITOURINARY: No dysuria, frequency, hematuria, or incontinence  NEUROLOGICAL: No headaches, memory loss, loss of strength, numbness, or tremors  SKIN: No itching, burning, rashes, or lesions   LYMPH NODES: No enlarged glands  ENDOCRINE: No heat or cold intolerance; No hair loss  MUSCULOSKELETAL: No joint pain or swelling; No muscle, back pain  PSYCHIATRIC: No depression, anxiety, mood swings, or difficulty sleeping  HEME/LYMPH: No easy bruising, or bleeding gums Review of Systems:   CONSTITUTIONAL: No fever, weight loss  EYES: No eye pain, visual disturbances, or discharge  RESPIRATORY: No SOB. No cough, wheezing, chills or hemoptysis  CARDIOVASCULAR: No chest pain, palpitations, dizziness, or leg swelling  GASTROINTESTINAL: No abdominal or epigastric pain. No nausea, vomiting, or hematemesis; No diarrhea or constipation. No melena or hematochezia.  GENITOURINARY: No dysuria, frequency, hematuria, or incontinence  NEUROLOGICAL: +headache, numbness in feet, tremors; No memory loss, loss of strength  SKIN: No itching, burning, rashes, or lesions   MUSCULOSKELETAL: + Leg pain, worse than right; No joint pain or swelling; No muscle, back pain  PSYCHIATRIC: No depression, anxiety, mood swings, or difficulty sleeping  HEME/LYMPH: No easy bruising, or bleeding gums

## 2023-12-09 NOTE — H&P ADULT - HISTORY OF PRESENT ILLNESS
This is a 59 y/o male w/ PMHx CLTI ([place] 4), alcohol abuse w/ history of withdrawal seizures, HLD and smoking who presents for alcohol withdrawal and pain in his legs. 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 He was apparently planned for stenting this coming Monday 12/11, but has been drinking too much vodka daily, and hasn't been able to stick to his medication regimen. His last drink was around noon today. He wants to quit drinking prior ro surgery. He is still having ischemic leg pain at rest.    In the ED, he was afebrile and hemodynamically stable, saturating well on RA. Has mildly elevated AG of 21, and transaminitis presents w/ AST/ALT 44/62 and ALP of 134. CXR was negative for any acute pulmonary process. Received 1 dose librium 50 and IVP valium 5mg, along w/ folic acid and thiamine in the ED.  This is a 57 y/o male w/ PMHx CLTI ([place] 4), alcohol abuse w/ history of withdrawal seizures, HLD and smoking who presents for alcohol withdrawal and pain in his legs. 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 He was apparently planned for stenting this coming Monday 12/11, but has been drinking too much vodka daily, and hasn't been able to stick to his medication regimen. His last drink was around noon today. He wants to quit drinking prior ro surgery. He is still having ischemic leg pain at rest.    In the ED, he was afebrile and hemodynamically stable, saturating well on RA. Has mildly elevated AG of 21, and transaminitis presents w/ AST/ALT 44/62 and ALP of 134. CXR was negative for any acute pulmonary process. Received 1 dose librium 50 and IVP valium 5mg, along w/ folic acid and thiamine in the ED.  This is a 59 y/o male w/ PMHx CLTI ([place] 4), alcohol abuse w/ history of withdrawal seizures, HLD and smoking who presents for alcohol withdrawal and pain in his legs. 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 He was apparently planned for the procedure this coming Monday 12/11, but has been drinking too much vodka and hasn't been able to stick to his medication regimen. His last drink was around noon today. He wants to quit drinking prior to surgery. He is still having ischemic leg pain at rest at times. His last drink was a half-liter of vodka on Tuesday, and his symptoms started on Thursday. Prior to the drink on Tuesday, his last drink before that was 3 weeks prior, which was also about another 0.5L of vodka. He states that he did get withdrawal symptoms between that and his last drink this Tuesday, but he suffered through it at home, and didn't have any seizures at the time. He states that he was in alcohol rehab for 2 months earlier this year, but started drinking again in September.     In the ED, he was afebrile and hemodynamically stable, saturating well on RA. Has mildly elevated AG of 21, and transaminitis presents w/ AST/ALT 44/62 and ALP of 134. CXR was negative for any acute pulmonary process. Received 1 dose librium 50 and IVP valium 5mg, along w/ folic acid and thiamine in the ED.  This is a 59 y/o male w/ PMHx CLTI ([place] 4), alcohol abuse w/ history of withdrawal seizures, HLD and smoking who presents for alcohol withdrawal and pain in his legs. 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 He was apparently planned for the procedure this coming Monday 12/11, but has been drinking too much vodka and hasn't been able to stick to his medication regimen. His last drink was around noon today. He wants to quit drinking prior to surgery. He is still having ischemic leg pain at rest at times. His last drink was a half-liter of vodka on Tuesday, and his symptoms started on Thursday. Prior to the drink on Tuesday, his last drink before that was 3 weeks prior, which was also about another 0.5L of vodka. He states that he did get withdrawal symptoms between that and his last drink this Tuesday, but he suffered through it at home, and didn't have any seizures at the time. He states that he was in alcohol rehab for 2 months earlier this year, but started drinking again in September.     In the ED, he was afebrile and hemodynamically stable, saturating well on RA. Has mildly elevated AG of 21, and transaminitis presents w/ AST/ALT 44/62 and ALP of 134. CXR was negative for any acute pulmonary process. Received 1 dose librium 50 and IVP valium 5mg, along w/ folic acid and thiamine in the ED.  This is a 57 y/o male w/ PMHx CLTI ([place] 4), alcohol abuse w/ history of withdrawal seizures, HLD and smoking who presents for alcohol withdrawal and pain in his legs. 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 He was apparently planned for the procedure this coming Monday 12/11, but has been drinking too much vodka and hasn't been able to stick to his medication regimen. He wants to quit drinking prior to surgery. He is still having ischemic leg pain at rest at times. His last drink was a half-liter of vodka on Tuesday, and his symptoms started on Thursday. Prior to the drink on Tuesday, his last drink before that was 3 weeks prior, which was also about another 0.5L of vodka. He states that he did get withdrawal symptoms between that and his last drink this Tuesday, but he suffered through it at home, and didn't have any seizures at the time. He states that he was in alcohol rehab for 2 months earlier this year, but started drinking again in September.     In the ED, he was afebrile and hemodynamically stable, saturating well on RA. Has mildly elevated AG of 21, and transaminitis presents w/ AST/ALT 44/62 and ALP of 134. CXR was negative for any acute pulmonary process. Received 1 dose librium 50 and IVP valium 5mg, along w/ folic acid and thiamine in the ED.  This is a 59 y/o male w/ PMHx CLTI ([place] 4), alcohol abuse w/ history of withdrawal seizures, HLD and smoking who presents for alcohol withdrawal and pain in his legs. 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 He was apparently planned for the procedure this coming Monday 12/11, but has been drinking too much vodka and hasn't been able to stick to his medication regimen. He wants to quit drinking prior to surgery. He is still having ischemic leg pain at rest at times. His last drink was a half-liter of vodka on Tuesday, and his symptoms started on Thursday. Prior to the drink on Tuesday, his last drink before that was 3 weeks prior, which was also about another 0.5L of vodka. He states that he did get withdrawal symptoms between that and his last drink this Tuesday, but he suffered through it at home, and didn't have any seizures at the time. He states that he was in alcohol rehab for 2 months earlier this year, but started drinking again in September.     In the ED, he was afebrile and hemodynamically stable, saturating well on RA. Has mildly elevated AG of 21, and transaminitis presents w/ AST/ALT 44/62 and ALP of 134. CXR was negative for any acute pulmonary process. Received 1 dose librium 50 and IVP valium 5mg, along w/ folic acid and thiamine in the ED.

## 2023-12-10 PROCEDURE — 99233 SBSQ HOSP IP/OBS HIGH 50: CPT

## 2023-12-10 RX ADMIN — AMLODIPINE BESYLATE 10 MILLIGRAM(S): 2.5 TABLET ORAL at 05:41

## 2023-12-10 RX ADMIN — Medication 81 MILLIGRAM(S): at 11:23

## 2023-12-10 RX ADMIN — Medication 1 MILLIGRAM(S): at 11:22

## 2023-12-10 RX ADMIN — Medication 3 MILLIGRAM(S): at 21:39

## 2023-12-10 RX ADMIN — SODIUM CHLORIDE 150 MILLILITER(S): 9 INJECTION, SOLUTION INTRAVENOUS at 11:24

## 2023-12-10 RX ADMIN — ATORVASTATIN CALCIUM 80 MILLIGRAM(S): 80 TABLET, FILM COATED ORAL at 21:39

## 2023-12-10 RX ADMIN — Medication 100 MILLIGRAM(S): at 11:22

## 2023-12-10 NOTE — PROGRESS NOTE ADULT - PROBLEM SELECTOR PLAN 1
Last drink 12/5  - [facility] protocol, IV Lorazepam PRN for score 8 and up. Has not required any prn Ativan over the weekend  - C/w thiamine 100mg daily  - C/w folic acid 1mg daily  - Seizure and aspiration precautions Last drink 12/5  - [facility] protocol, IV Lorazepam PRN for score 8 and up. Has not required any prn Ativan over the weekend  - C/w thiamine 100mg daily  - C/w folic acid 1mg daily  - Seizure and aspiration precautions Last drink 12/5  -  IV Lorazepam PRN for score 8 and up. Has not required any prn Ativan over the weekend. Will monitor off CIWA  - C/w thiamine 100mg daily  - C/w folic acid 1mg daily  - Seizure and aspiration precautions

## 2023-12-10 NOTE — PROGRESS NOTE ADULT - ASSESSMENT
59 y/o male w/ PMHx CLTI ([place] 4), alcohol abuse w/ history of withdrawal seizures, HLD and smoking who presents for alcohol withdrawal and pain in his legs. Aorta-bifem bypass planned for monday 12/11 with Dr. Heredia.    Plan/Recs  - Medical optimization noted  - Pre-op labs Sunday night  - Vascular following.     Plan discussed with Vascular Surgery Fellow on call    Vascular Surgery  [phone]  59 y/o male w/ PMHx CLTI ([place] 4), alcohol abuse w/ history of withdrawal seizures, HLD and smoking who presents for alcohol withdrawal and pain in his legs. Aorta-bifem bypass planned for monday 12/11 with Dr. Heredia.    Plan/Recs  - Medical optimization noted  - Pre-op labs Sunday night  - Vascular following.     Plan discussed with Vascular Surgery Fellow on call    Vascular Surgery  [phone]

## 2023-12-10 NOTE — PROGRESS NOTE ADULT - SUBJECTIVE AND OBJECTIVE BOX
Patient is a 58y old  Male who presents with a chief complaint of Alcohol withdrawal (10 Dec 2023 15:42)        SUBJECTIVE / OVERNIGHT EVENTS: Patient had no acute events overnight. Patient seen and examined. Ambulating in hallway. No headaches, hallucinations. Does not feel like he is in withdrawal    ROS: [ - ] Fever [ - ] Chills [ - ] Nausea/Vomiting [ - ] Chest Pain [ - ] Shortness of breath     MEDICATIONS  (STANDING):  amLODIPine   Tablet 10 milliGRAM(s) Oral daily  atorvastatin 80 milliGRAM(s) Oral at bedtime  folic acid 1 milliGRAM(s) Oral daily  lactated ringers. 1000 milliLiter(s) (150 mL/Hr) IV Continuous <Continuous>  thiamine 100 milliGRAM(s) Oral daily    MEDICATIONS  (PRN):  acetaminophen     Tablet .. 650 milliGRAM(s) Oral every 6 hours PRN Temp greater or equal to 38C (100.4F), Mild Pain (1 - 3)  LORazepam   Injectable 1 milliGRAM(s) IV Push every 1 hour PRN Symptom-triggered: each CIWA -Ar score 8 or GREATER  melatonin 3 milliGRAM(s) Oral at bedtime PRN Insomnia      Vital Signs Last 24 Hrs  T(C): 36.6 (10 Dec 2023 16:44), Max: 36.8 (10 Dec 2023 12:00)  T(F): 97.9 (10 Dec 2023 16:44), Max: 98.2 (10 Dec 2023 12:00)  HR: 73 (10 Dec 2023 16:44) (62 - 76)  BP: 134/78 (10 Dec 2023 16:44) (119/69 - 154/72)  BP(mean): --  RR: 18 (10 Dec 2023 16:44) (18 - 18)  SpO2: 96% (10 Dec 2023 16:44) (95% - 98%)    Parameters below as of 10 Dec 2023 16:44  Patient On (Oxygen Delivery Method): room air      CAPILLARY BLOOD GLUCOSE        I&O's Summary      PHYSICAL EXAM  GENERAL: NAD, ambulating  HEENT:  Atraumatic, Normocephalic, EOMI, conjunctiva and sclera clear, no LAD  CHEST/LUNG: Clear to auscultation bilaterally; No wheeze  HEART: RRR, S1 and S2 No murmurs, rubs, or gallops  ABDOMEN: Soft, Nontender, Nondistended; Bowel sounds present  EXTREMITIES:  2+ Peripheral Pulses, No clubbing, cyanosis, or edema. mild tremors in UE (reports baseline tremors)  NEURO: AAOx3, non-focal  SKIN: No rashes or lesions    LABS:                        13.7   7.92  )-----------( 170      ( 09 Dec 2023 06:45 )             39.0     12-09    138  |  103  |  10  ----------------------------<  81  3.7   |  23  |  0.60    Ca    8.6      09 Dec 2023 06:45  Phos  3.2     12-09  Mg     1.6     12-09    TPro  6.5  /  Alb  3.8  /  TBili  0.4  /  DBili  0.1  /  AST  37  /  ALT  49<H>  /  AlkPhos  112  12-09    PT/INR - ( 08 Dec 2023 20:30 )   PT: 10.9 sec;   INR: 1.04 ratio         PTT - ( 08 Dec 2023 20:30 )  PTT:30.6 sec      Urinalysis Basic - ( 09 Dec 2023 06:45 )    Color: x / Appearance: x / SG: x / pH: x  Gluc: 81 mg/dL / Ketone: x  / Bili: x / Urobili: x   Blood: x / Protein: x / Nitrite: x   Leuk Esterase: x / RBC: x / WBC x   Sq Epi: x / Non Sq Epi: x / Bacteria: x          RADIOLOGY & ADDITIONAL TESTS:      Labs Personally Reviewed  Imaging Personally Reviewed  Consultant(s) Notes Reviewed

## 2023-12-10 NOTE — PROGRESS NOTE ADULT - SUBJECTIVE AND OBJECTIVE BOX
SUBJECTIVE: Patient seen and examined on AM rounds. Patient reports that they're feeling well. Denies fever, chills. Reports pain as controlled. No complaints at this time.     Vital Signs Last 24 Hrs  T(C): 36.8 (10 Dec 2023 12:00), Max: 36.8 (09 Dec 2023 16:14)  T(F): 98.2 (10 Dec 2023 12:00), Max: 98.2 (09 Dec 2023 16:14)  HR: 71 (10 Dec 2023 12:00) (62 - 76)  BP: 119/75 (10 Dec 2023 12:00) (119/69 - 154/72)  BP(mean): --  RR: 18 (10 Dec 2023 12:00) (18 - 18)  SpO2: 96% (10 Dec 2023 12:00) (95% - 98%)    Parameters below as of 10 Dec 2023 12:00  Patient On (Oxygen Delivery Method): room air        General Appearance: Appears well, NAD  Neck: Supple  Chest: Equal expansion bilaterally  CV: Pulse regular presently  Abdomen: Soft, nontense  Extremities: Both feet WWP    I&O's Summary    I&O's Detail      LABS:                        13.7   7.92  )-----------( 170      ( 09 Dec 2023 06:45 )             39.0     12-09    138  |  103  |  10  ----------------------------<  81  3.7   |  23  |  0.60    Ca    8.6      09 Dec 2023 06:45  Phos  3.2     12-09  Mg     1.6     12-09    TPro  6.5  /  Alb  3.8  /  TBili  0.4  /  DBili  0.1  /  AST  37  /  ALT  49<H>  /  AlkPhos  112  12-09    PT/INR - ( 08 Dec 2023 20:30 )   PT: 10.9 sec;   INR: 1.04 ratio         PTT - ( 08 Dec 2023 20:30 )  PTT:30.6 sec  Urinalysis Basic - ( 09 Dec 2023 06:45 )    Color: x / Appearance: x / SG: x / pH: x  Gluc: 81 mg/dL / Ketone: x  / Bili: x / Urobili: x   Blood: x / Protein: x / Nitrite: x   Leuk Esterase: x / RBC: x / WBC x   Sq Epi: x / Non Sq Epi: x / Bacteria: x        RADIOLOGY & ADDITIONAL STUDIES:

## 2023-12-10 NOTE — PROGRESS NOTE ADULT - ASSESSMENT
59 y/o male w/ PMHx CLTI ([place] 4), alcohol abuse w/ history of withdrawal seizures, HLD and smoking presenting for both LE pain and inability to quit EtOH. Admitted for monitoring and treatment of EtOH withdrawal. 59 y/o male w/ PMHx CLTI ([place] 4), alcohol abuse w/ history of withdrawal seizures, HLD and smoking presenting for both LE pain and inability to quit EtOH. Admitted for monitoring and treatment of EtOH withdrawal.

## 2023-12-11 LAB
ALBUMIN SERPL ELPH-MCNC: 3.6 G/DL — SIGNIFICANT CHANGE UP (ref 3.3–5)
ALBUMIN SERPL ELPH-MCNC: 3.6 G/DL — SIGNIFICANT CHANGE UP (ref 3.3–5)
ALP SERPL-CCNC: 108 U/L — SIGNIFICANT CHANGE UP (ref 40–120)
ALP SERPL-CCNC: 108 U/L — SIGNIFICANT CHANGE UP (ref 40–120)
ALT FLD-CCNC: 49 U/L — HIGH (ref 10–45)
ALT FLD-CCNC: 49 U/L — HIGH (ref 10–45)
ANION GAP SERPL CALC-SCNC: 14 MMOL/L — SIGNIFICANT CHANGE UP (ref 5–17)
ANION GAP SERPL CALC-SCNC: 14 MMOL/L — SIGNIFICANT CHANGE UP (ref 5–17)
APTT BLD: 30.1 SEC — SIGNIFICANT CHANGE UP (ref 24.5–35.6)
APTT BLD: 30.1 SEC — SIGNIFICANT CHANGE UP (ref 24.5–35.6)
AST SERPL-CCNC: 38 U/L — SIGNIFICANT CHANGE UP (ref 10–40)
AST SERPL-CCNC: 38 U/L — SIGNIFICANT CHANGE UP (ref 10–40)
BILIRUB SERPL-MCNC: 0.4 MG/DL — SIGNIFICANT CHANGE UP (ref 0.2–1.2)
BILIRUB SERPL-MCNC: 0.4 MG/DL — SIGNIFICANT CHANGE UP (ref 0.2–1.2)
BLD GP AB SCN SERPL QL: NEGATIVE — SIGNIFICANT CHANGE UP
BLD GP AB SCN SERPL QL: NEGATIVE — SIGNIFICANT CHANGE UP
BUN SERPL-MCNC: 12 MG/DL — SIGNIFICANT CHANGE UP (ref 7–23)
BUN SERPL-MCNC: 12 MG/DL — SIGNIFICANT CHANGE UP (ref 7–23)
CALCIUM SERPL-MCNC: 9.4 MG/DL — SIGNIFICANT CHANGE UP (ref 8.4–10.5)
CALCIUM SERPL-MCNC: 9.4 MG/DL — SIGNIFICANT CHANGE UP (ref 8.4–10.5)
CHLORIDE SERPL-SCNC: 105 MMOL/L — SIGNIFICANT CHANGE UP (ref 96–108)
CHLORIDE SERPL-SCNC: 105 MMOL/L — SIGNIFICANT CHANGE UP (ref 96–108)
CO2 SERPL-SCNC: 19 MMOL/L — LOW (ref 22–31)
CO2 SERPL-SCNC: 19 MMOL/L — LOW (ref 22–31)
CREAT SERPL-MCNC: 0.68 MG/DL — SIGNIFICANT CHANGE UP (ref 0.5–1.3)
CREAT SERPL-MCNC: 0.68 MG/DL — SIGNIFICANT CHANGE UP (ref 0.5–1.3)
EGFR: 108 ML/MIN/1.73M2 — SIGNIFICANT CHANGE UP
EGFR: 108 ML/MIN/1.73M2 — SIGNIFICANT CHANGE UP
GLUCOSE SERPL-MCNC: 92 MG/DL — SIGNIFICANT CHANGE UP (ref 70–99)
GLUCOSE SERPL-MCNC: 92 MG/DL — SIGNIFICANT CHANGE UP (ref 70–99)
HCT VFR BLD CALC: 41.4 % — SIGNIFICANT CHANGE UP (ref 39–50)
HCT VFR BLD CALC: 41.4 % — SIGNIFICANT CHANGE UP (ref 39–50)
HGB BLD-MCNC: 14.2 G/DL — SIGNIFICANT CHANGE UP (ref 13–17)
HGB BLD-MCNC: 14.2 G/DL — SIGNIFICANT CHANGE UP (ref 13–17)
INR BLD: 0.95 RATIO — SIGNIFICANT CHANGE UP (ref 0.85–1.18)
INR BLD: 0.95 RATIO — SIGNIFICANT CHANGE UP (ref 0.85–1.18)
MCHC RBC-ENTMCNC: 32.1 PG — SIGNIFICANT CHANGE UP (ref 27–34)
MCHC RBC-ENTMCNC: 32.1 PG — SIGNIFICANT CHANGE UP (ref 27–34)
MCHC RBC-ENTMCNC: 34.3 GM/DL — SIGNIFICANT CHANGE UP (ref 32–36)
MCHC RBC-ENTMCNC: 34.3 GM/DL — SIGNIFICANT CHANGE UP (ref 32–36)
MCV RBC AUTO: 93.5 FL — SIGNIFICANT CHANGE UP (ref 80–100)
MCV RBC AUTO: 93.5 FL — SIGNIFICANT CHANGE UP (ref 80–100)
NRBC # BLD: 0 /100 WBCS — SIGNIFICANT CHANGE UP (ref 0–0)
NRBC # BLD: 0 /100 WBCS — SIGNIFICANT CHANGE UP (ref 0–0)
PLATELET # BLD AUTO: 150 K/UL — SIGNIFICANT CHANGE UP (ref 150–400)
PLATELET # BLD AUTO: 150 K/UL — SIGNIFICANT CHANGE UP (ref 150–400)
POTASSIUM SERPL-MCNC: 3.9 MMOL/L — SIGNIFICANT CHANGE UP (ref 3.5–5.3)
POTASSIUM SERPL-MCNC: 3.9 MMOL/L — SIGNIFICANT CHANGE UP (ref 3.5–5.3)
POTASSIUM SERPL-SCNC: 3.9 MMOL/L — SIGNIFICANT CHANGE UP (ref 3.5–5.3)
POTASSIUM SERPL-SCNC: 3.9 MMOL/L — SIGNIFICANT CHANGE UP (ref 3.5–5.3)
PROT SERPL-MCNC: 6.8 G/DL — SIGNIFICANT CHANGE UP (ref 6–8.3)
PROT SERPL-MCNC: 6.8 G/DL — SIGNIFICANT CHANGE UP (ref 6–8.3)
PROTHROM AB SERPL-ACNC: 10 SEC — SIGNIFICANT CHANGE UP (ref 9.5–13)
PROTHROM AB SERPL-ACNC: 10 SEC — SIGNIFICANT CHANGE UP (ref 9.5–13)
RBC # BLD: 4.43 M/UL — SIGNIFICANT CHANGE UP (ref 4.2–5.8)
RBC # BLD: 4.43 M/UL — SIGNIFICANT CHANGE UP (ref 4.2–5.8)
RBC # FLD: 12.9 % — SIGNIFICANT CHANGE UP (ref 10.3–14.5)
RBC # FLD: 12.9 % — SIGNIFICANT CHANGE UP (ref 10.3–14.5)
RH IG SCN BLD-IMP: POSITIVE — SIGNIFICANT CHANGE UP
RH IG SCN BLD-IMP: POSITIVE — SIGNIFICANT CHANGE UP
SODIUM SERPL-SCNC: 138 MMOL/L — SIGNIFICANT CHANGE UP (ref 135–145)
SODIUM SERPL-SCNC: 138 MMOL/L — SIGNIFICANT CHANGE UP (ref 135–145)
WBC # BLD: 9.4 K/UL — SIGNIFICANT CHANGE UP (ref 3.8–10.5)
WBC # BLD: 9.4 K/UL — SIGNIFICANT CHANGE UP (ref 3.8–10.5)
WBC # FLD AUTO: 9.4 K/UL — SIGNIFICANT CHANGE UP (ref 3.8–10.5)
WBC # FLD AUTO: 9.4 K/UL — SIGNIFICANT CHANGE UP (ref 3.8–10.5)

## 2023-12-11 PROCEDURE — 99232 SBSQ HOSP IP/OBS MODERATE 35: CPT

## 2023-12-11 RX ORDER — NICOTINE POLACRILEX 2 MG
1 GUM BUCCAL DAILY
Refills: 0 | Status: DISCONTINUED | OUTPATIENT
Start: 2023-12-11 | End: 2023-12-13

## 2023-12-11 RX ADMIN — AMLODIPINE BESYLATE 10 MILLIGRAM(S): 2.5 TABLET ORAL at 05:40

## 2023-12-11 RX ADMIN — Medication 1 PATCH: at 19:45

## 2023-12-11 RX ADMIN — Medication 1 MILLIGRAM(S): at 11:26

## 2023-12-11 RX ADMIN — ATORVASTATIN CALCIUM 80 MILLIGRAM(S): 80 TABLET, FILM COATED ORAL at 21:58

## 2023-12-11 RX ADMIN — Medication 1 PATCH: at 15:33

## 2023-12-11 RX ADMIN — Medication 100 MILLIGRAM(S): at 11:25

## 2023-12-11 NOTE — PROGRESS NOTE ADULT - PROBLEM SELECTOR PLAN 1
Last drink 12/5  - C/w thiamine 100mg daily  - C/w folic acid 1mg daily  - s/p seizure monitoring, not in withdrawal

## 2023-12-11 NOTE — PROGRESS NOTE ADULT - SUBJECTIVE AND OBJECTIVE BOX
[facility] Division of Hospital Medicine  Ashlielorri Mattson DO   Available via Microsoft Teams      Patient is a 58y old  Male who presents with a chief complaint of Alcohol withdrawal (11 Dec 2023 08:12)      SUBJECTIVE / OVERNIGHT EVENTS:  Unchanged numbness/tingling in the LLE   No tremors, N/V, abd pain     MEDICATIONS  (STANDING):  amLODIPine   Tablet 10 milliGRAM(s) Oral daily  atorvastatin 80 milliGRAM(s) Oral at bedtime  folic acid 1 milliGRAM(s) Oral daily  nicotine -   7 mG/24Hr(s) Patch 1 Patch Transdermal daily    MEDICATIONS  (PRN):  acetaminophen     Tablet .. 650 milliGRAM(s) Oral every 6 hours PRN Temp greater or equal to 38C (100.4F), Mild Pain (1 - 3)  melatonin 3 milliGRAM(s) Oral at bedtime PRN Insomnia      CAPILLARY BLOOD GLUCOSE        I&O's Summary      PHYSICAL EXAM:  Vital Signs Last 24 Hrs  T(C): 36.4 (11 Dec 2023 13:20), Max: 36.8 (10 Dec 2023 21:36)  T(F): 97.6 (11 Dec 2023 13:20), Max: 98.3 (10 Dec 2023 21:36)  HR: 70 (11 Dec 2023 13:20) (60 - 73)  BP: 120/79 (11 Dec 2023 13:20) (120/79 - 135/84)  BP(mean): --  RR: 18 (11 Dec 2023 13:20) (18 - 18)  SpO2: 96% (11 Dec 2023 13:20) (95% - 96%)    Parameters below as of 11 Dec 2023 13:20  Patient On (Oxygen Delivery Method): room air      CONSTITUTIONAL: NAD, well-developed, well-groomed  RESPIRATORY: Normal respiratory effort; lungs are clear to auscultation bilaterally  CARDIOVASCULAR: Regular rate and rhythm, normal S1 and S2; No lower extremity edema  ABDOMEN: Nontender to palpation, normoactive bowel sounds, no rebound/guarding  MUSCULOSKELETAL: no clubbing or cyanosis of digits; no joint swelling or tenderness to palpation ; extremities warm   PSYCH: A+O to person, place, and time; affect appropriate  NEUROLOGY: strength intact, no gross sensory deficits   SKIN: No rashes; no palpable lesions    LABS:                        14.2   9.40  )-----------( 150      ( 11 Dec 2023 05:59 )             41.4     12-11    138  |  105  |  12  ----------------------------<  92  3.9   |  19<L>  |  0.68    Ca    9.4      11 Dec 2023 05:58    TPro  6.8  /  Alb  3.6  /  TBili  0.4  /  DBili  x   /  AST  38  /  ALT  49<H>  /  AlkPhos  108  12-11    PT/INR - ( 11 Dec 2023 05:59 )   PT: 10.0 sec;   INR: 0.95 ratio         PTT - ( 11 Dec 2023 05:59 )  PTT:30.1 sec      Urinalysis Basic - ( 11 Dec 2023 05:58 )    Color: x / Appearance: x / SG: x / pH: x  Gluc: 92 mg/dL / Ketone: x  / Bili: x / Urobili: x   Blood: x / Protein: x / Nitrite: x   Leuk Esterase: x / RBC: x / WBC x   Sq Epi: x / Non Sq Epi: x / Bacteria: x          RADIOLOGY & ADDITIONAL TESTS:  Results Reviewed:   Imaging Personally Reviewed:  Electrocardiogram Personally Reviewed:    COORDINATION OF CARE:  Care Discussed with Consultants/Other Providers [Y/N]:  Prior or Outpatient Records Reviewed [Y/N]:   [facility] Division of Hospital Medicine  Ashlielorri Mattson DO   Available via Microsoft Teams      Patient is a 58y old  Male who presents with a chief complaint of Alcohol withdrawal (11 Dec 2023 08:12)      SUBJECTIVE / OVERNIGHT EVENTS:  Unchanged numbness/tingling in the LLE   No tremors, N/V, abd pain     MEDICATIONS  (STANDING):  amLODIPine   Tablet 10 milliGRAM(s) Oral daily  atorvastatin 80 milliGRAM(s) Oral at bedtime  folic acid 1 milliGRAM(s) Oral daily  nicotine -   7 mG/24Hr(s) Patch 1 Patch Transdermal daily    MEDICATIONS  (PRN):  acetaminophen     Tablet .. 650 milliGRAM(s) Oral every 6 hours PRN Temp greater or equal to 38C (100.4F), Mild Pain (1 - 3)  melatonin 3 milliGRAM(s) Oral at bedtime PRN Insomnia      CAPILLARY BLOOD GLUCOSE        I&O's Summary      PHYSICAL EXAM:  Vital Signs Last 24 Hrs  T(C): 36.4 (11 Dec 2023 13:20), Max: 36.8 (10 Dec 2023 21:36)  T(F): 97.6 (11 Dec 2023 13:20), Max: 98.3 (10 Dec 2023 21:36)  HR: 70 (11 Dec 2023 13:20) (60 - 73)  BP: 120/79 (11 Dec 2023 13:20) (120/79 - 135/84)  BP(mean): --  RR: 18 (11 Dec 2023 13:20) (18 - 18)  SpO2: 96% (11 Dec 2023 13:20) (95% - 96%)    Parameters below as of 11 Dec 2023 13:20  Patient On (Oxygen Delivery Method): room air      CONSTITUTIONAL: NAD, well-developed, well-groomed  RESPIRATORY: Normal respiratory effort; lungs are clear to auscultation bilaterally  CARDIOVASCULAR: Regular rate and rhythm, normal S1 and S2; No lower extremity edema  ABDOMEN: Nontender to palpation, normoactive bowel sounds, no rebound/guarding  MUSCULOSKELETAL: no clubbing or cyanosis of digits; no joint swelling or tenderness to palpation ; extremities warm   PSYCH: A+O to person, place, and time; affect appropriate  NEUROLOGY: strength intact, no gross sensory deficits   SKIN: No rashes; no palpable lesions    LABS:                        14.2   9.40  )-----------( 150      ( 11 Dec 2023 05:59 )             41.4     12-11    138  |  105  |  12  ----------------------------<  92  3.9   |  19<L>  |  0.68    Ca    9.4      11 Dec 2023 05:58    TPro  6.8  /  Alb  3.6  /  TBili  0.4  /  DBili  x   /  AST  38  /  ALT  49<H>  /  AlkPhos  108  12-11    PT/INR - ( 11 Dec 2023 05:59 )   PT: 10.0 sec;   INR: 0.95 ratio         PTT - ( 11 Dec 2023 05:59 )  PTT:30.1 sec      Urinalysis Basic - ( 11 Dec 2023 05:58 )    Color: x / Appearance: x / SG: x / pH: x  Gluc: 92 mg/dL / Ketone: x  / Bili: x / Urobili: x   Blood: x / Protein: x / Nitrite: x   Leuk Esterase: x / RBC: x / WBC x   Sq Epi: x / Non Sq Epi: x / Bacteria: x          RADIOLOGY & ADDITIONAL TESTS:  Results Reviewed:   Imaging Personally Reviewed:  Electrocardiogram Personally Reviewed:    COORDINATION OF CARE:  Care Discussed with Consultants/Other Providers [Y/N]:  Prior or Outpatient Records Reviewed [Y/N]:

## 2023-12-11 NOTE — PROGRESS NOTE ADULT - ASSESSMENT
57 y/o male w/ PMHx CLTI ([place] 4), alcohol abuse w/ history of withdrawal seizures, HLD and smoking who presents for alcohol withdrawal and pain in his legs. Aorta-bifem bypass planned for monday 12/11 with Dr. Heredia.    Plan/Recs  - Medical optimization noted  - Patient on [facility] protocol  - Patient scheduled for surgery Wednesday.   - Please preop Tues.  - Vascular following.     Plan discussed with Vascular Surgery Fellow on call    Vascular Surgery  [phone]  57 y/o male w/ PMHx CLTI ([place] 4), alcohol abuse w/ history of withdrawal seizures, HLD and smoking who presents for alcohol withdrawal and pain in his legs. Aorta-bifem bypass planned for monday 12/11 with Dr. Heredia.    Plan/Recs  - Medical optimization noted  - Patient on [facility] protocol  - Patient scheduled for surgery Wednesday.   - Please preop Tues.  - Vascular following.     Plan discussed with Vascular Surgery Fellow on call    Vascular Surgery  [phone]

## 2023-12-11 NOTE — SBIRT NOTE ADULT - NSSBIRTBRIEFINTDET_GEN_A_CORE
Screening results reviewed and level of risk associated with ETOH use discussed.  LMSW explored willingness/readiness to cut down or stop ETOH use.  Patient reports he acknowledges his ETOH is due to depression and is motivated to eliminate alcohol use.  LMSW offered behavioral health resources, patient accepted.  Patient reports he prefers mental health resources not geared toward substance use.  Patient reports substance use programs have not been successful for him in the past.  Patient reports he has been able to quit drinking cold turkey and feels the root of his issues is depression.

## 2023-12-11 NOTE — PROGRESS NOTE ADULT - SUBJECTIVE AND OBJECTIVE BOX
Vascular Surgery Progress Note  Patient is a 58y old  Male who presents with a chief complaint of Alcohol withdrawal (10 Dec 2023 19:05)      INTERVAL EVENTS: No acute events overnight.  SUBJECTIVE: Patient seen and examined at bedside with surgical team, patient without complaints. Denies fever, chills, CP, SOB nausea, vomiting, abdominal pain.      OBJECTIVE:    Vital Signs Last 24 Hrs  T(C): 36.3 (11 Dec 2023 05:48), Max: 36.8 (10 Dec 2023 12:00)  T(F): 97.4 (11 Dec 2023 05:48), Max: 98.3 (10 Dec 2023 21:36)  HR: 60 (11 Dec 2023 05:48) (60 - 73)  BP: 135/84 (11 Dec 2023 05:48) (119/75 - 135/84)  BP(mean): --  RR: 18 (11 Dec 2023 05:48) (18 - 18)  SpO2: 95% (11 Dec 2023 05:48) (95% - 96%)    Parameters below as of 11 Dec 2023 05:48  Patient On (Oxygen Delivery Method): room air    I&O's Detail  MEDICATIONS  (STANDING):  amLODIPine   Tablet 10 milliGRAM(s) Oral daily  atorvastatin 80 milliGRAM(s) Oral at bedtime  folic acid 1 milliGRAM(s) Oral daily  thiamine 100 milliGRAM(s) Oral daily    MEDICATIONS  (PRN):  acetaminophen     Tablet .. 650 milliGRAM(s) Oral every 6 hours PRN Temp greater or equal to 38C (100.4F), Mild Pain (1 - 3)  melatonin 3 milliGRAM(s) Oral at bedtime PRN Insomnia      PHYSICAL EXAM:  General Appearance: Appears well, NAD  Neck: Supple  Chest: Equal expansion bilaterally  CV: Pulse regular presently  Abdomen: Soft, nontense  Extremities: Both feet WWP    LABS:                        14.2   9.40  )-----------( 150      ( 11 Dec 2023 05:59 )             41.4     12-11    138  |  105  |  12  ----------------------------<  92  3.9   |  19<L>  |  0.68    Ca    9.4      11 Dec 2023 05:58    TPro  6.8  /  Alb  3.6  /  TBili  0.4  /  DBili  x   /  AST  38  /  ALT  49<H>  /  AlkPhos  108  12-11    PT/INR - ( 11 Dec 2023 05:59 )   PT: 10.0 sec;   INR: 0.95 ratio         PTT - ( 11 Dec 2023 05:59 )  PTT:30.1 sec  LIVER FUNCTIONS - ( 11 Dec 2023 05:58 )  Alb: 3.6 g/dL / Pro: 6.8 g/dL / ALK PHOS: 108 U/L / ALT: 49 U/L / AST: 38 U/L / GGT: x           Urinalysis Basic - ( 11 Dec 2023 05:58 )    Color: x / Appearance: x / SG: x / pH: x  Gluc: 92 mg/dL / Ketone: x  / Bili: x / Urobili: x   Blood: x / Protein: x / Nitrite: x   Leuk Esterase: x / RBC: x / WBC x   Sq Epi: x / Non Sq Epi: x / Bacteria: x      ABO Interpretation: A (12-11-23 @ 05:51)      IMAGING:

## 2023-12-11 NOTE — PROGRESS NOTE ADULT - ASSESSMENT
57 y/o male w/ PMHx CLTI ([place] 4), alcohol abuse w/ history of withdrawal seizures, HLD and smoking presenting for both LE pain and inability to quit EtOH. Admitted for monitoring and treatment of EtOH withdrawal. 59 y/o male w/ PMHx CLTI ([place] 4), alcohol abuse w/ history of withdrawal seizures, HLD and smoking presenting for both LE pain and inability to quit EtOH. Admitted for monitoring and treatment of EtOH withdrawal.

## 2023-12-12 ENCOUNTER — TRANSCRIPTION ENCOUNTER (OUTPATIENT)
Age: 58
End: 2023-12-12

## 2023-12-12 LAB
ANION GAP SERPL CALC-SCNC: 14 MMOL/L — SIGNIFICANT CHANGE UP (ref 5–17)
ANION GAP SERPL CALC-SCNC: 14 MMOL/L — SIGNIFICANT CHANGE UP (ref 5–17)
BUN SERPL-MCNC: 12 MG/DL — SIGNIFICANT CHANGE UP (ref 7–23)
BUN SERPL-MCNC: 12 MG/DL — SIGNIFICANT CHANGE UP (ref 7–23)
CALCIUM SERPL-MCNC: 9.4 MG/DL — SIGNIFICANT CHANGE UP (ref 8.4–10.5)
CALCIUM SERPL-MCNC: 9.4 MG/DL — SIGNIFICANT CHANGE UP (ref 8.4–10.5)
CHLORIDE SERPL-SCNC: 102 MMOL/L — SIGNIFICANT CHANGE UP (ref 96–108)
CHLORIDE SERPL-SCNC: 102 MMOL/L — SIGNIFICANT CHANGE UP (ref 96–108)
CO2 SERPL-SCNC: 20 MMOL/L — LOW (ref 22–31)
CO2 SERPL-SCNC: 20 MMOL/L — LOW (ref 22–31)
CREAT SERPL-MCNC: 0.64 MG/DL — SIGNIFICANT CHANGE UP (ref 0.5–1.3)
CREAT SERPL-MCNC: 0.64 MG/DL — SIGNIFICANT CHANGE UP (ref 0.5–1.3)
EGFR: 110 ML/MIN/1.73M2 — SIGNIFICANT CHANGE UP
EGFR: 110 ML/MIN/1.73M2 — SIGNIFICANT CHANGE UP
GLUCOSE SERPL-MCNC: 90 MG/DL — SIGNIFICANT CHANGE UP (ref 70–99)
GLUCOSE SERPL-MCNC: 90 MG/DL — SIGNIFICANT CHANGE UP (ref 70–99)
HCT VFR BLD CALC: 40.1 % — SIGNIFICANT CHANGE UP (ref 39–50)
HCT VFR BLD CALC: 40.1 % — SIGNIFICANT CHANGE UP (ref 39–50)
HGB BLD-MCNC: 13.8 G/DL — SIGNIFICANT CHANGE UP (ref 13–17)
HGB BLD-MCNC: 13.8 G/DL — SIGNIFICANT CHANGE UP (ref 13–17)
MCHC RBC-ENTMCNC: 31.8 PG — SIGNIFICANT CHANGE UP (ref 27–34)
MCHC RBC-ENTMCNC: 31.8 PG — SIGNIFICANT CHANGE UP (ref 27–34)
MCHC RBC-ENTMCNC: 34.4 GM/DL — SIGNIFICANT CHANGE UP (ref 32–36)
MCHC RBC-ENTMCNC: 34.4 GM/DL — SIGNIFICANT CHANGE UP (ref 32–36)
MCV RBC AUTO: 92.4 FL — SIGNIFICANT CHANGE UP (ref 80–100)
MCV RBC AUTO: 92.4 FL — SIGNIFICANT CHANGE UP (ref 80–100)
NRBC # BLD: 0 /100 WBCS — SIGNIFICANT CHANGE UP (ref 0–0)
NRBC # BLD: 0 /100 WBCS — SIGNIFICANT CHANGE UP (ref 0–0)
PLATELET # BLD AUTO: 154 K/UL — SIGNIFICANT CHANGE UP (ref 150–400)
PLATELET # BLD AUTO: 154 K/UL — SIGNIFICANT CHANGE UP (ref 150–400)
POTASSIUM SERPL-MCNC: 3.7 MMOL/L — SIGNIFICANT CHANGE UP (ref 3.5–5.3)
POTASSIUM SERPL-MCNC: 3.7 MMOL/L — SIGNIFICANT CHANGE UP (ref 3.5–5.3)
POTASSIUM SERPL-SCNC: 3.7 MMOL/L — SIGNIFICANT CHANGE UP (ref 3.5–5.3)
POTASSIUM SERPL-SCNC: 3.7 MMOL/L — SIGNIFICANT CHANGE UP (ref 3.5–5.3)
RBC # BLD: 4.34 M/UL — SIGNIFICANT CHANGE UP (ref 4.2–5.8)
RBC # BLD: 4.34 M/UL — SIGNIFICANT CHANGE UP (ref 4.2–5.8)
RBC # FLD: 12.8 % — SIGNIFICANT CHANGE UP (ref 10.3–14.5)
RBC # FLD: 12.8 % — SIGNIFICANT CHANGE UP (ref 10.3–14.5)
SODIUM SERPL-SCNC: 136 MMOL/L — SIGNIFICANT CHANGE UP (ref 135–145)
SODIUM SERPL-SCNC: 136 MMOL/L — SIGNIFICANT CHANGE UP (ref 135–145)
WBC # BLD: 10.91 K/UL — HIGH (ref 3.8–10.5)
WBC # BLD: 10.91 K/UL — HIGH (ref 3.8–10.5)
WBC # FLD AUTO: 10.91 K/UL — HIGH (ref 3.8–10.5)
WBC # FLD AUTO: 10.91 K/UL — HIGH (ref 3.8–10.5)

## 2023-12-12 PROCEDURE — 99232 SBSQ HOSP IP/OBS MODERATE 35: CPT | Mod: 57

## 2023-12-12 PROCEDURE — 99232 SBSQ HOSP IP/OBS MODERATE 35: CPT

## 2023-12-12 RX ORDER — SODIUM CHLORIDE 9 MG/ML
1000 INJECTION, SOLUTION INTRAVENOUS
Refills: 0 | Status: DISCONTINUED | OUTPATIENT
Start: 2023-12-13 | End: 2023-12-13

## 2023-12-12 RX ADMIN — Medication 1 PATCH: at 11:32

## 2023-12-12 RX ADMIN — Medication 1 MILLIGRAM(S): at 11:32

## 2023-12-12 RX ADMIN — AMLODIPINE BESYLATE 10 MILLIGRAM(S): 2.5 TABLET ORAL at 06:08

## 2023-12-12 RX ADMIN — Medication 1 PATCH: at 21:53

## 2023-12-12 RX ADMIN — ATORVASTATIN CALCIUM 80 MILLIGRAM(S): 80 TABLET, FILM COATED ORAL at 21:17

## 2023-12-12 NOTE — PROGRESS NOTE ADULT - PROBLEM SELECTOR PLAN 2
- C/w ASA 81mg daily  - C/w atorvastatin 80mg QHS  -on discharge, need ASA and atorvastatin scripts   -Plan for aorto-bifemoral bypass on Monday 12/11 with vascular, please follow-up with vascular in AM if this is still the case     Preop  -RCRI score 1: 6.0 % 30-day risk of of MI, death or cardiac arrest. METS >4  -ecg reviewed. TTE EF 60%  -Patient is medically optimized for aorto-bifemoral bypass surgery
- C/w ASA 81mg daily  - C/w atorvastatin 80mg QHS  -on discharge, need ASA and atorvastatin scripts   -Plan for aorto-bifemoral bypass on wed 12/13 with vascular    See prior pre-op eval, no acute medical contraindication for the OR.
- C/w ASA 81mg daily  - C/w atorvastatin 80mg QHS  -on discharge, need ASA and atorvastatin scripts   -Plan for aorto-bifemoral bypass on wed 12/13 with vascular    See prior pre-op eval, no acute medical contraindication for the OR.

## 2023-12-12 NOTE — PROGRESS NOTE ADULT - ASSESSMENT
59 y/o male w/ PMHx CLTI ([place] 4), alcohol abuse w/ history of withdrawal seizures, HLD and smoking presenting for both LE pain and inability to quit EtOH. Admitted for monitoring and treatment of EtOH withdrawal. 59 y/o male w/ PMHx CLTI ([place] 4), alcohol abuse w/ history of withdrawal seizures, HLD and smoking presenting for both LE pain and inability to quit EtOH. Admitted for monitoring and treatment of EtOH withdrawal.

## 2023-12-12 NOTE — PROGRESS NOTE ADULT - PROBLEM SELECTOR PROBLEM 2
Severe peripheral arterial disease

## 2023-12-12 NOTE — PROGRESS NOTE ADULT - NSPROGADDITIONALINFOA_GEN_ALL_CORE
d/w ACP    The necessity of the time spent during the encounter on this date of service was due to:   - Ordering, reviewing, and interpreting labs, testing, and imaging.  - Independently obtaining a review of systems and performing a physical exam  - Reviewing prior hospitalization and where necessary, outpatient records.  - Counselling and educating patient and family regarding interpretation of aforementioned items and plan of care.    Time Spent 40 minutes    Jennifer Oliva  Division of Hospital Medicine  Available on Microsoft Teams
bethany Santos
bethany Reyes

## 2023-12-12 NOTE — PRE PROCEDURE NOTE - PRE PROCEDURE EVALUATION
------------------------------------------------------------  Vascular Surgery Pre-Procedure Note  ------------------------------------------------------------    Indication: 58y Male with CLTI [place] 4, leg pain, Ao-bifem planning    Past Medical History:  HTN (hypertension)    FAP (familial adenomatous polyposis)    Alcohol abuse        Allergies: No Known Allergies      Medications:  amLODIPine   Tablet: 10 milliGRAM(s) Oral (12-12-23 @ 06:08)      Vital Signs:   T(F): 97.6 (04:05), Max: 97.6 (13:20)  HR: 60 (04:05)  BP: 129/73 (04:05)  RR: 18 (04:05)  SpO2: 96% (04:05)    Labs:           13.8  10.91)-----(154     (12-12-23 @ 06:50)         40.1     136 | 102 | 12  --------------------< 90     (12-12-23 @ 06:49)  3.7 | 20 | 0.64       PT: 10.0 12-11-23 @ 05:59  aPTT: 30.1 12-11-23 @ 05:59   INR: 0.95 12-11-23 @ 05:59    Imaging:   < from: CT Angio Abd Aorta w/run-off w/ IV Cont (11.29.23 @ 22:28) >  CT ANGIOGRAM ABDOMEN, PELVIS, AND LOWER EXTREMITIES:    PROCEDURE:  Initially, nonenhanced CT was obtained through the calves. Then,   following the rapid administration of intravenous contrast, CT   angiography was performed through the abdomen, pelvis, and lower   extremities down to the toes.  Delayed images through the calves were   also obtained. Sagittal and coronal reformats as well as 3D   reconstructions were performed.    FINDINGS:    LOWER CHEST: Scattered nodules in the bilateral lung bases measuringup   to 4 mm. For example, there is a 4 mm nodule in the right lower lobe   (series 8 image 46). Coronary artery and mitral annular calcification.    LIVER: Slightly nodular contour.  BILE DUCTS: Normal caliber.  GALLBLADDER: Within normal limits.  SPLEEN: Within normal limits.  PANCREAS: Within normal limits.  ADRENALS: Within normal limits.  KIDNEYS/URETERS: Within normal limits.    BLADDER: Underdistended.  REPRODUCTIVE ORGANS: Mildly enlarged prostate.    BOWEL: Subtotal colectomy. No bowel obstruction.  PERITONEUM: No ascites.  VESSELS: Please see the angiography discussion below.  RETROPERITONEUM/LYMPH NODES: No lymphadenopathy.  ABDOMINAL WALL: Within normal limits.  BONES: Degenerative changes.    CT ANGIOGRAPHY:    Atherosclerotic changes.    Patent abdominal aorta. The infrarenal abdominal aorta is ectatic to 2.8   cm with mural thrombus and scattered bulky and displaced calcified   plaque. Patent celiac artery with mild stenosis at the origin. Patent   superior mesenteric artery with mild to moderate stenosis at the   origin/proximally. Patent inferior mesenteric artery. Single renal   arteries bilaterally, which are patent, both demonstrating mild stenoses   at the origins.    Patent right common iliac, external iliac, andinternal iliac arteries.   Mild stenoses within the right common iliac and external iliac arteries.    Severe stenosis versus occlusion of the proximal left common iliac artery   related to markedly calcified and noncalcified plaque. The mid to distal   left common iliac artery is patent with severe stenosis. Patent left   external iliac artery with mild stenosis. The left internal iliac artery   is patent at least proximally; evaluation of the more distal vessel is   limited due to heavily calcified plaque.    The right common femoral artery is patent with mild to moderate stenosis.   Extensive calcified plaque within the right superficial femoral artery   with multifocal sites of severe stenoses. Portions of the right SFA are   difficult toassess due to the heavily calcified plaque; there may be a   severe stenosis within the distal right SFA, though a a focal occlusion   with reconstitution is not entirely excluded (series 8 image 762). The   right deep femoral artery is patent. The right popliteal artery is   heavily calcified, particularly distally, limiting evaluation, where   there may be a high-grade stenosis, with occlusion not entirely excluded.   Heavy calcification precludes accurate assessment of the right   tibioperoneal trunk. Heavy calcification in the proximal right anterior   tibial artery, which likely remains patent. The remainder of the right   anterior tibial artery is patent through to the level the ankle. Patent   right dorsalis pedis artery.. Heavy calcification throughout the right   posterior tibial artery precludes accurate assessment. Heavy   calcification within the proximal right peroneal artery limits   evaluation. However, the remainder of the mid to distal vessel appears   patent through to the level of the ankle.    The distal left common femoral artery is severely stenotic related to   heavily calcified plaque. The left superficial femoral artery is occluded   with reconstitution of the left popliteal artery; the left popliteal   arteryis patent with moderate to severe stenoses. The left deep femoral   artery is patent. Heavy calcification within the tibioperoneal trunk   limits accurate assessment. The proximal left anterior tibial artery is   also difficult to assess due to heavily calcified plaque. The distal left   anterior tibial artery is patent. Patent left dorsalis pedis artery.   Heavy calcification limits evaluation of the left posterior tibial   artery. Intermittent sites contrast opacification within the left   posterior tibial artery are visualized. Heavy calcification limits   evaluation of the proximal left peroneal artery. However, the remainder   of the mid to distal vessel is patent.    IMPRESSION:  *  Advanced atherosclerotic changes.  *  Severe stenosis versus occlusion with reconstitution of the left   common iliac artery.  *  Severe stenosis of the left common femoral artery.  *  Occluded left superficial femoral artery with reconstitution of the   left popliteal artery. Moderate to severe stenosiswithin the left   popliteal artery.  *  Heavily calcified plaque within the right superficial femoral artery   with multifocal sites of severe stenoses. Portions of the right SFA are   difficult to assess due to the heavily calcified plaque, and there may be   a severe stenosis versus a focal occlusion with reconstitution of the   distal right SFA.  *  Heavily calcified plaque limits evaluation of the right popliteal   artery and the bilateral infrapopliteal arteries. The proximal bilateral   anterior tibial and peroneal arteries are difficult to assess, though are   patent mid to distally. The bilateral posterior tibial arteries are not   well assessed.  *  Mildly nodular liver contour, which may reflect cirrhosis.  *  Scattered pulmonary nodules at the lung bases measuring up to 4 mm.    --- End of Report ---    < end of copied text >    Consent: obtained    Procedure Plan: Aorta-byfem bypass  - blood on hold  - NPO at midnight/VF  - 4 AM labs  - active type and screen
Preop Dx: PAD  Surgeon: Giovanna  Procedure: Aorto-bifem bypass    Vital Signs Last 24 Hrs  T(C): 36.8 (10 Dec 2023 12:00), Max: 36.8 (09 Dec 2023 16:14)  T(F): 98.2 (10 Dec 2023 12:00), Max: 98.2 (09 Dec 2023 16:14)  HR: 71 (10 Dec 2023 12:00) (62 - 76)  BP: 119/75 (10 Dec 2023 12:00) (119/69 - 154/72)  BP(mean): --  RR: 18 (10 Dec 2023 12:00) (18 - 18)  SpO2: 96% (10 Dec 2023 12:00) (95% - 98%)    Parameters below as of 10 Dec 2023 12:00  Patient On (Oxygen Delivery Method): room air                            13.7   7.92  )-----------( 170      ( 09 Dec 2023 06:45 )             39.0     12-09    138  |  103  |  10  ----------------------------<  81  3.7   |  23  |  0.60    Ca    8.6      09 Dec 2023 06:45  Phos  3.2     12-09  Mg     1.6     12-09    TPro  6.5  /  Alb  3.8  /  TBili  0.4  /  DBili  0.1  /  AST  37  /  ALT  49<H>  /  AlkPhos  112  12-09    PT/INR - ( 08 Dec 2023 20:30 )   PT: 10.9 sec;   INR: 1.04 ratio         PTT - ( 08 Dec 2023 20:30 )  PTT:30.6 sec  Daily Height in cm: 180.34 (09 Dec 2023 21:07)    Daily     EKG:  CXR:   Type and Screen:         A/P: 58y Male     - OR 12/11/23 for aortobifemoral bypass with Dr. Heredia  - NPO past midnight, except medications  - IVF while NPO  - Consent signed and in chart  - Medical clearance for OR

## 2023-12-12 NOTE — PROGRESS NOTE ADULT - SUBJECTIVE AND OBJECTIVE BOX
Vascular Surgery Progress Note  Patient is a 58y old  Male who presents with a chief complaint of Alcohol withdrawal (11 Dec 2023 15:13)      INTERVAL EVENTS: No acute events overnight.  SUBJECTIVE: Patient seen and examined at bedside with surgical team, patient without complaints. Denies fever, chills, CP, SOB nausea, vomiting, abdominal pain.    OBJECTIVE:    Vital Signs Last 24 Hrs  T(C): 36.4 (12 Dec 2023 04:05), Max: 36.4 (11 Dec 2023 13:20)  T(F): 97.6 (12 Dec 2023 04:05), Max: 97.6 (11 Dec 2023 13:20)  HR: 60 (12 Dec 2023 04:05) (60 - 70)  BP: 129/73 (12 Dec 2023 04:05) (120/79 - 131/81)  BP(mean): --  RR: 18 (12 Dec 2023 04:05) (18 - 18)  SpO2: 96% (12 Dec 2023 04:05) (96% - 97%)    Parameters below as of 12 Dec 2023 04:05  Patient On (Oxygen Delivery Method): room air    I&O's Detail  MEDICATIONS  (STANDING):  amLODIPine   Tablet 10 milliGRAM(s) Oral daily  atorvastatin 80 milliGRAM(s) Oral at bedtime  folic acid 1 milliGRAM(s) Oral daily  nicotine -   7 mG/24Hr(s) Patch 1 Patch Transdermal daily    MEDICATIONS  (PRN):  acetaminophen     Tablet .. 650 milliGRAM(s) Oral every 6 hours PRN Temp greater or equal to 38C (100.4F), Mild Pain (1 - 3)  melatonin 3 milliGRAM(s) Oral at bedtime PRN Insomnia      PHYSICAL EXAM:  General Appearance: Appears well, NAD  Neck: Supple  Chest: Equal expansion bilaterally  CV: Pulse regular presently  Abdomen: Soft, nontense  Extremities: Both feet WWP    LABS:                        13.8   10.91 )-----------( 154      ( 12 Dec 2023 06:50 )             40.1     12-12    136  |  102  |  12  ----------------------------<  90  3.7   |  20<L>  |  0.64    Ca    9.4      12 Dec 2023 06:49    TPro  6.8  /  Alb  3.6  /  TBili  0.4  /  DBili  x   /  AST  38  /  ALT  49<H>  /  AlkPhos  108  12-11    PT/INR - ( 11 Dec 2023 05:59 )   PT: 10.0 sec;   INR: 0.95 ratio         PTT - ( 11 Dec 2023 05:59 )  PTT:30.1 sec  LIVER FUNCTIONS - ( 11 Dec 2023 05:58 )  Alb: 3.6 g/dL / Pro: 6.8 g/dL / ALK PHOS: 108 U/L / ALT: 49 U/L / AST: 38 U/L / GGT: x           Urinalysis Basic - ( 12 Dec 2023 06:49 )    Color: x / Appearance: x / SG: x / pH: x  Gluc: 90 mg/dL / Ketone: x  / Bili: x / Urobili: x   Blood: x / Protein: x / Nitrite: x   Leuk Esterase: x / RBC: x / WBC x   Sq Epi: x / Non Sq Epi: x / Bacteria: x          IMAGING:

## 2023-12-12 NOTE — PROGRESS NOTE ADULT - PROBLEM SELECTOR PLAN 5
DVT PPx: HSQ  Code Status: CPR  SW consult: ETOH    NPO MN.
DVT PPx: HSQ  Code Status: CPR  SW consult: ETOH
DVT PPx: HSQ  Code Status: CPR  SW consult: ETOH

## 2023-12-12 NOTE — PROGRESS NOTE ADULT - PROBLEM SELECTOR PLAN 4
- C/w amlodipine 10mg daily
- C/w amlodipine 10mg daily  - Patient also states he is on losartan 10mg daily?
- C/w amlodipine 10mg daily

## 2023-12-12 NOTE — PROGRESS NOTE ADULT - SUBJECTIVE AND OBJECTIVE BOX
[facility] Division of Hospital Medicine  Ashlie MattsonDO   Available via [employer] Teams      Patient is a 58y old  Male who presents with a chief complaint of Alcohol withdrawal (12 Dec 2023 11:59)      SUBJECTIVE / OVERNIGHT EVENTS:  Overall feels well. No N/V/abd pain, tremors, increasing leg pain     MEDICATIONS  (STANDING):  amLODIPine   Tablet 10 milliGRAM(s) Oral daily  atorvastatin 80 milliGRAM(s) Oral at bedtime  folic acid 1 milliGRAM(s) Oral daily  nicotine -   7 mG/24Hr(s) Patch 1 Patch Transdermal daily    MEDICATIONS  (PRN):  acetaminophen     Tablet .. 650 milliGRAM(s) Oral every 6 hours PRN Temp greater or equal to 38C (100.4F), Mild Pain (1 - 3)  melatonin 3 milliGRAM(s) Oral at bedtime PRN Insomnia      CAPILLARY BLOOD GLUCOSE        I&O's Summary      PHYSICAL EXAM:  Vital Signs Last 24 Hrs  T(C): 36.6 (12 Dec 2023 12:30), Max: 36.6 (12 Dec 2023 12:30)  T(F): 97.8 (12 Dec 2023 12:30), Max: 97.8 (12 Dec 2023 12:30)  HR: 72 (12 Dec 2023 12:30) (60 - 72)  BP: 117/73 (12 Dec 2023 12:30) (117/73 - 131/81)  BP(mean): --  RR: 18 (12 Dec 2023 12:30) (18 - 18)  SpO2: 97% (12 Dec 2023 12:30) (96% - 97%)    Parameters below as of 12 Dec 2023 12:30  Patient On (Oxygen Delivery Method): room air      CONSTITUTIONAL: NAD, well-developed, well-groomed  RESPIRATORY: Normal respiratory effort; lungs are clear to auscultation bilaterally  CARDIOVASCULAR: Regular rate and rhythm, normal S1 and S2; No lower extremity edema  ABDOMEN: Nontender to palpation, normoactive bowel sounds, no rebound/guarding  MUSCULOSKELETAL: no clubbing or cyanosis of digits; no joint swelling or tenderness to palpation ; extremities warm   PSYCH: A+O to person, place, and time; affect appropriate  NEUROLOGY: strength intact, no gross sensory deficits   SKIN: No rashes; no palpable lesions    LABS:                        13.8   10.91 )-----------( 154      ( 12 Dec 2023 06:50 )             40.1     12-12    136  |  102  |  12  ----------------------------<  90  3.7   |  20<L>  |  0.64    Ca    9.4      12 Dec 2023 06:49    TPro  6.8  /  Alb  3.6  /  TBili  0.4  /  DBili  x   /  AST  38  /  ALT  49<H>  /  AlkPhos  108  12-11    PT/INR - ( 11 Dec 2023 05:59 )   PT: 10.0 sec;   INR: 0.95 ratio         PTT - ( 11 Dec 2023 05:59 )  PTT:30.1 sec      Urinalysis Basic - ( 12 Dec 2023 06:49 )    Color: x / Appearance: x / SG: x / pH: x  Gluc: 90 mg/dL / Ketone: x  / Bili: x / Urobili: x   Blood: x / Protein: x / Nitrite: x   Leuk Esterase: x / RBC: x / WBC x   Sq Epi: x / Non Sq Epi: x / Bacteria: x          RADIOLOGY & ADDITIONAL TESTS:  Results Reviewed:   Imaging Personally Reviewed:  Electrocardiogram Personally Reviewed:    COORDINATION OF CARE:  Care Discussed with Consultants/Other Providers [Y/N]:  Prior or Outpatient Records Reviewed [Y/N]:   [facility] Division of Hospital Medicine  Ashlie MattsonDO   Available via Microsoft Teams      Patient is a 58y old  Male who presents with a chief complaint of Alcohol withdrawal (12 Dec 2023 11:59)      SUBJECTIVE / OVERNIGHT EVENTS:  Overall feels well. No N/V/abd pain, tremors, increasing leg pain     MEDICATIONS  (STANDING):  amLODIPine   Tablet 10 milliGRAM(s) Oral daily  atorvastatin 80 milliGRAM(s) Oral at bedtime  folic acid 1 milliGRAM(s) Oral daily  nicotine -   7 mG/24Hr(s) Patch 1 Patch Transdermal daily    MEDICATIONS  (PRN):  acetaminophen     Tablet .. 650 milliGRAM(s) Oral every 6 hours PRN Temp greater or equal to 38C (100.4F), Mild Pain (1 - 3)  melatonin 3 milliGRAM(s) Oral at bedtime PRN Insomnia      CAPILLARY BLOOD GLUCOSE        I&O's Summary      PHYSICAL EXAM:  Vital Signs Last 24 Hrs  T(C): 36.6 (12 Dec 2023 12:30), Max: 36.6 (12 Dec 2023 12:30)  T(F): 97.8 (12 Dec 2023 12:30), Max: 97.8 (12 Dec 2023 12:30)  HR: 72 (12 Dec 2023 12:30) (60 - 72)  BP: 117/73 (12 Dec 2023 12:30) (117/73 - 131/81)  BP(mean): --  RR: 18 (12 Dec 2023 12:30) (18 - 18)  SpO2: 97% (12 Dec 2023 12:30) (96% - 97%)    Parameters below as of 12 Dec 2023 12:30  Patient On (Oxygen Delivery Method): room air      CONSTITUTIONAL: NAD, well-developed, well-groomed  RESPIRATORY: Normal respiratory effort; lungs are clear to auscultation bilaterally  CARDIOVASCULAR: Regular rate and rhythm, normal S1 and S2; No lower extremity edema  ABDOMEN: Nontender to palpation, normoactive bowel sounds, no rebound/guarding  MUSCULOSKELETAL: no clubbing or cyanosis of digits; no joint swelling or tenderness to palpation ; extremities warm   PSYCH: A+O to person, place, and time; affect appropriate  NEUROLOGY: strength intact, no gross sensory deficits   SKIN: No rashes; no palpable lesions    LABS:                        13.8   10.91 )-----------( 154      ( 12 Dec 2023 06:50 )             40.1     12-12    136  |  102  |  12  ----------------------------<  90  3.7   |  20<L>  |  0.64    Ca    9.4      12 Dec 2023 06:49    TPro  6.8  /  Alb  3.6  /  TBili  0.4  /  DBili  x   /  AST  38  /  ALT  49<H>  /  AlkPhos  108  12-11    PT/INR - ( 11 Dec 2023 05:59 )   PT: 10.0 sec;   INR: 0.95 ratio         PTT - ( 11 Dec 2023 05:59 )  PTT:30.1 sec      Urinalysis Basic - ( 12 Dec 2023 06:49 )    Color: x / Appearance: x / SG: x / pH: x  Gluc: 90 mg/dL / Ketone: x  / Bili: x / Urobili: x   Blood: x / Protein: x / Nitrite: x   Leuk Esterase: x / RBC: x / WBC x   Sq Epi: x / Non Sq Epi: x / Bacteria: x          RADIOLOGY & ADDITIONAL TESTS:  Results Reviewed:   Imaging Personally Reviewed:  Electrocardiogram Personally Reviewed:    COORDINATION OF CARE:  Care Discussed with Consultants/Other Providers [Y/N]:  Prior or Outpatient Records Reviewed [Y/N]:

## 2023-12-12 NOTE — PROGRESS NOTE ADULT - ASSESSMENT
59 y/o male w/ PMHx CLTI ([place] 4), alcohol abuse w/ history of withdrawal seizures, HLD and smoking who presents for alcohol withdrawal and pain in his legs. Aorta-bifem bypass planned for monday 12/11 with Dr. Heredia.    Plan/Recs  - Medical optimization noted  - Patient on [facility] protocol  - Patient scheduled for surgery Wednesday.   - Please preop today: NPO  midnight, AM labs, active T&S.  - Vascular following.     Plan discussed with Vascular Surgery Fellow on call    Vascular Surgery  [id number]  59 y/o male w/ PMHx CLTI ([place] 4), alcohol abuse w/ history of withdrawal seizures, HLD and smoking who presents for alcohol withdrawal and pain in his legs. Aorta-bifem bypass planned for monday 12/11 with Dr. Heredia.    Plan/Recs  - Medical optimization noted  - Patient on [facility] protocol  - Patient scheduled for surgery Wednesday.   - Please preop today: NPO  midnight, AM labs, active T&S.  - Vascular following.     Plan discussed with Vascular Surgery Fellow on call    Vascular Surgery  [phone]

## 2023-12-13 LAB
ALBUMIN SERPL ELPH-MCNC: 2.8 G/DL — LOW (ref 3.3–5)
ALBUMIN SERPL ELPH-MCNC: 2.8 G/DL — LOW (ref 3.3–5)
ALP SERPL-CCNC: 55 U/L — SIGNIFICANT CHANGE UP (ref 40–120)
ALP SERPL-CCNC: 55 U/L — SIGNIFICANT CHANGE UP (ref 40–120)
ALT FLD-CCNC: 24 U/L — SIGNIFICANT CHANGE UP (ref 10–45)
ALT FLD-CCNC: 24 U/L — SIGNIFICANT CHANGE UP (ref 10–45)
ANION GAP SERPL CALC-SCNC: 12 MMOL/L — SIGNIFICANT CHANGE UP (ref 5–17)
ANION GAP SERPL CALC-SCNC: 12 MMOL/L — SIGNIFICANT CHANGE UP (ref 5–17)
ANION GAP SERPL CALC-SCNC: 15 MMOL/L — SIGNIFICANT CHANGE UP (ref 5–17)
ANION GAP SERPL CALC-SCNC: 15 MMOL/L — SIGNIFICANT CHANGE UP (ref 5–17)
APTT BLD: 27.4 SEC — SIGNIFICANT CHANGE UP (ref 24.5–35.6)
APTT BLD: 27.4 SEC — SIGNIFICANT CHANGE UP (ref 24.5–35.6)
APTT BLD: 30.5 SEC — SIGNIFICANT CHANGE UP (ref 24.5–35.6)
APTT BLD: 30.5 SEC — SIGNIFICANT CHANGE UP (ref 24.5–35.6)
AST SERPL-CCNC: 26 U/L — SIGNIFICANT CHANGE UP (ref 10–40)
AST SERPL-CCNC: 26 U/L — SIGNIFICANT CHANGE UP (ref 10–40)
BILIRUB SERPL-MCNC: 2.1 MG/DL — HIGH (ref 0.2–1.2)
BILIRUB SERPL-MCNC: 2.1 MG/DL — HIGH (ref 0.2–1.2)
BUN SERPL-MCNC: 10 MG/DL — SIGNIFICANT CHANGE UP (ref 7–23)
BUN SERPL-MCNC: 10 MG/DL — SIGNIFICANT CHANGE UP (ref 7–23)
BUN SERPL-MCNC: 9 MG/DL — SIGNIFICANT CHANGE UP (ref 7–23)
BUN SERPL-MCNC: 9 MG/DL — SIGNIFICANT CHANGE UP (ref 7–23)
CALCIUM SERPL-MCNC: 8.4 MG/DL — SIGNIFICANT CHANGE UP (ref 8.4–10.5)
CALCIUM SERPL-MCNC: 8.4 MG/DL — SIGNIFICANT CHANGE UP (ref 8.4–10.5)
CALCIUM SERPL-MCNC: 9.4 MG/DL — SIGNIFICANT CHANGE UP (ref 8.4–10.5)
CALCIUM SERPL-MCNC: 9.4 MG/DL — SIGNIFICANT CHANGE UP (ref 8.4–10.5)
CHLORIDE SERPL-SCNC: 105 MMOL/L — SIGNIFICANT CHANGE UP (ref 96–108)
CHLORIDE SERPL-SCNC: 105 MMOL/L — SIGNIFICANT CHANGE UP (ref 96–108)
CHLORIDE SERPL-SCNC: 107 MMOL/L — SIGNIFICANT CHANGE UP (ref 96–108)
CHLORIDE SERPL-SCNC: 107 MMOL/L — SIGNIFICANT CHANGE UP (ref 96–108)
CO2 SERPL-SCNC: 20 MMOL/L — LOW (ref 22–31)
CO2 SERPL-SCNC: 20 MMOL/L — LOW (ref 22–31)
CO2 SERPL-SCNC: 22 MMOL/L — SIGNIFICANT CHANGE UP (ref 22–31)
CO2 SERPL-SCNC: 22 MMOL/L — SIGNIFICANT CHANGE UP (ref 22–31)
CREAT SERPL-MCNC: 0.65 MG/DL — SIGNIFICANT CHANGE UP (ref 0.5–1.3)
CREAT SERPL-MCNC: 0.65 MG/DL — SIGNIFICANT CHANGE UP (ref 0.5–1.3)
CREAT SERPL-MCNC: 0.74 MG/DL — SIGNIFICANT CHANGE UP (ref 0.5–1.3)
CREAT SERPL-MCNC: 0.74 MG/DL — SIGNIFICANT CHANGE UP (ref 0.5–1.3)
EGFR: 105 ML/MIN/1.73M2 — SIGNIFICANT CHANGE UP
EGFR: 105 ML/MIN/1.73M2 — SIGNIFICANT CHANGE UP
EGFR: 109 ML/MIN/1.73M2 — SIGNIFICANT CHANGE UP
EGFR: 109 ML/MIN/1.73M2 — SIGNIFICANT CHANGE UP
GAS PNL BLDA: SIGNIFICANT CHANGE UP
GLUCOSE BLDC GLUCOMTR-MCNC: 168 MG/DL — HIGH (ref 70–99)
GLUCOSE BLDC GLUCOMTR-MCNC: 168 MG/DL — HIGH (ref 70–99)
GLUCOSE BLDC GLUCOMTR-MCNC: 230 MG/DL — HIGH (ref 70–99)
GLUCOSE BLDC GLUCOMTR-MCNC: 230 MG/DL — HIGH (ref 70–99)
GLUCOSE BLDC GLUCOMTR-MCNC: 290 MG/DL — HIGH (ref 70–99)
GLUCOSE BLDC GLUCOMTR-MCNC: 290 MG/DL — HIGH (ref 70–99)
GLUCOSE SERPL-MCNC: 127 MG/DL — HIGH (ref 70–99)
GLUCOSE SERPL-MCNC: 127 MG/DL — HIGH (ref 70–99)
GLUCOSE SERPL-MCNC: 193 MG/DL — HIGH (ref 70–99)
GLUCOSE SERPL-MCNC: 193 MG/DL — HIGH (ref 70–99)
HCT VFR BLD CALC: 35.6 % — LOW (ref 39–50)
HCT VFR BLD CALC: 35.6 % — LOW (ref 39–50)
HCT VFR BLD CALC: 39.1 % — SIGNIFICANT CHANGE UP (ref 39–50)
HCT VFR BLD CALC: 39.1 % — SIGNIFICANT CHANGE UP (ref 39–50)
HGB BLD-MCNC: 12.4 G/DL — LOW (ref 13–17)
HGB BLD-MCNC: 12.4 G/DL — LOW (ref 13–17)
HGB BLD-MCNC: 13.8 G/DL — SIGNIFICANT CHANGE UP (ref 13–17)
HGB BLD-MCNC: 13.8 G/DL — SIGNIFICANT CHANGE UP (ref 13–17)
INR BLD: 1 RATIO — SIGNIFICANT CHANGE UP (ref 0.85–1.18)
INR BLD: 1 RATIO — SIGNIFICANT CHANGE UP (ref 0.85–1.18)
INR BLD: 1.18 RATIO — SIGNIFICANT CHANGE UP (ref 0.85–1.18)
INR BLD: 1.18 RATIO — SIGNIFICANT CHANGE UP (ref 0.85–1.18)
MAGNESIUM SERPL-MCNC: 1.7 MG/DL — SIGNIFICANT CHANGE UP (ref 1.6–2.6)
MAGNESIUM SERPL-MCNC: 1.7 MG/DL — SIGNIFICANT CHANGE UP (ref 1.6–2.6)
MAGNESIUM SERPL-MCNC: 1.9 MG/DL — SIGNIFICANT CHANGE UP (ref 1.6–2.6)
MAGNESIUM SERPL-MCNC: 1.9 MG/DL — SIGNIFICANT CHANGE UP (ref 1.6–2.6)
MCHC RBC-ENTMCNC: 31.9 PG — SIGNIFICANT CHANGE UP (ref 27–34)
MCHC RBC-ENTMCNC: 31.9 PG — SIGNIFICANT CHANGE UP (ref 27–34)
MCHC RBC-ENTMCNC: 32.7 PG — SIGNIFICANT CHANGE UP (ref 27–34)
MCHC RBC-ENTMCNC: 32.7 PG — SIGNIFICANT CHANGE UP (ref 27–34)
MCHC RBC-ENTMCNC: 34.8 GM/DL — SIGNIFICANT CHANGE UP (ref 32–36)
MCHC RBC-ENTMCNC: 34.8 GM/DL — SIGNIFICANT CHANGE UP (ref 32–36)
MCHC RBC-ENTMCNC: 35.3 GM/DL — SIGNIFICANT CHANGE UP (ref 32–36)
MCHC RBC-ENTMCNC: 35.3 GM/DL — SIGNIFICANT CHANGE UP (ref 32–36)
MCV RBC AUTO: 91.5 FL — SIGNIFICANT CHANGE UP (ref 80–100)
MCV RBC AUTO: 91.5 FL — SIGNIFICANT CHANGE UP (ref 80–100)
MCV RBC AUTO: 92.7 FL — SIGNIFICANT CHANGE UP (ref 80–100)
MCV RBC AUTO: 92.7 FL — SIGNIFICANT CHANGE UP (ref 80–100)
NRBC # BLD: 0 /100 WBCS — SIGNIFICANT CHANGE UP (ref 0–0)
PHOSPHATE SERPL-MCNC: 3.6 MG/DL — SIGNIFICANT CHANGE UP (ref 2.5–4.5)
PHOSPHATE SERPL-MCNC: 3.6 MG/DL — SIGNIFICANT CHANGE UP (ref 2.5–4.5)
PHOSPHATE SERPL-MCNC: 4.9 MG/DL — HIGH (ref 2.5–4.5)
PHOSPHATE SERPL-MCNC: 4.9 MG/DL — HIGH (ref 2.5–4.5)
PLATELET # BLD AUTO: 131 K/UL — LOW (ref 150–400)
PLATELET # BLD AUTO: 131 K/UL — LOW (ref 150–400)
PLATELET # BLD AUTO: 147 K/UL — LOW (ref 150–400)
PLATELET # BLD AUTO: 147 K/UL — LOW (ref 150–400)
POTASSIUM SERPL-MCNC: 3.8 MMOL/L — SIGNIFICANT CHANGE UP (ref 3.5–5.3)
POTASSIUM SERPL-MCNC: 3.8 MMOL/L — SIGNIFICANT CHANGE UP (ref 3.5–5.3)
POTASSIUM SERPL-MCNC: 5.4 MMOL/L — HIGH (ref 3.5–5.3)
POTASSIUM SERPL-MCNC: 5.4 MMOL/L — HIGH (ref 3.5–5.3)
POTASSIUM SERPL-SCNC: 3.8 MMOL/L — SIGNIFICANT CHANGE UP (ref 3.5–5.3)
POTASSIUM SERPL-SCNC: 3.8 MMOL/L — SIGNIFICANT CHANGE UP (ref 3.5–5.3)
POTASSIUM SERPL-SCNC: 5.4 MMOL/L — HIGH (ref 3.5–5.3)
POTASSIUM SERPL-SCNC: 5.4 MMOL/L — HIGH (ref 3.5–5.3)
PROT SERPL-MCNC: 4.5 G/DL — LOW (ref 6–8.3)
PROT SERPL-MCNC: 4.5 G/DL — LOW (ref 6–8.3)
PROTHROM AB SERPL-ACNC: 10.5 SEC — SIGNIFICANT CHANGE UP (ref 9.5–13)
PROTHROM AB SERPL-ACNC: 10.5 SEC — SIGNIFICANT CHANGE UP (ref 9.5–13)
PROTHROM AB SERPL-ACNC: 12.3 SEC — SIGNIFICANT CHANGE UP (ref 9.5–13)
PROTHROM AB SERPL-ACNC: 12.3 SEC — SIGNIFICANT CHANGE UP (ref 9.5–13)
RBC # BLD: 3.89 M/UL — LOW (ref 4.2–5.8)
RBC # BLD: 3.89 M/UL — LOW (ref 4.2–5.8)
RBC # BLD: 4.22 M/UL — SIGNIFICANT CHANGE UP (ref 4.2–5.8)
RBC # BLD: 4.22 M/UL — SIGNIFICANT CHANGE UP (ref 4.2–5.8)
RBC # FLD: 12.9 % — SIGNIFICANT CHANGE UP (ref 10.3–14.5)
RBC # FLD: 12.9 % — SIGNIFICANT CHANGE UP (ref 10.3–14.5)
RBC # FLD: 14.4 % — SIGNIFICANT CHANGE UP (ref 10.3–14.5)
RBC # FLD: 14.4 % — SIGNIFICANT CHANGE UP (ref 10.3–14.5)
SODIUM SERPL-SCNC: 139 MMOL/L — SIGNIFICANT CHANGE UP (ref 135–145)
SODIUM SERPL-SCNC: 139 MMOL/L — SIGNIFICANT CHANGE UP (ref 135–145)
SODIUM SERPL-SCNC: 142 MMOL/L — SIGNIFICANT CHANGE UP (ref 135–145)
SODIUM SERPL-SCNC: 142 MMOL/L — SIGNIFICANT CHANGE UP (ref 135–145)
WBC # BLD: 10.33 K/UL — SIGNIFICANT CHANGE UP (ref 3.8–10.5)
WBC # BLD: 10.33 K/UL — SIGNIFICANT CHANGE UP (ref 3.8–10.5)
WBC # BLD: 21.52 K/UL — HIGH (ref 3.8–10.5)
WBC # BLD: 21.52 K/UL — HIGH (ref 3.8–10.5)
WBC # FLD AUTO: 10.33 K/UL — SIGNIFICANT CHANGE UP (ref 3.8–10.5)
WBC # FLD AUTO: 10.33 K/UL — SIGNIFICANT CHANGE UP (ref 3.8–10.5)
WBC # FLD AUTO: 21.52 K/UL — HIGH (ref 3.8–10.5)
WBC # FLD AUTO: 21.52 K/UL — HIGH (ref 3.8–10.5)

## 2023-12-13 PROCEDURE — 71045 X-RAY EXAM CHEST 1 VIEW: CPT | Mod: 26

## 2023-12-13 PROCEDURE — 35540 ART BYP GRFT AORTBIFEMORAL: CPT

## 2023-12-13 DEVICE — SURGIFOAM PAD 8CM X 12.5CM X 10MM (100): Type: IMPLANTABLE DEVICE | Status: FUNCTIONAL

## 2023-12-13 DEVICE — CATH FOGARTY 4FR X 80CM: Type: IMPLANTABLE DEVICE | Status: FUNCTIONAL

## 2023-12-13 DEVICE — CLIP APPLIER COVIDIEN SURGICLIP 13" LARGE: Type: IMPLANTABLE DEVICE | Status: FUNCTIONAL

## 2023-12-13 DEVICE — SURGICEL NU-KNIT 6 X 9": Type: IMPLANTABLE DEVICE | Status: FUNCTIONAL

## 2023-12-13 DEVICE — KIT A-LINE 1LUM 20G X 12CM SAFE KIT: Type: IMPLANTABLE DEVICE | Status: FUNCTIONAL

## 2023-12-13 DEVICE — CLIP APPLIER COVIDIEN SURGICLIP 11.5" MEDIUM: Type: IMPLANTABLE DEVICE | Status: FUNCTIONAL

## 2023-12-13 DEVICE — SURGICEL 2 X 14": Type: IMPLANTABLE DEVICE | Status: FUNCTIONAL

## 2023-12-13 DEVICE — GRAFT VASC BIF 14-7MMX40CM HEMASHIELD: Type: IMPLANTABLE DEVICE | Status: FUNCTIONAL

## 2023-12-13 DEVICE — CLIP APPLIER COVIDIEN SURGICLIP III 9" SM: Type: IMPLANTABLE DEVICE | Status: FUNCTIONAL

## 2023-12-13 DEVICE — SURGICEL FIBRILLAR 2 X 4": Type: IMPLANTABLE DEVICE | Status: FUNCTIONAL

## 2023-12-13 DEVICE — SURGIFLO MATRIX WITH THROMBIN KIT: Type: IMPLANTABLE DEVICE | Status: FUNCTIONAL

## 2023-12-13 RX ORDER — MAGNESIUM SULFATE 500 MG/ML
2 VIAL (ML) INJECTION ONCE
Refills: 0 | Status: COMPLETED | OUTPATIENT
Start: 2023-12-13 | End: 2023-12-13

## 2023-12-13 RX ORDER — DEXTROSE 50 % IN WATER 50 %
50 SYRINGE (ML) INTRAVENOUS ONCE
Refills: 0 | Status: COMPLETED | OUTPATIENT
Start: 2023-12-13 | End: 2023-12-13

## 2023-12-13 RX ORDER — HYDROMORPHONE HYDROCHLORIDE 2 MG/ML
0.25 INJECTION INTRAMUSCULAR; INTRAVENOUS; SUBCUTANEOUS
Refills: 0 | Status: DISCONTINUED | OUTPATIENT
Start: 2023-12-13 | End: 2023-12-16

## 2023-12-13 RX ORDER — CALCIUM GLUCONATE 100 MG/ML
2 VIAL (ML) INTRAVENOUS ONCE
Refills: 0 | Status: COMPLETED | OUTPATIENT
Start: 2023-12-13 | End: 2023-12-13

## 2023-12-13 RX ORDER — SODIUM CHLORIDE 9 MG/ML
500 INJECTION, SOLUTION INTRAVENOUS ONCE
Refills: 0 | Status: COMPLETED | OUTPATIENT
Start: 2023-12-13 | End: 2023-12-13

## 2023-12-13 RX ORDER — ACETAMINOPHEN 500 MG
1000 TABLET ORAL EVERY 6 HOURS
Refills: 0 | Status: COMPLETED | OUTPATIENT
Start: 2023-12-13 | End: 2023-12-14

## 2023-12-13 RX ORDER — CHLORHEXIDINE GLUCONATE 213 G/1000ML
1 SOLUTION TOPICAL
Refills: 0 | Status: DISCONTINUED | OUTPATIENT
Start: 2023-12-13 | End: 2023-12-18

## 2023-12-13 RX ORDER — INSULIN HUMAN 100 [IU]/ML
10 INJECTION, SOLUTION SUBCUTANEOUS ONCE
Refills: 0 | Status: COMPLETED | OUTPATIENT
Start: 2023-12-13 | End: 2023-12-13

## 2023-12-13 RX ORDER — INSULIN LISPRO 100/ML
VIAL (ML) SUBCUTANEOUS EVERY 6 HOURS
Refills: 0 | Status: DISCONTINUED | OUTPATIENT
Start: 2023-12-13 | End: 2023-12-13

## 2023-12-13 RX ORDER — SODIUM CHLORIDE 9 MG/ML
1000 INJECTION, SOLUTION INTRAVENOUS
Refills: 0 | Status: DISCONTINUED | OUTPATIENT
Start: 2023-12-13 | End: 2023-12-15

## 2023-12-13 RX ORDER — INSULIN LISPRO 100/ML
VIAL (ML) SUBCUTANEOUS EVERY 4 HOURS
Refills: 0 | Status: DISCONTINUED | OUTPATIENT
Start: 2023-12-13 | End: 2023-12-14

## 2023-12-13 RX ADMIN — SODIUM CHLORIDE 100 MILLILITER(S): 9 INJECTION, SOLUTION INTRAVENOUS at 00:05

## 2023-12-13 RX ADMIN — SODIUM CHLORIDE 2000 MILLILITER(S): 9 INJECTION, SOLUTION INTRAVENOUS at 21:26

## 2023-12-13 RX ADMIN — HYDROMORPHONE HYDROCHLORIDE 0.25 MILLIGRAM(S): 2 INJECTION INTRAMUSCULAR; INTRAVENOUS; SUBCUTANEOUS at 22:00

## 2023-12-13 RX ADMIN — Medication 25 GRAM(S): at 22:29

## 2023-12-13 RX ADMIN — Medication 200 GRAM(S): at 21:26

## 2023-12-13 RX ADMIN — Medication 50 MILLILITER(S): at 21:27

## 2023-12-13 RX ADMIN — INSULIN HUMAN 10 UNIT(S): 100 INJECTION, SOLUTION SUBCUTANEOUS at 21:26

## 2023-12-13 RX ADMIN — CHLORHEXIDINE GLUCONATE 1 APPLICATION(S): 213 SOLUTION TOPICAL at 21:27

## 2023-12-13 RX ADMIN — HYDROMORPHONE HYDROCHLORIDE 0.25 MILLIGRAM(S): 2 INJECTION INTRAMUSCULAR; INTRAVENOUS; SUBCUTANEOUS at 21:41

## 2023-12-13 RX ADMIN — SODIUM CHLORIDE 150 MILLILITER(S): 9 INJECTION, SOLUTION INTRAVENOUS at 21:26

## 2023-12-13 NOTE — PROGRESS NOTE ADULT - SUBJECTIVE AND OBJECTIVE BOX
SUBJECTIVE: Patient seen and examined on AM rounds. Patient reports that they're feeling well. Denies fever, chills. Reports pain as controlled. No complaints at this time.     Vital Signs Last 24 Hrs  T(C): 36.3 (13 Dec 2023 05:45), Max: 36.6 (12 Dec 2023 12:30)  T(F): 97.3 (13 Dec 2023 05:45), Max: 97.9 (13 Dec 2023 00:29)  HR: 86 (13 Dec 2023 05:45) (67 - 86)  BP: 127/85 (13 Dec 2023 05:45) (117/73 - 152/79)  BP(mean): --  RR: 18 (13 Dec 2023 05:45) (18 - 18)  SpO2: 97% (13 Dec 2023 05:45) (94% - 97%)    Parameters below as of 13 Dec 2023 04:12  Patient On (Oxygen Delivery Method): room air        General Appearance: Appears well, NAD  Neck: Supple  Chest: Equal expansion bilaterally  CV: Pulse regular presently  Abdomen: Soft, nontense  Extremities: WWP    I&O's Summary    I&O's Detail      LABS:                        13.8   10.33 )-----------( 147      ( 13 Dec 2023 04:44 )             39.1     12-13    139  |  105  |  10  ----------------------------<  127<H>  3.8   |  22  |  0.65    Ca    9.4      13 Dec 2023 04:44  Phos  3.6     12-13  Mg     1.7     12-13      PT/INR - ( 13 Dec 2023 04:44 )   PT: 10.5 sec;   INR: 1.00 ratio         PTT - ( 13 Dec 2023 04:44 )  PTT:30.5 sec  Urinalysis Basic - ( 13 Dec 2023 04:44 )    Color: x / Appearance: x / SG: x / pH: x  Gluc: 127 mg/dL / Ketone: x  / Bili: x / Urobili: x   Blood: x / Protein: x / Nitrite: x   Leuk Esterase: x / RBC: x / WBC x   Sq Epi: x / Non Sq Epi: x / Bacteria: x        RADIOLOGY & ADDITIONAL STUDIES:

## 2023-12-13 NOTE — PRE-ANESTHESIA EVALUATION ADULT - NSRADCARDRESULTSFT_GEN_ALL_CORE
< from: TTE W or WO Ultrasound Enhancing Agent (12.04.23 @ 09:43) >    CONCLUSIONS:      1. Left ventricular cavity is normal. Left ventricular wall thickness is normal. Left ventricular systolic function is normal with an ejection fraction of 60 % by Romero's method of disks.   2. Normal leftventricular diastolic function, with normal filling pressure.   3. Normal right ventricular cavity size, wall thickness, and systolic function.   4. Fibrocalcific aortic valve sclerosis without stenosis.   5. Mild aortic regurgitation.   6. No pericardial effusion seen.   7. No prior echocardiogram is available for comparison.    < end of copied text >

## 2023-12-13 NOTE — CHART NOTE - NSCHARTNOTEFT_GEN_A_CORE
Did not see patient today before the OR. Per vascular note vascular to assume care of patient postoperatively.

## 2023-12-13 NOTE — PRE-ANESTHESIA EVALUATION ADULT - NSANTHPMHFT_GEN_ALL_CORE
58M with PMH as above, etoh abuse, last drink 8 days ago, denies withdrawal symptoms, denies seizures/tremors. Denies CP, SOB, denies active GERD. Denies issues with anesthesia. able to ambulate 2 flights of stairs, limiting factor is leg pain    cardiac cath results reviewed- medical intervention

## 2023-12-13 NOTE — PROGRESS NOTE ADULT - SUBJECTIVE AND OBJECTIVE BOX
57 y/o male w/ PMHx CLTI ([place] 4), alcohol abuse w/ history of withdrawal seizures, HLD and smoking who presents for alcohol withdrawal and pain in his legs. Patient planned to intially stop drinking prior to procedure however he was unable to stop. Patient admitted to hospital for Acholol withdrawal and planned aorto-bifemoral bypass. Patient admitted to the SICU for Q1 checks s/p bypass procedure.       HISTORY  58y Male    24 HOUR EVENTS:    SUBJECTIVE/ROS:  [ ] A ten-point review of systems was otherwise negative except as noted.  [ ] Due to altered mental status/intubation, subjective information were not able to be obtained from the patient. History was obtained, to the extent possible, from review of the chart and collateral sources of information.      NEURO  Meds: acetaminophen   IVPB .. 1000 milliGRAM(s) IV Intermittent every 6 hours  HYDROmorphone  Injectable 0.25 milliGRAM(s) IV Push every 3 hours PRN Moderate Pain (4 - 6)    [x] Adequacy of sedation and pain control has been assessed and adjusted      RESPIRATORY  RR: 18 (12-13-23 @ 08:49) (18 - 18)  SpO2: 97% (12-13-23 @ 08:49) (94% - 97%)  Wt(kg): --  Exam: unlabored, clear to auscultation bilaterally  Mechanical Ventilation:   ABG - ( 13 Dec 2023 19:52 )  pH: 7.36  /  pCO2: 37    /  pO2: 93    / HCO3: 21    / Base Excess: -4.1  /  SaO2: 98.7    Lactate: x          CARDIOVASCULAR  HR: 86 (12-13-23 @ 08:49) (68 - 86)  BP: 127/85 (12-13-23 @ 08:49) (119/71 - 152/79)  BP(mean): --  ABP: --  ABP(mean): --  Wt(kg): --  CVP(cm H2O): --      Exam: regular rate and rhythm  Cardiac Rhythm: sinus  Perfusion     [x]Adequate   [ ]Inadequate  Mentation   [x]Normal       [ ]Reduced  Extremities  [x]Warm         [ ]Cool  Volume Status [ ]Hypervolemic [x]Euvolemic [ ]Hypovolemic  Meds:       GI/NUTRITION  Exam: soft, nontender, nondistended, incision C/D/I  Diet: NPO / no PO MEDS      GENITOURINARY  I&O's Detail    Weight (kg): 78.9 (12-13 @ 08:49)  12-13    142  |  107  |  9   ----------------------------<  193<H>  5.4<H>   |  20<L>  |  0.74    Ca    8.4      13 Dec 2023 20:09  Phos  4.9     12-13  Mg     1.9     12-13    TPro  4.5<L>  /  Alb  2.8<L>  /  TBili  2.1<H>  /  DBili  x   /  AST  26  /  ALT  24  /  AlkPhos  55  12-13    [ ] Zavala catheter, indication: N/A  Meds: calcium gluconate IVPB 2 Gram(s) IV Intermittent once  lactated ringers Bolus 500 milliLiter(s) IV Bolus once  lactated ringers. 1000 milliLiter(s) IV Continuous <Continuous>  magnesium sulfate  IVPB 2 Gram(s) IV Intermittent once        HEMATOLOGIC  Meds:   [x] VTE Prophylaxis                        12.4   21.52 )-----------( 131      ( 13 Dec 2023 20:09 )             35.6     PT/INR - ( 13 Dec 2023 20:09 )   PT: 12.3 sec;   INR: 1.18 ratio         PTT - ( 13 Dec 2023 20:09 )  PTT:27.4 sec  Transfusion     [ ] PRBC   [ ] Platelets   [ ] FFP   [ ] Cryoprecipitate      INFECTIOUS DISEASES  WBC Count: 21.52 K/uL (12-13 @ 20:09)  WBC Count: 10.33 K/uL (12-13 @ 04:44)    RECENT CULTURES:        ENDOCRINE  CAPILLARY BLOOD GLUCOSE        Meds: dextrose 50% Injectable 50 milliLiter(s) IV Push once  insulin regular  human recombinant 10 Unit(s) IV Push once          OTHER MEDICATIONS:  chlorhexidine 2% Cloths 1 Application(s) Topical <User Schedule>      CODE STATUS: FULL CODE 59 y/o male w/ PMHx CLTI ([place] 4), alcohol abuse w/ history of withdrawal seizures, HLD and smoking who presents for alcohol withdrawal and pain in his legs. Patient planned to intially stop drinking prior to procedure however he was unable to stop. Patient admitted to hospital for Acholol withdrawal and planned aorto-bifemoral bypass. Patient admitted to the SICU for Q1 checks s/p bypass procedure.       HISTORY  58y Male    24 HOUR EVENTS:    SUBJECTIVE/ROS:  [ ] A ten-point review of systems was otherwise negative except as noted.  [ ] Due to altered mental status/intubation, subjective information were not able to be obtained from the patient. History was obtained, to the extent possible, from review of the chart and collateral sources of information.      NEURO  Meds: acetaminophen   IVPB .. 1000 milliGRAM(s) IV Intermittent every 6 hours  HYDROmorphone  Injectable 0.25 milliGRAM(s) IV Push every 3 hours PRN Moderate Pain (4 - 6)    [x] Adequacy of sedation and pain control has been assessed and adjusted      RESPIRATORY  RR: 18 (12-13-23 @ 08:49) (18 - 18)  SpO2: 97% (12-13-23 @ 08:49) (94% - 97%)  Wt(kg): --  Exam: unlabored, clear to auscultation bilaterally  Mechanical Ventilation:   ABG - ( 13 Dec 2023 19:52 )  pH: 7.36  /  pCO2: 37    /  pO2: 93    / HCO3: 21    / Base Excess: -4.1  /  SaO2: 98.7    Lactate: x          CARDIOVASCULAR  HR: 86 (12-13-23 @ 08:49) (68 - 86)  BP: 127/85 (12-13-23 @ 08:49) (119/71 - 152/79)  BP(mean): --  ABP: --  ABP(mean): --  Wt(kg): --  CVP(cm H2O): --      Exam: regular rate and rhythm  Cardiac Rhythm: sinus  Perfusion     [x]Adequate   [ ]Inadequate  Mentation   [x]Normal       [ ]Reduced  Extremities  [x]Warm         [ ]Cool  Volume Status [ ]Hypervolemic [x]Euvolemic [ ]Hypovolemic  Meds:       GI/NUTRITION  Exam: soft, nontender, nondistended, incision C/D/I  Diet: NPO / no PO MEDS      GENITOURINARY  I&O's Detail    Weight (kg): 78.9 (12-13 @ 08:49)  12-13    142  |  107  |  9   ----------------------------<  193<H>  5.4<H>   |  20<L>  |  0.74    Ca    8.4      13 Dec 2023 20:09  Phos  4.9     12-13  Mg     1.9     12-13    TPro  4.5<L>  /  Alb  2.8<L>  /  TBili  2.1<H>  /  DBili  x   /  AST  26  /  ALT  24  /  AlkPhos  55  12-13    [ ] Zavala catheter, indication: N/A  Meds: calcium gluconate IVPB 2 Gram(s) IV Intermittent once  lactated ringers Bolus 500 milliLiter(s) IV Bolus once  lactated ringers. 1000 milliLiter(s) IV Continuous <Continuous>  magnesium sulfate  IVPB 2 Gram(s) IV Intermittent once        HEMATOLOGIC  Meds:   [x] VTE Prophylaxis                        12.4   21.52 )-----------( 131      ( 13 Dec 2023 20:09 )             35.6     PT/INR - ( 13 Dec 2023 20:09 )   PT: 12.3 sec;   INR: 1.18 ratio         PTT - ( 13 Dec 2023 20:09 )  PTT:27.4 sec  Transfusion     [ ] PRBC   [ ] Platelets   [ ] FFP   [ ] Cryoprecipitate      INFECTIOUS DISEASES  WBC Count: 21.52 K/uL (12-13 @ 20:09)  WBC Count: 10.33 K/uL (12-13 @ 04:44)    RECENT CULTURES:        ENDOCRINE  CAPILLARY BLOOD GLUCOSE        Meds: dextrose 50% Injectable 50 milliLiter(s) IV Push once  insulin regular  human recombinant 10 Unit(s) IV Push once          OTHER MEDICATIONS:  chlorhexidine 2% Cloths 1 Application(s) Topical <User Schedule>      CODE STATUS: FULL CODE

## 2023-12-13 NOTE — PROGRESS NOTE ADULT - ASSESSMENT
59 y/o male w/ PMHx CLTI ([place] 4), alcohol abuse w/ history of withdrawal seizures, HLD and smoking who presents for alcohol withdrawal and pain in his legs. Patient planned to intially stop drinking prior to procedure however he was unable to stop. Patient admitted to hospital for Acholol withdrawal and planned aorto-bifemoral bypass. Patient admitted to the SICU for Q1 checks s/p bypass procedure.       Neuro   Alert and Oriented  CIWA  PRN DILAUDID  Standing Ofirmev      Resp  RA     CV  Monitor BP  Hemodynamically stable off pressors   Assess vascular status Q1hr - LLE / RLE       GI   NG tube to suction  NPO       /RENAL  Monitor intake and output   Trend Cr.   Indwelling morris  LR @ 150mL/Hr      Endo  Monitor glucose  Insulin 10- for hyperkalemia          59 y/o male w/ PMHx CLTI ([place] 4), alcohol abuse w/ history of withdrawal seizures, HLD and smoking who presents for alcohol withdrawal and pain in his legs. Patient planned to intially stop drinking prior to procedure however he was unable to stop. Patient admitted to hospital for Acholol withdrawal and planned aorto-bifemoral bypass. Patient admitted to the SICU for Q1 checks s/p bypass procedure.       Neuro   Alert and Oriented  CIWA  PRN DILAUDID  Standing Ofirmev      Resp  RA     CV  Monitor BP  Hemodynamically stable off pressors   Assess vascular status Q1hr - LLE / RLE       GI   NG tube to suction  NPO       /RENAL  Monitor intake and output   Trend Cr.   Indwelling morris  LR @ 150mL/Hr      Endo  Monitor glucose  Insulin 10- for hyperkalemia

## 2023-12-13 NOTE — PROGRESS NOTE ADULT - ASSESSMENT
57 y/o male w/ PMHx CLTI ([place] 4), alcohol abuse w/ history of withdrawal seizures, HLD and smoking who presents for alcohol withdrawal and pain in his legs. Aorta-bifem bypass planned for Wednesday 12/13 with Dr. Heredia.    Plan/Recs  - Plan for OR today for aortobifemoral bypass  - Medical optimization noted  - Patient on CIWA protocol  - Patient is preoperatively prepared.  - Vascular to assume care of patient postoperatively.     Plan discussed with fellow on behalf of attending.     Vascular Surgery  [id number]  59 y/o male w/ PMHx CLTI ([place] 4), alcohol abuse w/ history of withdrawal seizures, HLD and smoking who presents for alcohol withdrawal and pain in his legs. Aorta-bifem bypass planned for Wednesday 12/13 with Dr. Heredia.    Plan/Recs  - Plan for OR today for aortobifemoral bypass  - Medical optimization noted  - Patient on CIWA protocol  - Patient is preoperatively prepared.  - Vascular to assume care of patient postoperatively.     Plan discussed with fellow on behalf of attending.     Vascular Surgery  [phone]

## 2023-12-13 NOTE — PRE-ANESTHESIA EVALUATION ADULT - NSANTHAIRWAYFT_ENT_ALL_CORE
small mouth opening  TM>3fb  neck: FROM small mouth opening  TM>3fb  neck: FROM  chipped front upper tooth and chipped right bottom

## 2023-12-14 LAB
ALBUMIN SERPL ELPH-MCNC: 2.8 G/DL — LOW (ref 3.3–5)
ALBUMIN SERPL ELPH-MCNC: 2.8 G/DL — LOW (ref 3.3–5)
ALBUMIN SERPL ELPH-MCNC: 3 G/DL — LOW (ref 3.3–5)
ALBUMIN SERPL ELPH-MCNC: 3 G/DL — LOW (ref 3.3–5)
ALBUMIN SERPL ELPH-MCNC: 3.1 G/DL — LOW (ref 3.3–5)
ALBUMIN SERPL ELPH-MCNC: 3.1 G/DL — LOW (ref 3.3–5)
ALBUMIN SERPL ELPH-MCNC: 3.2 G/DL — LOW (ref 3.3–5)
ALBUMIN SERPL ELPH-MCNC: 3.2 G/DL — LOW (ref 3.3–5)
ALP SERPL-CCNC: 56 U/L — SIGNIFICANT CHANGE UP (ref 40–120)
ALP SERPL-CCNC: 57 U/L — SIGNIFICANT CHANGE UP (ref 40–120)
ALP SERPL-CCNC: 57 U/L — SIGNIFICANT CHANGE UP (ref 40–120)
ALP SERPL-CCNC: 58 U/L — SIGNIFICANT CHANGE UP (ref 40–120)
ALP SERPL-CCNC: 58 U/L — SIGNIFICANT CHANGE UP (ref 40–120)
ALT FLD-CCNC: 22 U/L — SIGNIFICANT CHANGE UP (ref 10–45)
ALT FLD-CCNC: 22 U/L — SIGNIFICANT CHANGE UP (ref 10–45)
ALT FLD-CCNC: 23 U/L — SIGNIFICANT CHANGE UP (ref 10–45)
ALT FLD-CCNC: 23 U/L — SIGNIFICANT CHANGE UP (ref 10–45)
ALT FLD-CCNC: 25 U/L — SIGNIFICANT CHANGE UP (ref 10–45)
ANION GAP SERPL CALC-SCNC: 12 MMOL/L — SIGNIFICANT CHANGE UP (ref 5–17)
ANION GAP SERPL CALC-SCNC: 12 MMOL/L — SIGNIFICANT CHANGE UP (ref 5–17)
ANION GAP SERPL CALC-SCNC: 8 MMOL/L — SIGNIFICANT CHANGE UP (ref 5–17)
ANION GAP SERPL CALC-SCNC: 8 MMOL/L — SIGNIFICANT CHANGE UP (ref 5–17)
ANION GAP SERPL CALC-SCNC: 9 MMOL/L — SIGNIFICANT CHANGE UP (ref 5–17)
APTT BLD: 24.8 SEC — SIGNIFICANT CHANGE UP (ref 24.5–35.6)
APTT BLD: 24.8 SEC — SIGNIFICANT CHANGE UP (ref 24.5–35.6)
APTT BLD: 25.7 SEC — SIGNIFICANT CHANGE UP (ref 24.5–35.6)
APTT BLD: 25.7 SEC — SIGNIFICANT CHANGE UP (ref 24.5–35.6)
APTT BLD: 28.2 SEC — SIGNIFICANT CHANGE UP (ref 24.5–35.6)
APTT BLD: 28.2 SEC — SIGNIFICANT CHANGE UP (ref 24.5–35.6)
AST SERPL-CCNC: 28 U/L — SIGNIFICANT CHANGE UP (ref 10–40)
AST SERPL-CCNC: 28 U/L — SIGNIFICANT CHANGE UP (ref 10–40)
AST SERPL-CCNC: 30 U/L — SIGNIFICANT CHANGE UP (ref 10–40)
AST SERPL-CCNC: 30 U/L — SIGNIFICANT CHANGE UP (ref 10–40)
AST SERPL-CCNC: 31 U/L — SIGNIFICANT CHANGE UP (ref 10–40)
AST SERPL-CCNC: 31 U/L — SIGNIFICANT CHANGE UP (ref 10–40)
AST SERPL-CCNC: 32 U/L — SIGNIFICANT CHANGE UP (ref 10–40)
AST SERPL-CCNC: 32 U/L — SIGNIFICANT CHANGE UP (ref 10–40)
BILIRUB SERPL-MCNC: 0.6 MG/DL — SIGNIFICANT CHANGE UP (ref 0.2–1.2)
BILIRUB SERPL-MCNC: 1.3 MG/DL — HIGH (ref 0.2–1.2)
BILIRUB SERPL-MCNC: 1.3 MG/DL — HIGH (ref 0.2–1.2)
BLD GP AB SCN SERPL QL: NEGATIVE — SIGNIFICANT CHANGE UP
BLD GP AB SCN SERPL QL: NEGATIVE — SIGNIFICANT CHANGE UP
BUN SERPL-MCNC: 11 MG/DL — SIGNIFICANT CHANGE UP (ref 7–23)
BUN SERPL-MCNC: 11 MG/DL — SIGNIFICANT CHANGE UP (ref 7–23)
BUN SERPL-MCNC: 13 MG/DL — SIGNIFICANT CHANGE UP (ref 7–23)
BUN SERPL-MCNC: 13 MG/DL — SIGNIFICANT CHANGE UP (ref 7–23)
BUN SERPL-MCNC: 17 MG/DL — SIGNIFICANT CHANGE UP (ref 7–23)
BUN SERPL-MCNC: 17 MG/DL — SIGNIFICANT CHANGE UP (ref 7–23)
BUN SERPL-MCNC: 20 MG/DL — SIGNIFICANT CHANGE UP (ref 7–23)
BUN SERPL-MCNC: 20 MG/DL — SIGNIFICANT CHANGE UP (ref 7–23)
CALCIUM SERPL-MCNC: 8.4 MG/DL — SIGNIFICANT CHANGE UP (ref 8.4–10.5)
CALCIUM SERPL-MCNC: 8.4 MG/DL — SIGNIFICANT CHANGE UP (ref 8.4–10.5)
CALCIUM SERPL-MCNC: 8.6 MG/DL — SIGNIFICANT CHANGE UP (ref 8.4–10.5)
CALCIUM SERPL-MCNC: 8.6 MG/DL — SIGNIFICANT CHANGE UP (ref 8.4–10.5)
CALCIUM SERPL-MCNC: 8.7 MG/DL — SIGNIFICANT CHANGE UP (ref 8.4–10.5)
CHLORIDE SERPL-SCNC: 105 MMOL/L — SIGNIFICANT CHANGE UP (ref 96–108)
CHLORIDE SERPL-SCNC: 108 MMOL/L — SIGNIFICANT CHANGE UP (ref 96–108)
CHLORIDE SERPL-SCNC: 108 MMOL/L — SIGNIFICANT CHANGE UP (ref 96–108)
CK MB BLD-MCNC: 0.9 % — SIGNIFICANT CHANGE UP (ref 0–3.5)
CK MB BLD-MCNC: 0.9 % — SIGNIFICANT CHANGE UP (ref 0–3.5)
CK MB CFR SERPL CALC: 11.5 NG/ML — HIGH (ref 0–6.7)
CK MB CFR SERPL CALC: 11.5 NG/ML — HIGH (ref 0–6.7)
CK SERPL-CCNC: 1265 U/L — HIGH (ref 30–200)
CK SERPL-CCNC: 1265 U/L — HIGH (ref 30–200)
CO2 SERPL-SCNC: 21 MMOL/L — LOW (ref 22–31)
CO2 SERPL-SCNC: 21 MMOL/L — LOW (ref 22–31)
CO2 SERPL-SCNC: 23 MMOL/L — SIGNIFICANT CHANGE UP (ref 22–31)
CO2 SERPL-SCNC: 23 MMOL/L — SIGNIFICANT CHANGE UP (ref 22–31)
CO2 SERPL-SCNC: 24 MMOL/L — SIGNIFICANT CHANGE UP (ref 22–31)
CREAT SERPL-MCNC: 0.69 MG/DL — SIGNIFICANT CHANGE UP (ref 0.5–1.3)
CREAT SERPL-MCNC: 0.69 MG/DL — SIGNIFICANT CHANGE UP (ref 0.5–1.3)
CREAT SERPL-MCNC: 0.75 MG/DL — SIGNIFICANT CHANGE UP (ref 0.5–1.3)
CREAT SERPL-MCNC: 0.75 MG/DL — SIGNIFICANT CHANGE UP (ref 0.5–1.3)
CREAT SERPL-MCNC: 0.8 MG/DL — SIGNIFICANT CHANGE UP (ref 0.5–1.3)
CREAT SERPL-MCNC: 0.8 MG/DL — SIGNIFICANT CHANGE UP (ref 0.5–1.3)
CREAT SERPL-MCNC: 0.82 MG/DL — SIGNIFICANT CHANGE UP (ref 0.5–1.3)
CREAT SERPL-MCNC: 0.82 MG/DL — SIGNIFICANT CHANGE UP (ref 0.5–1.3)
EGFR: 102 ML/MIN/1.73M2 — SIGNIFICANT CHANGE UP
EGFR: 102 ML/MIN/1.73M2 — SIGNIFICANT CHANGE UP
EGFR: 103 ML/MIN/1.73M2 — SIGNIFICANT CHANGE UP
EGFR: 103 ML/MIN/1.73M2 — SIGNIFICANT CHANGE UP
EGFR: 105 ML/MIN/1.73M2 — SIGNIFICANT CHANGE UP
EGFR: 105 ML/MIN/1.73M2 — SIGNIFICANT CHANGE UP
EGFR: 107 ML/MIN/1.73M2 — SIGNIFICANT CHANGE UP
EGFR: 107 ML/MIN/1.73M2 — SIGNIFICANT CHANGE UP
GAS PNL BLDA: SIGNIFICANT CHANGE UP
GLUCOSE BLDC GLUCOMTR-MCNC: 128 MG/DL — HIGH (ref 70–99)
GLUCOSE BLDC GLUCOMTR-MCNC: 128 MG/DL — HIGH (ref 70–99)
GLUCOSE BLDC GLUCOMTR-MCNC: 130 MG/DL — HIGH (ref 70–99)
GLUCOSE BLDC GLUCOMTR-MCNC: 130 MG/DL — HIGH (ref 70–99)
GLUCOSE BLDC GLUCOMTR-MCNC: 131 MG/DL — HIGH (ref 70–99)
GLUCOSE BLDC GLUCOMTR-MCNC: 131 MG/DL — HIGH (ref 70–99)
GLUCOSE BLDC GLUCOMTR-MCNC: 138 MG/DL — HIGH (ref 70–99)
GLUCOSE BLDC GLUCOMTR-MCNC: 138 MG/DL — HIGH (ref 70–99)
GLUCOSE SERPL-MCNC: 131 MG/DL — HIGH (ref 70–99)
GLUCOSE SERPL-MCNC: 131 MG/DL — HIGH (ref 70–99)
GLUCOSE SERPL-MCNC: 132 MG/DL — HIGH (ref 70–99)
GLUCOSE SERPL-MCNC: 132 MG/DL — HIGH (ref 70–99)
GLUCOSE SERPL-MCNC: 135 MG/DL — HIGH (ref 70–99)
GLUCOSE SERPL-MCNC: 135 MG/DL — HIGH (ref 70–99)
GLUCOSE SERPL-MCNC: 187 MG/DL — HIGH (ref 70–99)
GLUCOSE SERPL-MCNC: 187 MG/DL — HIGH (ref 70–99)
HCT VFR BLD CALC: 29.6 % — LOW (ref 39–50)
HCT VFR BLD CALC: 29.6 % — LOW (ref 39–50)
HCT VFR BLD CALC: 30.8 % — LOW (ref 39–50)
HCT VFR BLD CALC: 30.8 % — LOW (ref 39–50)
HCT VFR BLD CALC: 32 % — LOW (ref 39–50)
HCT VFR BLD CALC: 32 % — LOW (ref 39–50)
HCT VFR BLD CALC: 34.5 % — LOW (ref 39–50)
HCT VFR BLD CALC: 34.5 % — LOW (ref 39–50)
HGB BLD-MCNC: 10.5 G/DL — LOW (ref 13–17)
HGB BLD-MCNC: 10.5 G/DL — LOW (ref 13–17)
HGB BLD-MCNC: 11 G/DL — LOW (ref 13–17)
HGB BLD-MCNC: 11 G/DL — LOW (ref 13–17)
HGB BLD-MCNC: 11.9 G/DL — LOW (ref 13–17)
HGB BLD-MCNC: 11.9 G/DL — LOW (ref 13–17)
HGB BLD-MCNC: 9.9 G/DL — LOW (ref 13–17)
HGB BLD-MCNC: 9.9 G/DL — LOW (ref 13–17)
INR BLD: 0.98 RATIO — SIGNIFICANT CHANGE UP (ref 0.85–1.18)
INR BLD: 0.98 RATIO — SIGNIFICANT CHANGE UP (ref 0.85–1.18)
INR BLD: 1.07 RATIO — SIGNIFICANT CHANGE UP (ref 0.85–1.18)
INR BLD: 1.07 RATIO — SIGNIFICANT CHANGE UP (ref 0.85–1.18)
INR BLD: 1.1 RATIO — SIGNIFICANT CHANGE UP (ref 0.85–1.18)
INR BLD: 1.1 RATIO — SIGNIFICANT CHANGE UP (ref 0.85–1.18)
MAGNESIUM SERPL-MCNC: 2 MG/DL — SIGNIFICANT CHANGE UP (ref 1.6–2.6)
MAGNESIUM SERPL-MCNC: 2.1 MG/DL — SIGNIFICANT CHANGE UP (ref 1.6–2.6)
MAGNESIUM SERPL-MCNC: 2.1 MG/DL — SIGNIFICANT CHANGE UP (ref 1.6–2.6)
MAGNESIUM SERPL-MCNC: 2.2 MG/DL — SIGNIFICANT CHANGE UP (ref 1.6–2.6)
MAGNESIUM SERPL-MCNC: 2.2 MG/DL — SIGNIFICANT CHANGE UP (ref 1.6–2.6)
MCHC RBC-ENTMCNC: 30.8 PG — SIGNIFICANT CHANGE UP (ref 27–34)
MCHC RBC-ENTMCNC: 30.8 PG — SIGNIFICANT CHANGE UP (ref 27–34)
MCHC RBC-ENTMCNC: 31 PG — SIGNIFICANT CHANGE UP (ref 27–34)
MCHC RBC-ENTMCNC: 31 PG — SIGNIFICANT CHANGE UP (ref 27–34)
MCHC RBC-ENTMCNC: 31.1 PG — SIGNIFICANT CHANGE UP (ref 27–34)
MCHC RBC-ENTMCNC: 31.1 PG — SIGNIFICANT CHANGE UP (ref 27–34)
MCHC RBC-ENTMCNC: 31.2 PG — SIGNIFICANT CHANGE UP (ref 27–34)
MCHC RBC-ENTMCNC: 31.2 PG — SIGNIFICANT CHANGE UP (ref 27–34)
MCHC RBC-ENTMCNC: 33.4 GM/DL — SIGNIFICANT CHANGE UP (ref 32–36)
MCHC RBC-ENTMCNC: 33.4 GM/DL — SIGNIFICANT CHANGE UP (ref 32–36)
MCHC RBC-ENTMCNC: 34.1 GM/DL — SIGNIFICANT CHANGE UP (ref 32–36)
MCHC RBC-ENTMCNC: 34.1 GM/DL — SIGNIFICANT CHANGE UP (ref 32–36)
MCHC RBC-ENTMCNC: 34.4 GM/DL — SIGNIFICANT CHANGE UP (ref 32–36)
MCHC RBC-ENTMCNC: 34.4 GM/DL — SIGNIFICANT CHANGE UP (ref 32–36)
MCHC RBC-ENTMCNC: 34.5 GM/DL — SIGNIFICANT CHANGE UP (ref 32–36)
MCHC RBC-ENTMCNC: 34.5 GM/DL — SIGNIFICANT CHANGE UP (ref 32–36)
MCV RBC AUTO: 90.1 FL — SIGNIFICANT CHANGE UP (ref 80–100)
MCV RBC AUTO: 91.4 FL — SIGNIFICANT CHANGE UP (ref 80–100)
MCV RBC AUTO: 91.4 FL — SIGNIFICANT CHANGE UP (ref 80–100)
MCV RBC AUTO: 92.2 FL — SIGNIFICANT CHANGE UP (ref 80–100)
MCV RBC AUTO: 92.2 FL — SIGNIFICANT CHANGE UP (ref 80–100)
NRBC # BLD: 0 /100 WBCS — SIGNIFICANT CHANGE UP (ref 0–0)
PHOSPHATE SERPL-MCNC: 3.7 MG/DL — SIGNIFICANT CHANGE UP (ref 2.5–4.5)
PHOSPHATE SERPL-MCNC: 3.7 MG/DL — SIGNIFICANT CHANGE UP (ref 2.5–4.5)
PHOSPHATE SERPL-MCNC: 3.8 MG/DL — SIGNIFICANT CHANGE UP (ref 2.5–4.5)
PHOSPHATE SERPL-MCNC: 3.8 MG/DL — SIGNIFICANT CHANGE UP (ref 2.5–4.5)
PHOSPHATE SERPL-MCNC: 3.9 MG/DL — SIGNIFICANT CHANGE UP (ref 2.5–4.5)
PHOSPHATE SERPL-MCNC: 3.9 MG/DL — SIGNIFICANT CHANGE UP (ref 2.5–4.5)
PHOSPHATE SERPL-MCNC: 4.3 MG/DL — SIGNIFICANT CHANGE UP (ref 2.5–4.5)
PHOSPHATE SERPL-MCNC: 4.3 MG/DL — SIGNIFICANT CHANGE UP (ref 2.5–4.5)
PLATELET # BLD AUTO: 111 K/UL — LOW (ref 150–400)
PLATELET # BLD AUTO: 111 K/UL — LOW (ref 150–400)
PLATELET # BLD AUTO: 114 K/UL — LOW (ref 150–400)
PLATELET # BLD AUTO: 114 K/UL — LOW (ref 150–400)
PLATELET # BLD AUTO: 115 K/UL — LOW (ref 150–400)
PLATELET # BLD AUTO: 115 K/UL — LOW (ref 150–400)
PLATELET # BLD AUTO: 123 K/UL — LOW (ref 150–400)
PLATELET # BLD AUTO: 123 K/UL — LOW (ref 150–400)
POTASSIUM SERPL-MCNC: 4.5 MMOL/L — SIGNIFICANT CHANGE UP (ref 3.5–5.3)
POTASSIUM SERPL-MCNC: 4.7 MMOL/L — SIGNIFICANT CHANGE UP (ref 3.5–5.3)
POTASSIUM SERPL-MCNC: 4.7 MMOL/L — SIGNIFICANT CHANGE UP (ref 3.5–5.3)
POTASSIUM SERPL-SCNC: 4.5 MMOL/L — SIGNIFICANT CHANGE UP (ref 3.5–5.3)
POTASSIUM SERPL-SCNC: 4.7 MMOL/L — SIGNIFICANT CHANGE UP (ref 3.5–5.3)
POTASSIUM SERPL-SCNC: 4.7 MMOL/L — SIGNIFICANT CHANGE UP (ref 3.5–5.3)
PROT SERPL-MCNC: 4.8 G/DL — LOW (ref 6–8.3)
PROT SERPL-MCNC: 4.8 G/DL — LOW (ref 6–8.3)
PROT SERPL-MCNC: 4.9 G/DL — LOW (ref 6–8.3)
PROT SERPL-MCNC: 4.9 G/DL — LOW (ref 6–8.3)
PROT SERPL-MCNC: 5.5 G/DL — LOW (ref 6–8.3)
PROT SERPL-MCNC: 5.5 G/DL — LOW (ref 6–8.3)
PROT SERPL-MCNC: 5.7 G/DL — LOW (ref 6–8.3)
PROT SERPL-MCNC: 5.7 G/DL — LOW (ref 6–8.3)
PROTHROM AB SERPL-ACNC: 10.8 SEC — SIGNIFICANT CHANGE UP (ref 9.5–13)
PROTHROM AB SERPL-ACNC: 10.8 SEC — SIGNIFICANT CHANGE UP (ref 9.5–13)
PROTHROM AB SERPL-ACNC: 11.2 SEC — SIGNIFICANT CHANGE UP (ref 9.5–13)
PROTHROM AB SERPL-ACNC: 11.2 SEC — SIGNIFICANT CHANGE UP (ref 9.5–13)
PROTHROM AB SERPL-ACNC: 11.5 SEC — SIGNIFICANT CHANGE UP (ref 9.5–13)
PROTHROM AB SERPL-ACNC: 11.5 SEC — SIGNIFICANT CHANGE UP (ref 9.5–13)
RBC # BLD: 3.21 M/UL — LOW (ref 4.2–5.8)
RBC # BLD: 3.21 M/UL — LOW (ref 4.2–5.8)
RBC # BLD: 3.37 M/UL — LOW (ref 4.2–5.8)
RBC # BLD: 3.37 M/UL — LOW (ref 4.2–5.8)
RBC # BLD: 3.55 M/UL — LOW (ref 4.2–5.8)
RBC # BLD: 3.55 M/UL — LOW (ref 4.2–5.8)
RBC # BLD: 3.83 M/UL — LOW (ref 4.2–5.8)
RBC # BLD: 3.83 M/UL — LOW (ref 4.2–5.8)
RBC # FLD: 14.6 % — HIGH (ref 10.3–14.5)
RBC # FLD: 14.6 % — HIGH (ref 10.3–14.5)
RBC # FLD: 14.7 % — HIGH (ref 10.3–14.5)
RBC # FLD: 14.7 % — HIGH (ref 10.3–14.5)
RBC # FLD: 14.8 % — HIGH (ref 10.3–14.5)
RH IG SCN BLD-IMP: POSITIVE — SIGNIFICANT CHANGE UP
RH IG SCN BLD-IMP: POSITIVE — SIGNIFICANT CHANGE UP
SODIUM SERPL-SCNC: 137 MMOL/L — SIGNIFICANT CHANGE UP (ref 135–145)
SODIUM SERPL-SCNC: 138 MMOL/L — SIGNIFICANT CHANGE UP (ref 135–145)
SODIUM SERPL-SCNC: 138 MMOL/L — SIGNIFICANT CHANGE UP (ref 135–145)
SODIUM SERPL-SCNC: 141 MMOL/L — SIGNIFICANT CHANGE UP (ref 135–145)
SODIUM SERPL-SCNC: 141 MMOL/L — SIGNIFICANT CHANGE UP (ref 135–145)
TROPONIN T, HIGH SENSITIVITY RESULT: 8 NG/L — SIGNIFICANT CHANGE UP (ref 0–51)
TROPONIN T, HIGH SENSITIVITY RESULT: 8 NG/L — SIGNIFICANT CHANGE UP (ref 0–51)
WBC # BLD: 19.87 K/UL — HIGH (ref 3.8–10.5)
WBC # BLD: 19.87 K/UL — HIGH (ref 3.8–10.5)
WBC # BLD: 20.7 K/UL — HIGH (ref 3.8–10.5)
WBC # BLD: 20.7 K/UL — HIGH (ref 3.8–10.5)
WBC # BLD: 23.7 K/UL — HIGH (ref 3.8–10.5)
WBC # BLD: 23.7 K/UL — HIGH (ref 3.8–10.5)
WBC # BLD: 23.84 K/UL — HIGH (ref 3.8–10.5)
WBC # BLD: 23.84 K/UL — HIGH (ref 3.8–10.5)
WBC # FLD AUTO: 19.87 K/UL — HIGH (ref 3.8–10.5)
WBC # FLD AUTO: 19.87 K/UL — HIGH (ref 3.8–10.5)
WBC # FLD AUTO: 20.7 K/UL — HIGH (ref 3.8–10.5)
WBC # FLD AUTO: 20.7 K/UL — HIGH (ref 3.8–10.5)
WBC # FLD AUTO: 23.7 K/UL — HIGH (ref 3.8–10.5)
WBC # FLD AUTO: 23.7 K/UL — HIGH (ref 3.8–10.5)
WBC # FLD AUTO: 23.84 K/UL — HIGH (ref 3.8–10.5)
WBC # FLD AUTO: 23.84 K/UL — HIGH (ref 3.8–10.5)

## 2023-12-14 PROCEDURE — 93010 ELECTROCARDIOGRAM REPORT: CPT

## 2023-12-14 PROCEDURE — 71045 X-RAY EXAM CHEST 1 VIEW: CPT | Mod: 26,76

## 2023-12-14 PROCEDURE — 99233 SBSQ HOSP IP/OBS HIGH 50: CPT | Mod: GC

## 2023-12-14 PROCEDURE — 93010 ELECTROCARDIOGRAM REPORT: CPT | Mod: 77

## 2023-12-14 PROCEDURE — 74018 RADEX ABDOMEN 1 VIEW: CPT | Mod: 26

## 2023-12-14 RX ORDER — ONDANSETRON 8 MG/1
4 TABLET, FILM COATED ORAL ONCE
Refills: 0 | Status: COMPLETED | OUTPATIENT
Start: 2023-12-14 | End: 2023-12-17

## 2023-12-14 RX ORDER — HYDROMORPHONE HYDROCHLORIDE 2 MG/ML
0.25 INJECTION INTRAMUSCULAR; INTRAVENOUS; SUBCUTANEOUS ONCE
Refills: 0 | Status: DISCONTINUED | OUTPATIENT
Start: 2023-12-14 | End: 2023-12-14

## 2023-12-14 RX ORDER — INSULIN LISPRO 100/ML
VIAL (ML) SUBCUTANEOUS EVERY 6 HOURS
Refills: 0 | Status: DISCONTINUED | OUTPATIENT
Start: 2023-12-14 | End: 2023-12-16

## 2023-12-14 RX ORDER — PANTOPRAZOLE SODIUM 20 MG/1
40 TABLET, DELAYED RELEASE ORAL EVERY 24 HOURS
Refills: 0 | Status: DISCONTINUED | OUTPATIENT
Start: 2023-12-14 | End: 2023-12-17

## 2023-12-14 RX ORDER — SIMETHICONE 80 MG/1
80 TABLET, CHEWABLE ORAL EVERY 6 HOURS
Refills: 0 | Status: DISCONTINUED | OUTPATIENT
Start: 2023-12-14 | End: 2023-12-18

## 2023-12-14 RX ORDER — ENOXAPARIN SODIUM 100 MG/ML
40 INJECTION SUBCUTANEOUS EVERY 24 HOURS
Refills: 0 | Status: DISCONTINUED | OUTPATIENT
Start: 2023-12-14 | End: 2023-12-18

## 2023-12-14 RX ORDER — LABETALOL HCL 100 MG
10 TABLET ORAL ONCE
Refills: 0 | Status: COMPLETED | OUTPATIENT
Start: 2023-12-14 | End: 2023-12-14

## 2023-12-14 RX ORDER — HYDROMORPHONE HYDROCHLORIDE 2 MG/ML
0.5 INJECTION INTRAMUSCULAR; INTRAVENOUS; SUBCUTANEOUS
Refills: 0 | Status: DISCONTINUED | OUTPATIENT
Start: 2023-12-14 | End: 2023-12-16

## 2023-12-14 RX ORDER — HYDROMORPHONE HYDROCHLORIDE 2 MG/ML
0.5 INJECTION INTRAMUSCULAR; INTRAVENOUS; SUBCUTANEOUS ONCE
Refills: 0 | Status: DISCONTINUED | OUTPATIENT
Start: 2023-12-14 | End: 2023-12-14

## 2023-12-14 RX ORDER — METOPROLOL TARTRATE 50 MG
5 TABLET ORAL EVERY 6 HOURS
Refills: 0 | Status: DISCONTINUED | OUTPATIENT
Start: 2023-12-14 | End: 2023-12-16

## 2023-12-14 RX ADMIN — HYDROMORPHONE HYDROCHLORIDE 0.25 MILLIGRAM(S): 2 INJECTION INTRAMUSCULAR; INTRAVENOUS; SUBCUTANEOUS at 04:23

## 2023-12-14 RX ADMIN — HYDROMORPHONE HYDROCHLORIDE 0.5 MILLIGRAM(S): 2 INJECTION INTRAMUSCULAR; INTRAVENOUS; SUBCUTANEOUS at 16:20

## 2023-12-14 RX ADMIN — SODIUM CHLORIDE 150 MILLILITER(S): 9 INJECTION, SOLUTION INTRAVENOUS at 08:42

## 2023-12-14 RX ADMIN — Medication 1000 MILLIGRAM(S): at 11:45

## 2023-12-14 RX ADMIN — ENOXAPARIN SODIUM 40 MILLIGRAM(S): 100 INJECTION SUBCUTANEOUS at 17:22

## 2023-12-14 RX ADMIN — Medication 1000 MILLIGRAM(S): at 06:23

## 2023-12-14 RX ADMIN — SIMETHICONE 80 MILLIGRAM(S): 80 TABLET, CHEWABLE ORAL at 22:12

## 2023-12-14 RX ADMIN — SODIUM CHLORIDE 150 MILLILITER(S): 9 INJECTION, SOLUTION INTRAVENOUS at 04:23

## 2023-12-14 RX ADMIN — HYDROMORPHONE HYDROCHLORIDE 0.5 MILLIGRAM(S): 2 INJECTION INTRAMUSCULAR; INTRAVENOUS; SUBCUTANEOUS at 08:42

## 2023-12-14 RX ADMIN — Medication 5 MILLIGRAM(S): at 17:23

## 2023-12-14 RX ADMIN — HYDROMORPHONE HYDROCHLORIDE 0.25 MILLIGRAM(S): 2 INJECTION INTRAMUSCULAR; INTRAVENOUS; SUBCUTANEOUS at 02:15

## 2023-12-14 RX ADMIN — SODIUM CHLORIDE 150 MILLILITER(S): 9 INJECTION, SOLUTION INTRAVENOUS at 20:02

## 2023-12-14 RX ADMIN — SODIUM CHLORIDE 150 MILLILITER(S): 9 INJECTION, SOLUTION INTRAVENOUS at 05:53

## 2023-12-14 RX ADMIN — SODIUM CHLORIDE 150 MILLILITER(S): 9 INJECTION, SOLUTION INTRAVENOUS at 16:27

## 2023-12-14 RX ADMIN — PANTOPRAZOLE SODIUM 40 MILLIGRAM(S): 20 TABLET, DELAYED RELEASE ORAL at 22:59

## 2023-12-14 RX ADMIN — HYDROMORPHONE HYDROCHLORIDE 0.25 MILLIGRAM(S): 2 INJECTION INTRAMUSCULAR; INTRAVENOUS; SUBCUTANEOUS at 02:33

## 2023-12-14 RX ADMIN — SIMETHICONE 80 MILLIGRAM(S): 80 TABLET, CHEWABLE ORAL at 16:27

## 2023-12-14 RX ADMIN — Medication 10 MILLIGRAM(S): at 16:05

## 2023-12-14 RX ADMIN — Medication 400 MILLIGRAM(S): at 05:53

## 2023-12-14 RX ADMIN — HYDROMORPHONE HYDROCHLORIDE 0.5 MILLIGRAM(S): 2 INJECTION INTRAMUSCULAR; INTRAVENOUS; SUBCUTANEOUS at 23:18

## 2023-12-14 RX ADMIN — HYDROMORPHONE HYDROCHLORIDE 0.25 MILLIGRAM(S): 2 INJECTION INTRAMUSCULAR; INTRAVENOUS; SUBCUTANEOUS at 05:00

## 2023-12-14 RX ADMIN — Medication 400 MILLIGRAM(S): at 17:22

## 2023-12-14 RX ADMIN — HYDROMORPHONE HYDROCHLORIDE 0.5 MILLIGRAM(S): 2 INJECTION INTRAMUSCULAR; INTRAVENOUS; SUBCUTANEOUS at 23:15

## 2023-12-14 RX ADMIN — HYDROMORPHONE HYDROCHLORIDE 0.5 MILLIGRAM(S): 2 INJECTION INTRAMUSCULAR; INTRAVENOUS; SUBCUTANEOUS at 08:45

## 2023-12-14 RX ADMIN — Medication 400 MILLIGRAM(S): at 11:08

## 2023-12-14 RX ADMIN — SODIUM CHLORIDE 150 MILLILITER(S): 9 INJECTION, SOLUTION INTRAVENOUS at 00:09

## 2023-12-14 RX ADMIN — Medication 400 MILLIGRAM(S): at 00:09

## 2023-12-14 RX ADMIN — HYDROMORPHONE HYDROCHLORIDE 0.5 MILLIGRAM(S): 2 INJECTION INTRAMUSCULAR; INTRAVENOUS; SUBCUTANEOUS at 13:04

## 2023-12-14 RX ADMIN — Medication 1000 MILLIGRAM(S): at 00:39

## 2023-12-14 RX ADMIN — HYDROMORPHONE HYDROCHLORIDE 0.5 MILLIGRAM(S): 2 INJECTION INTRAMUSCULAR; INTRAVENOUS; SUBCUTANEOUS at 20:48

## 2023-12-14 RX ADMIN — Medication 5 MILLIGRAM(S): at 23:03

## 2023-12-14 RX ADMIN — CHLORHEXIDINE GLUCONATE 1 APPLICATION(S): 213 SOLUTION TOPICAL at 05:53

## 2023-12-14 RX ADMIN — HYDROMORPHONE HYDROCHLORIDE 0.5 MILLIGRAM(S): 2 INJECTION INTRAMUSCULAR; INTRAVENOUS; SUBCUTANEOUS at 22:45

## 2023-12-14 RX ADMIN — HYDROMORPHONE HYDROCHLORIDE 0.5 MILLIGRAM(S): 2 INJECTION INTRAMUSCULAR; INTRAVENOUS; SUBCUTANEOUS at 16:04

## 2023-12-14 RX ADMIN — HYDROMORPHONE HYDROCHLORIDE 0.5 MILLIGRAM(S): 2 INJECTION INTRAMUSCULAR; INTRAVENOUS; SUBCUTANEOUS at 13:20

## 2023-12-14 NOTE — DIETITIAN INITIAL EVALUATION ADULT - PROBLEM SELECTOR PLAN 1
- Start CIWA protocol, IVP Lorazepam 2mg PRN for score 8 and up  - C/w thiamine 100mg daily  - C/w folic acid 1mg daily  - Seizure and aspiration precautions

## 2023-12-14 NOTE — PROVIDER CONTACT NOTE (CHANGE IN STATUS NOTIFICATION) - ASSESSMENT
Pt in visible distress. Complaining of shortness of breath saturation 92% on room air. HR in 120s. Pt hypertensive SBP in 160s. Pulses remain palpable in extremities. NG tube output 500 throughout shift.

## 2023-12-14 NOTE — CHART NOTE - NSCHARTNOTEFT_GEN_A_CORE
Patient informed nurse that he was unable to feel his RLE and it has become progressively numb over the last 30 minutes. Patient was examined and found to have pulses in the bilateral lower extremities. However, patient did not have sensation from RLE from the knee to the foot on the dorsal aspect. Patient had normal sensation on LLE. Patient had full range of motion of bilateral extremities... Vascular fellow was informed of exam--> plan to be monitored for 30 minutes and reassessed.

## 2023-12-14 NOTE — PROGRESS NOTE ADULT - ASSESSMENT
59 y/o male w/ PMHx CLTI ([place] 4), alcohol abuse w/ history of withdrawal seizures, HLD and smoking who presents for alcohol withdrawal and pain in his legs.  Now s/p aortobifemoral bypass graft on 12/13.     Plan/Recs  - recovering from OR in SICU   - pain medications as needed   - NPO with NGT   - monitor neurovascular checks   - Medical optimization noted  - Patient on CIWA protocol  - SICU care     Vascular Surgery  [phone]      59 y/o male w/ PMHx CLTI ([place] 4), alcohol abuse w/ history of withdrawal seizures, HLD and smoking who presents for alcohol withdrawal and pain in his legs.  Now s/p aortobifemoral bypass graft on 12/13.     Plan/Recs  - recovering from OR in SICU   - pain medications as needed   - NPO with NGT   - monitor neurovascular checks   - Medical optimization noted  - Patient on CIWA protocol  - SICU care     Vascular Surgery  [phone]

## 2023-12-14 NOTE — PROGRESS NOTE ADULT - ASSESSMENT
Event Note Event Note Event Note MED PA NOTE/Event Note DAILY note Event Note Event Note Event Note Event Note Event Note Event Note Event Note MED PA/Event Note MED PA/Event Note Transfer Note Vascular surgery - post-op note/Event Note Vascular surgery - pre-op note/Event Note Vascular surgery -/Event Note 59 y/o male w/ PMHx CLTI ([place] 4), alcohol abuse w/ history of withdrawal seizures, HLD and smoking who presents for alcohol withdrawal and pain in his legs. Patient planned to intially stop drinking prior to procedure however he was unable to stop. Patient admitted to hospital for Acholol withdrawal and planned aorto-bifemoral bypass. Patient admitted to the SICU for Q1 checks s/p bypass procedure.       Neuro   Alert and Oriented  CIWA  PRN DILAUDID  Standing Ofirmev      Resp  RA     CV  Monitor BP  Hemodynamically stable off pressors   Assess vascular status Q1hr - LLE / RLE       GI   NG tube to suction  NPO       /RENAL  Monitor intake and output   Trend Cr.   Indwelling morris  LR @ 150mL/Hr      Endo  Monitor glucose  Insulin 10- for hyperkalemia    59 y/o male w/ PMHx CLTI ([place] 4), alcohol abuse w/ history of withdrawal seizures, HLD and smoking who presents for alcohol withdrawal and pain in his legs. Patient planned to intially stop drinking prior to procedure however he was unable to stop. Patient admitted to hospital for Acholol withdrawal and planned aorto-bifemoral bypass. Patient admitted to the SICU for Q1 checks s/p bypass procedure.       Neuro   Alert and Oriented  CIWA  PRN DILAUDID  Standing Ofirmev      Resp  RA     CV  Monitor BP  Hemodynamically stable off pressors   Assess vascular status Q1hr - LLE / RLE       GI   NG tube to suction  NPO       /RENAL  Monitor intake and output   Trend Cr.   Indwelling morris  LR @ 150mL/Hr      Endo  Monitor glucose  Insulin 10- for hyperkalemia

## 2023-12-14 NOTE — DIETITIAN INITIAL EVALUATION ADULT - NSFNSGIIOFT_GEN_A_CORE
12-13-23 @ 07:01  -  12-14-23 @ 07:00  --------------------------------------------------------  OUT:    Nasogastric/Oral tube (mL): 0 mL  Total OUT: 0 mL    Last BM: none documented; ? accuracy  Bowel Regimen: none

## 2023-12-14 NOTE — PROVIDER CONTACT NOTE (CHANGE IN STATUS NOTIFICATION) - ACTION/TREATMENT ORDERED:
ECG ordered, labs with troponin ordered. Pain medication to be given. Labetalol ordered. NG tube adjusted at bedside. XRay ordered.

## 2023-12-14 NOTE — DIETITIAN INITIAL EVALUATION ADULT - PERTINENT LABORATORY DATA
12-14    137  |  105  |  13  ----------------------------<  135<H>  4.5   |  24  |  0.75    Ca    8.6      14 Dec 2023 05:53  Phos  4.3     12-14  Mg     2.0     12-14    TPro  4.8<L>  /  Alb  2.8<L>  /  TBili  0.6  /  DBili  x   /  AST  30  /  ALT  25  /  AlkPhos  56  12-14  POCT Blood Glucose.: 138 mg/dL (12-14-23 @ 02:27)  A1C with Estimated Average Glucose Result: 5.3 % (12-08-23 @ 20:30)

## 2023-12-14 NOTE — PROVIDER CONTACT NOTE (CHANGE IN STATUS NOTIFICATION) - RECOMMENDATIONS
Providers to come to bedside. ECG and labs to be done for cardiac evaluation. NG adjustment with XRay for confirmation.

## 2023-12-14 NOTE — CHART NOTE - NSCHARTNOTEFT_GEN_A_CORE
Asked to assess patient for decreased sensation in his right leg. Has diminished sensation from the right knee through foot compared to the left.  I previously assessed him at midnight and patient had no sensory complaints. Patient noticed as he was recovering from anesthesia that his right leg had persistent numbness. He denies any pain. He remains motor intact with 5/5 strength in his foot and 5/5 strength in the knee. He can bend at the hip. AT/DP signal on the right equal to prior examination. No swelling or hematoma noted. He is in no distress    /74  HR 84  Zavala in place with UOP 30-75/hr   Lactate downtrending to 3.5-Benign abdominal exam     Plan:  Will need to monitor neurovasc exam closely for evolving paresthesias or development of motor deficit.   Continue resuscitation     D/W Dr. Heredia

## 2023-12-14 NOTE — DIETITIAN INITIAL EVALUATION ADULT - REASON
Pt appears appropriately developed with no overt signs of muscle or fat depletion; no malnutrition risk factors identified at this time.

## 2023-12-14 NOTE — DIETITIAN INITIAL EVALUATION ADULT - REASON INDICATOR FOR ASSESSMENT
Nutrition Assessment warranted for length of stay on SICU  Information obtained from: medical record, 8ICU Interdisciplinary Rounds   Pt on CIWA protocol.

## 2023-12-14 NOTE — DIETITIAN INITIAL EVALUATION ADULT - OTHER INFO
Nutrition Status:  - Vascular following for severe PAD; s/p aortobifemoral bypass graft 12/13  - [facility] protocol for ETOH abuse, h/o withdrawal with seizures; s/p thiamine and folic acid on admission  - HbA1c 5.3%. SSI ordered q6 hrs  - IVF while NPO: lactated ringers @ 50 ml/hr Nutrition Status:  - Vascular following for severe PAD; s/p aortobifemoral bypass graft 12/13  - [facility] protocol for ETOH abuse, h/o withdrawal with seizures; s/p thiamine and folic acid on admission  - HbA1c 5.3%. SSI ordered q6 hrs  - IVF while NPO: lactated ringers @ 50 ml/hr

## 2023-12-14 NOTE — PROGRESS NOTE ADULT - SUBJECTIVE AND OBJECTIVE BOX
24 hour events   - s/p aorta-bifemoralbypass  - Q1 hr vascular checks     SUBJECTIVE/ROS:  [ x] A ten-point review of systems was otherwise negative except as noted.  [ ] Due to altered mental status/intubation, subjective information were not able to be obtained from the patient. History was obtained, to the extent possible, from review of the chart and collateral sources of information.      NEURO  Exam: awake, alert, oriented  Meds: acetaminophen   IVPB .. 1000 milliGRAM(s) IV Intermittent every 6 hours  HYDROmorphone  Injectable 0.25 milliGRAM(s) IV Push every 3 hours PRN Moderate Pain (4 - 6)    [x] Adequacy of sedation and pain control has been assessed and adjusted      RESPIRATORY  RR: 21 (12-14-23 @ 01:00) (12 - 25)  SpO2: 95% (12-14-23 @ 01:00) (93% - 97%)  Wt(kg): --  Exam: unlabored, clear to auscultation bilaterally  ABG - ( 14 Dec 2023 00:09 )  pH: 7.39  /  pCO2: 36    /  pO2: 86    / HCO3: 22    / Base Excess: -2.7  /  SaO2: 98.0    Lactate: x                CARDIOVASCULAR  HR: 91 (12-14-23 @ 01:00) (70 - 101)  BP: 101/69 (12-13-23 @ 19:30) (101/69 - 152/79)  BP(mean): 80 (12-13-23 @ 19:30) (80 - 80)  ABP: 138/71 (12-14-23 @ 01:00) (93/48 - 138/71)  ABP(mean): 93 (12-14-23 @ 01:00) (65 - 93)  Wt(kg): --  CVP(cm H2O): --      Exam: regular rate and rhythm  Cardiac Rhythm: sinus  Perfusion     [x]Adequate   [ ]Inadequate  Mentation   [x]Normal       [ ]Reduced  Extremities  [x]Warm         [ ]Cool  Volume Status [ ]Hypervolemic [x]Euvolemic [ ]Hypovolemic        GI/NUTRITION  Exam: soft, nontender, nondistended, incision C/D/I  Diet: NPO      GENITOURINARY  I&O's Detail    12-13 @ 07:01  -  12-14 @ 02:08  --------------------------------------------------------  IN:    IV PiggyBack: 100 mL    IV PiggyBack: 150 mL    Lactated Ringers: 900 mL    Lactated Ringers Bolus: 500 mL  Total IN: 1650 mL    OUT:    Indwelling Catheter - Urethral (mL): 255 mL  Total OUT: 255 mL    Total NET: 1395 mL        Weight (kg): 78.9 (12-13 @ 08:49)  12-14    141  |  108  |  11  ----------------------------<  187<H>  4.5   |  21<L>  |  0.69    Ca    8.7      14 Dec 2023 00:15  Phos  3.9     12-14  Mg     2.2     12-14    TPro  4.9<L>  /  Alb  3.1<L>  /  TBili  1.3<H>  /  DBili  x   /  AST  28  /  ALT  25  /  AlkPhos  57  12-14    [ ] Zavala catheter, indication: N/A  Meds: lactated ringers. 1000 milliLiter(s) IV Continuous <Continuous>        HEMATOLOGIC  Meds:   [x] VTE Prophylaxis                        11.9   23.84 )-----------( 123      ( 14 Dec 2023 00:15 )             34.5     PT/INR - ( 13 Dec 2023 20:09 )   PT: 12.3 sec;   INR: 1.18 ratio         PTT - ( 13 Dec 2023 20:09 )  PTT:27.4 sec  Transfusion     [ ] PRBC   [ ] Platelets   [ ] FFP   [ ] Cryoprecipitate      INFECTIOUS DISEASES  WBC Count: 23.84 K/uL (12-14 @ 00:15)  WBC Count: 21.52 K/uL (12-13 @ 20:09)  WBC Count: 10.33 K/uL (12-13 @ 04:44)        ENDOCRINE  CAPILLARY BLOOD GLUCOSE      POCT Blood Glucose.: 230 mg/dL (13 Dec 2023 22:12)  POCT Blood Glucose.: 290 mg/dL (13 Dec 2023 21:44)  POCT Blood Glucose.: 168 mg/dL (13 Dec 2023 21:24)    Meds: insulin lispro (ADMELOG) corrective regimen sliding scale   SubCutaneous every 4 hours      OTHER MEDICATIONS:  chlorhexidine 2% Cloths 1 Application(s) Topical <User Schedule>      CODE STATUS: FULL CODE

## 2023-12-14 NOTE — DIETITIAN INITIAL EVALUATION ADULT - PERTINENT MEDS FT
MEDICATIONS  (STANDING):  acetaminophen   IVPB .. 1000 milliGRAM(s) IV Intermittent every 6 hours  chlorhexidine 2% Cloths 1 Application(s) Topical <User Schedule>  insulin lispro (ADMELOG) corrective regimen sliding scale   SubCutaneous every 6 hours  lactated ringers. 1000 milliLiter(s) (150 mL/Hr) IV Continuous <Continuous>    MEDICATIONS  (PRN):  HYDROmorphone  Injectable 0.25 milliGRAM(s) IV Push every 3 hours PRN Moderate Pain (4 - 6)  HYDROmorphone  Injectable 0.5 milliGRAM(s) IV Push every 3 hours PRN Severe Pain (7 - 10)

## 2023-12-14 NOTE — DIETITIAN INITIAL EVALUATION ADULT - ENERGY INTAKE
Pt previously ordered for DASH diet 12/9-12/13; PO intake >75% noted on nursing flowsheet.  Currently NPO as of 12/13, with NGT to LCS

## 2023-12-14 NOTE — PROVIDER CONTACT NOTE (CHANGE IN STATUS NOTIFICATION) - SITUATION
Pt complaining of abdominal pain radiating to back and left shoulder. Patient rates pain 10/10. Patient describes pain as "so much pressure I feel like I might vomit"

## 2023-12-14 NOTE — DIETITIAN INITIAL EVALUATION ADULT - NSICDXPASTMEDICALHX_GEN_ALL_CORE_FT
PAST MEDICAL HISTORY:  Alcohol abuse     FAP (familial adenomatous polyposis)     HTN (hypertension)

## 2023-12-14 NOTE — DIETITIAN INITIAL EVALUATION ADULT - PHYSCIAL ASSESSMENT
Relatively stable weight history per HIE:  83.9kg (11/6/19), 81.6kg ( 11/29/23), 78.9kg(12/8/23)  IBW: 78 kg

## 2023-12-14 NOTE — PROGRESS NOTE ADULT - SUBJECTIVE AND OBJECTIVE BOX
SURGERY DAILY PROGRESS NOTE:     24 hour events   - s/p aorta-bifemoralbypass  - Q1 hr vascular checks    SUBJECTIVE/ROS: Patient seen and evaluated on AM rounds.   Continues to report some numbness in the right foot.     OBJECTIVE:    Vital Signs Last 24 Hrs  T(C): 36.6 (14 Dec 2023 03:00), Max: 36.7 (13 Dec 2023 23:00)  T(F): 97.8 (14 Dec 2023 03:00), Max: 98 (13 Dec 2023 23:00)  HR: 90 (14 Dec 2023 07:00) (70 - 101)  BP: 101/69 (13 Dec 2023 19:30) (101/69 - 127/85)  BP(mean): 80 (13 Dec 2023 19:30) (80 - 80)  RR: 25 (14 Dec 2023 07:00) (12 - 25)  SpO2: 94% (14 Dec 2023 07:00) (93% - 97%)    Parameters below as of 14 Dec 2023 03:00  Patient On (Oxygen Delivery Method): room air      I&O's Detail    13 Dec 2023 07:01  -  14 Dec 2023 07:00  --------------------------------------------------------  IN:    IV PiggyBack: 100 mL    IV PiggyBack: 250 mL    Lactated Ringers: 1800 mL    Lactated Ringers Bolus: 500 mL  Total IN: 2650 mL    OUT:    Indwelling Catheter - Urethral (mL): 465 mL    Nasogastric/Oral tube (mL): 0 mL  Total OUT: 465 mL    Total NET: 2185 mL        Daily Height in cm: 180.34 (13 Dec 2023 08:49)    Daily   MEDICATIONS  (STANDING):  acetaminophen   IVPB .. 1000 milliGRAM(s) IV Intermittent every 6 hours  chlorhexidine 2% Cloths 1 Application(s) Topical <User Schedule>  insulin lispro (ADMELOG) corrective regimen sliding scale   SubCutaneous every 6 hours  lactated ringers. 1000 milliLiter(s) (150 mL/Hr) IV Continuous <Continuous>    MEDICATIONS  (PRN):  HYDROmorphone  Injectable 0.25 milliGRAM(s) IV Push every 3 hours PRN Moderate Pain (4 - 6)  HYDROmorphone  Injectable 0.5 milliGRAM(s) IV Push every 3 hours PRN Severe Pain (7 - 10)      LABS:                        11.0   19.87 )-----------( 114      ( 14 Dec 2023 05:53 )             32.0     12-14    137  |  105  |  13  ----------------------------<  135<H>  4.5   |  24  |  0.75    Ca    8.6      14 Dec 2023 05:53  Phos  4.3     12-14  Mg     2.0     12-14    TPro  4.8<L>  /  Alb  2.8<L>  /  TBili  0.6  /  DBili  x   /  AST  30  /  ALT  25  /  AlkPhos  56  12-14    PT/INR - ( 14 Dec 2023 05:53 )   PT: 10.8 sec;   INR: 0.98 ratio         PTT - ( 14 Dec 2023 05:53 )  PTT:25.7 sec  Urinalysis Basic - ( 14 Dec 2023 05:53 )    Color: x / Appearance: x / SG: x / pH: x  Gluc: 135 mg/dL / Ketone: x  / Bili: x / Urobili: x   Blood: x / Protein: x / Nitrite: x   Leuk Esterase: x / RBC: x / WBC x   Sq Epi: x / Non Sq Epi: x / Bacteria: x              Physical Examination:  General: NAD, resting comfortably in bed  HEENT: Normocephalic atraumatic, NGT present   Respiratory: Nonlabored respirations, normal CW expansion.  Abdomen: soft, appropriately tender, midline incision c/d/i  Extremities: bilateral groin incisions c/d/i, (+) bilateral DP and PT signals, motor/sensory intact, reports numbness of toes bilaterally,

## 2023-12-14 NOTE — DIETITIAN INITIAL EVALUATION ADULT - NAME AND PHONE
Barbara Caballero, MS RD CDN [facility];   Available on MS TEAMS  Barbara Caballero, MS RD CDN [facility];   Available on MS TEAMS

## 2023-12-14 NOTE — DIETITIAN INITIAL EVALUATION ADULT - ORAL INTAKE PTA/DIET HISTORY
Unable to assess diet history PTA.  RD will follow up with subjective diet history as able.  Allergies: No known food allergies

## 2023-12-14 NOTE — CHART NOTE - NSCHARTNOTEFT_GEN_A_CORE
Post Operative Note  Patient: TAB CHA 58y (1965) Male   MRN: [id number]  Location: [place] [address]  Visit: 12-09-23 Inpatient  Date: 12-14-23 @ 00:47    Procedure: S/P aortobifemoral bypass graft on 12/13.     Subjective: Patient seen and examined post operatively. Reports pain as controlled. Denies nausea, vomiting, fever, chills, chest pain, SOB, cough.      Objective:  Vitals: T(F): 97.3 (12-13-23 @ 19:30), Max: 97.5 (12-13-23 @ 04:12)  HR: 89 (12-13-23 @ 22:00)  BP: 101/69 (12-13-23 @ 19:30) (101/69 - 152/79)  RR: 14 (12-13-23 @ 22:00)  SpO2: 97% (12-13-23 @ 22:00)    Physical Examination:  General: NAD, resting comfortably in bed  HEENT: Normocephalic atraumatic  Respiratory: Nonlabored respirations, normal CW expansion.  Cardio: pulse present  Abdomen: soft/nontender     Imaging:  No post-op imaging studies    Assessment:  58yMale patient S/P aortobifemoral bypass graft on 12/13. Recovering appropriately in the SICU.     Plan:   - Diet: NPO  - IVF   - DVT ppx: SCD's    Vascular Surgery  [id number]     Date/Time: 12-14-23 @ 00:47 Post Operative Note  Patient: TAB CHA 58y (1965) Male   MRN: [id number]  Location: [place] [address]  Visit: 12-09-23 Inpatient  Date: 12-14-23 @ 00:47    Procedure: S/P aortobifemoral bypass graft on 12/13.     Subjective: Patient seen and examined post operatively. Reports pain as controlled. Denies nausea, vomiting, fever, chills, chest pain, SOB, cough.      Objective:  Vitals: T(F): 97.3 (12-13-23 @ 19:30), Max: 97.5 (12-13-23 @ 04:12)  HR: 89 (12-13-23 @ 22:00)  BP: 101/69 (12-13-23 @ 19:30) (101/69 - 152/79)  RR: 14 (12-13-23 @ 22:00)  SpO2: 97% (12-13-23 @ 22:00)    Physical Examination:  General: NAD, resting comfortably in bed  HEENT: Normocephalic atraumatic  Respiratory: Nonlabored respirations, normal CW expansion.  Cardio: pulse present  Abdomen: soft/nontender     Imaging:  No post-op imaging studies    Assessment:  58yMale patient S/P aortobifemoral bypass graft on 12/13. Recovering appropriately in the SICU.     Plan:   - Diet: NPO  - IVF   - DVT ppx: SCD's    Vascular Surgery  [id number]     Date/Time: 12-14-23 @ 00:47 Post Operative Note  Patient: TAB CHA 58y (1965) Male   MRN: [id number]  Location: [place] [address]  Visit: 12-09-23 Inpatient  Date: 12-14-23 @ 00:47    Procedure: S/P aortobifemoral bypass graft on 12/13.     Subjective: Patient seen and examined post operatively. Reports pain as controlled. Denies nausea, vomiting, fever, chills, chest pain, SOB, cough.      Objective:  Vitals: T(F): 97.3 (12-13-23 @ 19:30), Max: 97.5 (12-13-23 @ 04:12)  HR: 89 (12-13-23 @ 22:00)  BP: 101/69 (12-13-23 @ 19:30) (101/69 - 152/79)  RR: 14 (12-13-23 @ 22:00)  SpO2: 97% (12-13-23 @ 22:00)    Physical Examination:  General: NAD, resting comfortably in bed  HEENT: Normocephalic atraumatic  Respiratory: Nonlabored respirations, normal CW expansion.  Cardio: pulse present  Abdomen: soft, appropriately tender, midline incision c/d/i  Extremities: bilateral groin incisions c/d/i, (+) bilateral DP and PT signals, motor/sensory intact, reports numbness of toes bilaterally    Imaging:  No post-op imaging studies    Assessment:  58yMale patient S/P aortobifemoral bypass graft on 12/13. Recovering appropriately in the SICU. He reports numbness in toes bilaterally that is stable since prior to surgery. Some increased numbness of R leg, subjectively up to level of knee.     Plan:   - Diet: NPO  - IVF   - DVT ppx: SCD's    Vascular Surgery  [id number]     Date/Time: 12-14-23 @ 00:47 Post Operative Note  Patient: TAB CHA 58y (1965) Male   MRN: [id number]  Location: [place] [address]  Visit: 12-09-23 Inpatient  Date: 12-14-23 @ 00:47    Procedure: S/P aortobifemoral bypass graft on 12/13.     Subjective: Patient seen and examined post operatively. Reports pain as controlled. Denies nausea, vomiting, fever, chills, chest pain, SOB, cough.      Objective:  Vitals: T(F): 97.3 (12-13-23 @ 19:30), Max: 97.5 (12-13-23 @ 04:12)  HR: 89 (12-13-23 @ 22:00)  BP: 101/69 (12-13-23 @ 19:30) (101/69 - 152/79)  RR: 14 (12-13-23 @ 22:00)  SpO2: 97% (12-13-23 @ 22:00)    Physical Examination:  General: NAD, resting comfortably in bed  HEENT: Normocephalic atraumatic  Respiratory: Nonlabored respirations, normal CW expansion.  Cardio: pulse present  Abdomen: soft, appropriately tender, midline incision c/d/i  Extremities: bilateral groin incisions c/d/i, (+) bilateral DP and PT signals, motor/sensory intact, reports numbness of toes bilaterally    Imaging:  No post-op imaging studies    Assessment:  58yMale patient S/P aortobifemoral bypass graft on 12/13. Recovering appropriately in the SICU. He reports numbness in toes bilaterally that is stable since prior to surgery. Some increased numbness of R leg, subjectively up to level of knee.     Plan:   - Diet: NPO  - IVF   - DVT ppx: SCD's    Vascular Surgery  [id number]     Date/Time: 12-14-23 @ 00:47

## 2023-12-15 LAB
ALBUMIN SERPL ELPH-MCNC: 2.8 G/DL — LOW (ref 3.3–5)
ALBUMIN SERPL ELPH-MCNC: 2.8 G/DL — LOW (ref 3.3–5)
ALBUMIN SERPL ELPH-MCNC: 3 G/DL — LOW (ref 3.3–5)
ALBUMIN SERPL ELPH-MCNC: 3 G/DL — LOW (ref 3.3–5)
ALBUMIN SERPL ELPH-MCNC: 3.3 G/DL — SIGNIFICANT CHANGE UP (ref 3.3–5)
ALBUMIN SERPL ELPH-MCNC: 3.3 G/DL — SIGNIFICANT CHANGE UP (ref 3.3–5)
ALP SERPL-CCNC: 46 U/L — SIGNIFICANT CHANGE UP (ref 40–120)
ALP SERPL-CCNC: 52 U/L — SIGNIFICANT CHANGE UP (ref 40–120)
ALP SERPL-CCNC: 52 U/L — SIGNIFICANT CHANGE UP (ref 40–120)
ALT FLD-CCNC: 15 U/L — SIGNIFICANT CHANGE UP (ref 10–45)
ALT FLD-CCNC: 15 U/L — SIGNIFICANT CHANGE UP (ref 10–45)
ALT FLD-CCNC: 16 U/L — SIGNIFICANT CHANGE UP (ref 10–45)
ALT FLD-CCNC: 16 U/L — SIGNIFICANT CHANGE UP (ref 10–45)
ALT FLD-CCNC: 19 U/L — SIGNIFICANT CHANGE UP (ref 10–45)
ALT FLD-CCNC: 19 U/L — SIGNIFICANT CHANGE UP (ref 10–45)
ANION GAP SERPL CALC-SCNC: 7 MMOL/L — SIGNIFICANT CHANGE UP (ref 5–17)
ANION GAP SERPL CALC-SCNC: 7 MMOL/L — SIGNIFICANT CHANGE UP (ref 5–17)
ANION GAP SERPL CALC-SCNC: 8 MMOL/L — SIGNIFICANT CHANGE UP (ref 5–17)
ANION GAP SERPL CALC-SCNC: 8 MMOL/L — SIGNIFICANT CHANGE UP (ref 5–17)
ANION GAP SERPL CALC-SCNC: 9 MMOL/L — SIGNIFICANT CHANGE UP (ref 5–17)
ANION GAP SERPL CALC-SCNC: 9 MMOL/L — SIGNIFICANT CHANGE UP (ref 5–17)
APTT BLD: 26.9 SEC — SIGNIFICANT CHANGE UP (ref 24.5–35.6)
APTT BLD: 26.9 SEC — SIGNIFICANT CHANGE UP (ref 24.5–35.6)
AST SERPL-CCNC: 23 U/L — SIGNIFICANT CHANGE UP (ref 10–40)
AST SERPL-CCNC: 23 U/L — SIGNIFICANT CHANGE UP (ref 10–40)
AST SERPL-CCNC: 26 U/L — SIGNIFICANT CHANGE UP (ref 10–40)
AST SERPL-CCNC: 26 U/L — SIGNIFICANT CHANGE UP (ref 10–40)
AST SERPL-CCNC: 27 U/L — SIGNIFICANT CHANGE UP (ref 10–40)
AST SERPL-CCNC: 27 U/L — SIGNIFICANT CHANGE UP (ref 10–40)
BASE EXCESS BLDV CALC-SCNC: 4.1 MMOL/L — HIGH (ref -2–3)
BASE EXCESS BLDV CALC-SCNC: 4.1 MMOL/L — HIGH (ref -2–3)
BILIRUB SERPL-MCNC: 0.5 MG/DL — SIGNIFICANT CHANGE UP (ref 0.2–1.2)
BILIRUB SERPL-MCNC: 0.5 MG/DL — SIGNIFICANT CHANGE UP (ref 0.2–1.2)
BILIRUB SERPL-MCNC: 0.6 MG/DL — SIGNIFICANT CHANGE UP (ref 0.2–1.2)
BILIRUB SERPL-MCNC: 0.6 MG/DL — SIGNIFICANT CHANGE UP (ref 0.2–1.2)
BILIRUB SERPL-MCNC: 0.7 MG/DL — SIGNIFICANT CHANGE UP (ref 0.2–1.2)
BILIRUB SERPL-MCNC: 0.7 MG/DL — SIGNIFICANT CHANGE UP (ref 0.2–1.2)
BUN SERPL-MCNC: 12 MG/DL — SIGNIFICANT CHANGE UP (ref 7–23)
BUN SERPL-MCNC: 12 MG/DL — SIGNIFICANT CHANGE UP (ref 7–23)
BUN SERPL-MCNC: 16 MG/DL — SIGNIFICANT CHANGE UP (ref 7–23)
BUN SERPL-MCNC: 16 MG/DL — SIGNIFICANT CHANGE UP (ref 7–23)
BUN SERPL-MCNC: 18 MG/DL — SIGNIFICANT CHANGE UP (ref 7–23)
BUN SERPL-MCNC: 18 MG/DL — SIGNIFICANT CHANGE UP (ref 7–23)
CA-I SERPL-SCNC: 1.2 MMOL/L — SIGNIFICANT CHANGE UP (ref 1.15–1.33)
CA-I SERPL-SCNC: 1.2 MMOL/L — SIGNIFICANT CHANGE UP (ref 1.15–1.33)
CALCIUM SERPL-MCNC: 8.1 MG/DL — LOW (ref 8.4–10.5)
CALCIUM SERPL-MCNC: 8.1 MG/DL — LOW (ref 8.4–10.5)
CALCIUM SERPL-MCNC: 8.4 MG/DL — SIGNIFICANT CHANGE UP (ref 8.4–10.5)
CALCIUM SERPL-MCNC: 8.4 MG/DL — SIGNIFICANT CHANGE UP (ref 8.4–10.5)
CALCIUM SERPL-MCNC: 8.9 MG/DL — SIGNIFICANT CHANGE UP (ref 8.4–10.5)
CALCIUM SERPL-MCNC: 8.9 MG/DL — SIGNIFICANT CHANGE UP (ref 8.4–10.5)
CHLORIDE BLDV-SCNC: 107 MMOL/L — SIGNIFICANT CHANGE UP (ref 96–108)
CHLORIDE BLDV-SCNC: 107 MMOL/L — SIGNIFICANT CHANGE UP (ref 96–108)
CHLORIDE SERPL-SCNC: 105 MMOL/L — SIGNIFICANT CHANGE UP (ref 96–108)
CHLORIDE SERPL-SCNC: 105 MMOL/L — SIGNIFICANT CHANGE UP (ref 96–108)
CHLORIDE SERPL-SCNC: 106 MMOL/L — SIGNIFICANT CHANGE UP (ref 96–108)
CO2 BLDV-SCNC: 30 MMOL/L — HIGH (ref 22–26)
CO2 BLDV-SCNC: 30 MMOL/L — HIGH (ref 22–26)
CO2 SERPL-SCNC: 26 MMOL/L — SIGNIFICANT CHANGE UP (ref 22–31)
CO2 SERPL-SCNC: 26 MMOL/L — SIGNIFICANT CHANGE UP (ref 22–31)
CO2 SERPL-SCNC: 27 MMOL/L — SIGNIFICANT CHANGE UP (ref 22–31)
CO2 SERPL-SCNC: 27 MMOL/L — SIGNIFICANT CHANGE UP (ref 22–31)
CO2 SERPL-SCNC: 28 MMOL/L — SIGNIFICANT CHANGE UP (ref 22–31)
CO2 SERPL-SCNC: 28 MMOL/L — SIGNIFICANT CHANGE UP (ref 22–31)
CREAT SERPL-MCNC: 0.62 MG/DL — SIGNIFICANT CHANGE UP (ref 0.5–1.3)
CREAT SERPL-MCNC: 0.62 MG/DL — SIGNIFICANT CHANGE UP (ref 0.5–1.3)
CREAT SERPL-MCNC: 0.65 MG/DL — SIGNIFICANT CHANGE UP (ref 0.5–1.3)
CREAT SERPL-MCNC: 0.65 MG/DL — SIGNIFICANT CHANGE UP (ref 0.5–1.3)
CREAT SERPL-MCNC: 0.73 MG/DL — SIGNIFICANT CHANGE UP (ref 0.5–1.3)
CREAT SERPL-MCNC: 0.73 MG/DL — SIGNIFICANT CHANGE UP (ref 0.5–1.3)
EGFR: 105 ML/MIN/1.73M2 — SIGNIFICANT CHANGE UP
EGFR: 105 ML/MIN/1.73M2 — SIGNIFICANT CHANGE UP
EGFR: 109 ML/MIN/1.73M2 — SIGNIFICANT CHANGE UP
EGFR: 109 ML/MIN/1.73M2 — SIGNIFICANT CHANGE UP
EGFR: 111 ML/MIN/1.73M2 — SIGNIFICANT CHANGE UP
EGFR: 111 ML/MIN/1.73M2 — SIGNIFICANT CHANGE UP
GAS PNL BLDA: SIGNIFICANT CHANGE UP
GAS PNL BLDV: 137 MMOL/L — SIGNIFICANT CHANGE UP (ref 136–145)
GAS PNL BLDV: 137 MMOL/L — SIGNIFICANT CHANGE UP (ref 136–145)
GAS PNL BLDV: SIGNIFICANT CHANGE UP
GAS PNL BLDV: SIGNIFICANT CHANGE UP
GLUCOSE BLDC GLUCOMTR-MCNC: 104 MG/DL — HIGH (ref 70–99)
GLUCOSE BLDC GLUCOMTR-MCNC: 104 MG/DL — HIGH (ref 70–99)
GLUCOSE BLDC GLUCOMTR-MCNC: 116 MG/DL — HIGH (ref 70–99)
GLUCOSE BLDC GLUCOMTR-MCNC: 116 MG/DL — HIGH (ref 70–99)
GLUCOSE BLDC GLUCOMTR-MCNC: 133 MG/DL — HIGH (ref 70–99)
GLUCOSE BLDC GLUCOMTR-MCNC: 133 MG/DL — HIGH (ref 70–99)
GLUCOSE BLDV-MCNC: 136 MG/DL — HIGH (ref 70–99)
GLUCOSE BLDV-MCNC: 136 MG/DL — HIGH (ref 70–99)
GLUCOSE SERPL-MCNC: 113 MG/DL — HIGH (ref 70–99)
GLUCOSE SERPL-MCNC: 113 MG/DL — HIGH (ref 70–99)
GLUCOSE SERPL-MCNC: 136 MG/DL — HIGH (ref 70–99)
GLUCOSE SERPL-MCNC: 136 MG/DL — HIGH (ref 70–99)
GLUCOSE SERPL-MCNC: 98 MG/DL — SIGNIFICANT CHANGE UP (ref 70–99)
GLUCOSE SERPL-MCNC: 98 MG/DL — SIGNIFICANT CHANGE UP (ref 70–99)
HCO3 BLDV-SCNC: 28 MMOL/L — SIGNIFICANT CHANGE UP (ref 22–29)
HCO3 BLDV-SCNC: 28 MMOL/L — SIGNIFICANT CHANGE UP (ref 22–29)
HCT VFR BLD CALC: 23.3 % — LOW (ref 39–50)
HCT VFR BLD CALC: 23.3 % — LOW (ref 39–50)
HCT VFR BLD CALC: 23.4 % — LOW (ref 39–50)
HCT VFR BLD CALC: 23.4 % — LOW (ref 39–50)
HCT VFR BLD CALC: 24 % — LOW (ref 39–50)
HCT VFR BLD CALC: 24 % — LOW (ref 39–50)
HCT VFR BLD CALC: 25 % — LOW (ref 39–50)
HCT VFR BLD CALC: 25 % — LOW (ref 39–50)
HCT VFR BLDA CALC: 24 % — LOW (ref 39–51)
HCT VFR BLDA CALC: 24 % — LOW (ref 39–51)
HGB BLD CALC-MCNC: 8 G/DL — LOW (ref 12.6–17.4)
HGB BLD CALC-MCNC: 8 G/DL — LOW (ref 12.6–17.4)
HGB BLD-MCNC: 7.8 G/DL — LOW (ref 13–17)
HGB BLD-MCNC: 8 G/DL — LOW (ref 13–17)
HGB BLD-MCNC: 8 G/DL — LOW (ref 13–17)
HGB BLD-MCNC: 8.3 G/DL — LOW (ref 13–17)
HGB BLD-MCNC: 8.3 G/DL — LOW (ref 13–17)
HOROWITZ INDEX BLDV+IHG-RTO: 44 — SIGNIFICANT CHANGE UP
HOROWITZ INDEX BLDV+IHG-RTO: 44 — SIGNIFICANT CHANGE UP
INR BLD: 1.17 RATIO — SIGNIFICANT CHANGE UP (ref 0.85–1.18)
INR BLD: 1.17 RATIO — SIGNIFICANT CHANGE UP (ref 0.85–1.18)
LACTATE BLDV-MCNC: 1.4 MMOL/L — SIGNIFICANT CHANGE UP (ref 0.5–2)
LACTATE BLDV-MCNC: 1.4 MMOL/L — SIGNIFICANT CHANGE UP (ref 0.5–2)
MAGNESIUM SERPL-MCNC: 2 MG/DL — SIGNIFICANT CHANGE UP (ref 1.6–2.6)
MAGNESIUM SERPL-MCNC: 2.1 MG/DL — SIGNIFICANT CHANGE UP (ref 1.6–2.6)
MAGNESIUM SERPL-MCNC: 2.1 MG/DL — SIGNIFICANT CHANGE UP (ref 1.6–2.6)
MCHC RBC-ENTMCNC: 31 PG — SIGNIFICANT CHANGE UP (ref 27–34)
MCHC RBC-ENTMCNC: 31 PG — SIGNIFICANT CHANGE UP (ref 27–34)
MCHC RBC-ENTMCNC: 31.1 PG — SIGNIFICANT CHANGE UP (ref 27–34)
MCHC RBC-ENTMCNC: 31.1 PG — SIGNIFICANT CHANGE UP (ref 27–34)
MCHC RBC-ENTMCNC: 31.2 PG — SIGNIFICANT CHANGE UP (ref 27–34)
MCHC RBC-ENTMCNC: 31.2 PG — SIGNIFICANT CHANGE UP (ref 27–34)
MCHC RBC-ENTMCNC: 31.3 PG — SIGNIFICANT CHANGE UP (ref 27–34)
MCHC RBC-ENTMCNC: 31.3 PG — SIGNIFICANT CHANGE UP (ref 27–34)
MCHC RBC-ENTMCNC: 33.2 GM/DL — SIGNIFICANT CHANGE UP (ref 32–36)
MCHC RBC-ENTMCNC: 33.2 GM/DL — SIGNIFICANT CHANGE UP (ref 32–36)
MCHC RBC-ENTMCNC: 33.3 GM/DL — SIGNIFICANT CHANGE UP (ref 32–36)
MCHC RBC-ENTMCNC: 33.5 GM/DL — SIGNIFICANT CHANGE UP (ref 32–36)
MCHC RBC-ENTMCNC: 33.5 GM/DL — SIGNIFICANT CHANGE UP (ref 32–36)
MCV RBC AUTO: 93.2 FL — SIGNIFICANT CHANGE UP (ref 80–100)
MCV RBC AUTO: 93.2 FL — SIGNIFICANT CHANGE UP (ref 80–100)
MCV RBC AUTO: 93.3 FL — SIGNIFICANT CHANGE UP (ref 80–100)
MCV RBC AUTO: 93.3 FL — SIGNIFICANT CHANGE UP (ref 80–100)
MCV RBC AUTO: 93.4 FL — SIGNIFICANT CHANGE UP (ref 80–100)
MCV RBC AUTO: 93.4 FL — SIGNIFICANT CHANGE UP (ref 80–100)
MCV RBC AUTO: 94 FL — SIGNIFICANT CHANGE UP (ref 80–100)
MCV RBC AUTO: 94 FL — SIGNIFICANT CHANGE UP (ref 80–100)
NRBC # BLD: 0 /100 WBCS — SIGNIFICANT CHANGE UP (ref 0–0)
PCO2 BLDV: 41 MMHG — LOW (ref 42–55)
PCO2 BLDV: 41 MMHG — LOW (ref 42–55)
PH BLDV: 7.45 — HIGH (ref 7.32–7.43)
PH BLDV: 7.45 — HIGH (ref 7.32–7.43)
PHOSPHATE SERPL-MCNC: 2.1 MG/DL — LOW (ref 2.5–4.5)
PHOSPHATE SERPL-MCNC: 2.1 MG/DL — LOW (ref 2.5–4.5)
PHOSPHATE SERPL-MCNC: 2.8 MG/DL — SIGNIFICANT CHANGE UP (ref 2.5–4.5)
PHOSPHATE SERPL-MCNC: 2.8 MG/DL — SIGNIFICANT CHANGE UP (ref 2.5–4.5)
PHOSPHATE SERPL-MCNC: 3.4 MG/DL — SIGNIFICANT CHANGE UP (ref 2.5–4.5)
PHOSPHATE SERPL-MCNC: 3.4 MG/DL — SIGNIFICANT CHANGE UP (ref 2.5–4.5)
PLATELET # BLD AUTO: 83 K/UL — LOW (ref 150–400)
PLATELET # BLD AUTO: 83 K/UL — LOW (ref 150–400)
PLATELET # BLD AUTO: 91 K/UL — LOW (ref 150–400)
PLATELET # BLD AUTO: 91 K/UL — LOW (ref 150–400)
PLATELET # BLD AUTO: 92 K/UL — LOW (ref 150–400)
PLATELET # BLD AUTO: 92 K/UL — LOW (ref 150–400)
PLATELET # BLD AUTO: 99 K/UL — LOW (ref 150–400)
PLATELET # BLD AUTO: 99 K/UL — LOW (ref 150–400)
PO2 BLDV: 75 MMHG — HIGH (ref 25–45)
PO2 BLDV: 75 MMHG — HIGH (ref 25–45)
POTASSIUM BLDV-SCNC: 3.8 MMOL/L — SIGNIFICANT CHANGE UP (ref 3.5–5.1)
POTASSIUM BLDV-SCNC: 3.8 MMOL/L — SIGNIFICANT CHANGE UP (ref 3.5–5.1)
POTASSIUM SERPL-MCNC: 3.4 MMOL/L — LOW (ref 3.5–5.3)
POTASSIUM SERPL-MCNC: 3.4 MMOL/L — LOW (ref 3.5–5.3)
POTASSIUM SERPL-MCNC: 3.8 MMOL/L — SIGNIFICANT CHANGE UP (ref 3.5–5.3)
POTASSIUM SERPL-MCNC: 3.8 MMOL/L — SIGNIFICANT CHANGE UP (ref 3.5–5.3)
POTASSIUM SERPL-MCNC: 4.1 MMOL/L — SIGNIFICANT CHANGE UP (ref 3.5–5.3)
POTASSIUM SERPL-MCNC: 4.1 MMOL/L — SIGNIFICANT CHANGE UP (ref 3.5–5.3)
POTASSIUM SERPL-SCNC: 3.4 MMOL/L — LOW (ref 3.5–5.3)
POTASSIUM SERPL-SCNC: 3.4 MMOL/L — LOW (ref 3.5–5.3)
POTASSIUM SERPL-SCNC: 3.8 MMOL/L — SIGNIFICANT CHANGE UP (ref 3.5–5.3)
POTASSIUM SERPL-SCNC: 3.8 MMOL/L — SIGNIFICANT CHANGE UP (ref 3.5–5.3)
POTASSIUM SERPL-SCNC: 4.1 MMOL/L — SIGNIFICANT CHANGE UP (ref 3.5–5.3)
POTASSIUM SERPL-SCNC: 4.1 MMOL/L — SIGNIFICANT CHANGE UP (ref 3.5–5.3)
PROT SERPL-MCNC: 5.1 G/DL — LOW (ref 6–8.3)
PROT SERPL-MCNC: 5.1 G/DL — LOW (ref 6–8.3)
PROT SERPL-MCNC: 5.2 G/DL — LOW (ref 6–8.3)
PROT SERPL-MCNC: 5.2 G/DL — LOW (ref 6–8.3)
PROT SERPL-MCNC: 5.7 G/DL — LOW (ref 6–8.3)
PROT SERPL-MCNC: 5.7 G/DL — LOW (ref 6–8.3)
PROTHROM AB SERPL-ACNC: 12.2 SEC — SIGNIFICANT CHANGE UP (ref 9.5–13)
PROTHROM AB SERPL-ACNC: 12.2 SEC — SIGNIFICANT CHANGE UP (ref 9.5–13)
RBC # BLD: 2.49 M/UL — LOW (ref 4.2–5.8)
RBC # BLD: 2.49 M/UL — LOW (ref 4.2–5.8)
RBC # BLD: 2.5 M/UL — LOW (ref 4.2–5.8)
RBC # BLD: 2.5 M/UL — LOW (ref 4.2–5.8)
RBC # BLD: 2.57 M/UL — LOW (ref 4.2–5.8)
RBC # BLD: 2.57 M/UL — LOW (ref 4.2–5.8)
RBC # BLD: 2.68 M/UL — LOW (ref 4.2–5.8)
RBC # BLD: 2.68 M/UL — LOW (ref 4.2–5.8)
RBC # FLD: 14.5 % — SIGNIFICANT CHANGE UP (ref 10.3–14.5)
RBC # FLD: 14.5 % — SIGNIFICANT CHANGE UP (ref 10.3–14.5)
RBC # FLD: 14.6 % — HIGH (ref 10.3–14.5)
RBC # FLD: 14.6 % — HIGH (ref 10.3–14.5)
RBC # FLD: 14.7 % — HIGH (ref 10.3–14.5)
RBC # FLD: 14.7 % — HIGH (ref 10.3–14.5)
RBC # FLD: 14.8 % — HIGH (ref 10.3–14.5)
RBC # FLD: 14.8 % — HIGH (ref 10.3–14.5)
SAO2 % BLDV: 98.1 % — HIGH (ref 67–88)
SAO2 % BLDV: 98.1 % — HIGH (ref 67–88)
SODIUM SERPL-SCNC: 140 MMOL/L — SIGNIFICANT CHANGE UP (ref 135–145)
SODIUM SERPL-SCNC: 142 MMOL/L — SIGNIFICANT CHANGE UP (ref 135–145)
SODIUM SERPL-SCNC: 142 MMOL/L — SIGNIFICANT CHANGE UP (ref 135–145)
WBC # BLD: 16.72 K/UL — HIGH (ref 3.8–10.5)
WBC # BLD: 16.72 K/UL — HIGH (ref 3.8–10.5)
WBC # BLD: 16.98 K/UL — HIGH (ref 3.8–10.5)
WBC # BLD: 16.98 K/UL — HIGH (ref 3.8–10.5)
WBC # BLD: 17.35 K/UL — HIGH (ref 3.8–10.5)
WBC # BLD: 17.35 K/UL — HIGH (ref 3.8–10.5)
WBC # BLD: 18.18 K/UL — HIGH (ref 3.8–10.5)
WBC # BLD: 18.18 K/UL — HIGH (ref 3.8–10.5)
WBC # FLD AUTO: 16.72 K/UL — HIGH (ref 3.8–10.5)
WBC # FLD AUTO: 16.72 K/UL — HIGH (ref 3.8–10.5)
WBC # FLD AUTO: 16.98 K/UL — HIGH (ref 3.8–10.5)
WBC # FLD AUTO: 16.98 K/UL — HIGH (ref 3.8–10.5)
WBC # FLD AUTO: 17.35 K/UL — HIGH (ref 3.8–10.5)
WBC # FLD AUTO: 17.35 K/UL — HIGH (ref 3.8–10.5)
WBC # FLD AUTO: 18.18 K/UL — HIGH (ref 3.8–10.5)
WBC # FLD AUTO: 18.18 K/UL — HIGH (ref 3.8–10.5)

## 2023-12-15 PROCEDURE — 85018 HEMOGLOBIN: CPT

## 2023-12-15 PROCEDURE — 93306 TTE W/DOPPLER COMPLETE: CPT

## 2023-12-15 PROCEDURE — 93926 LOWER EXTREMITY STUDY: CPT

## 2023-12-15 PROCEDURE — C1887: CPT

## 2023-12-15 PROCEDURE — 80307 DRUG TEST PRSMV CHEM ANLYZR: CPT

## 2023-12-15 PROCEDURE — 99291 CRITICAL CARE FIRST HOUR: CPT | Mod: 25

## 2023-12-15 PROCEDURE — 82947 ASSAY GLUCOSE BLOOD QUANT: CPT

## 2023-12-15 PROCEDURE — 71045 X-RAY EXAM CHEST 1 VIEW: CPT | Mod: 26

## 2023-12-15 PROCEDURE — 71260 CT THORAX DX C+: CPT | Mod: 26

## 2023-12-15 PROCEDURE — C1769: CPT

## 2023-12-15 PROCEDURE — 83735 ASSAY OF MAGNESIUM: CPT

## 2023-12-15 PROCEDURE — 85730 THROMBOPLASTIN TIME PARTIAL: CPT

## 2023-12-15 PROCEDURE — 80053 COMPREHEN METABOLIC PANEL: CPT

## 2023-12-15 PROCEDURE — 74174 CTA ABD&PLVS W/CONTRAST: CPT | Mod: 26

## 2023-12-15 PROCEDURE — 84132 ASSAY OF SERUM POTASSIUM: CPT

## 2023-12-15 PROCEDURE — 82803 BLOOD GASES ANY COMBINATION: CPT

## 2023-12-15 PROCEDURE — 93971 EXTREMITY STUDY: CPT

## 2023-12-15 PROCEDURE — 80048 BASIC METABOLIC PNL TOTAL CA: CPT

## 2023-12-15 PROCEDURE — 85025 COMPLETE CBC W/AUTO DIFF WBC: CPT

## 2023-12-15 PROCEDURE — 36415 COLL VENOUS BLD VENIPUNCTURE: CPT

## 2023-12-15 PROCEDURE — 82330 ASSAY OF CALCIUM: CPT

## 2023-12-15 PROCEDURE — 86901 BLOOD TYPING SEROLOGIC RH(D): CPT

## 2023-12-15 PROCEDURE — 85610 PROTHROMBIN TIME: CPT

## 2023-12-15 PROCEDURE — 84295 ASSAY OF SERUM SODIUM: CPT

## 2023-12-15 PROCEDURE — C1894: CPT

## 2023-12-15 PROCEDURE — 85014 HEMATOCRIT: CPT

## 2023-12-15 PROCEDURE — 99222 1ST HOSP IP/OBS MODERATE 55: CPT

## 2023-12-15 PROCEDURE — 93010 ELECTROCARDIOGRAM REPORT: CPT

## 2023-12-15 PROCEDURE — 75635 CT ANGIO ABDOMINAL ARTERIES: CPT | Mod: MA

## 2023-12-15 PROCEDURE — 82435 ASSAY OF BLOOD CHLORIDE: CPT

## 2023-12-15 PROCEDURE — 86900 BLOOD TYPING SEROLOGIC ABO: CPT

## 2023-12-15 PROCEDURE — 93454 CORONARY ARTERY ANGIO S&I: CPT

## 2023-12-15 PROCEDURE — 86850 RBC ANTIBODY SCREEN: CPT

## 2023-12-15 PROCEDURE — 99233 SBSQ HOSP IP/OBS HIGH 50: CPT | Mod: GC

## 2023-12-15 PROCEDURE — 93005 ELECTROCARDIOGRAM TRACING: CPT

## 2023-12-15 PROCEDURE — 85027 COMPLETE CBC AUTOMATED: CPT

## 2023-12-15 PROCEDURE — 84100 ASSAY OF PHOSPHORUS: CPT

## 2023-12-15 PROCEDURE — 83605 ASSAY OF LACTIC ACID: CPT

## 2023-12-15 RX ORDER — ALBUMIN HUMAN 25 %
250 VIAL (ML) INTRAVENOUS ONCE
Refills: 0 | Status: COMPLETED | OUTPATIENT
Start: 2023-12-15 | End: 2023-12-15

## 2023-12-15 RX ORDER — SODIUM CHLORIDE 9 MG/ML
500 INJECTION, SOLUTION INTRAVENOUS ONCE
Refills: 0 | Status: COMPLETED | OUTPATIENT
Start: 2023-12-15 | End: 2023-12-15

## 2023-12-15 RX ORDER — FUROSEMIDE 40 MG
20 TABLET ORAL ONCE
Refills: 0 | Status: COMPLETED | OUTPATIENT
Start: 2023-12-15 | End: 2023-12-15

## 2023-12-15 RX ORDER — POTASSIUM CHLORIDE 20 MEQ
10 PACKET (EA) ORAL
Refills: 0 | Status: COMPLETED | OUTPATIENT
Start: 2023-12-15 | End: 2023-12-15

## 2023-12-15 RX ORDER — SODIUM CHLORIDE 9 MG/ML
500 INJECTION, SOLUTION INTRAVENOUS ONCE
Refills: 0 | Status: DISCONTINUED | OUTPATIENT
Start: 2023-12-15 | End: 2023-12-15

## 2023-12-15 RX ORDER — ACETAMINOPHEN 500 MG
1000 TABLET ORAL EVERY 6 HOURS
Refills: 0 | Status: COMPLETED | OUTPATIENT
Start: 2023-12-15 | End: 2023-12-16

## 2023-12-15 RX ORDER — POTASSIUM PHOSPHATE, MONOBASIC POTASSIUM PHOSPHATE, DIBASIC 236; 224 MG/ML; MG/ML
30 INJECTION, SOLUTION INTRAVENOUS ONCE
Refills: 0 | Status: COMPLETED | OUTPATIENT
Start: 2023-12-15 | End: 2023-12-15

## 2023-12-15 RX ORDER — ACETAMINOPHEN 500 MG
1000 TABLET ORAL ONCE
Refills: 0 | Status: COMPLETED | OUTPATIENT
Start: 2023-12-15 | End: 2023-12-15

## 2023-12-15 RX ORDER — HYDROMORPHONE HYDROCHLORIDE 2 MG/ML
0.5 INJECTION INTRAMUSCULAR; INTRAVENOUS; SUBCUTANEOUS ONCE
Refills: 0 | Status: DISCONTINUED | OUTPATIENT
Start: 2023-12-15 | End: 2023-12-15

## 2023-12-15 RX ORDER — SODIUM CHLORIDE 9 MG/ML
1000 INJECTION, SOLUTION INTRAVENOUS
Refills: 0 | Status: DISCONTINUED | OUTPATIENT
Start: 2023-12-15 | End: 2023-12-16

## 2023-12-15 RX ADMIN — HYDROMORPHONE HYDROCHLORIDE 0.5 MILLIGRAM(S): 2 INJECTION INTRAMUSCULAR; INTRAVENOUS; SUBCUTANEOUS at 15:24

## 2023-12-15 RX ADMIN — Medication 400 MILLIGRAM(S): at 17:13

## 2023-12-15 RX ADMIN — POTASSIUM PHOSPHATE, MONOBASIC POTASSIUM PHOSPHATE, DIBASIC 83.33 MILLIMOLE(S): 236; 224 INJECTION, SOLUTION INTRAVENOUS at 21:32

## 2023-12-15 RX ADMIN — HYDROMORPHONE HYDROCHLORIDE 0.5 MILLIGRAM(S): 2 INJECTION INTRAMUSCULAR; INTRAVENOUS; SUBCUTANEOUS at 08:50

## 2023-12-15 RX ADMIN — SODIUM CHLORIDE 150 MILLILITER(S): 9 INJECTION, SOLUTION INTRAVENOUS at 07:30

## 2023-12-15 RX ADMIN — SODIUM CHLORIDE 150 MILLILITER(S): 9 INJECTION, SOLUTION INTRAVENOUS at 05:26

## 2023-12-15 RX ADMIN — HYDROMORPHONE HYDROCHLORIDE 0.5 MILLIGRAM(S): 2 INJECTION INTRAMUSCULAR; INTRAVENOUS; SUBCUTANEOUS at 01:15

## 2023-12-15 RX ADMIN — Medication 1000 MILLILITER(S): at 01:11

## 2023-12-15 RX ADMIN — HYDROMORPHONE HYDROCHLORIDE 0.5 MILLIGRAM(S): 2 INJECTION INTRAMUSCULAR; INTRAVENOUS; SUBCUTANEOUS at 22:00

## 2023-12-15 RX ADMIN — Medication 255 MILLIMOLE(S): at 05:28

## 2023-12-15 RX ADMIN — HYDROMORPHONE HYDROCHLORIDE 0.5 MILLIGRAM(S): 2 INJECTION INTRAMUSCULAR; INTRAVENOUS; SUBCUTANEOUS at 01:45

## 2023-12-15 RX ADMIN — HYDROMORPHONE HYDROCHLORIDE 0.5 MILLIGRAM(S): 2 INJECTION INTRAMUSCULAR; INTRAVENOUS; SUBCUTANEOUS at 06:03

## 2023-12-15 RX ADMIN — HYDROMORPHONE HYDROCHLORIDE 0.5 MILLIGRAM(S): 2 INJECTION INTRAMUSCULAR; INTRAVENOUS; SUBCUTANEOUS at 00:10

## 2023-12-15 RX ADMIN — Medication 5 MILLIGRAM(S): at 17:13

## 2023-12-15 RX ADMIN — HYDROMORPHONE HYDROCHLORIDE 0.5 MILLIGRAM(S): 2 INJECTION INTRAMUSCULAR; INTRAVENOUS; SUBCUTANEOUS at 21:33

## 2023-12-15 RX ADMIN — Medication 1000 MILLILITER(S): at 00:53

## 2023-12-15 RX ADMIN — Medication 400 MILLIGRAM(S): at 11:23

## 2023-12-15 RX ADMIN — HYDROMORPHONE HYDROCHLORIDE 0.5 MILLIGRAM(S): 2 INJECTION INTRAMUSCULAR; INTRAVENOUS; SUBCUTANEOUS at 15:43

## 2023-12-15 RX ADMIN — ENOXAPARIN SODIUM 40 MILLIGRAM(S): 100 INJECTION SUBCUTANEOUS at 17:12

## 2023-12-15 RX ADMIN — Medication 5 MILLIGRAM(S): at 11:30

## 2023-12-15 RX ADMIN — Medication 1000 MILLIGRAM(S): at 11:45

## 2023-12-15 RX ADMIN — SODIUM CHLORIDE 2000 MILLILITER(S): 9 INJECTION, SOLUTION INTRAVENOUS at 00:53

## 2023-12-15 RX ADMIN — SODIUM CHLORIDE 150 MILLILITER(S): 9 INJECTION, SOLUTION INTRAVENOUS at 00:54

## 2023-12-15 RX ADMIN — PANTOPRAZOLE SODIUM 40 MILLIGRAM(S): 20 TABLET, DELAYED RELEASE ORAL at 22:51

## 2023-12-15 RX ADMIN — Medication 5 MILLIGRAM(S): at 05:28

## 2023-12-15 RX ADMIN — Medication 100 MILLIEQUIVALENT(S): at 08:50

## 2023-12-15 RX ADMIN — HYDROMORPHONE HYDROCHLORIDE 0.5 MILLIGRAM(S): 2 INJECTION INTRAMUSCULAR; INTRAVENOUS; SUBCUTANEOUS at 05:28

## 2023-12-15 RX ADMIN — Medication 100 MILLIEQUIVALENT(S): at 07:29

## 2023-12-15 RX ADMIN — HYDROMORPHONE HYDROCHLORIDE 0.5 MILLIGRAM(S): 2 INJECTION INTRAMUSCULAR; INTRAVENOUS; SUBCUTANEOUS at 09:05

## 2023-12-15 RX ADMIN — SODIUM CHLORIDE 50 MILLILITER(S): 9 INJECTION, SOLUTION INTRAVENOUS at 21:32

## 2023-12-15 RX ADMIN — HYDROMORPHONE HYDROCHLORIDE 0.5 MILLIGRAM(S): 2 INJECTION INTRAMUSCULAR; INTRAVENOUS; SUBCUTANEOUS at 00:45

## 2023-12-15 RX ADMIN — CHLORHEXIDINE GLUCONATE 1 APPLICATION(S): 213 SOLUTION TOPICAL at 06:20

## 2023-12-15 RX ADMIN — SODIUM CHLORIDE 50 MILLILITER(S): 9 INJECTION, SOLUTION INTRAVENOUS at 11:34

## 2023-12-15 RX ADMIN — Medication 20 MILLIGRAM(S): at 11:31

## 2023-12-15 NOTE — OCCUPATIONAL THERAPY INITIAL EVALUATION ADULT - BALANCE TRAINING, PT EVAL
"    Referring Provider: Hospitalist  Reason for Consultation:     Chief complaint: Right double-J stent for retroperitoneal hematoma with chronic indwelling Tobar    Subjective .     Well-known to me who had a retroperitoneal bleed several months back requiring a cystoscopy retrograde J stent placement since that time she has been in LTAC as well as nursing homes and that she is due for Tobar catheter change as well as J stent replacement    Review of Systems:  Pertinent items are noted in HPI    History:  Past Medical History:   Diagnosis Date    Arthritis     Depression     GERD (gastroesophageal reflux disease)     Hyperlipidemia     Hypertension     Near syncope     Vascular disease      Past Surgical History:   Procedure Laterality Date    BLADDER SURGERY      BREAST ABSCESS INCISION AND DRAINAGE Left 8/1/2023    Procedure: BREAST ABSCESS INCISION AND DRAINAGE;  Surgeon: Fox Roper MD;  Location: [facility] OR;  Service: General;  Laterality: Left;    CYSTOSCOPY, RETROGRADE PYELOGRAM AND STENT INSERTION Right 8/6/2023    Procedure: CYSTOSCOPY RETROGRADE PYELOGRAM AND STENT INSERTION;  Surgeon: Ousmane Garcia MD;  Location: [facility] OR;  Service: Urology;  Laterality: Right;    ENDOSCOPY N/A 5/6/2019    Procedure: ESOPHAGOGASTRODUODENOSCOPY;  Surgeon: Alberto Sanchez DO;  Location: [facility] ENDOSCOPY;  Service: Gastroenterology    GALLBLADDER SURGERY      INTERVENTIONAL RADIOLOGY PROCEDURE N/A 8/17/2023    Procedure: tunneled central venous catheter placement;  Surgeon: Hardy Yin MD;  Location: [facility] ANGIO INVASIVE LOCATION;  Service: Interventional Radiology;  Laterality: N/A;    SHOULDER SURGERY      \"bone spurs\"    SUBTOTAL HYSTERECTOMY       Family History   Problem Relation Age of Onset    Hypertension Mother     Diabetes Paternal Aunt     Diabetes Paternal Grandmother     Hypertension Paternal Grandmother     Hypertension Paternal Grandfather      Social History     Tobacco Use    " Smoking status: Never    Smokeless tobacco: Never   Vaping Use    Vaping Use: Never used   Substance Use Topics    Alcohol use: No    Drug use: No     Medications Prior to Admission   Medication Sig Dispense Refill Last Dose    amiodarone (PACERONE) 200 MG tablet Take 1 tablet by mouth Daily.   9/24/2023    amitriptyline (ELAVIL) 25 MG tablet Take 1 tablet by mouth Every Night.   9/24/2023    apixaban (ELIQUIS) 5 MG tablet tablet Take 1 tablet by mouth 2 (Two) Times a Day.   9/24/2023    busPIRone (BUSPAR) 5 MG tablet Take 1 tablet by mouth 3 (Three) Times a Day.   9/24/2023    dilTIAZem (CARDIZEM) 60 MG tablet Take 1 tablet by mouth 4 (Four) Times a Day.   9/24/2023    docusate sodium (COLACE) 100 MG capsule Take 1 capsule by mouth 2 (Two) Times a Day.   9/24/2023    DULoxetine (CYMBALTA) 60 MG capsule Take 1 capsule by mouth Daily.   9/24/2023    gabapentin (NEURONTIN) 100 MG capsule Take 1 capsule by mouth 3 (Three) Times a Day.   9/24/2023    HYDROcodone-acetaminophen (NORCO) 5-325 MG per tablet Take 1 tablet by mouth Every 6 (Six) Hours As Needed.   Past Week    lansoprazole (PREVACID) 30 MG capsule Take 1 capsule by mouth Every 12 (Twelve) Hours.   9/24/2023    midodrine (PROAMATINE) 5 MG tablet Take 1.5 tablets by mouth 3 (Three) Times a Day Before Meals.   9/24/2023    ondansetron (ZOFRAN) 4 MG tablet Take 1 tablet by mouth Every 6 (Six) Hours As Needed for Nausea or Vomiting.   Past Week    rosuvastatin (CRESTOR) 40 MG tablet Take 1 tablet by mouth Daily. 90 tablet 3 9/24/2023    traZODone (DESYREL) 50 MG tablet Take 1 tablet by mouth Every Night.   Past Week    acetaminophen (TYLENOL) 500 MG tablet Take 1 tablet by mouth As Needed.       isosorbide mononitrate (IMDUR) 30 MG 24 hr tablet Take 1 tablet by mouth Daily. 90 tablet 3     metoprolol tartrate (LOPRESSOR) 100 MG tablet Take 1 tablet by mouth Every 12 (Twelve) Hours.       nystatin (MYCOSTATIN) 227974 UNIT/GM powder Apply  topically to the  appropriate area as directed Every 12 (Twelve) Hours.       VENTOLIN  (90 Base) MCG/ACT inhaler            Allergies: Tuberculin tests, Hydrocodone, and Lortab [hydrocodone-acetaminophen]    Objective     Vital Signs:   Temp:  [96.9 °F (36.1 °C)-98.9 °F (37.2 °C)] 97.8 °F (36.6 °C)  Heart Rate:  [54-62] 54  Resp:  [16-18] 18  BP: (122-148)/(57-66) 130/61    Physical Exam:     General Appearance:  Sick   Head:    Normocephalic, without obvious abnormality, atraumatic.   Eyes:            Lids and lashes normal, conjunctivae and sclerae normal, no   icterus, no pallor, corneas clear, PERRLA.   Ears:    Ears appear intact with no abnormalities noted.   Throat:   No oral lesions, no thrush, oral mucosa moist.   Lungs:     Clear to auscultation, respirations regular, even and                  unlabored.    Heart:    Regular rhythm and normal rate, normal S1 and S2, no            murmur, no gallop, no rub, no click.   Abdomen:     Normal bowel sounds, no masses, no organomegaly, soft        nontender, nondistended, no guarding, no rebound          tenderness.   Genitourinary: Urine turbid.   Rectal:   Deferred.   Extremities:   Moves all extremities well, no edema, no cyanosis, no             redness.   Pulses:   Pulses palpable and equal bilaterally.   Skin:   No bleeding, bruising or rash.   Neurologic:   Cranial nerves 2 - 12 grossly intact, sensation intact, DTR       present and equal bilaterally.       Results Review:      I reviewed the patient's new clinical results.  I reviewed the patient's new imaging results and agree with the interpretation.  I reviewed the patient's other test results and agree with the interpretation.      Assessment:    Pain right J stent and Tobar    Plan:    He needs Tobar changed with subsequent scheduling for cystoscopy and right J stent change    I discussed the patient's findings and my recommendations with patient.     Ousmane Garcia MD  09/27/23  19:06 CDT       GOAL: pt will increase sitting/standing balance by one grade within 4 weeks in order to increase ability to participate in ADLs and functional tasks.

## 2023-12-15 NOTE — OCCUPATIONAL THERAPY INITIAL EVALUATION ADULT - LIVES WITH, PROFILE
children/spouse pt lives with wife and kids in a private house with 2 flights of steps to enter. prior to admission, pt was independent in all ADLs and functional tasks without AE. +drives. pt has both walk in and tub shower./children/spouse pt lives with wife and kids in a private house with 6 steps to enter. pt has flight to upstairs and downstairs. prior to admission, pt was independent in all ADLs and functional tasks without AE. +drives. pt has both walk in and tub shower./children/spouse

## 2023-12-15 NOTE — CHART NOTE - NSCHARTNOTEFT_GEN_A_CORE
Patient was seen and examined  Notified by SICU team that patient has been having intermittent sharp abdominal pain radiating to right shoulder/back with associated shortness of breath  On exam patient with periincisional abdominal tenderness, moderate distension, no gross signs of peritonism  Vital signs normal  Urine output borderline adequate  Lactate 3  Fluid bolus, trend lactate  Planned for CT scan, will follow up  Discussed with on call vascular attending

## 2023-12-15 NOTE — OCCUPATIONAL THERAPY INITIAL EVALUATION ADULT - PERTINENT HX OF CURRENT PROBLEM, REHAB EVAL
Pt is a 59 y/o male admitted to [facility] on 12/9/23 w/ PMHx CLTI (Acadia 4), alcohol abuse w/ history of withdrawal seizures, HLD and smoking who presents for alcohol withdrawal and pain in his legs. He was discharged from here on 12/5 after being diagnosed with Acadia 4 CTLI, and requires an aorto-bifemoral bypass. He was apparently planned for the procedure this coming Monday 12/11, but has been drinking too much vodka and hasn't been able to stick to his medication regimen. He wants to quit drinking prior to surgery. He is still having ischemic leg pain at rest at times. His last drink was a half-liter of vodka on Tuesday, and his symptoms started on Thursday. Prior to the drink on Tuesday, his last drink before that was 3 weeks prior, which was also about another 0.5L of vodka. He states that he did get withdrawal symptoms between that and his last drink this Tuesday, but he suffered through it at home, and didn't have any seizures at the time. He states that he was in alcohol rehab for 2 months earlier this year, but started drinking again in September. Hospital course: In the ED, he was afebrile and hemodynamically stable, saturating well on RA. Has mildly elevated AG of 21, and transaminitis presents w/ AST/ALT 44/62 and ALP of 134. CXR was negative for any acute pulmonary process. Received 1 dose librium 50 and IVP valium 5mg, along w/ folic acid and thiamine in the ED. Now s/p aorto bi-femoral bypass 12/13 admitted to SICU for q1h vascular checks. CTA abd/pelvis: Small bilateral pleural effusions. Left lower lobe consolidation. Pneumoperitoneum. 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 Patent celiac artery, SMA and LUIS DANIEL. No abnormal bowel wall enhancement. lovenox to start 12/14 AM per vascular Pt is a 57 y/o male admitted to [facility] on 12/9/23 w/ PMHx CLTI ([place] 4), alcohol abuse w/ history of withdrawal seizures, HLD and smoking who presents for alcohol withdrawal and pain in his legs. He was discharged from here on 12/5 after being diagnosed with [place] 4 CTLI, and requires an aorto-bifemoral bypass. He was apparently planned for the procedure this coming Monday 12/11, but has been drinking too much vodka and hasn't been able to stick to his medication regimen. He wants to quit drinking prior to surgery. He is still having ischemic leg pain at rest at times. His last drink was a half-liter of vodka on Tuesday, and his symptoms started on Thursday. Prior to the drink on Tuesday, his last drink before that was 3 weeks prior, which was also about another 0.5L of vodka. He states that he did get withdrawal symptoms between that and his last drink this Tuesday, but he suffered through it at home, and didn't have any seizures at the time. He states that he was in alcohol rehab for 2 months earlier this year, but started drinking again in September. Hospital course: In the ED, he was afebrile and hemodynamically stable, saturating well on RA. Has mildly elevated AG of 21, and transaminitis presents w/ AST/ALT 44/62 and ALP of 134. CXR was negative for any acute pulmonary process. Received 1 dose librium 50 and IVP valium 5mg, along w/ folic acid and thiamine in the ED. Now s/p aorto bi-femoral bypass 12/13 admitted to SICU for q1h vascular checks. CTA abd/pelvis: Small bilateral pleural effusions. Left lower lobe consolidation. Pneumoperitoneum. 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 Patent celiac artery, SMA and LUIS DANIEL. No abnormal bowel wall enhancement. lovenox to start 12/14 AM per vascular

## 2023-12-15 NOTE — PROGRESS NOTE ADULT - SUBJECTIVE AND OBJECTIVE BOX
VASCULAR SURGERY DAILY PROGRESS NOTE:     24 hour events:  abdominal pain overnight, Obtained CTA abdomen and pelvis to rule out thrombosis    SUBJECTIVE/ROS: Patient seen and examined. abdominal pain is controlled. patient denies n/v.      MEDICATIONS  (STANDING):  chlorhexidine 2% Cloths 1 Application(s) Topical <User Schedule>  enoxaparin Injectable 40 milliGRAM(s) SubCutaneous every 24 hours  insulin lispro (ADMELOG) corrective regimen sliding scale   SubCutaneous every 6 hours  lactated ringers. 1000 milliLiter(s) (150 mL/Hr) IV Continuous <Continuous>  metoprolol tartrate Injectable 5 milliGRAM(s) IV Push every 6 hours  ondansetron Injectable 4 milliGRAM(s) IV Push once  pantoprazole  Injectable 40 milliGRAM(s) IV Push every 24 hours  potassium chloride  10 mEq/100 mL IVPB 10 milliEquivalent(s) IV Intermittent every 1 hour    MEDICATIONS  (PRN):  HYDROmorphone  Injectable 0.25 milliGRAM(s) IV Push every 3 hours PRN Moderate Pain (4 - 6)  HYDROmorphone  Injectable 0.5 milliGRAM(s) IV Push every 3 hours PRN Severe Pain (7 - 10)  simethicone 80 milliGRAM(s) Chew every 6 hours PRN Heartburn      OBJECTIVE:    Vital Signs Last 24 Hrs  T(C): 36.6 (15 Dec 2023 03:00), Max: 37.5 (14 Dec 2023 11:00)  T(F): 97.8 (15 Dec 2023 03:00), Max: 99.5 (14 Dec 2023 11:00)  HR: 93 (15 Dec 2023 08:00) (80 - 121)  BP: 127/69 (15 Dec 2023 08:00) (113/68 - 162/87)  BP(mean): 92 (15 Dec 2023 08:00) (84 - 118)  RR: 19 (15 Dec 2023 08:00) (19 - 44)  SpO2: 89% (15 Dec 2023 08:00) (89% - 97%)    Parameters below as of 15 Dec 2023 07:00  Patient On (Oxygen Delivery Method): nasal cannula  O2 Flow (L/min): 4          I&O's Detail    14 Dec 2023 07:01  -  15 Dec 2023 07:00  --------------------------------------------------------  IN:    Albumin 5%  - 250 mL: 500 mL    IV PiggyBack: 450 mL    Lactated Ringers: 3100 mL    Lactated Ringers Bolus: 500 mL  Total IN: 4550 mL    OUT:    Indwelling Catheter - Urethral (mL): 1305 mL    Nasogastric/Oral tube (mL): 1400 mL  Total OUT: 2705 mL    Total NET: 1845 mL          Daily     Daily     LABS:                        7.8    16.98 )-----------( 83       ( 15 Dec 2023 06:14 )             23.3     12-15    140  |  106  |  16  ----------------------------<  136<H>  3.8   |  26  |  0.65    Ca    8.4      15 Dec 2023 06:14  Phos  3.4     12-15  Mg     2.0     12-15    TPro  5.1<L>  /  Alb  2.8<L>  /  TBili  0.5  /  DBili  x   /  AST  23  /  ALT  15  /  AlkPhos  46  12-15    PT/INR - ( 15 Dec 2023 03:24 )   PT: 12.2 sec;   INR: 1.17 ratio         PTT - ( 15 Dec 2023 03:24 )  PTT:26.9 sec  Urinalysis Basic - ( 15 Dec 2023 06:14 )    Color: x / Appearance: x / SG: x / pH: x  Gluc: 136 mg/dL / Ketone: x  / Bili: x / Urobili: x   Blood: x / Protein: x / Nitrite: x   Leuk Esterase: x / RBC: x / WBC x   Sq Epi: x / Non Sq Epi: x / Bacteria: x                Physical Examination:  General: NAD, resting comfortably in bed  HEENT: Normocephalic atraumatic, NGT present   Respiratory: Nonlabored respirations, normal CW expansion.  Abdomen: soft, appropriately tender, midline incision c/d/i  Extremities: bilateral groin incisions c/d/i, (+)R DP and PT signals, L AT, PT signals motor/sensory intact, reports numbness of toes bilaterally,

## 2023-12-15 NOTE — PHYSICAL THERAPY INITIAL EVALUATION ADULT - PLANNED THERAPY INTERVENTIONS, PT EVAL
stair neg: goal; pt will neg 5 steps 1 HR ind in 4wks./bed mobility training/gait training/strengthening/transfer training

## 2023-12-15 NOTE — PHYSICAL THERAPY INITIAL EVALUATION ADULT - GENERAL OBSERVATIONS, REHAB EVAL
Pt is s/p aortobifem bypass 12/13 admitted to SICU for q1h vascular checks, etoh hx. Pt recieved supine in bed, +ICU monitoring, +luly, +O2, +NGT, +morris, +dressing C/D/I. Pt is A&Ox4, follows all commands.

## 2023-12-15 NOTE — PROGRESS NOTE ADULT - SUBJECTIVE AND OBJECTIVE BOX
57 y/o male w/ PMHx CLTI ([place] 4), FAP s/p colectomy, Alcohol abuse w/ history of withdrawal seizures, HLD and smoking presenting for both LE pain and inability to quit EtOH.  He reports, he has been drinking too much vodka and hasn't been able to stick to his medication regimen. He wants to quit drinking prior to surgery.  Admitted for monitoring and treatment of EtOH withdrawal.  Now s/p aorto bi-femoral bypass 12/13 admitted to SICU for q1h vascular checks.          SUBJECTIVE/ROS:  [x ] A ten-point review of systems was otherwise negative except as noted.  [ ] Due to altered mental status/intubation, subjective information were not able to be obtained from the patient. History was obtained, to the extent possible, from review of the chart and collateral sources of information.      NEURO  RASS:     GCS:     CAM ICU:  Exam: awake, alert, oriented  Meds: HYDROmorphone  Injectable 0.25 milliGRAM(s) IV Push every 3 hours PRN Moderate Pain (4 - 6)  HYDROmorphone  Injectable 0.5 milliGRAM(s) IV Push every 3 hours PRN Severe Pain (7 - 10)  ondansetron Injectable 4 milliGRAM(s) IV Push once    [x] Adequacy of sedation and pain control has been assessed and adjusted      RESPIRATORY  RR: 25 (12-15-23 @ 01:00) (13 - 44)  SpO2: 91% (12-15-23 @ 01:00) (91% - 97%)  Wt(kg): --  Exam: unlabored, clear to auscultation bilaterally  Mechanical Ventilation:   ABG - ( 14 Dec 2023 22:38 )  pH: 7.42  /  pCO2: 40    /  pO2: 97    / HCO3: 26    / Base Excess: 1.3   /  SaO2: 99.3    Lactate: x                [N/A] Extubation Readiness Assessed  Meds:       CARDIOVASCULAR  HR: 86 (12-15-23 @ 01:00) (80 - 121)  BP: 126/73 (12-15-23 @ 01:00) (113/68 - 162/87)  BP(mean): 94 (12-15-23 @ 01:00) (84 - 118)  ABP: 133/97 (12-15-23 @ 01:00) (87/73 - 189/136)  ABP(mean): 110 (12-15-23 @ 01:00) (79 - 153)  Wt(kg): --  CVP(cm H2O): --      Exam: regular rate and rhythm  Cardiac Rhythm: sinus  Perfusion     [x]Adequate   [ ]Inadequate  Mentation   [x]Normal       [ ]Reduced  Extremities  [x]Warm         [ ]Cool  Volume Status [ ]Hypervolemic [x]Euvolemic [ ]Hypovolemic  Meds: metoprolol tartrate Injectable 5 milliGRAM(s) IV Push every 6 hours        GI/NUTRITION  Exam: soft, nontender, nondistended, incision C/D/I  Diet:  Meds: pantoprazole  Injectable 40 milliGRAM(s) IV Push every 24 hours  simethicone 80 milliGRAM(s) Chew every 6 hours PRN Heartburn      GENITOURINARY  I&O's Detail    12-13 @ 07:01  -  12-14 @ 07:00  --------------------------------------------------------  IN:    IV PiggyBack: 100 mL    IV PiggyBack: 250 mL    Lactated Ringers: 1800 mL    Lactated Ringers Bolus: 500 mL  Total IN: 2650 mL    OUT:    Indwelling Catheter - Urethral (mL): 465 mL    Nasogastric/Oral tube (mL): 0 mL  Total OUT: 465 mL    Total NET: 2185 mL      12-14 @ 07:01  -  12-15 @ 01:30  --------------------------------------------------------  IN:    Albumin 5%  - 250 mL: 500 mL    IV PiggyBack: 200 mL    Lactated Ringers: 2200 mL    Lactated Ringers Bolus: 500 mL  Total IN: 3400 mL    OUT:    Indwelling Catheter - Urethral (mL): 665 mL    Nasogastric/Oral tube (mL): 1000 mL  Total OUT: 1665 mL    Total NET: 1735 mL          12-14    138  |  105  |  20  ----------------------------<  131<H>  4.7   |  24  |  0.82    Ca    8.4      14 Dec 2023 22:45  Phos  3.8     12-14  Mg     2.1     12-14    TPro  5.7<L>  /  Alb  3.2<L>  /  TBili  0.6  /  DBili  x   /  AST  31  /  ALT  23  /  AlkPhos  56  12-14    [ ] Zavala catheter, indication: N/A  Meds: lactated ringers. 1000 milliLiter(s) IV Continuous <Continuous>        HEMATOLOGIC  Meds: enoxaparin Injectable 40 milliGRAM(s) SubCutaneous every 24 hours    [x] VTE Prophylaxis                        9.9    23.70 )-----------( 111      ( 14 Dec 2023 22:45 )             29.6     PT/INR - ( 14 Dec 2023 22:45 )   PT: 11.5 sec;   INR: 1.10 ratio         PTT - ( 14 Dec 2023 22:45 )  PTT:28.2 sec  Transfusion     [ ] PRBC   [ ] Platelets   [ ] FFP   [ ] Cryoprecipitate      INFECTIOUS DISEASES  WBC Count: 23.70 K/uL (12-14 @ 22:45)  WBC Count: 20.70 K/uL (12-14 @ 16:32)  WBC Count: 19.87 K/uL (12-14 @ 05:53)    RECENT CULTURES:    Meds:       ENDOCRINE  CAPILLARY BLOOD GLUCOSE      POCT Blood Glucose.: 128 mg/dL (14 Dec 2023 23:02)  POCT Blood Glucose.: 130 mg/dL (14 Dec 2023 17:28)  POCT Blood Glucose.: 131 mg/dL (14 Dec 2023 11:08)  POCT Blood Glucose.: 138 mg/dL (14 Dec 2023 02:27)    Meds: insulin lispro (ADMELOG) corrective regimen sliding scale   SubCutaneous every 6 hours          OTHER MEDICATIONS:  chlorhexidine 2% Cloths 1 Application(s) Topical <User Schedule>      CODE STATUS: FULL CODE   57 y/o male w/ PMHx CLTI ([place] 4), FAP s/p colectomy, Alcohol abuse w/ history of withdrawal seizures, HLD and smoking presenting for both LE pain and inability to quit EtOH.  He reports, he has been drinking too much vodka and hasn't been able to stick to his medication regimen. He wants to quit drinking prior to surgery.  Admitted for monitoring and treatment of EtOH withdrawal.  Now s/p aorto bi-femoral bypass 12/13 admitted to SICU for q1h vascular checks.          SUBJECTIVE/ROS:  [x ] A ten-point review of systems was otherwise negative except as noted.  [ ] Due to altered mental status/intubation, subjective information were not able to be obtained from the patient. History was obtained, to the extent possible, from review of the chart and collateral sources of information.      NEURO  RASS:     GCS:     CAM ICU:  Exam: awake, alert, oriented  Meds: HYDROmorphone  Injectable 0.25 milliGRAM(s) IV Push every 3 hours PRN Moderate Pain (4 - 6)  HYDROmorphone  Injectable 0.5 milliGRAM(s) IV Push every 3 hours PRN Severe Pain (7 - 10)  ondansetron Injectable 4 milliGRAM(s) IV Push once    [x] Adequacy of sedation and pain control has been assessed and adjusted      RESPIRATORY  RR: 25 (12-15-23 @ 01:00) (13 - 44)  SpO2: 91% (12-15-23 @ 01:00) (91% - 97%)  Wt(kg): --  Exam: unlabored, clear to auscultation bilaterally  Mechanical Ventilation:   ABG - ( 14 Dec 2023 22:38 )  pH: 7.42  /  pCO2: 40    /  pO2: 97    / HCO3: 26    / Base Excess: 1.3   /  SaO2: 99.3    Lactate: x                [N/A] Extubation Readiness Assessed  Meds:       CARDIOVASCULAR  HR: 86 (12-15-23 @ 01:00) (80 - 121)  BP: 126/73 (12-15-23 @ 01:00) (113/68 - 162/87)  BP(mean): 94 (12-15-23 @ 01:00) (84 - 118)  ABP: 133/97 (12-15-23 @ 01:00) (87/73 - 189/136)  ABP(mean): 110 (12-15-23 @ 01:00) (79 - 153)  Wt(kg): --  CVP(cm H2O): --      Exam: regular rate and rhythm  Cardiac Rhythm: sinus  Perfusion     [x]Adequate   [ ]Inadequate  Mentation   [x]Normal       [ ]Reduced  Extremities  [x]Warm         [ ]Cool  Volume Status [ ]Hypervolemic [x]Euvolemic [ ]Hypovolemic  Meds: metoprolol tartrate Injectable 5 milliGRAM(s) IV Push every 6 hours        GI/NUTRITION  Exam: soft, nontender, nondistended, incision C/D/I  Diet:  Meds: pantoprazole  Injectable 40 milliGRAM(s) IV Push every 24 hours  simethicone 80 milliGRAM(s) Chew every 6 hours PRN Heartburn      GENITOURINARY  I&O's Detail    12-13 @ 07:01  -  12-14 @ 07:00  --------------------------------------------------------  IN:    IV PiggyBack: 100 mL    IV PiggyBack: 250 mL    Lactated Ringers: 1800 mL    Lactated Ringers Bolus: 500 mL  Total IN: 2650 mL    OUT:    Indwelling Catheter - Urethral (mL): 465 mL    Nasogastric/Oral tube (mL): 0 mL  Total OUT: 465 mL    Total NET: 2185 mL      12-14 @ 07:01  -  12-15 @ 01:30  --------------------------------------------------------  IN:    Albumin 5%  - 250 mL: 500 mL    IV PiggyBack: 200 mL    Lactated Ringers: 2200 mL    Lactated Ringers Bolus: 500 mL  Total IN: 3400 mL    OUT:    Indwelling Catheter - Urethral (mL): 665 mL    Nasogastric/Oral tube (mL): 1000 mL  Total OUT: 1665 mL    Total NET: 1735 mL          12-14    138  |  105  |  20  ----------------------------<  131<H>  4.7   |  24  |  0.82    Ca    8.4      14 Dec 2023 22:45  Phos  3.8     12-14  Mg     2.1     12-14    TPro  5.7<L>  /  Alb  3.2<L>  /  TBili  0.6  /  DBili  x   /  AST  31  /  ALT  23  /  AlkPhos  56  12-14    [ ] Zavala catheter, indication: N/A  Meds: lactated ringers. 1000 milliLiter(s) IV Continuous <Continuous>        HEMATOLOGIC  Meds: enoxaparin Injectable 40 milliGRAM(s) SubCutaneous every 24 hours    [x] VTE Prophylaxis                        9.9    23.70 )-----------( 111      ( 14 Dec 2023 22:45 )             29.6     PT/INR - ( 14 Dec 2023 22:45 )   PT: 11.5 sec;   INR: 1.10 ratio         PTT - ( 14 Dec 2023 22:45 )  PTT:28.2 sec  Transfusion     [ ] PRBC   [ ] Platelets   [ ] FFP   [ ] Cryoprecipitate      INFECTIOUS DISEASES  WBC Count: 23.70 K/uL (12-14 @ 22:45)  WBC Count: 20.70 K/uL (12-14 @ 16:32)  WBC Count: 19.87 K/uL (12-14 @ 05:53)    RECENT CULTURES:    Meds:       ENDOCRINE  CAPILLARY BLOOD GLUCOSE      POCT Blood Glucose.: 128 mg/dL (14 Dec 2023 23:02)  POCT Blood Glucose.: 130 mg/dL (14 Dec 2023 17:28)  POCT Blood Glucose.: 131 mg/dL (14 Dec 2023 11:08)  POCT Blood Glucose.: 138 mg/dL (14 Dec 2023 02:27)    Meds: insulin lispro (ADMELOG) corrective regimen sliding scale   SubCutaneous every 6 hours          OTHER MEDICATIONS:  chlorhexidine 2% Cloths 1 Application(s) Topical <User Schedule>      CODE STATUS: FULL CODE

## 2023-12-15 NOTE — CHART NOTE - NSCHARTNOTESELECT_GEN_ALL_CORE
2nd Touch/Event Note
Vascular Surgery
Event Note
Post Operative Check

## 2023-12-15 NOTE — OCCUPATIONAL THERAPY INITIAL EVALUATION ADULT - BED MOBILITY TRAINING, PT EVAL
GOAL: Pt will be independent with all bed mobility tasks within 4 weeks to increase ability to engage in functional mobility tasks. .

## 2023-12-15 NOTE — CHART NOTE - NSCHARTNOTEFT_GEN_A_CORE
Patient with multiple episodes of increasingly worse abdominal distention and tenderness. Pain radiates to chest and right shoulder. Patient feels like he can't breathe, but is saturating well on nasal cannula. Attempted to adjust NGT, flush and aspirate NGT with little relief.  Multiple EKGs with no signs of ischemia. Troponins negative. CXR with air below the diaphragm, expected after an ex-lap. Abdominal x-ray with non-specific gas pattern. Vascular aware and evaluated the patient.  Concern for mesenteric ischemia or aortic thrombosis given patient's vascular history. Obtained CTA abdomen and pelvis to rule out thrombosis. Lactate elevated, but normalized with a fluid bolus.

## 2023-12-15 NOTE — PROGRESS NOTE ADULT - ASSESSMENT
PLAN:  Neuro:  - AOx4  - Tylenol and dilaudid    Resp:  - saturating ok on RA    CV:  - HDS off pressors  - s/p 500 LR bolus iso soft BP, responded appropriately  - lactate cleared 1.9  - q1 hr vascular checks     GI:  - strict NPO/NGT to LCWS    / Renal:  - strict IO's, replete electrolytes as needed  -  per vascular  - shifted for K 5.4, repeat 4.5     Heme:  - trend CBC  - lovenox to start 12/14 AM per vascular    ID:  - no active issues    Endo:  - monitor glucose  - SSI      VTE : f/u vascular     [ ] Aorta-bifem bypass planned 12/13  Patient scheduled for surgery Wednesday. - Please preop Tues.  [  ] oder PT post OP    Dispo: Please discharge patient w/ supply of ASA and atorvastatin, he doesn't have these meds at home anymore

## 2023-12-15 NOTE — CONSULT NOTE ADULT - SUBJECTIVE AND OBJECTIVE BOX
Patient is a 58y old  Male who presents with a chief complaint of Alcohol withdrawal (15 Dec 2023 08:02)    Admission HPI:  This is a 59 y/o male w/ PMHx CLTI ([place] 4), alcohol abuse w/ history of withdrawal seizures, HLD and smoking who presents for alcohol withdrawal and pain in his legs. 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 He was apparently planned for the procedure this coming Monday 12/11, but has been drinking too much vodka and hasn't been able to stick to his medication regimen. He wants to quit drinking prior to surgery. He is still having ischemic leg pain at rest at times. His last drink was a half-liter of vodka on Tuesday, and his symptoms started on Thursday. Prior to the drink on Tuesday, his last drink before that was 3 weeks prior, which was also about another 0.5L of vodka. He states that he did get withdrawal symptoms between that and his last drink this Tuesday, but he suffered through it at home, and didn't have any seizures at the time. He states that he was in alcohol rehab for 2 months earlier this year, but started drinking again in September.     In the ED, he was afebrile and hemodynamically stable, saturating well on RA. Has mildly elevated AG of 21, and transaminitis presents w/ AST/ALT 44/62 and ALP of 134. CXR was negative for any acute pulmonary process. Received 1 dose librium 50 and IVP valium 5mg, along w/ folic acid and thiamine in the ED.  (09 Dec 2023 00:06)    Interval History:  Patient went to OR on 12/13 - s/p Bypass graft, aortobifemoral, with non-vein  Patient w functional deficits due to vascular dz and ETOH wdrawal.     REVIEW OF SYSTEMS: diffuse pain, + weakness, + abd discomfort, No chest pain, shortness of breath, nausea, vomiting or diarhea; other ROS neg     PAST MEDICAL & SURGICAL HISTORY  HTN (hypertension)  FAP (familial adenomatous polyposis)  Alcohol abuse  H/O colectomy    FUNCTIONAL HISTORY:   Lives w wife  PTA Independent- works construction.     FAMILY HISTORY   Family history of FAP (familial adenomatous polyposis)    MEDICATIONS   chlorhexidine 2% Cloths 1 Application(s) Topical <User Schedule>  enoxaparin Injectable 40 milliGRAM(s) SubCutaneous every 24 hours  HYDROmorphone  Injectable 0.5 milliGRAM(s) IV Push every 3 hours PRN  HYDROmorphone  Injectable 0.25 milliGRAM(s) IV Push every 3 hours PRN  insulin lispro (ADMELOG) corrective regimen sliding scale   SubCutaneous every 6 hours  lactated ringers. 1000 milliLiter(s) IV Continuous <Continuous>  metoprolol tartrate Injectable 5 milliGRAM(s) IV Push every 6 hours  ondansetron Injectable 4 milliGRAM(s) IV Push once  pantoprazole  Injectable 40 milliGRAM(s) IV Push every 24 hours  simethicone 80 milliGRAM(s) Chew every 6 hours PRN    ALLERGIES  No Known Allergies    VITALS  T(C): 36.7 (12-15-23 @ 11:00), Max: 37.5 (12-14-23 @ 15:00)  HR: 85 (12-15-23 @ 13:00) (80 - 121)  BP: 114/64 (12-15-23 @ 13:00) (113/68 - 162/87)  RR: 25 (12-15-23 @ 13:00) (18 - 44)  SpO2: 93% (12-15-23 @ 13:00) (89% - 97%)  Wt(kg): --    PHYSICAL EXAM  Constitutional - NAD, Comfortable  HEENT - +NGT, NCAT, EOMI  Neck - Supple  Chest - No distress, no use of accessory muscles for respiration  Cardiovascular -Well perfused  Abdomen - BS+, Soft, NTND  Extremities -Trace edema, No calf tenderness   Neurologic Exam -                 Alert  Follows verbal instruction  AAO x 3  Motor 4+/5 bl UE and LEs  Dec sensation distally  No clonus     Psychiatric - Mood stable, Affect WNL    RECENT LABS/IMAGING  CBC Full  -  ( 15 Dec 2023 10:51 )  WBC Count : 16.72 K/uL  RBC Count : 2.49 M/uL  Hemoglobin : 7.8 g/dL  Hematocrit : 23.4 %  Platelet Count - Automated : 91 K/uL  Mean Cell Volume : 94.0 fl  Mean Cell Hemoglobin : 31.3 pg  Mean Cell Hemoglobin Concentration : 33.3 gm/dL  Auto Neutrophil # : x  Auto Lymphocyte # : x  Auto Monocyte # : x  Auto Eosinophil # : x  Auto Basophil # : x  Auto Neutrophil % : x  Auto Lymphocyte % : x  Auto Monocyte % : x  Auto Eosinophil % : x  Auto Basophil % : x    12-15    140  |  106  |  16  ----------------------------<  136<H>  3.8   |  26  |  0.65    Ca    8.4      15 Dec 2023 06:14  Phos  3.4     12-15  Mg     2.0     12-15    TPro  5.1<L>  /  Alb  2.8<L>  /  TBili  0.5  /  DBili  x   /  AST  23  /  ALT  15  /  AlkPhos  46  12-15    Urinalysis Basic - ( 15 Dec 2023 06:14 )    Color: x / Appearance: x / SG: x / pH: x  Gluc: 136 mg/dL / Ketone: x  / Bili: x / Urobili: x   Blood: x / Protein: x / Nitrite: x   Leuk Esterase: x / RBC: x / WBC x   Sq Epi: x / Non Sq Epi: x / Bacteria: x    Impression:  57 yo with functional deficits secondary to diagnosis of debility, PVD    Plan:  PT- ROM, Bed Mob, Transfers, Amb w AD and bracing as needed  OT- ADLs, bracing  SLP- Dysphagia eval and treat  Prec- Falls, Cardiac  DVT Prophylaxis- Lovenox  Monitor wbc ct, anemia  Skin- Turn q2 h  Dispo- Acute Rehab- patient requires active and ongoing therapeutic interventions of multiple disciplines and can tolerate 3 hours of therapies x 4wks. Can actively participate and benefit from  an intensive rehabilitation program. Requires supervision by a rehabilitation physician and a coordinated interdisciplinary approach to providing rehabilitation.    Patient is a 58y old  Male who presents with a chief complaint of Alcohol withdrawal (15 Dec 2023 08:02)    Admission HPI:  This is a 57 y/o male w/ PMHx CLTI ([place] 4), alcohol abuse w/ history of withdrawal seizures, HLD and smoking who presents for alcohol withdrawal and pain in his legs. 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 He was apparently planned for the procedure this coming Monday 12/11, but has been drinking too much vodka and hasn't been able to stick to his medication regimen. He wants to quit drinking prior to surgery. He is still having ischemic leg pain at rest at times. His last drink was a half-liter of vodka on Tuesday, and his symptoms started on Thursday. Prior to the drink on Tuesday, his last drink before that was 3 weeks prior, which was also about another 0.5L of vodka. He states that he did get withdrawal symptoms between that and his last drink this Tuesday, but he suffered through it at home, and didn't have any seizures at the time. He states that he was in alcohol rehab for 2 months earlier this year, but started drinking again in September.     In the ED, he was afebrile and hemodynamically stable, saturating well on RA. Has mildly elevated AG of 21, and transaminitis presents w/ AST/ALT 44/62 and ALP of 134. CXR was negative for any acute pulmonary process. Received 1 dose librium 50 and IVP valium 5mg, along w/ folic acid and thiamine in the ED.  (09 Dec 2023 00:06)    Interval History:  Patient went to OR on 12/13 - s/p Bypass graft, aortobifemoral, with non-vein  Patient w functional deficits due to vascular dz and ETOH wdrawal.     REVIEW OF SYSTEMS: diffuse pain, + weakness, + abd discomfort, No chest pain, shortness of breath, nausea, vomiting or diarhea; other ROS neg     PAST MEDICAL & SURGICAL HISTORY  HTN (hypertension)  FAP (familial adenomatous polyposis)  Alcohol abuse  H/O colectomy    FUNCTIONAL HISTORY:   Lives w wife  PTA Independent- works construction.     FAMILY HISTORY   Family history of FAP (familial adenomatous polyposis)    MEDICATIONS   chlorhexidine 2% Cloths 1 Application(s) Topical <User Schedule>  enoxaparin Injectable 40 milliGRAM(s) SubCutaneous every 24 hours  HYDROmorphone  Injectable 0.5 milliGRAM(s) IV Push every 3 hours PRN  HYDROmorphone  Injectable 0.25 milliGRAM(s) IV Push every 3 hours PRN  insulin lispro (ADMELOG) corrective regimen sliding scale   SubCutaneous every 6 hours  lactated ringers. 1000 milliLiter(s) IV Continuous <Continuous>  metoprolol tartrate Injectable 5 milliGRAM(s) IV Push every 6 hours  ondansetron Injectable 4 milliGRAM(s) IV Push once  pantoprazole  Injectable 40 milliGRAM(s) IV Push every 24 hours  simethicone 80 milliGRAM(s) Chew every 6 hours PRN    ALLERGIES  No Known Allergies    VITALS  T(C): 36.7 (12-15-23 @ 11:00), Max: 37.5 (12-14-23 @ 15:00)  HR: 85 (12-15-23 @ 13:00) (80 - 121)  BP: 114/64 (12-15-23 @ 13:00) (113/68 - 162/87)  RR: 25 (12-15-23 @ 13:00) (18 - 44)  SpO2: 93% (12-15-23 @ 13:00) (89% - 97%)  Wt(kg): --    PHYSICAL EXAM  Constitutional - NAD, Comfortable  HEENT - +NGT, NCAT, EOMI  Neck - Supple  Chest - No distress, no use of accessory muscles for respiration  Cardiovascular -Well perfused  Abdomen - BS+, Soft, NTND  Extremities -Trace edema, No calf tenderness   Neurologic Exam -                 Alert  Follows verbal instruction  AAO x 3  Motor 4+/5 bl UE and LEs  Dec sensation distally  No clonus     Psychiatric - Mood stable, Affect WNL    RECENT LABS/IMAGING  CBC Full  -  ( 15 Dec 2023 10:51 )  WBC Count : 16.72 K/uL  RBC Count : 2.49 M/uL  Hemoglobin : 7.8 g/dL  Hematocrit : 23.4 %  Platelet Count - Automated : 91 K/uL  Mean Cell Volume : 94.0 fl  Mean Cell Hemoglobin : 31.3 pg  Mean Cell Hemoglobin Concentration : 33.3 gm/dL  Auto Neutrophil # : x  Auto Lymphocyte # : x  Auto Monocyte # : x  Auto Eosinophil # : x  Auto Basophil # : x  Auto Neutrophil % : x  Auto Lymphocyte % : x  Auto Monocyte % : x  Auto Eosinophil % : x  Auto Basophil % : x    12-15    140  |  106  |  16  ----------------------------<  136<H>  3.8   |  26  |  0.65    Ca    8.4      15 Dec 2023 06:14  Phos  3.4     12-15  Mg     2.0     12-15    TPro  5.1<L>  /  Alb  2.8<L>  /  TBili  0.5  /  DBili  x   /  AST  23  /  ALT  15  /  AlkPhos  46  12-15    Urinalysis Basic - ( 15 Dec 2023 06:14 )    Color: x / Appearance: x / SG: x / pH: x  Gluc: 136 mg/dL / Ketone: x  / Bili: x / Urobili: x   Blood: x / Protein: x / Nitrite: x   Leuk Esterase: x / RBC: x / WBC x   Sq Epi: x / Non Sq Epi: x / Bacteria: x    Impression:  59 yo with functional deficits secondary to diagnosis of debility, PVD    Plan:  PT- ROM, Bed Mob, Transfers, Amb w AD and bracing as needed  OT- ADLs, bracing  SLP- Dysphagia eval and treat  Prec- Falls, Cardiac  DVT Prophylaxis- Lovenox  Monitor wbc ct, anemia  Skin- Turn q2 h  Dispo- Acute Rehab- patient requires active and ongoing therapeutic interventions of multiple disciplines and can tolerate 3 hours of therapies x 4wks. Can actively participate and benefit from  an intensive rehabilitation program. Requires supervision by a rehabilitation physician and a coordinated interdisciplinary approach to providing rehabilitation.

## 2023-12-15 NOTE — PHYSICAL THERAPY INITIAL EVALUATION ADULT - ACTIVE RANGE OF MOTION EXAMINATION, REHAB EVAL
limited overall hip flex 2* pain incisional/Left UE Active ROM was WFL (within functional limits)/Right UE Active ROM was WFL (within functional limits)/Left LE Active ROM was WFL (within functional limits)/Right LE Active ROM was WFL (within functional limits)

## 2023-12-15 NOTE — PHYSICAL THERAPY INITIAL EVALUATION ADULT - ADDITIONAL COMMENTS
pt reports living at home with family, +stairs to negotiate, PTA ind amb and ADLs, works in construction

## 2023-12-15 NOTE — PHYSICAL THERAPY INITIAL EVALUATION ADULT - PERTINENT HX OF CURRENT PROBLEM, REHAB EVAL
Pt is a 57 y/o male admitted to [facility] on 12/9/23 w/ PMHx CLTI ([place] 4), alcohol abuse w/ history of withdrawal seizures, HLD and smoking who presents for alcohol withdrawal and pain in his legs. He was discharged from here on 12/5 after being diagnosed with [place] 4 CTLI, and requires an aorto-bifemoral bypass. He was apparently planned for the procedure this coming Monday 12/11, but has been drinking too much vodka and hasn't been able to stick to his medication regimen. He wants to quit drinking prior to surgery. He is still having ischemic leg pain at rest at times. His last drink was a half-liter of vodka on Tuesday, and his symptoms started on Thursday. Prior to the drink on Tuesday, his last drink before that was 3 weeks prior, which was also about another 0.5L of vodka. He states that he did get withdrawal symptoms between that and his last drink this Tuesday, but he suffered through it at home, and didn't have any seizures at the time. He states that he was in alcohol rehab for 2 months earlier this year, but started drinking again in September. Hospital course: In the ED, he was afebrile and hemodynamically stable, saturating well on RA. Has mildly elevated AG of 21, and transaminitis presents w/ AST/ALT 44/62 and ALP of 134. CXR was negative for any acute pulmonary process. Received 1 dose librium 50 and IVP valium 5mg, along w/ folic acid and thiamine in the ED. Now s/p aorto bi-femoral bypass 12/13 admitted to SICU for q1h vascular checks. CTA abd/pelvis: Small bilateral pleural effusions. Left lower lobe consolidation. Pneumoperitoneum. 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 Patent celiac artery, SMA and LUIS DANIEL. No abnormal bowel wall enhancement. lovenox to start 12/14 AM per vascular Pt is a 59 y/o male admitted to [facility] on 12/9/23 w/ PMHx CLTI ([place] 4), alcohol abuse w/ history of withdrawal seizures, HLD and smoking who presents for alcohol withdrawal and pain in his legs. He was discharged from here on 12/5 after being diagnosed with [place] 4 CTLI, and requires an aorto-bifemoral bypass. He was apparently planned for the procedure this coming Monday 12/11, but has been drinking too much vodka and hasn't been able to stick to his medication regimen. He wants to quit drinking prior to surgery. He is still having ischemic leg pain at rest at times. His last drink was a half-liter of vodka on Tuesday, and his symptoms started on Thursday. Prior to the drink on Tuesday, his last drink before that was 3 weeks prior, which was also about another 0.5L of vodka. He states that he did get withdrawal symptoms between that and his last drink this Tuesday, but he suffered through it at home, and didn't have any seizures at the time. He states that he was in alcohol rehab for 2 months earlier this year, but started drinking again in September. Hospital course: In the ED, he was afebrile and hemodynamically stable, saturating well on RA. Has mildly elevated AG of 21, and transaminitis presents w/ AST/ALT 44/62 and ALP of 134. CXR was negative for any acute pulmonary process. Received 1 dose librium 50 and IVP valium 5mg, along w/ folic acid and thiamine in the ED. Now s/p aorto bi-femoral bypass 12/13 admitted to SICU for q1h vascular checks. CTA abd/pelvis: Small bilateral pleural effusions. Left lower lobe consolidation. Pneumoperitoneum. 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 Patent celiac artery, SMA and LUIS DANIEL. No abnormal bowel wall enhancement. lovenox to start 12/14 AM per vascular

## 2023-12-15 NOTE — PROGRESS NOTE ADULT - ASSESSMENT
57 y/o male w/ PMHx CLTI ([place] 4), alcohol abuse w/ history of withdrawal seizures, HLD and smoking who presents for alcohol withdrawal and pain in his legs.  Now s/p aortobifemoral bypass graft on 12/13.     Plan/Recs  - recovering from OR in SICU   - pain medications as needed   - NPO with NGT   - monitor neurovascular checks   - Medical optimization noted  - Patient on CIWA protocol  - SICU care     Vascular Surgery  [id number]      59 y/o male w/ PMHx CLTI ([place] 4), alcohol abuse w/ history of withdrawal seizures, HLD and smoking who presents for alcohol withdrawal and pain in his legs.  Now s/p aortobifemoral bypass graft on 12/13.     Plan/Recs  - recovering from OR in SICU   - pain medications as needed   - NPO with NGT   - monitor neurovascular checks   - Medical optimization noted  - Patient on CIWA protocol  - SICU care     Vascular Surgery  [id number]

## 2023-12-16 LAB
ALBUMIN SERPL ELPH-MCNC: 3 G/DL — LOW (ref 3.3–5)
ALP SERPL-CCNC: 61 U/L — SIGNIFICANT CHANGE UP (ref 40–120)
ALP SERPL-CCNC: 61 U/L — SIGNIFICANT CHANGE UP (ref 40–120)
ALP SERPL-CCNC: 92 U/L — SIGNIFICANT CHANGE UP (ref 40–120)
ALP SERPL-CCNC: 92 U/L — SIGNIFICANT CHANGE UP (ref 40–120)
ALT FLD-CCNC: 16 U/L — SIGNIFICANT CHANGE UP (ref 10–45)
ANION GAP SERPL CALC-SCNC: 10 MMOL/L — SIGNIFICANT CHANGE UP (ref 5–17)
ANION GAP SERPL CALC-SCNC: 10 MMOL/L — SIGNIFICANT CHANGE UP (ref 5–17)
ANION GAP SERPL CALC-SCNC: 8 MMOL/L — SIGNIFICANT CHANGE UP (ref 5–17)
ANION GAP SERPL CALC-SCNC: 8 MMOL/L — SIGNIFICANT CHANGE UP (ref 5–17)
AST SERPL-CCNC: 22 U/L — SIGNIFICANT CHANGE UP (ref 10–40)
AST SERPL-CCNC: 22 U/L — SIGNIFICANT CHANGE UP (ref 10–40)
AST SERPL-CCNC: 24 U/L — SIGNIFICANT CHANGE UP (ref 10–40)
AST SERPL-CCNC: 24 U/L — SIGNIFICANT CHANGE UP (ref 10–40)
BILIRUB SERPL-MCNC: 0.6 MG/DL — SIGNIFICANT CHANGE UP (ref 0.2–1.2)
BILIRUB SERPL-MCNC: 0.6 MG/DL — SIGNIFICANT CHANGE UP (ref 0.2–1.2)
BILIRUB SERPL-MCNC: 0.8 MG/DL — SIGNIFICANT CHANGE UP (ref 0.2–1.2)
BILIRUB SERPL-MCNC: 0.8 MG/DL — SIGNIFICANT CHANGE UP (ref 0.2–1.2)
BUN SERPL-MCNC: 6 MG/DL — LOW (ref 7–23)
BUN SERPL-MCNC: 6 MG/DL — LOW (ref 7–23)
BUN SERPL-MCNC: 9 MG/DL — SIGNIFICANT CHANGE UP (ref 7–23)
BUN SERPL-MCNC: 9 MG/DL — SIGNIFICANT CHANGE UP (ref 7–23)
CALCIUM SERPL-MCNC: 8.1 MG/DL — LOW (ref 8.4–10.5)
CALCIUM SERPL-MCNC: 8.1 MG/DL — LOW (ref 8.4–10.5)
CALCIUM SERPL-MCNC: 8.4 MG/DL — SIGNIFICANT CHANGE UP (ref 8.4–10.5)
CALCIUM SERPL-MCNC: 8.4 MG/DL — SIGNIFICANT CHANGE UP (ref 8.4–10.5)
CHLORIDE SERPL-SCNC: 104 MMOL/L — SIGNIFICANT CHANGE UP (ref 96–108)
CHLORIDE SERPL-SCNC: 104 MMOL/L — SIGNIFICANT CHANGE UP (ref 96–108)
CHLORIDE SERPL-SCNC: 105 MMOL/L — SIGNIFICANT CHANGE UP (ref 96–108)
CHLORIDE SERPL-SCNC: 105 MMOL/L — SIGNIFICANT CHANGE UP (ref 96–108)
CO2 SERPL-SCNC: 24 MMOL/L — SIGNIFICANT CHANGE UP (ref 22–31)
CREAT SERPL-MCNC: 0.5 MG/DL — SIGNIFICANT CHANGE UP (ref 0.5–1.3)
EGFR: 118 ML/MIN/1.73M2 — SIGNIFICANT CHANGE UP
GLUCOSE BLDC GLUCOMTR-MCNC: 114 MG/DL — HIGH (ref 70–99)
GLUCOSE BLDC GLUCOMTR-MCNC: 114 MG/DL — HIGH (ref 70–99)
GLUCOSE BLDC GLUCOMTR-MCNC: 120 MG/DL — HIGH (ref 70–99)
GLUCOSE BLDC GLUCOMTR-MCNC: 120 MG/DL — HIGH (ref 70–99)
GLUCOSE BLDC GLUCOMTR-MCNC: 90 MG/DL — SIGNIFICANT CHANGE UP (ref 70–99)
GLUCOSE BLDC GLUCOMTR-MCNC: 90 MG/DL — SIGNIFICANT CHANGE UP (ref 70–99)
GLUCOSE BLDC GLUCOMTR-MCNC: 96 MG/DL — SIGNIFICANT CHANGE UP (ref 70–99)
GLUCOSE BLDC GLUCOMTR-MCNC: 96 MG/DL — SIGNIFICANT CHANGE UP (ref 70–99)
GLUCOSE BLDC GLUCOMTR-MCNC: 97 MG/DL — SIGNIFICANT CHANGE UP (ref 70–99)
GLUCOSE BLDC GLUCOMTR-MCNC: 97 MG/DL — SIGNIFICANT CHANGE UP (ref 70–99)
GLUCOSE SERPL-MCNC: 107 MG/DL — HIGH (ref 70–99)
GLUCOSE SERPL-MCNC: 107 MG/DL — HIGH (ref 70–99)
GLUCOSE SERPL-MCNC: 83 MG/DL — SIGNIFICANT CHANGE UP (ref 70–99)
GLUCOSE SERPL-MCNC: 83 MG/DL — SIGNIFICANT CHANGE UP (ref 70–99)
HCT VFR BLD CALC: 22 % — LOW (ref 39–50)
HCT VFR BLD CALC: 22 % — LOW (ref 39–50)
HCT VFR BLD CALC: 23 % — LOW (ref 39–50)
HCT VFR BLD CALC: 23 % — LOW (ref 39–50)
HCT VFR BLD CALC: 23.6 % — LOW (ref 39–50)
HCT VFR BLD CALC: 23.6 % — LOW (ref 39–50)
HGB BLD-MCNC: 7.6 G/DL — LOW (ref 13–17)
MAGNESIUM SERPL-MCNC: 1.7 MG/DL — SIGNIFICANT CHANGE UP (ref 1.6–2.6)
MAGNESIUM SERPL-MCNC: 1.7 MG/DL — SIGNIFICANT CHANGE UP (ref 1.6–2.6)
MAGNESIUM SERPL-MCNC: 1.9 MG/DL — SIGNIFICANT CHANGE UP (ref 1.6–2.6)
MAGNESIUM SERPL-MCNC: 1.9 MG/DL — SIGNIFICANT CHANGE UP (ref 1.6–2.6)
MCHC RBC-ENTMCNC: 30.3 PG — SIGNIFICANT CHANGE UP (ref 27–34)
MCHC RBC-ENTMCNC: 30.3 PG — SIGNIFICANT CHANGE UP (ref 27–34)
MCHC RBC-ENTMCNC: 30.9 PG — SIGNIFICANT CHANGE UP (ref 27–34)
MCHC RBC-ENTMCNC: 30.9 PG — SIGNIFICANT CHANGE UP (ref 27–34)
MCHC RBC-ENTMCNC: 31.4 PG — SIGNIFICANT CHANGE UP (ref 27–34)
MCHC RBC-ENTMCNC: 31.4 PG — SIGNIFICANT CHANGE UP (ref 27–34)
MCHC RBC-ENTMCNC: 32.2 GM/DL — SIGNIFICANT CHANGE UP (ref 32–36)
MCHC RBC-ENTMCNC: 32.2 GM/DL — SIGNIFICANT CHANGE UP (ref 32–36)
MCHC RBC-ENTMCNC: 33 GM/DL — SIGNIFICANT CHANGE UP (ref 32–36)
MCHC RBC-ENTMCNC: 33 GM/DL — SIGNIFICANT CHANGE UP (ref 32–36)
MCHC RBC-ENTMCNC: 34.5 GM/DL — SIGNIFICANT CHANGE UP (ref 32–36)
MCHC RBC-ENTMCNC: 34.5 GM/DL — SIGNIFICANT CHANGE UP (ref 32–36)
MCV RBC AUTO: 90.9 FL — SIGNIFICANT CHANGE UP (ref 80–100)
MCV RBC AUTO: 90.9 FL — SIGNIFICANT CHANGE UP (ref 80–100)
MCV RBC AUTO: 93.5 FL — SIGNIFICANT CHANGE UP (ref 80–100)
MCV RBC AUTO: 93.5 FL — SIGNIFICANT CHANGE UP (ref 80–100)
MCV RBC AUTO: 94 FL — SIGNIFICANT CHANGE UP (ref 80–100)
MCV RBC AUTO: 94 FL — SIGNIFICANT CHANGE UP (ref 80–100)
NRBC # BLD: 0 /100 WBCS — SIGNIFICANT CHANGE UP (ref 0–0)
PHOSPHATE SERPL-MCNC: 1.3 MG/DL — LOW (ref 2.5–4.5)
PHOSPHATE SERPL-MCNC: 1.3 MG/DL — LOW (ref 2.5–4.5)
PHOSPHATE SERPL-MCNC: 2.6 MG/DL — SIGNIFICANT CHANGE UP (ref 2.5–4.5)
PHOSPHATE SERPL-MCNC: 2.6 MG/DL — SIGNIFICANT CHANGE UP (ref 2.5–4.5)
PLATELET # BLD AUTO: 102 K/UL — LOW (ref 150–400)
PLATELET # BLD AUTO: 102 K/UL — LOW (ref 150–400)
PLATELET # BLD AUTO: 131 K/UL — LOW (ref 150–400)
PLATELET # BLD AUTO: 131 K/UL — LOW (ref 150–400)
PLATELET # BLD AUTO: 89 K/UL — LOW (ref 150–400)
PLATELET # BLD AUTO: 89 K/UL — LOW (ref 150–400)
POTASSIUM SERPL-MCNC: 3 MMOL/L — LOW (ref 3.5–5.3)
POTASSIUM SERPL-MCNC: 3 MMOL/L — LOW (ref 3.5–5.3)
POTASSIUM SERPL-MCNC: 3.1 MMOL/L — LOW (ref 3.5–5.3)
POTASSIUM SERPL-MCNC: 3.1 MMOL/L — LOW (ref 3.5–5.3)
POTASSIUM SERPL-SCNC: 3 MMOL/L — LOW (ref 3.5–5.3)
POTASSIUM SERPL-SCNC: 3 MMOL/L — LOW (ref 3.5–5.3)
POTASSIUM SERPL-SCNC: 3.1 MMOL/L — LOW (ref 3.5–5.3)
POTASSIUM SERPL-SCNC: 3.1 MMOL/L — LOW (ref 3.5–5.3)
PROT SERPL-MCNC: 5.5 G/DL — LOW (ref 6–8.3)
PROT SERPL-MCNC: 5.5 G/DL — LOW (ref 6–8.3)
PROT SERPL-MCNC: 5.6 G/DL — LOW (ref 6–8.3)
PROT SERPL-MCNC: 5.6 G/DL — LOW (ref 6–8.3)
RBC # BLD: 2.42 M/UL — LOW (ref 4.2–5.8)
RBC # BLD: 2.42 M/UL — LOW (ref 4.2–5.8)
RBC # BLD: 2.46 M/UL — LOW (ref 4.2–5.8)
RBC # BLD: 2.46 M/UL — LOW (ref 4.2–5.8)
RBC # BLD: 2.51 M/UL — LOW (ref 4.2–5.8)
RBC # BLD: 2.51 M/UL — LOW (ref 4.2–5.8)
RBC # FLD: 13.6 % — SIGNIFICANT CHANGE UP (ref 10.3–14.5)
RBC # FLD: 13.6 % — SIGNIFICANT CHANGE UP (ref 10.3–14.5)
RBC # FLD: 14.1 % — SIGNIFICANT CHANGE UP (ref 10.3–14.5)
RBC # FLD: 14.1 % — SIGNIFICANT CHANGE UP (ref 10.3–14.5)
RBC # FLD: 14.2 % — SIGNIFICANT CHANGE UP (ref 10.3–14.5)
RBC # FLD: 14.2 % — SIGNIFICANT CHANGE UP (ref 10.3–14.5)
SODIUM SERPL-SCNC: 136 MMOL/L — SIGNIFICANT CHANGE UP (ref 135–145)
SODIUM SERPL-SCNC: 136 MMOL/L — SIGNIFICANT CHANGE UP (ref 135–145)
SODIUM SERPL-SCNC: 139 MMOL/L — SIGNIFICANT CHANGE UP (ref 135–145)
SODIUM SERPL-SCNC: 139 MMOL/L — SIGNIFICANT CHANGE UP (ref 135–145)
WBC # BLD: 13.14 K/UL — HIGH (ref 3.8–10.5)
WBC # BLD: 13.14 K/UL — HIGH (ref 3.8–10.5)
WBC # BLD: 14.75 K/UL — HIGH (ref 3.8–10.5)
WBC # BLD: 14.75 K/UL — HIGH (ref 3.8–10.5)
WBC # BLD: 14.94 K/UL — HIGH (ref 3.8–10.5)
WBC # BLD: 14.94 K/UL — HIGH (ref 3.8–10.5)
WBC # FLD AUTO: 13.14 K/UL — HIGH (ref 3.8–10.5)
WBC # FLD AUTO: 13.14 K/UL — HIGH (ref 3.8–10.5)
WBC # FLD AUTO: 14.75 K/UL — HIGH (ref 3.8–10.5)
WBC # FLD AUTO: 14.75 K/UL — HIGH (ref 3.8–10.5)
WBC # FLD AUTO: 14.94 K/UL — HIGH (ref 3.8–10.5)
WBC # FLD AUTO: 14.94 K/UL — HIGH (ref 3.8–10.5)

## 2023-12-16 PROCEDURE — 71045 X-RAY EXAM CHEST 1 VIEW: CPT | Mod: 26

## 2023-12-16 PROCEDURE — 99232 SBSQ HOSP IP/OBS MODERATE 35: CPT | Mod: GC

## 2023-12-16 RX ORDER — INSULIN LISPRO 100/ML
VIAL (ML) SUBCUTANEOUS
Refills: 0 | Status: DISCONTINUED | OUTPATIENT
Start: 2023-12-16 | End: 2023-12-16

## 2023-12-16 RX ORDER — POTASSIUM CHLORIDE 20 MEQ
40 PACKET (EA) ORAL EVERY 4 HOURS
Refills: 0 | Status: COMPLETED | OUTPATIENT
Start: 2023-12-16 | End: 2023-12-16

## 2023-12-16 RX ORDER — AMLODIPINE BESYLATE 2.5 MG/1
10 TABLET ORAL EVERY 24 HOURS
Refills: 0 | Status: DISCONTINUED | OUTPATIENT
Start: 2023-12-16 | End: 2023-12-18

## 2023-12-16 RX ORDER — ATORVASTATIN CALCIUM 80 MG/1
80 TABLET, FILM COATED ORAL AT BEDTIME
Refills: 0 | Status: DISCONTINUED | OUTPATIENT
Start: 2023-12-16 | End: 2023-12-18

## 2023-12-16 RX ORDER — MAGNESIUM SULFATE 500 MG/ML
2 VIAL (ML) INJECTION
Refills: 0 | Status: COMPLETED | OUTPATIENT
Start: 2023-12-16 | End: 2023-12-17

## 2023-12-16 RX ORDER — OXYCODONE HYDROCHLORIDE 5 MG/1
5 TABLET ORAL EVERY 4 HOURS
Refills: 0 | Status: DISCONTINUED | OUTPATIENT
Start: 2023-12-16 | End: 2023-12-18

## 2023-12-16 RX ORDER — OXYCODONE HYDROCHLORIDE 5 MG/1
2.5 TABLET ORAL EVERY 4 HOURS
Refills: 0 | Status: DISCONTINUED | OUTPATIENT
Start: 2023-12-16 | End: 2023-12-18

## 2023-12-16 RX ORDER — MAGNESIUM SULFATE 500 MG/ML
2 VIAL (ML) INJECTION ONCE
Refills: 0 | Status: COMPLETED | OUTPATIENT
Start: 2023-12-16 | End: 2023-12-16

## 2023-12-16 RX ORDER — POTASSIUM PHOSPHATE, MONOBASIC POTASSIUM PHOSPHATE, DIBASIC 236; 224 MG/ML; MG/ML
30 INJECTION, SOLUTION INTRAVENOUS ONCE
Refills: 0 | Status: COMPLETED | OUTPATIENT
Start: 2023-12-16 | End: 2023-12-17

## 2023-12-16 RX ORDER — ASPIRIN/CALCIUM CARB/MAGNESIUM 324 MG
81 TABLET ORAL DAILY
Refills: 0 | Status: DISCONTINUED | OUTPATIENT
Start: 2023-12-16 | End: 2023-12-18

## 2023-12-16 RX ADMIN — Medication 1000 MILLIGRAM(S): at 06:55

## 2023-12-16 RX ADMIN — Medication 400 MILLIGRAM(S): at 00:52

## 2023-12-16 RX ADMIN — ENOXAPARIN SODIUM 40 MILLIGRAM(S): 100 INJECTION SUBCUTANEOUS at 17:05

## 2023-12-16 RX ADMIN — Medication 5 MILLIGRAM(S): at 06:22

## 2023-12-16 RX ADMIN — ATORVASTATIN CALCIUM 80 MILLIGRAM(S): 80 TABLET, FILM COATED ORAL at 21:22

## 2023-12-16 RX ADMIN — Medication 5 MILLIGRAM(S): at 00:52

## 2023-12-16 RX ADMIN — Medication 40 MILLIEQUIVALENT(S): at 10:17

## 2023-12-16 RX ADMIN — Medication 25 GRAM(S): at 08:07

## 2023-12-16 RX ADMIN — OXYCODONE HYDROCHLORIDE 5 MILLIGRAM(S): 5 TABLET ORAL at 16:05

## 2023-12-16 RX ADMIN — Medication 400 MILLIGRAM(S): at 12:05

## 2023-12-16 RX ADMIN — Medication 40 MILLIEQUIVALENT(S): at 13:32

## 2023-12-16 RX ADMIN — SODIUM CHLORIDE 50 MILLILITER(S): 9 INJECTION, SOLUTION INTRAVENOUS at 06:22

## 2023-12-16 RX ADMIN — AMLODIPINE BESYLATE 10 MILLIGRAM(S): 2.5 TABLET ORAL at 13:32

## 2023-12-16 RX ADMIN — PANTOPRAZOLE SODIUM 40 MILLIGRAM(S): 20 TABLET, DELAYED RELEASE ORAL at 22:07

## 2023-12-16 RX ADMIN — Medication 400 MILLIGRAM(S): at 06:21

## 2023-12-16 RX ADMIN — Medication 1000 MILLIGRAM(S): at 12:35

## 2023-12-16 RX ADMIN — Medication 255 MILLIMOLE(S): at 23:49

## 2023-12-16 RX ADMIN — OXYCODONE HYDROCHLORIDE 5 MILLIGRAM(S): 5 TABLET ORAL at 22:07

## 2023-12-16 RX ADMIN — Medication 1000 MILLIGRAM(S): at 01:26

## 2023-12-16 RX ADMIN — OXYCODONE HYDROCHLORIDE 5 MILLIGRAM(S): 5 TABLET ORAL at 16:35

## 2023-12-16 RX ADMIN — Medication 81 MILLIGRAM(S): at 12:04

## 2023-12-16 RX ADMIN — OXYCODONE HYDROCHLORIDE 5 MILLIGRAM(S): 5 TABLET ORAL at 22:37

## 2023-12-16 NOTE — PROGRESS NOTE ADULT - SUBJECTIVE AND OBJECTIVE BOX
SURGERY DAILY PROGRESS NOTE    STATUS POST:  Bypass graft, aortobifemoral     SUBJECTIVE: Patient seen and examined on AM rounds. Reports feeling very good, in good spirits after NGT removal, no new complaints. Pain is well-controlled. He is tolerating clears and passing gas, had a BM this morning. Denies chest pain, SOB, palpitations, HA, fever, chills, N/V.      OBJECTIVE:  Vital Signs Last 24 Hrs  T(C): 37.1 (16 Dec 2023 08:00), Max: 37.4 (15 Dec 2023 23:00)  T(F): 98.7 (16 Dec 2023 08:00), Max: 99.3 (15 Dec 2023 23:00)  HR: 73 (16 Dec 2023 11:00) (73 - 99)  BP: 119/67 (16 Dec 2023 11:00) (113/59 - 151/72)  BP(mean): 87 (16 Dec 2023 11:00) (80 - 107)  RR: 20 (16 Dec 2023 11:00) (15 - 31)  SpO2: 94% (16 Dec 2023 11:00) (86% - 96%)    Parameters below as of 16 Dec 2023 03:00  Patient On (Oxygen Delivery Method): nasal cannula  O2 Flow (L/min): 1      I&O's Summary    15 Dec 2023 07:01  -  16 Dec 2023 07:00  --------------------------------------------------------  IN: 2499.8 mL / OUT: 3875 mL / NET: -1375.2 mL    16 Dec 2023 07:01  -  16 Dec 2023 11:19  --------------------------------------------------------  IN: 200 mL / OUT: 425 mL / NET: -225 mL        Physical Exam:  General Appearance: Appears well, NAD   Chest: Nonlabored breathing on RA  CV: Hemodynamically stable  Abdomen: Soft, appropriately tender, minimally distended. Dressing C/D/I with mild strikethrough  Extremities: Grossly symmetric, SCD's in place. Dressings C/D/I with mild strikethrough  Vascular: DP/PT ds+ bilaterally    LABS:                        7.6    14.94 )-----------( 102      ( 16 Dec 2023 06:29 )             23.6     12-16    139  |  105  |  9   ----------------------------<  83  3.1<L>   |  24  |  0.50    Ca    8.4      16 Dec 2023 06:29  Phos  2.6     12-16  Mg     1.9     12-16    TPro  5.6<L>  /  Alb  3.0<L>  /  TBili  0.8  /  DBili  x   /  AST  24  /  ALT  16  /  AlkPhos  61  12-16    PT/INR - ( 15 Dec 2023 03:24 )   PT: 12.2 sec;   INR: 1.17 ratio         PTT - ( 15 Dec 2023 03:24 )  PTT:26.9 sec  Urinalysis Basic - ( 16 Dec 2023 06:29 )    Color: x / Appearance: x / SG: x / pH: x  Gluc: 83 mg/dL / Ketone: x  / Bili: x / Urobili: x   Blood: x / Protein: x / Nitrite: x   Leuk Esterase: x / RBC: x / WBC x   Sq Epi: x / Non Sq Epi: x / Bacteria: x

## 2023-12-16 NOTE — PROGRESS NOTE ADULT - SUBJECTIVE AND OBJECTIVE BOX
SUBJECTIVE/ROS:  [x ] A ten-point review of systems was otherwise negative except as noted.  [ ] Due to altered mental status/intubation, subjective information were not able to be obtained from the patient. History was obtained, to the extent possible, from review of the chart and collateral sources of information.      NEURO  RASS:     GCS:     CAM ICU:  Exam: awake, alert, oriented  Meds: acetaminophen   IVPB .. 1000 milliGRAM(s) IV Intermittent every 6 hours  HYDROmorphone  Injectable 0.25 milliGRAM(s) IV Push every 3 hours PRN Moderate Pain (4 - 6)  HYDROmorphone  Injectable 0.5 milliGRAM(s) IV Push every 3 hours PRN Severe Pain (7 - 10)  ondansetron Injectable 4 milliGRAM(s) IV Push once    [x] Adequacy of sedation and pain control has been assessed and adjusted      RESPIRATORY  RR: 15 (12-15-23 @ 23:00) (15 - 32)  SpO2: 94% (12-15-23 @ 23:00) (86% - 97%)  Wt(kg): --  Exam: unlabored, clear to auscultation bilaterally  Mechanical Ventilation:   ABG - ( 15 Dec 2023 10:35 )  pH: 7.45  /  pCO2: 39    /  pO2: 92    / HCO3: 27    / Base Excess: 2.9   /  SaO2: 98.1    Lactate: x            CARDIOVASCULAR  HR: 83 (12-15-23 @ 23:00) (81 - 101)  BP: 137/73 (12-15-23 @ 23:00) (114/64 - 147/77)  BP(mean): 99 (12-15-23 @ 23:00) (83 - 106)  ABP: 148/110 (12-15-23 @ 12:00) (100/47 - 151/62)  ABP(mean): 124 (12-15-23 @ 12:00) (67 - 124)  Wt(kg): --  CVP(cm H2O): --  VBG - ( 15 Dec 2023 06:10 )  pH: 7.45  /  pCO2: 41    /  pO2: 75    / HCO3: 28    / Base Excess: 4.1   /  SaO2: 98.1   Lactate: 1.4                Exam: regular rate and rhythm  Cardiac Rhythm: sinus  Perfusion     [x]Adequate   [ ]Inadequate  Mentation   [x]Normal       [ ]Reduced  Extremities  [x]Warm         [ ]Cool  Volume Status [ ]Hypervolemic [x]Euvolemic [ ]Hypovolemic  Meds: metoprolol tartrate Injectable 5 milliGRAM(s) IV Push every 6 hours        GI/NUTRITION  Exam: soft, nontender, nondistended, incision C/D/I  Diet: NPO  Meds: pantoprazole  Injectable 40 milliGRAM(s) IV Push every 24 hours  simethicone 80 milliGRAM(s) Chew every 6 hours PRN Heartburn      GENITOURINARY  I&O's Detail    12-14 @ 07:01  -  12-15 @ 07:00  --------------------------------------------------------  IN:    Albumin 5%  - 250 mL: 500 mL    IV PiggyBack: 450 mL    Lactated Ringers: 3100 mL    Lactated Ringers Bolus: 500 mL  Total IN: 4550 mL    OUT:    Indwelling Catheter - Urethral (mL): 1305 mL    Nasogastric/Oral tube (mL): 1400 mL  Total OUT: 2705 mL    Total NET: 1845 mL      12-15 @ 07:01 - 12-16 @ 01:18  --------------------------------------------------------  IN:    IV PiggyBack: 300 mL    Lactated Ringers: 600 mL    Lactated Ringers: 600 mL  Total IN: 1500 mL    OUT:    Indwelling Catheter - Urethral (mL): 1525 mL    Nasogastric/Oral tube (mL): 700 mL  Total OUT: 2225 mL    Total NET: -725 mL          12-15    142  |  105  |  12  ----------------------------<  98  3.4<L>   |  28  |  0.62    Ca    8.9      15 Dec 2023 17:12  Phos  2.1     12-15  Mg     2.1     12-15    TPro  5.7<L>  /  Alb  3.3  /  TBili  0.7  /  DBili  x   /  AST  26  /  ALT  16  /  AlkPhos  52  12-15    [ ] Zavala catheter, indication: N/A  Meds: lactated ringers. 1000 milliLiter(s) IV Continuous <Continuous>        HEMATOLOGIC  Meds: enoxaparin Injectable 40 milliGRAM(s) SubCutaneous every 24 hours    [x] VTE Prophylaxis                        8.3    17.35 )-----------( 99       ( 15 Dec 2023 17:12 )             25.0     PT/INR - ( 15 Dec 2023 03:24 )   PT: 12.2 sec;   INR: 1.17 ratio         PTT - ( 15 Dec 2023 03:24 )  PTT:26.9 sec  Transfusion     [ ] PRBC   [ ] Platelets   [ ] FFP   [ ] Cryoprecipitate      INFECTIOUS DISEASES  WBC Count: 17.35 K/uL (12-15 @ 17:12)  WBC Count: 16.72 K/uL (12-15 @ 10:51)  WBC Count: 16.98 K/uL (12-15 @ 06:14)  WBC Count: 18.18 K/uL (12-15 @ 03:24)    RECENT CULTURES:    Meds:       ENDOCRINE  CAPILLARY BLOOD GLUCOSE      POCT Blood Glucose.: 96 mg/dL (16 Dec 2023 00:44)  POCT Blood Glucose.: 104 mg/dL (15 Dec 2023 17:11)  POCT Blood Glucose.: 116 mg/dL (15 Dec 2023 11:26)  POCT Blood Glucose.: 133 mg/dL (15 Dec 2023 05:43)    Meds: insulin lispro (ADMELOG) corrective regimen sliding scale   SubCutaneous every 6 hours        OTHER MEDICATIONS:  chlorhexidine 2% Cloths 1 Application(s) Topical <User Schedule>      CODE STATUS: FULL CODE

## 2023-12-16 NOTE — PROGRESS NOTE ADULT - ASSESSMENT
INTERVAL EVENTS:  - considering PE study for increased oxyen requirements, continue to wean   - LR @ 50/hr  - Lasix 20mg  - A-line DC'd  - F/u rpt CXR  - q4h vascular checks    PLAN:  Neuro:  - AOx4  - Tylenol and dilaudid    Resp:  - on NC    CV:  - HDS off pressors  - s/p 500 LR bolus iso soft BP, responded appropriately  - lactate cleared 1.9  - q1 hr vascular checks   - Repeat CXR  - Consider home amlodipine    GI:  - strict NPO/NGT to LCWS    / Renal:  - strict IO's, replete electrolytes as needed  - LR50   - shifted for K 5.4, repeat 4.5   - Lasix 20 mg     Heme:  - trend CBC  - lovenox to start 12/14 AM per vascular    ID:  - no active issues    Endo:  - monitor glucose  - SSI      VTE : f/u vascular       Dispo: Please discharge patient w/ supply of ASA and atorvastatin, he doesn't have these meds at home anymore

## 2023-12-16 NOTE — PROGRESS NOTE ADULT - ASSESSMENT
59 y/o male w/ PMHx CLTI ([place] 4), alcohol abuse w/ history of withdrawal seizures, HLD and smoking who presents for alcohol withdrawal and pain in his legs. Now s/p aortobifemoral bypass graft on 12/13, recovering well. Tolerating clears s/p NGT removal, +/+.    Recs:  - Pain medications PRN  - No further Lasix as patient autodiuresing   - Continue neurovascular checks   - Patient on CIWA protocol  - SICU care     Vascular Surgery  [id number]  57 y/o male w/ PMHx CLTI ([place] 4), alcohol abuse w/ history of withdrawal seizures, HLD and smoking who presents for alcohol withdrawal and pain in his legs. Now s/p aortobifemoral bypass graft on 12/13, recovering well. Tolerating clears s/p NGT removal, +/+.    Recs:  - Pain medications PRN  - No further Lasix as patient autodiuresing   - Continue neurovascular checks   - Patient on CIWA protocol  - SICU care     Vascular Surgery  [id number]

## 2023-12-17 LAB
ALBUMIN SERPL ELPH-MCNC: 2.8 G/DL — LOW (ref 3.3–5)
ALBUMIN SERPL ELPH-MCNC: 2.8 G/DL — LOW (ref 3.3–5)
ALBUMIN SERPL ELPH-MCNC: 3 G/DL — LOW (ref 3.3–5)
ALBUMIN SERPL ELPH-MCNC: 3 G/DL — LOW (ref 3.3–5)
ALP SERPL-CCNC: 102 U/L — SIGNIFICANT CHANGE UP (ref 40–120)
ALP SERPL-CCNC: 102 U/L — SIGNIFICANT CHANGE UP (ref 40–120)
ALP SERPL-CCNC: 160 U/L — HIGH (ref 40–120)
ALP SERPL-CCNC: 160 U/L — HIGH (ref 40–120)
ALT FLD-CCNC: 16 U/L — SIGNIFICANT CHANGE UP (ref 10–45)
ALT FLD-CCNC: 16 U/L — SIGNIFICANT CHANGE UP (ref 10–45)
ALT FLD-CCNC: 24 U/L — SIGNIFICANT CHANGE UP (ref 10–45)
ALT FLD-CCNC: 24 U/L — SIGNIFICANT CHANGE UP (ref 10–45)
ANION GAP SERPL CALC-SCNC: 7 MMOL/L — SIGNIFICANT CHANGE UP (ref 5–17)
ANION GAP SERPL CALC-SCNC: 7 MMOL/L — SIGNIFICANT CHANGE UP (ref 5–17)
ANION GAP SERPL CALC-SCNC: 9 MMOL/L — SIGNIFICANT CHANGE UP (ref 5–17)
ANION GAP SERPL CALC-SCNC: 9 MMOL/L — SIGNIFICANT CHANGE UP (ref 5–17)
AST SERPL-CCNC: 22 U/L — SIGNIFICANT CHANGE UP (ref 10–40)
AST SERPL-CCNC: 22 U/L — SIGNIFICANT CHANGE UP (ref 10–40)
AST SERPL-CCNC: 34 U/L — SIGNIFICANT CHANGE UP (ref 10–40)
AST SERPL-CCNC: 34 U/L — SIGNIFICANT CHANGE UP (ref 10–40)
BILIRUB SERPL-MCNC: 0.5 MG/DL — SIGNIFICANT CHANGE UP (ref 0.2–1.2)
BILIRUB SERPL-MCNC: 0.5 MG/DL — SIGNIFICANT CHANGE UP (ref 0.2–1.2)
BILIRUB SERPL-MCNC: 0.6 MG/DL — SIGNIFICANT CHANGE UP (ref 0.2–1.2)
BILIRUB SERPL-MCNC: 0.6 MG/DL — SIGNIFICANT CHANGE UP (ref 0.2–1.2)
BUN SERPL-MCNC: 5 MG/DL — LOW (ref 7–23)
BUN SERPL-MCNC: 5 MG/DL — LOW (ref 7–23)
BUN SERPL-MCNC: 9 MG/DL — SIGNIFICANT CHANGE UP (ref 7–23)
BUN SERPL-MCNC: 9 MG/DL — SIGNIFICANT CHANGE UP (ref 7–23)
CALCIUM SERPL-MCNC: 8 MG/DL — LOW (ref 8.4–10.5)
CALCIUM SERPL-MCNC: 8 MG/DL — LOW (ref 8.4–10.5)
CALCIUM SERPL-MCNC: 8.5 MG/DL — SIGNIFICANT CHANGE UP (ref 8.4–10.5)
CALCIUM SERPL-MCNC: 8.5 MG/DL — SIGNIFICANT CHANGE UP (ref 8.4–10.5)
CHLORIDE SERPL-SCNC: 102 MMOL/L — SIGNIFICANT CHANGE UP (ref 96–108)
CHLORIDE SERPL-SCNC: 102 MMOL/L — SIGNIFICANT CHANGE UP (ref 96–108)
CHLORIDE SERPL-SCNC: 103 MMOL/L — SIGNIFICANT CHANGE UP (ref 96–108)
CHLORIDE SERPL-SCNC: 103 MMOL/L — SIGNIFICANT CHANGE UP (ref 96–108)
CO2 SERPL-SCNC: 23 MMOL/L — SIGNIFICANT CHANGE UP (ref 22–31)
CO2 SERPL-SCNC: 23 MMOL/L — SIGNIFICANT CHANGE UP (ref 22–31)
CO2 SERPL-SCNC: 25 MMOL/L — SIGNIFICANT CHANGE UP (ref 22–31)
CO2 SERPL-SCNC: 25 MMOL/L — SIGNIFICANT CHANGE UP (ref 22–31)
CREAT SERPL-MCNC: 0.5 MG/DL — SIGNIFICANT CHANGE UP (ref 0.5–1.3)
CREAT SERPL-MCNC: 0.5 MG/DL — SIGNIFICANT CHANGE UP (ref 0.5–1.3)
CREAT SERPL-MCNC: 0.66 MG/DL — SIGNIFICANT CHANGE UP (ref 0.5–1.3)
CREAT SERPL-MCNC: 0.66 MG/DL — SIGNIFICANT CHANGE UP (ref 0.5–1.3)
EGFR: 109 ML/MIN/1.73M2 — SIGNIFICANT CHANGE UP
EGFR: 109 ML/MIN/1.73M2 — SIGNIFICANT CHANGE UP
EGFR: 118 ML/MIN/1.73M2 — SIGNIFICANT CHANGE UP
EGFR: 118 ML/MIN/1.73M2 — SIGNIFICANT CHANGE UP
GLUCOSE SERPL-MCNC: 108 MG/DL — HIGH (ref 70–99)
GLUCOSE SERPL-MCNC: 108 MG/DL — HIGH (ref 70–99)
GLUCOSE SERPL-MCNC: 93 MG/DL — SIGNIFICANT CHANGE UP (ref 70–99)
GLUCOSE SERPL-MCNC: 93 MG/DL — SIGNIFICANT CHANGE UP (ref 70–99)
HCT VFR BLD CALC: 22.8 % — LOW (ref 39–50)
HCT VFR BLD CALC: 22.8 % — LOW (ref 39–50)
HCT VFR BLD CALC: 24.5 % — LOW (ref 39–50)
HCT VFR BLD CALC: 24.5 % — LOW (ref 39–50)
HGB BLD-MCNC: 7.8 G/DL — LOW (ref 13–17)
HGB BLD-MCNC: 7.8 G/DL — LOW (ref 13–17)
HGB BLD-MCNC: 8.3 G/DL — LOW (ref 13–17)
HGB BLD-MCNC: 8.3 G/DL — LOW (ref 13–17)
MAGNESIUM SERPL-MCNC: 2 MG/DL — SIGNIFICANT CHANGE UP (ref 1.6–2.6)
MAGNESIUM SERPL-MCNC: 2 MG/DL — SIGNIFICANT CHANGE UP (ref 1.6–2.6)
MAGNESIUM SERPL-MCNC: 2.1 MG/DL — SIGNIFICANT CHANGE UP (ref 1.6–2.6)
MAGNESIUM SERPL-MCNC: 2.1 MG/DL — SIGNIFICANT CHANGE UP (ref 1.6–2.6)
MCHC RBC-ENTMCNC: 31.1 PG — SIGNIFICANT CHANGE UP (ref 27–34)
MCHC RBC-ENTMCNC: 31.1 PG — SIGNIFICANT CHANGE UP (ref 27–34)
MCHC RBC-ENTMCNC: 31.3 PG — SIGNIFICANT CHANGE UP (ref 27–34)
MCHC RBC-ENTMCNC: 31.3 PG — SIGNIFICANT CHANGE UP (ref 27–34)
MCHC RBC-ENTMCNC: 33.9 GM/DL — SIGNIFICANT CHANGE UP (ref 32–36)
MCHC RBC-ENTMCNC: 33.9 GM/DL — SIGNIFICANT CHANGE UP (ref 32–36)
MCHC RBC-ENTMCNC: 34.2 GM/DL — SIGNIFICANT CHANGE UP (ref 32–36)
MCHC RBC-ENTMCNC: 34.2 GM/DL — SIGNIFICANT CHANGE UP (ref 32–36)
MCV RBC AUTO: 91.6 FL — SIGNIFICANT CHANGE UP (ref 80–100)
MCV RBC AUTO: 91.6 FL — SIGNIFICANT CHANGE UP (ref 80–100)
MCV RBC AUTO: 91.8 FL — SIGNIFICANT CHANGE UP (ref 80–100)
MCV RBC AUTO: 91.8 FL — SIGNIFICANT CHANGE UP (ref 80–100)
NRBC # BLD: 0 /100 WBCS — SIGNIFICANT CHANGE UP (ref 0–0)
PHOSPHATE SERPL-MCNC: 2.8 MG/DL — SIGNIFICANT CHANGE UP (ref 2.5–4.5)
PHOSPHATE SERPL-MCNC: 2.8 MG/DL — SIGNIFICANT CHANGE UP (ref 2.5–4.5)
PHOSPHATE SERPL-MCNC: 3.7 MG/DL — SIGNIFICANT CHANGE UP (ref 2.5–4.5)
PHOSPHATE SERPL-MCNC: 3.7 MG/DL — SIGNIFICANT CHANGE UP (ref 2.5–4.5)
PLATELET # BLD AUTO: 151 K/UL — SIGNIFICANT CHANGE UP (ref 150–400)
PLATELET # BLD AUTO: 151 K/UL — SIGNIFICANT CHANGE UP (ref 150–400)
PLATELET # BLD AUTO: 179 K/UL — SIGNIFICANT CHANGE UP (ref 150–400)
PLATELET # BLD AUTO: 179 K/UL — SIGNIFICANT CHANGE UP (ref 150–400)
POTASSIUM SERPL-MCNC: 3.3 MMOL/L — LOW (ref 3.5–5.3)
POTASSIUM SERPL-MCNC: 3.3 MMOL/L — LOW (ref 3.5–5.3)
POTASSIUM SERPL-MCNC: 4.1 MMOL/L — SIGNIFICANT CHANGE UP (ref 3.5–5.3)
POTASSIUM SERPL-MCNC: 4.1 MMOL/L — SIGNIFICANT CHANGE UP (ref 3.5–5.3)
POTASSIUM SERPL-SCNC: 3.3 MMOL/L — LOW (ref 3.5–5.3)
POTASSIUM SERPL-SCNC: 3.3 MMOL/L — LOW (ref 3.5–5.3)
POTASSIUM SERPL-SCNC: 4.1 MMOL/L — SIGNIFICANT CHANGE UP (ref 3.5–5.3)
POTASSIUM SERPL-SCNC: 4.1 MMOL/L — SIGNIFICANT CHANGE UP (ref 3.5–5.3)
PROT SERPL-MCNC: 5.7 G/DL — LOW (ref 6–8.3)
PROT SERPL-MCNC: 5.7 G/DL — LOW (ref 6–8.3)
PROT SERPL-MCNC: 5.9 G/DL — LOW (ref 6–8.3)
PROT SERPL-MCNC: 5.9 G/DL — LOW (ref 6–8.3)
RBC # BLD: 2.49 M/UL — LOW (ref 4.2–5.8)
RBC # BLD: 2.49 M/UL — LOW (ref 4.2–5.8)
RBC # BLD: 2.67 M/UL — LOW (ref 4.2–5.8)
RBC # BLD: 2.67 M/UL — LOW (ref 4.2–5.8)
RBC # FLD: 13.7 % — SIGNIFICANT CHANGE UP (ref 10.3–14.5)
SODIUM SERPL-SCNC: 134 MMOL/L — LOW (ref 135–145)
SODIUM SERPL-SCNC: 134 MMOL/L — LOW (ref 135–145)
SODIUM SERPL-SCNC: 135 MMOL/L — SIGNIFICANT CHANGE UP (ref 135–145)
SODIUM SERPL-SCNC: 135 MMOL/L — SIGNIFICANT CHANGE UP (ref 135–145)
WBC # BLD: 11.67 K/UL — HIGH (ref 3.8–10.5)
WBC # BLD: 11.67 K/UL — HIGH (ref 3.8–10.5)
WBC # BLD: 11.73 K/UL — HIGH (ref 3.8–10.5)
WBC # BLD: 11.73 K/UL — HIGH (ref 3.8–10.5)
WBC # FLD AUTO: 11.67 K/UL — HIGH (ref 3.8–10.5)
WBC # FLD AUTO: 11.67 K/UL — HIGH (ref 3.8–10.5)
WBC # FLD AUTO: 11.73 K/UL — HIGH (ref 3.8–10.5)
WBC # FLD AUTO: 11.73 K/UL — HIGH (ref 3.8–10.5)

## 2023-12-17 PROCEDURE — 99232 SBSQ HOSP IP/OBS MODERATE 35: CPT | Mod: GC

## 2023-12-17 RX ORDER — PANTOPRAZOLE SODIUM 20 MG/1
40 TABLET, DELAYED RELEASE ORAL
Refills: 0 | Status: DISCONTINUED | OUTPATIENT
Start: 2023-12-17 | End: 2023-12-18

## 2023-12-17 RX ORDER — POTASSIUM CHLORIDE 20 MEQ
40 PACKET (EA) ORAL EVERY 4 HOURS
Refills: 0 | Status: COMPLETED | OUTPATIENT
Start: 2023-12-17 | End: 2023-12-17

## 2023-12-17 RX ORDER — POTASSIUM CHLORIDE 20 MEQ
10 PACKET (EA) ORAL
Refills: 0 | Status: COMPLETED | OUTPATIENT
Start: 2023-12-17 | End: 2023-12-17

## 2023-12-17 RX ADMIN — Medication 100 MILLIEQUIVALENT(S): at 16:06

## 2023-12-17 RX ADMIN — Medication 81 MILLIGRAM(S): at 12:11

## 2023-12-17 RX ADMIN — AMLODIPINE BESYLATE 10 MILLIGRAM(S): 2.5 TABLET ORAL at 13:32

## 2023-12-17 RX ADMIN — POTASSIUM PHOSPHATE, MONOBASIC POTASSIUM PHOSPHATE, DIBASIC 83.33 MILLIMOLE(S): 236; 224 INJECTION, SOLUTION INTRAVENOUS at 03:22

## 2023-12-17 RX ADMIN — Medication 25 GRAM(S): at 01:58

## 2023-12-17 RX ADMIN — ENOXAPARIN SODIUM 40 MILLIGRAM(S): 100 INJECTION SUBCUTANEOUS at 17:07

## 2023-12-17 RX ADMIN — OXYCODONE HYDROCHLORIDE 5 MILLIGRAM(S): 5 TABLET ORAL at 06:59

## 2023-12-17 RX ADMIN — Medication 40 MILLIEQUIVALENT(S): at 17:08

## 2023-12-17 RX ADMIN — PANTOPRAZOLE SODIUM 40 MILLIGRAM(S): 20 TABLET, DELAYED RELEASE ORAL at 08:04

## 2023-12-17 RX ADMIN — Medication 40 MILLIEQUIVALENT(S): at 14:48

## 2023-12-17 RX ADMIN — ATORVASTATIN CALCIUM 80 MILLIGRAM(S): 80 TABLET, FILM COATED ORAL at 21:42

## 2023-12-17 RX ADMIN — OXYCODONE HYDROCHLORIDE 5 MILLIGRAM(S): 5 TABLET ORAL at 06:29

## 2023-12-17 RX ADMIN — Medication 100 MILLIEQUIVALENT(S): at 15:54

## 2023-12-17 RX ADMIN — ONDANSETRON 4 MILLIGRAM(S): 8 TABLET, FILM COATED ORAL at 05:29

## 2023-12-17 RX ADMIN — Medication 100 MILLIEQUIVALENT(S): at 14:48

## 2023-12-17 RX ADMIN — CHLORHEXIDINE GLUCONATE 1 APPLICATION(S): 213 SOLUTION TOPICAL at 05:02

## 2023-12-17 RX ADMIN — Medication 25 GRAM(S): at 00:36

## 2023-12-17 NOTE — PROGRESS NOTE ADULT - SUBJECTIVE AND OBJECTIVE BOX
Vascular Progress Note  STATUS POST:  Bypass graft, aortobifemoral     SUBJECTIVE: Patient seen and examined on AM rounds. Reports feeling very good, in good spirits after NGT removal, no new complaints. Pain is well-controlled. He is tolerating clears and passing gas, had a BM this morning. Denies chest pain, SOB, palpitations, HA, fever, chills, N/V.    O:  Physical Exam:  General Appearance: Appears well, NAD   Chest: Nonlabored breathing on RA  CV: Hemodynamically stable  Abdomen: Soft, appropriately tender, minimally distended. Dressing C/D/I with mild strikethrough  Extremities: Grossly symmetric, SCD's in place. Dressings C/D/I with mild strikethrough  Vascular: DP/PT ds+ bilaterally      Vital Signs Last 24 Hrs  T(C): 36.7 (17 Dec 2023 07:00), Max: 37.5 (16 Dec 2023 19:00)  T(F): 98.1 (17 Dec 2023 07:00), Max: 99.5 (16 Dec 2023 19:00)  HR: 75 (17 Dec 2023 07:00) (70 - 92)  BP: 122/69 (17 Dec 2023 07:00) (113/59 - 154/78)  BP(mean): 90 (17 Dec 2023 07:00) (80 - 109)  RR: 24 (17 Dec 2023 07:00) (16 - 28)  SpO2: 92% (17 Dec 2023 07:00) (92% - 96%)    Parameters below as of 17 Dec 2023 05:00  Patient On (Oxygen Delivery Method): room air        I&O's Detail    16 Dec 2023 07:01  -  17 Dec 2023 07:00  --------------------------------------------------------  IN:    IV PiggyBack: 766.5 mL    Oral Fluid: 600 mL  Total IN: 1366.5 mL    OUT:    Indwelling Catheter - Urethral (mL): 550 mL    Nasogastric/Oral tube (mL): 100 mL    Voided (mL): 1600 mL  Total OUT: 2250 mL    Total NET: -883.5 mL                                7.6    13.14 )-----------( 131      ( 16 Dec 2023 22:20 )             22.0       12-16    136  |  104  |  6<L>  ----------------------------<  107<H>  3.0<L>   |  24  |  0.50    Ca    8.1<L>      16 Dec 2023 22:20  Phos  1.3     12-16  Mg     1.7     12-16    TPro  5.5<L>  /  Alb  3.0<L>  /  TBili  0.6  /  DBili  x   /  AST  22  /  ALT  16  /  AlkPhos  92  12-16       Vascular Progress Note  STATUS POST:  Bypass graft, aortobifemoral     SUBJECTIVE: Patient seen and examined on AM rounds. Reports feeling very good, in good spirits after NGT and morris removal, no new complaints. Pain is well-controlled. He is tolerating clears and passing gas, having BM. Denies chest pain, SOB, palpitations, HA, fever, chills, N/V.    O:  Physical Exam:  General Appearance: Appears well, NAD   Chest: Nonlabored breathing on RA  CV: Hemodynamically stable  Abdomen: Soft, appropriately tender, minimally distended. Dressing C/D/I with mild strikethrough  Extremities: Grossly symmetric, SCD's in place. Dressings C/D/I with mild strikethrough  Vascular: DP/PT ds+ bilaterally      Vital Signs Last 24 Hrs  T(C): 36.7 (17 Dec 2023 07:00), Max: 37.5 (16 Dec 2023 19:00)  T(F): 98.1 (17 Dec 2023 07:00), Max: 99.5 (16 Dec 2023 19:00)  HR: 75 (17 Dec 2023 07:00) (70 - 92)  BP: 122/69 (17 Dec 2023 07:00) (113/59 - 154/78)  BP(mean): 90 (17 Dec 2023 07:00) (80 - 109)  RR: 24 (17 Dec 2023 07:00) (16 - 28)  SpO2: 92% (17 Dec 2023 07:00) (92% - 96%)    Parameters below as of 17 Dec 2023 05:00  Patient On (Oxygen Delivery Method): room air        I&O's Detail    16 Dec 2023 07:01  -  17 Dec 2023 07:00  --------------------------------------------------------  IN:    IV PiggyBack: 766.5 mL    Oral Fluid: 600 mL  Total IN: 1366.5 mL    OUT:    Indwelling Catheter - Urethral (mL): 550 mL    Nasogastric/Oral tube (mL): 100 mL    Voided (mL): 1600 mL  Total OUT: 2250 mL    Total NET: -883.5 mL                                7.6    13.14 )-----------( 131      ( 16 Dec 2023 22:20 )             22.0       12-16    136  |  104  |  6<L>  ----------------------------<  107<H>  3.0<L>   |  24  |  0.50    Ca    8.1<L>      16 Dec 2023 22:20  Phos  1.3     12-16  Mg     1.7     12-16    TPro  5.5<L>  /  Alb  3.0<L>  /  TBili  0.6  /  DBili  x   /  AST  22  /  ALT  16  /  AlkPhos  92  12-16

## 2023-12-17 NOTE — PROGRESS NOTE ADULT - ATTENDING SUPERVISION STATEMENT
PATIENT FROM CCU TO ROOM 111, UP IN CHAIR.  PATIENT UP IN CHAIR.  TPN INFUSING
AT THIS TIME.
Resident

## 2023-12-17 NOTE — PROGRESS NOTE ADULT - ASSESSMENT
57 y/o male w/ PMHx CLTI ([place] 4), alcohol abuse w/ history of withdrawal seizures, HLD and smoking who presents for alcohol withdrawal and pain in his legs. Now s/p aortobifemoral bypass graft on 12/13, recovering well. Tolerating clears s/p NGT removal, +/+.    Recs:  - Pain medications PRN  - No further Lasix as patient autodiuresing   - Continue neurovascular checks   - Patient on CIWA protocol  - SICU care     Vascular Surgery  [id number]  57 y/o male w/ PMHx CLTI ([place] 4), alcohol abuse w/ history of withdrawal seizures, HLD and smoking who presents for alcohol withdrawal and pain in his legs. Now s/p aortobifemoral bypass graft on 12/13, recovering well. Tolerating clears s/p NGT removal, +/+.    Recs:  - Pain medications PRN  - No further Lasix as patient autodiuresing   - Continue neurovascular checks   - Patient on CIWA protocol  - SICU care     Vascular Surgery  [id number]  57 y/o male w/ PMHx CLTI ([place] 4), alcohol abuse w/ history of withdrawal seizures, HLD and smoking who presents for alcohol withdrawal and pain in his legs. Now s/p aortobifemoral bypass graft on 12/13, recovering well. Tolerating clears s/p NGT removal, +/+.    Recs:  - Progress to regular diet in PM if patient amenable  - Pain medications PRN  - No further Lasix as patient autodiuresing, recommend repleting K aggressively  - Continue neurovascular checks   - Patient on CIWA protocol  - SICU care     Vascular Surgery  [id number]  57 y/o male w/ PMHx CLTI ([place] 4), alcohol abuse w/ history of withdrawal seizures, HLD and smoking who presents for alcohol withdrawal and pain in his legs. Now s/p aortobifemoral bypass graft on 12/13, recovering well. Tolerating clears s/p NGT removal, +/+.    Recs:  - Progress to regular diet in PM if patient amenable  - Pain medications PRN  - No further Lasix as patient autodiuresing, recommend repleting K aggressively  - Continue neurovascular checks   - Patient on CIWA protocol  - SICU care     Vascular Surgery  [id number]

## 2023-12-17 NOTE — PROGRESS NOTE ADULT - SUBJECTIVE AND OBJECTIVE BOX
HISTORY  57 y/o male w/ PMHx CLTI (Ama 4), FAP s/p colectomy, Alcohol abuse w/ history of withdrawal seizures, HLD and smoking presenting for both LE pain and inability to quit EtOH.  He reports, he has been drinking too much vodka and hasn't been able to stick to his medication regimen. He wants to quit drinking prior to surgery.  Admitted for monitoring and treatment of EtOH withdrawal.    Underwent aorto bi-femoral bypass 12/13 admitted to SICU for q1h vascular checks, now listed for floor on q4 neurovascular checks.    24 HOUR EVENTS:  - IV locked   - NGT removed, adv to CLD   - Removed morris   - added ASA/statin       SUBJECTIVE/ROS:  [ x ] A ten-point review of systems was otherwise negative except as noted.      NEURO  Exam: awake, alert, oriented  Meds: ondansetron Injectable 4 milliGRAM(s) IV Push once  oxyCODONE    IR 2.5 milliGRAM(s) Oral every 4 hours PRN Moderate Pain (4 - 6)  oxyCODONE    IR 5 milliGRAM(s) Oral every 4 hours PRN Severe Pain (7 - 10)    [x] Adequacy of sedation and pain control has been assessed and adjusted    RESPIRATORY  RR: 26 (12-17-23 @ 00:00) (16 - 28)  SpO2: 93% (12-17-23 @ 00:00) (92% - 96%)  Wt(kg): --  Exam: unlabored, clear to auscultation bilaterally  ABG - ( 15 Dec 2023 10:35 )  pH: 7.45  /  pCO2: 39    /  pO2: 92    / HCO3: 27    / Base Excess: 2.9   /  SaO2: 98.1    Lactate: x          CARDIOVASCULAR  HR: 84 (12-17-23 @ 00:00) (73 - 92)  BP: 135/70 (12-17-23 @ 00:00) (113/59 - 154/78)  BP(mean): 97 (12-17-23 @ 00:00) (80 - 109)  ABP: --  ABP(mean): --  Wt(kg): --  CVP(cm H2O): --  VBG - ( 15 Dec 2023 06:10 )  pH: 7.45  /  pCO2: 41    /  pO2: 75    / HCO3: 28    / Base Excess: 4.1   /  SaO2: 98.1   Lactate: 1.4          Exam: regular rate and rhythm  Cardiac Rhythm: sinus  Perfusion     [x]Adequate   [ ]Inadequate  Mentation   [x]Normal       [ ]Reduced  Extremities  [x]Warm         [ ]Cool  Volume Status [ ]Hypervolemic [x]Euvolemic [ ]Hypovolemic  Meds: amLODIPine   Tablet 10 milliGRAM(s) Oral every 24 hours        GI/NUTRITION  Exam: soft, nontender, nondistended, incision C/D/I  Diet: Clear liquid diet  Meds: pantoprazole  Injectable 40 milliGRAM(s) IV Push every 24 hours  simethicone 80 milliGRAM(s) Chew every 6 hours PRN Heartburn      GENITOURINARY  I&O's Detail    12-15 @ 07:01  -  12-16 @ 07:00  --------------------------------------------------------  IN:    IV PiggyBack: 400 mL    IV PiggyBack: 499.8 mL    Lactated Ringers: 600 mL    Lactated Ringers: 1000 mL  Total IN: 2499.8 mL    OUT:    Indwelling Catheter - Urethral (mL): 2875 mL    Nasogastric/Oral tube (mL): 1000 mL  Total OUT: 3875 mL    Total NET: -1375.2 mL      12-16 @ 07:01  -  12-17 @ 00:46  --------------------------------------------------------  IN:    IV PiggyBack: 250 mL    Oral Fluid: 600 mL  Total IN: 850 mL    OUT:    Indwelling Catheter - Urethral (mL): 550 mL    Nasogastric/Oral tube (mL): 100 mL    Voided (mL): 1150 mL  Total OUT: 1800 mL    Total NET: -950 mL        12-16    136  |  104  |  6<L>  ----------------------------<  107<H>  3.0<L>   |  24  |  0.50    Ca    8.1<L>      16 Dec 2023 22:20  Phos  1.3     12-16  Mg     1.7     12-16    TPro  5.5<L>  /  Alb  3.0<L>  /  TBili  0.6  /  DBili  x   /  AST  22  /  ALT  16  /  AlkPhos  92  12-16    [ ] Morris catheter, indication: N/A  Meds: magnesium sulfate  IVPB 2 Gram(s) IV Intermittent every 2 hours  potassium phosphate IVPB 30 milliMole(s) IV Intermittent once        HEMATOLOGIC  Meds: aspirin enteric coated 81 milliGRAM(s) Oral daily  enoxaparin Injectable 40 milliGRAM(s) SubCutaneous every 24 hours    [x] VTE Prophylaxis                        7.6    13.14 )-----------( 131      ( 16 Dec 2023 22:20 )             22.0     PT/INR - ( 15 Dec 2023 03:24 )   PT: 12.2 sec;   INR: 1.17 ratio         PTT - ( 15 Dec 2023 03:24 )  PTT:26.9 sec  Transfusion     [ ] PRBC   [ ] Platelets   [ ] FFP   [ ] Cryoprecipitate      INFECTIOUS DISEASES  WBC Count: 13.14 K/uL (12-16 @ 22:20)  WBC Count: 14.94 K/uL (12-16 @ 06:29)  WBC Count: 14.75 K/uL (12-16 @ 01:17)    RECENT CULTURES:      ENDOCRINE  CAPILLARY BLOOD GLUCOSE      POCT Blood Glucose.: 114 mg/dL (16 Dec 2023 21:25)  POCT Blood Glucose.: 97 mg/dL (16 Dec 2023 16:04)  POCT Blood Glucose.: 120 mg/dL (16 Dec 2023 11:22)  POCT Blood Glucose.: 90 mg/dL (16 Dec 2023 06:17)    Meds: atorvastatin 80 milliGRAM(s) Oral at bedtime        ACCESS DEVICES:  [ x ] Peripheral IV  [ ] Central Venous Line	[ ] R	[ ] L	[ ] IJ	[ ] Fem	[ ] SC	Placed:   [ ] Arterial Line		[ ] R	[ ] L	[ ] Fem	[ ] Rad	[ ] Ax	Placed:   [ ] PICC:					[ ] Mediport  [ ] Urinary Catheter, Date Placed:   [x] Necessity of urinary, arterial, and venous catheters discussed    OTHER MEDICATIONS:  chlorhexidine 2% Cloths 1 Application(s) Topical <User Schedule>      CODE STATUS:  - Full Code    IMAGING:    Xray Chest 1 View- PORTABLE-Urgent (Xray Chest 1 View- PORTABLE-Urgent .) (12.15.23 @ 12:56)   IMPRESSION:  Redemonstrated air under the right hemidiaphragm appears increased from   previous x-ray.  Bibasilar atelectasis similar to previous study..    CT Chest w/ IV Cont (12.15.23 @ 02:52)   IMPRESSION:  Bilateral lower lobe and lingular consolidation, likely representing   atelectasis.    Patent aortobifemoral bypass graft with postoperative changes and   pneumoperitoneum.    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 Mild stenosis of the celiac origin.   Moderate stenosis of the proximal SMA.    Cirrhosis.      ASSESSMENT AND PLAN:    57 y/o male w/ PMHx chronic limb threatening ischemia, admitted prior to surgery for treatment of ETOH abuse/withdrawal, now s/p underwent aorto bi-femoral bypass 12/13 admitted to SICU for q1h vascular checks, now listed for floor on q4 neurovascular checks.    PLAN:  Neuro:  - Hx alcohol abuse complicated by withdrawal seizures; No need for further CIWA  - AOx4  - PRN oxy for post op pain control    Resp:  - Saturating well on RA     CV:  - Hemodynamically stable off pressors  - q4 hr vascular checks   - Started home amlodipine  - Aspirin and statin    GI:  - NGT dc'd, adv to CLD   - PRN simethicone    / Renal:  - strict IO's, replete electrolytes as needed  - Normal renal function no active issues  - IV locked     Heme:  - Aspirin 81 mg daily  - Lovenox and SCDs for DVT PPx  - trend CBC    ID:  - no active issues    Endo:  - Euglycemic; Discontinued sliding scale   HISTORY  59 y/o male w/ PMHx CLTI (Ama 4), FAP s/p colectomy, Alcohol abuse w/ history of withdrawal seizures, HLD and smoking presenting for both LE pain and inability to quit EtOH.  He reports, he has been drinking too much vodka and hasn't been able to stick to his medication regimen. He wants to quit drinking prior to surgery.  Admitted for monitoring and treatment of EtOH withdrawal.    Underwent aorto bi-femoral bypass 12/13 admitted to SICU for q1h vascular checks, now listed for floor on q4 neurovascular checks.    24 HOUR EVENTS:  - IV locked   - NGT removed, adv to CLD   - Removed morris   - added ASA/statin       SUBJECTIVE/ROS:  [ x ] A ten-point review of systems was otherwise negative except as noted.      NEURO  Exam: awake, alert, oriented  Meds: ondansetron Injectable 4 milliGRAM(s) IV Push once  oxyCODONE    IR 2.5 milliGRAM(s) Oral every 4 hours PRN Moderate Pain (4 - 6)  oxyCODONE    IR 5 milliGRAM(s) Oral every 4 hours PRN Severe Pain (7 - 10)    [x] Adequacy of sedation and pain control has been assessed and adjusted    RESPIRATORY  RR: 26 (12-17-23 @ 00:00) (16 - 28)  SpO2: 93% (12-17-23 @ 00:00) (92% - 96%)  Wt(kg): --  Exam: unlabored, clear to auscultation bilaterally  ABG - ( 15 Dec 2023 10:35 )  pH: 7.45  /  pCO2: 39    /  pO2: 92    / HCO3: 27    / Base Excess: 2.9   /  SaO2: 98.1    Lactate: x          CARDIOVASCULAR  HR: 84 (12-17-23 @ 00:00) (73 - 92)  BP: 135/70 (12-17-23 @ 00:00) (113/59 - 154/78)  BP(mean): 97 (12-17-23 @ 00:00) (80 - 109)  ABP: --  ABP(mean): --  Wt(kg): --  CVP(cm H2O): --  VBG - ( 15 Dec 2023 06:10 )  pH: 7.45  /  pCO2: 41    /  pO2: 75    / HCO3: 28    / Base Excess: 4.1   /  SaO2: 98.1   Lactate: 1.4          Exam: regular rate and rhythm  Cardiac Rhythm: sinus  Perfusion     [x]Adequate   [ ]Inadequate  Mentation   [x]Normal       [ ]Reduced  Extremities  [x]Warm         [ ]Cool  Volume Status [ ]Hypervolemic [x]Euvolemic [ ]Hypovolemic  Meds: amLODIPine   Tablet 10 milliGRAM(s) Oral every 24 hours        GI/NUTRITION  Exam: soft, nontender, nondistended, incision C/D/I  Diet: Clear liquid diet  Meds: pantoprazole  Injectable 40 milliGRAM(s) IV Push every 24 hours  simethicone 80 milliGRAM(s) Chew every 6 hours PRN Heartburn      GENITOURINARY  I&O's Detail    12-15 @ 07:01  -  12-16 @ 07:00  --------------------------------------------------------  IN:    IV PiggyBack: 400 mL    IV PiggyBack: 499.8 mL    Lactated Ringers: 600 mL    Lactated Ringers: 1000 mL  Total IN: 2499.8 mL    OUT:    Indwelling Catheter - Urethral (mL): 2875 mL    Nasogastric/Oral tube (mL): 1000 mL  Total OUT: 3875 mL    Total NET: -1375.2 mL      12-16 @ 07:01  -  12-17 @ 00:46  --------------------------------------------------------  IN:    IV PiggyBack: 250 mL    Oral Fluid: 600 mL  Total IN: 850 mL    OUT:    Indwelling Catheter - Urethral (mL): 550 mL    Nasogastric/Oral tube (mL): 100 mL    Voided (mL): 1150 mL  Total OUT: 1800 mL    Total NET: -950 mL        12-16    136  |  104  |  6<L>  ----------------------------<  107<H>  3.0<L>   |  24  |  0.50    Ca    8.1<L>      16 Dec 2023 22:20  Phos  1.3     12-16  Mg     1.7     12-16    TPro  5.5<L>  /  Alb  3.0<L>  /  TBili  0.6  /  DBili  x   /  AST  22  /  ALT  16  /  AlkPhos  92  12-16    [ ] Morris catheter, indication: N/A  Meds: magnesium sulfate  IVPB 2 Gram(s) IV Intermittent every 2 hours  potassium phosphate IVPB 30 milliMole(s) IV Intermittent once        HEMATOLOGIC  Meds: aspirin enteric coated 81 milliGRAM(s) Oral daily  enoxaparin Injectable 40 milliGRAM(s) SubCutaneous every 24 hours    [x] VTE Prophylaxis                        7.6    13.14 )-----------( 131      ( 16 Dec 2023 22:20 )             22.0     PT/INR - ( 15 Dec 2023 03:24 )   PT: 12.2 sec;   INR: 1.17 ratio         PTT - ( 15 Dec 2023 03:24 )  PTT:26.9 sec  Transfusion     [ ] PRBC   [ ] Platelets   [ ] FFP   [ ] Cryoprecipitate      INFECTIOUS DISEASES  WBC Count: 13.14 K/uL (12-16 @ 22:20)  WBC Count: 14.94 K/uL (12-16 @ 06:29)  WBC Count: 14.75 K/uL (12-16 @ 01:17)    RECENT CULTURES:      ENDOCRINE  CAPILLARY BLOOD GLUCOSE      POCT Blood Glucose.: 114 mg/dL (16 Dec 2023 21:25)  POCT Blood Glucose.: 97 mg/dL (16 Dec 2023 16:04)  POCT Blood Glucose.: 120 mg/dL (16 Dec 2023 11:22)  POCT Blood Glucose.: 90 mg/dL (16 Dec 2023 06:17)    Meds: atorvastatin 80 milliGRAM(s) Oral at bedtime        ACCESS DEVICES:  [ x ] Peripheral IV  [ ] Central Venous Line	[ ] R	[ ] L	[ ] IJ	[ ] Fem	[ ] SC	Placed:   [ ] Arterial Line		[ ] R	[ ] L	[ ] Fem	[ ] Rad	[ ] Ax	Placed:   [ ] PICC:					[ ] Mediport  [ ] Urinary Catheter, Date Placed:   [x] Necessity of urinary, arterial, and venous catheters discussed    OTHER MEDICATIONS:  chlorhexidine 2% Cloths 1 Application(s) Topical <User Schedule>      CODE STATUS:  - Full Code    IMAGING:    Xray Chest 1 View- PORTABLE-Urgent (Xray Chest 1 View- PORTABLE-Urgent .) (12.15.23 @ 12:56)   IMPRESSION:  Redemonstrated air under the right hemidiaphragm appears increased from   previous x-ray.  Bibasilar atelectasis similar to previous study..    CT Chest w/ IV Cont (12.15.23 @ 02:52)   IMPRESSION:  Bilateral lower lobe and lingular consolidation, likely representing   atelectasis.    Patent aortobifemoral bypass graft with postoperative changes and   pneumoperitoneum.    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 Mild stenosis of the celiac origin.   Moderate stenosis of the proximal SMA.    Cirrhosis.      ASSESSMENT AND PLAN:    59 y/o male w/ PMHx chronic limb threatening ischemia, admitted prior to surgery for treatment of ETOH abuse/withdrawal, now s/p underwent aorto bi-femoral bypass 12/13 admitted to SICU for q1h vascular checks, now listed for floor on q4 neurovascular checks.    PLAN:  Neuro:  - Hx alcohol abuse complicated by withdrawal seizures; No need for further CIWA  - AOx4  - PRN oxy for post op pain control    Resp:  - Saturating well on RA     CV:  - Hemodynamically stable off pressors  - q4 hr vascular checks   - Started home amlodipine  - Aspirin and statin    GI:  - NGT dc'd, adv to CLD   - PRN simethicone    / Renal:  - strict IO's, replete electrolytes as needed  - Normal renal function no active issues  - IV locked     Heme:  - Aspirin 81 mg daily  - Lovenox and SCDs for DVT PPx  - trend CBC    ID:  - no active issues    Endo:  - Euglycemic; Discontinued sliding scale

## 2023-12-17 NOTE — PROGRESS NOTE ADULT - ATTENDING COMMENTS
ON: no major events    alert and awake , sleeping currently  pain well control  AM CXR clear   on RA   on nebulizers  home amlodipine  clear diet, advance as able  has regular BM  net neg balance 1.1 , adequate urine output  hypophosphatemic , repleted, will recheck   voiding freely  VTE PPX lovenox , asa  off antibiotics, afebrile  good glycemic control  PT OT working with him  full code  may go to floor
ON: episodes of chest pain, had CT imaging     alert and awake , sitting in chair on speaking phone  no sx of withdrawal  AM CXR pending  CT no read of PE , however suboptimal, will discuss with radiology  hypertensive, metop q6hr standing  vascular checks Q1hr, will discuss w vascular regarding PO meds and advancing diet  NG in place 1.4 lter output  NPO  no flatus  CT abd pelvis w free post operative air  adequate urine output, however 2 lit pos, concern for volume overload will decrease fluids and give lasix 20 mg IV  LR @50  VTE PPX lovenox   slight downtrend of Hb, likely dilutional  off antibiotics, afebrile  good glycemic control  PT OT seeing him  full code    dc a line
as above
ON: no major events    alert and awake , sitting in bed  pain well control  AM CXR clear   added nebs, will volume removal and improvement of cxr, unlikely PE to be the source of hypoxia and chest pain. I believe the chest pain was post operative free air which is improved in today's CXR  restart home amlodipine  advance diet as tolerated  has BM  net neg balance 1.2 , adequate urine output  morris dc , TOV  no IV fluids  VTE PPX lovenox   slight downtrend of Hb  off antibiotics, afebrile  good glycemic control  PT OT working with him  full code
ON: no major events    alert and awake and oriented  CIWA monitoring  hypertensive, PRN hydralazine unless able to given PO home meds  vascular checks Q1hr  NG in place  NPO for now, discuss advancing  cr at baseline, adequate urine output, morris in place, discuss w primary removing  acute hyperkalemia, resolved now  LR @50  VTE PPX chem to start unless primary disagrees  stable Hb   off antibiotics, afebrile  good glycemic control  PT OT to see  full code
as above

## 2023-12-18 ENCOUNTER — TRANSCRIPTION ENCOUNTER (OUTPATIENT)
Age: 58
End: 2023-12-18

## 2023-12-18 ENCOUNTER — INPATIENT (INPATIENT)
Facility: HOSPITAL | Age: 58
LOS: 4 days | Discharge: HOME | DRG: 950 | End: 2023-12-23
Attending: PHYSICAL MEDICINE & REHABILITATION | Admitting: PHYSICAL MEDICINE & REHABILITATION
Payer: COMMERCIAL

## 2023-12-18 VITALS
TEMPERATURE: 98 F | WEIGHT: 178.13 LBS | OXYGEN SATURATION: 99 % | HEIGHT: 71 IN | SYSTOLIC BLOOD PRESSURE: 143 MMHG | HEART RATE: 75 BPM | DIASTOLIC BLOOD PRESSURE: 80 MMHG | RESPIRATION RATE: 17 BRPM

## 2023-12-18 VITALS
OXYGEN SATURATION: 98 % | RESPIRATION RATE: 20 BRPM | HEART RATE: 80 BPM | SYSTOLIC BLOOD PRESSURE: 130 MMHG | DIASTOLIC BLOOD PRESSURE: 70 MMHG | TEMPERATURE: 99 F

## 2023-12-18 DIAGNOSIS — S83.512A SPRAIN OF ANTERIOR CRUCIATE LIGAMENT OF LEFT KNEE, INITIAL ENCOUNTER: Chronic | ICD-10-CM

## 2023-12-18 DIAGNOSIS — R53.81 OTHER MALAISE: ICD-10-CM

## 2023-12-18 DIAGNOSIS — Z90.49 ACQUIRED ABSENCE OF OTHER SPECIFIED PARTS OF DIGESTIVE TRACT: Chronic | ICD-10-CM

## 2023-12-18 PROBLEM — F10.10 ALCOHOL ABUSE, UNCOMPLICATED: Chronic | Status: ACTIVE | Noted: 2023-12-09

## 2023-12-18 LAB
ALBUMIN SERPL ELPH-MCNC: 3.1 G/DL — LOW (ref 3.3–5)
ALBUMIN SERPL ELPH-MCNC: 3.1 G/DL — LOW (ref 3.3–5)
ALP SERPL-CCNC: 159 U/L — HIGH (ref 40–120)
ALP SERPL-CCNC: 159 U/L — HIGH (ref 40–120)
ALT FLD-CCNC: 23 U/L — SIGNIFICANT CHANGE UP (ref 10–45)
ALT FLD-CCNC: 23 U/L — SIGNIFICANT CHANGE UP (ref 10–45)
ANION GAP SERPL CALC-SCNC: 10 MMOL/L — SIGNIFICANT CHANGE UP (ref 5–17)
ANION GAP SERPL CALC-SCNC: 10 MMOL/L — SIGNIFICANT CHANGE UP (ref 5–17)
AST SERPL-CCNC: 28 U/L — SIGNIFICANT CHANGE UP (ref 10–40)
AST SERPL-CCNC: 28 U/L — SIGNIFICANT CHANGE UP (ref 10–40)
BILIRUB SERPL-MCNC: 0.4 MG/DL — SIGNIFICANT CHANGE UP (ref 0.2–1.2)
BILIRUB SERPL-MCNC: 0.4 MG/DL — SIGNIFICANT CHANGE UP (ref 0.2–1.2)
BUN SERPL-MCNC: 6 MG/DL — LOW (ref 7–23)
BUN SERPL-MCNC: 6 MG/DL — LOW (ref 7–23)
CALCIUM SERPL-MCNC: 8.5 MG/DL — SIGNIFICANT CHANGE UP (ref 8.4–10.5)
CALCIUM SERPL-MCNC: 8.5 MG/DL — SIGNIFICANT CHANGE UP (ref 8.4–10.5)
CHLORIDE SERPL-SCNC: 103 MMOL/L — SIGNIFICANT CHANGE UP (ref 96–108)
CHLORIDE SERPL-SCNC: 103 MMOL/L — SIGNIFICANT CHANGE UP (ref 96–108)
CO2 SERPL-SCNC: 23 MMOL/L — SIGNIFICANT CHANGE UP (ref 22–31)
CO2 SERPL-SCNC: 23 MMOL/L — SIGNIFICANT CHANGE UP (ref 22–31)
CREAT SERPL-MCNC: 0.59 MG/DL — SIGNIFICANT CHANGE UP (ref 0.5–1.3)
CREAT SERPL-MCNC: 0.59 MG/DL — SIGNIFICANT CHANGE UP (ref 0.5–1.3)
EGFR: 112 ML/MIN/1.73M2 — SIGNIFICANT CHANGE UP
EGFR: 112 ML/MIN/1.73M2 — SIGNIFICANT CHANGE UP
GLUCOSE SERPL-MCNC: 94 MG/DL — SIGNIFICANT CHANGE UP (ref 70–99)
GLUCOSE SERPL-MCNC: 94 MG/DL — SIGNIFICANT CHANGE UP (ref 70–99)
HCT VFR BLD CALC: 25.5 % — LOW (ref 39–50)
HCT VFR BLD CALC: 25.5 % — LOW (ref 39–50)
HGB BLD-MCNC: 8.4 G/DL — LOW (ref 13–17)
HGB BLD-MCNC: 8.4 G/DL — LOW (ref 13–17)
MAGNESIUM SERPL-MCNC: 1.9 MG/DL — SIGNIFICANT CHANGE UP (ref 1.6–2.6)
MAGNESIUM SERPL-MCNC: 1.9 MG/DL — SIGNIFICANT CHANGE UP (ref 1.6–2.6)
MCHC RBC-ENTMCNC: 30.8 PG — SIGNIFICANT CHANGE UP (ref 27–34)
MCHC RBC-ENTMCNC: 30.8 PG — SIGNIFICANT CHANGE UP (ref 27–34)
MCHC RBC-ENTMCNC: 32.9 GM/DL — SIGNIFICANT CHANGE UP (ref 32–36)
MCHC RBC-ENTMCNC: 32.9 GM/DL — SIGNIFICANT CHANGE UP (ref 32–36)
MCV RBC AUTO: 93.4 FL — SIGNIFICANT CHANGE UP (ref 80–100)
MCV RBC AUTO: 93.4 FL — SIGNIFICANT CHANGE UP (ref 80–100)
NRBC # BLD: 0 /100 WBCS — SIGNIFICANT CHANGE UP (ref 0–0)
NRBC # BLD: 0 /100 WBCS — SIGNIFICANT CHANGE UP (ref 0–0)
PHOSPHATE SERPL-MCNC: 2.8 MG/DL — SIGNIFICANT CHANGE UP (ref 2.5–4.5)
PHOSPHATE SERPL-MCNC: 2.8 MG/DL — SIGNIFICANT CHANGE UP (ref 2.5–4.5)
PLATELET # BLD AUTO: 195 K/UL — SIGNIFICANT CHANGE UP (ref 150–400)
PLATELET # BLD AUTO: 195 K/UL — SIGNIFICANT CHANGE UP (ref 150–400)
POTASSIUM SERPL-MCNC: 3.4 MMOL/L — LOW (ref 3.5–5.3)
POTASSIUM SERPL-MCNC: 3.4 MMOL/L — LOW (ref 3.5–5.3)
POTASSIUM SERPL-SCNC: 3.4 MMOL/L — LOW (ref 3.5–5.3)
POTASSIUM SERPL-SCNC: 3.4 MMOL/L — LOW (ref 3.5–5.3)
PROT SERPL-MCNC: 5.9 G/DL — LOW (ref 6–8.3)
PROT SERPL-MCNC: 5.9 G/DL — LOW (ref 6–8.3)
RBC # BLD: 2.73 M/UL — LOW (ref 4.2–5.8)
RBC # BLD: 2.73 M/UL — LOW (ref 4.2–5.8)
RBC # FLD: 14 % — SIGNIFICANT CHANGE UP (ref 10.3–14.5)
RBC # FLD: 14 % — SIGNIFICANT CHANGE UP (ref 10.3–14.5)
SARS-COV-2 RNA SPEC QL NAA+PROBE: SIGNIFICANT CHANGE UP
SARS-COV-2 RNA SPEC QL NAA+PROBE: SIGNIFICANT CHANGE UP
SODIUM SERPL-SCNC: 136 MMOL/L — SIGNIFICANT CHANGE UP (ref 135–145)
SODIUM SERPL-SCNC: 136 MMOL/L — SIGNIFICANT CHANGE UP (ref 135–145)
WBC # BLD: 9.15 K/UL — SIGNIFICANT CHANGE UP (ref 3.8–10.5)
WBC # BLD: 9.15 K/UL — SIGNIFICANT CHANGE UP (ref 3.8–10.5)
WBC # FLD AUTO: 9.15 K/UL — SIGNIFICANT CHANGE UP (ref 3.8–10.5)
WBC # FLD AUTO: 9.15 K/UL — SIGNIFICANT CHANGE UP (ref 3.8–10.5)

## 2023-12-18 PROCEDURE — 71260 CT THORAX DX C+: CPT

## 2023-12-18 PROCEDURE — 84295 ASSAY OF SERUM SODIUM: CPT

## 2023-12-18 PROCEDURE — 83605 ASSAY OF LACTIC ACID: CPT

## 2023-12-18 PROCEDURE — 82435 ASSAY OF BLOOD CHLORIDE: CPT

## 2023-12-18 PROCEDURE — 82803 BLOOD GASES ANY COMBINATION: CPT

## 2023-12-18 PROCEDURE — 84100 ASSAY OF PHOSPHORUS: CPT

## 2023-12-18 PROCEDURE — 80053 COMPREHEN METABOLIC PANEL: CPT

## 2023-12-18 PROCEDURE — 84145 PROCALCITONIN (PCT): CPT

## 2023-12-18 PROCEDURE — 97162 PT EVAL MOD COMPLEX 30 MIN: CPT

## 2023-12-18 PROCEDURE — 85730 THROMBOPLASTIN TIME PARTIAL: CPT

## 2023-12-18 PROCEDURE — 99292 CRITICAL CARE ADDL 30 MIN: CPT

## 2023-12-18 PROCEDURE — 86901 BLOOD TYPING SEROLOGIC RH(D): CPT

## 2023-12-18 PROCEDURE — 82550 ASSAY OF CK (CPK): CPT

## 2023-12-18 PROCEDURE — C1889: CPT

## 2023-12-18 PROCEDURE — 97129 THER IVNTJ 1ST 15 MIN: CPT

## 2023-12-18 PROCEDURE — C1769: CPT

## 2023-12-18 PROCEDURE — 97116 GAIT TRAINING THERAPY: CPT

## 2023-12-18 PROCEDURE — C9399: CPT

## 2023-12-18 PROCEDURE — 82962 GLUCOSE BLOOD TEST: CPT

## 2023-12-18 PROCEDURE — 82553 CREATINE MB FRACTION: CPT

## 2023-12-18 PROCEDURE — P9016: CPT

## 2023-12-18 PROCEDURE — 74018 RADEX ABDOMEN 1 VIEW: CPT

## 2023-12-18 PROCEDURE — 97166 OT EVAL MOD COMPLEX 45 MIN: CPT

## 2023-12-18 PROCEDURE — 85610 PROTHROMBIN TIME: CPT

## 2023-12-18 PROCEDURE — C1768: CPT

## 2023-12-18 PROCEDURE — 86900 BLOOD TYPING SEROLOGIC ABO: CPT

## 2023-12-18 PROCEDURE — 85014 HEMATOCRIT: CPT

## 2023-12-18 PROCEDURE — 84484 ASSAY OF TROPONIN QUANT: CPT

## 2023-12-18 PROCEDURE — 82565 ASSAY OF CREATININE: CPT

## 2023-12-18 PROCEDURE — 85027 COMPLETE CBC AUTOMATED: CPT

## 2023-12-18 PROCEDURE — 83036 HEMOGLOBIN GLYCOSYLATED A1C: CPT

## 2023-12-18 PROCEDURE — 80048 BASIC METABOLIC PNL TOTAL CA: CPT

## 2023-12-18 PROCEDURE — 86923 COMPATIBILITY TEST ELECTRIC: CPT

## 2023-12-18 PROCEDURE — 85018 HEMOGLOBIN: CPT

## 2023-12-18 PROCEDURE — 83735 ASSAY OF MAGNESIUM: CPT

## 2023-12-18 PROCEDURE — 96375 TX/PRO/DX INJ NEW DRUG ADDON: CPT

## 2023-12-18 PROCEDURE — P9037: CPT

## 2023-12-18 PROCEDURE — 97130 THER IVNTJ EA ADDL 15 MIN: CPT

## 2023-12-18 PROCEDURE — 96374 THER/PROPH/DIAG INJ IV PUSH: CPT

## 2023-12-18 PROCEDURE — C1757: CPT

## 2023-12-18 PROCEDURE — 85025 COMPLETE CBC W/AUTO DIFF WBC: CPT

## 2023-12-18 PROCEDURE — 80076 HEPATIC FUNCTION PANEL: CPT

## 2023-12-18 PROCEDURE — 99291 CRITICAL CARE FIRST HOUR: CPT

## 2023-12-18 PROCEDURE — P9059: CPT

## 2023-12-18 PROCEDURE — 83690 ASSAY OF LIPASE: CPT

## 2023-12-18 PROCEDURE — 36415 COLL VENOUS BLD VENIPUNCTURE: CPT

## 2023-12-18 PROCEDURE — 71045 X-RAY EXAM CHEST 1 VIEW: CPT

## 2023-12-18 PROCEDURE — 86850 RBC ANTIBODY SCREEN: CPT

## 2023-12-18 PROCEDURE — 97110 THERAPEUTIC EXERCISES: CPT

## 2023-12-18 PROCEDURE — P9045: CPT

## 2023-12-18 PROCEDURE — 74174 CTA ABD&PLVS W/CONTRAST: CPT

## 2023-12-18 PROCEDURE — 82947 ASSAY GLUCOSE BLOOD QUANT: CPT

## 2023-12-18 PROCEDURE — 84132 ASSAY OF SERUM POTASSIUM: CPT

## 2023-12-18 PROCEDURE — 82330 ASSAY OF CALCIUM: CPT

## 2023-12-18 PROCEDURE — 71046 X-RAY EXAM CHEST 2 VIEWS: CPT

## 2023-12-18 PROCEDURE — 93005 ELECTROCARDIOGRAM TRACING: CPT

## 2023-12-18 PROCEDURE — P9100: CPT

## 2023-12-18 RX ORDER — ENOXAPARIN SODIUM 100 MG/ML
40 INJECTION SUBCUTANEOUS EVERY 24 HOURS
Refills: 0 | Status: DISCONTINUED | OUTPATIENT
Start: 2023-12-18 | End: 2023-12-23

## 2023-12-18 RX ORDER — POTASSIUM CHLORIDE 20 MEQ
40 PACKET (EA) ORAL EVERY 4 HOURS
Refills: 0 | Status: COMPLETED | OUTPATIENT
Start: 2023-12-18 | End: 2023-12-18

## 2023-12-18 RX ORDER — AMLODIPINE BESYLATE 2.5 MG/1
10 TABLET ORAL DAILY
Refills: 0 | Status: DISCONTINUED | OUTPATIENT
Start: 2023-12-19 | End: 2023-12-23

## 2023-12-18 RX ORDER — FLUOXETINE HCL 10 MG
20 CAPSULE ORAL DAILY
Refills: 0 | Status: DISCONTINUED | OUTPATIENT
Start: 2023-12-18 | End: 2023-12-23

## 2023-12-18 RX ORDER — SIMETHICONE 80 MG/1
80 TABLET, CHEWABLE ORAL EVERY 6 HOURS
Refills: 0 | Status: DISCONTINUED | OUTPATIENT
Start: 2023-12-18 | End: 2023-12-23

## 2023-12-18 RX ORDER — OXYCODONE HYDROCHLORIDE 5 MG/1
1 TABLET ORAL
Qty: 0 | Refills: 0 | DISCHARGE
Start: 2023-12-18

## 2023-12-18 RX ORDER — ATORVASTATIN CALCIUM 80 MG/1
80 TABLET, FILM COATED ORAL AT BEDTIME
Refills: 0 | Status: DISCONTINUED | OUTPATIENT
Start: 2023-12-18 | End: 2023-12-23

## 2023-12-18 RX ORDER — OXYCODONE HYDROCHLORIDE 5 MG/1
2.5 TABLET ORAL EVERY 4 HOURS
Refills: 0 | Status: DISCONTINUED | OUTPATIENT
Start: 2023-12-18 | End: 2023-12-23

## 2023-12-18 RX ORDER — PANTOPRAZOLE SODIUM 20 MG/1
1 TABLET, DELAYED RELEASE ORAL
Qty: 0 | Refills: 0 | DISCHARGE
Start: 2023-12-18

## 2023-12-18 RX ORDER — NICOTINE POLACRILEX 2 MG
1 GUM BUCCAL DAILY
Refills: 0 | Status: DISCONTINUED | OUTPATIENT
Start: 2023-12-18 | End: 2023-12-23

## 2023-12-18 RX ORDER — ASPIRIN/CALCIUM CARB/MAGNESIUM 324 MG
81 TABLET ORAL DAILY
Refills: 0 | Status: DISCONTINUED | OUTPATIENT
Start: 2023-12-19 | End: 2023-12-23

## 2023-12-18 RX ORDER — ACETAMINOPHEN 500 MG
650 TABLET ORAL EVERY 6 HOURS
Refills: 0 | Status: DISCONTINUED | OUTPATIENT
Start: 2023-12-18 | End: 2023-12-23

## 2023-12-18 RX ORDER — PANTOPRAZOLE SODIUM 20 MG/1
40 TABLET, DELAYED RELEASE ORAL
Refills: 0 | Status: DISCONTINUED | OUTPATIENT
Start: 2023-12-19 | End: 2023-12-23

## 2023-12-18 RX ORDER — OXYCODONE HYDROCHLORIDE 5 MG/1
5 TABLET ORAL EVERY 4 HOURS
Refills: 0 | Status: DISCONTINUED | OUTPATIENT
Start: 2023-12-18 | End: 2023-12-23

## 2023-12-18 RX ADMIN — Medication 40 MILLIEQUIVALENT(S): at 13:45

## 2023-12-18 RX ADMIN — Medication 40 MILLIEQUIVALENT(S): at 10:05

## 2023-12-18 RX ADMIN — AMLODIPINE BESYLATE 10 MILLIGRAM(S): 2.5 TABLET ORAL at 13:42

## 2023-12-18 RX ADMIN — ENOXAPARIN SODIUM 40 MILLIGRAM(S): 100 INJECTION SUBCUTANEOUS at 18:53

## 2023-12-18 RX ADMIN — PANTOPRAZOLE SODIUM 40 MILLIGRAM(S): 20 TABLET, DELAYED RELEASE ORAL at 05:30

## 2023-12-18 RX ADMIN — ATORVASTATIN CALCIUM 80 MILLIGRAM(S): 80 TABLET, FILM COATED ORAL at 21:43

## 2023-12-18 RX ADMIN — Medication 1 PATCH: at 18:56

## 2023-12-18 RX ADMIN — CHLORHEXIDINE GLUCONATE 1 APPLICATION(S): 213 SOLUTION TOPICAL at 13:44

## 2023-12-18 RX ADMIN — Medication 1 PATCH: at 19:00

## 2023-12-18 RX ADMIN — Medication 81 MILLIGRAM(S): at 11:16

## 2023-12-18 NOTE — H&P ADULT - HISTORY OF PRESENT ILLNESS
57 y/o male w/ PMHx CLTI ([place] 4), alcohol abuse w/ history of withdrawal seizures, HLD and smoking who presents for alcohol withdrawal and pain in his legs. Aorta-bifem bypass planned on 12/11 with Dr. Heredia. Patient was medically optimized before procedure. On 12/14 patient was taken to the operating room and underwent  aortobifemoral bypass graft. The patient tolerated the procedure well without complications, was extubated, and transferred to the PACU in stable condition. Patient was admitted to SICU for Q 1 hour vascular checks. 12/18 patient transferred from SICU to floor. Diet was advanced as tolerated and patient treated with pain control. Physical therapy worked with patient and recommended acute rehab. On day of discharge, the patient was tolerating diet, ambulating with assistance and pain controlled.      Patient was evaluated by PM&R and therapy for functional deficits and gait/ ADL impairments and recommended acute rehabilitation. Patient was medically optimized for discharge to [facility] on 12/18   57 y/o male w/ PMHx CLTI ([place] 4), alcohol abuse w/ history of withdrawal seizures, HLD and smoking who presents for alcohol withdrawal and pain in his legs. Aorta-bifem bypass planned on 12/11 with Dr. Heredia. Patient was medically optimized before procedure. On 12/14 patient was taken to the operating room and underwent  aortobifemoral bypass graft. The patient tolerated the procedure well without complications, was extubated, and transferred to the PACU in stable condition. Patient was admitted to SICU for Q 1 hour vascular checks. 12/18 patient transferred from SICU to floor. Diet was advanced as tolerated and patient treated with pain control. Physical therapy worked with patient and recommended acute rehab. On day of discharge, the patient was tolerating diet, ambulating with assistance and pain controlled.      Patient was evaluated by PM&R and therapy for functional deficits and gait/ ADL impairments and recommended acute rehabilitation. Patient was medically optimized for discharge to [facility] on 12/18   58 year old male with PMH of CLTI (chronic limb threatening ischemia )([place] 4), alcohol abuse w/ history of withdrawal seizures, FAP s/p colectomy  2019, left ACL repair, reports brain bleed in 2021, HLD and PPD smoking history x 45 years who presented to [facility] on 12/9 for alcohol withdrawal and pain in his legs. He initially had an Aorta-bifem bypass planned on 12/11 with Dr. Heredia but was admitted for medical optimization. He was medically optimized before procedure and on 12/14 patient was taken to the operating room and underwent  aortobifemoral bypass graft. The patient tolerated the procedure well without complications, was extubated, and transferred to the PACU in stable condition. Patient was admitted to SICU for Q 1 hour vascular checks. 12/18 patient transferred from SICU to floor. Diet was advanced as tolerated and patient treated with pain control. Physical therapy worked with patient and recommended acute rehab. On day of discharge, the patient was tolerating diet, ambulating with assistance and pain controlled.      Patient was evaluated by PM&R and therapy for functional deficits and gait/ ADL impairments and recommended acute rehabilitation. Patient was medically optimized for discharge to [facility] on 12/18   58 year old male with PMH of CLTI (chronic limb threatening ischemia )([place] 4), alcohol abuse w/ history of withdrawal seizures, FAP s/p colectomy  2019, left ACL repair, reports brain bleed in 2021, HLD and PPD smoking history x 45 years who presented to [facility] on 12/9 for alcohol withdrawal and pain in his legs. He initially had an Aorta-bifem bypass planned on 12/11 with Dr. Heredia but was admitted for medical optimization. He was medically optimized before procedure and on 12/14 patient was taken to the operating room and underwent  aortobifemoral bypass graft. The patient tolerated the procedure well without complications, was extubated, and transferred to the PACU in stable condition. Patient was admitted to SICU for Q 1 hour vascular checks. 12/18 patient transferred from SICU to floor. Diet was advanced as tolerated and patient treated with pain control. Physical therapy worked with patient and recommended acute rehab. On day of discharge, the patient was tolerating diet, ambulating with assistance and pain controlled.      Patient was evaluated by PM&R and therapy for functional deficits and gait/ ADL impairments and recommended acute rehabilitation. Patient was medically optimized for discharge to [facility] on 12/18

## 2023-12-18 NOTE — DISCHARGE NOTE NURSING/CASE MANAGEMENT/SOCIAL WORK - PATIENT PORTAL LINK FT
You can access the FollowMyHealth Patient Portal offered by [facility] by registering at the following website: [email or web address]. By joining [employer]’s FollowMyHealth portal, you will also be able to view your health information using other applications (apps) compatible with our system. You can access the FollowMyHealth Patient Portal offered by [facility] by registering at the following website: [email or web address]. By joining [employer]’s FollowMyHealth portal, you will also be able to view your health information using other applications (apps) compatible with our system.

## 2023-12-18 NOTE — PATIENT PROFILE ADULT - FUNCTIONAL ASSESSMENT - BASIC MOBILITY 6.
3-calculated by average/Not able to assess (calculate score using [facility] averaging method)  3-calculated by average/Not able to assess (calculate score using [facility] averaging method)

## 2023-12-18 NOTE — DISCHARGE NOTE NURSING/CASE MANAGEMENT/SOCIAL WORK - NSDCPEFALRISK_GEN_ALL_CORE
For information on Fall & Injury Prevention, visit: [email or web address] OR  [email or web address] OR  https://www.cdc.gov/steadi/patient.html For information on Fall & Injury Prevention, visit: [email or web address] OR  [email or web address] OR  https://www.cdc.gov/steadi/patient.html

## 2023-12-18 NOTE — PROGRESS NOTE ADULT - SUBJECTIVE AND OBJECTIVE BOX
VASCULAR SURGERY DAILY PROGRESS NOTE:     SUBJECTIVE/ROS:  Patient seen and examined on AM rounds. He is feeling well. tolerating a diet well. He is passing flatus and having bms.     MEDICATIONS  (STANDING):  amLODIPine   Tablet 10 milliGRAM(s) Oral every 24 hours  aspirin enteric coated 81 milliGRAM(s) Oral daily  atorvastatin 80 milliGRAM(s) Oral at bedtime  chlorhexidine 2% Cloths 1 Application(s) Topical <User Schedule>  enoxaparin Injectable 40 milliGRAM(s) SubCutaneous every 24 hours  pantoprazole    Tablet 40 milliGRAM(s) Oral before breakfast    MEDICATIONS  (PRN):  oxyCODONE    IR 5 milliGRAM(s) Oral every 4 hours PRN Severe Pain (7 - 10)  oxyCODONE    IR 2.5 milliGRAM(s) Oral every 4 hours PRN Moderate Pain (4 - 6)  simethicone 80 milliGRAM(s) Chew every 6 hours PRN Heartburn      OBJECTIVE:    Vital Signs Last 24 Hrs  T(C): 36.7 (18 Dec 2023 05:20), Max: 37.1 (18 Dec 2023 01:00)  T(F): 98 (18 Dec 2023 05:20), Max: 98.7 (18 Dec 2023 01:00)  HR: 78 (18 Dec 2023 05:20) (67 - 92)  BP: 136/77 (18 Dec 2023 05:20) (107/69 - 140/76)  BP(mean): 81 (17 Dec 2023 21:00) (78 - 101)  RR: 18 (18 Dec 2023 05:20) (16 - 26)  SpO2: 94% (18 Dec 2023 05:20) (93% - 96%)    Parameters below as of 18 Dec 2023 05:20  Patient On (Oxygen Delivery Method): room air            I&O's Detail    17 Dec 2023 07:01  -  18 Dec 2023 07:00  --------------------------------------------------------  IN:    IV PiggyBack: 83.3 mL    Oral Fluid: 940 mL  Total IN: 1023.3 mL    OUT:    Voided (mL): 2350 mL  Total OUT: 2350 mL    Total NET: -1326.7 mL          Daily     Daily     LABS:                        8.4    9.15  )-----------( 195      ( 18 Dec 2023 07:27 )             25.5     12-17    134<L>  |  102  |  9   ----------------------------<  108<H>  4.1   |  23  |  0.66    Ca    8.5      17 Dec 2023 19:48  Phos  2.8     12-17  Mg     2.0     12-17    TPro  5.9<L>  /  Alb  3.0<L>  /  TBili  0.5  /  DBili  x   /  AST  34  /  ALT  24  /  AlkPhos  160<H>  12-17      Urinalysis Basic - ( 17 Dec 2023 19:48 )    Color: x / Appearance: x / SG: x / pH: x  Gluc: 108 mg/dL / Ketone: x  / Bili: x / Urobili: x   Blood: x / Protein: x / Nitrite: x   Leuk Esterase: x / RBC: x / WBC x   Sq Epi: x / Non Sq Epi: x / Bacteria: x                PHYSICAL EXAM:    General Appearance: Appears well, NAD   Chest: Nonlabored breathing on RA  CV: Hemodynamically stable  Abdomen: Soft, appropriately tender, minimally distended. Dressing C/D/I with mild strikethrough  Extremities: Grossly symmetric, SCD's in place. Dressings C/D/I with mild strikethrough  Vascular: DP/PT ds+ bilaterally

## 2023-12-18 NOTE — DISCHARGE NOTE PROVIDER - PROVIDER TOKENS
PROVIDER:[TOKEN:[[id number]],FOLLOWUP:[2 weeks]] PROVIDER:[TOKEN:[[id number]],FOLLOWUP:[2 weeks]]

## 2023-12-18 NOTE — DISCHARGE NOTE PROVIDER - NSDCCPCAREPLAN_GEN_ALL_CORE_FT
PRINCIPAL DISCHARGE DIAGNOSIS  Diagnosis: Vascular disease, peripheral  Assessment and Plan of Treatment:       SECONDARY DISCHARGE DIAGNOSES  Diagnosis: Vascular disease, peripheral  Assessment and Plan of Treatment:

## 2023-12-18 NOTE — DISCHARGE NOTE PROVIDER - CARE PROVIDERS DIRECT ADDRESSES
[email or web address]

## 2023-12-18 NOTE — PROGRESS NOTE ADULT - REASON FOR ADMISSION
Alcohol withdrawal

## 2023-12-18 NOTE — PROGRESS NOTE ADULT - ASSESSMENT
57 y/o male w/ PMHx CLTI ([place] 4), alcohol abuse w/ history of withdrawal seizures, HLD and smoking who presents for alcohol withdrawal and pain in his legs. Now s/p aortobifemoral bypass graft on 12/13, recovering well.     Recs:  - continue regular diet  - Pain medications PRN  - No further Lasix as patient autodiuresing, recommend repleting K aggressively  - Continue neurovascular checks   - dc planning to acute rehab    Vascular Surgery  [id number]        59 y/o male w/ PMHx CLTI ([place] 4), alcohol abuse w/ history of withdrawal seizures, HLD and smoking who presents for alcohol withdrawal and pain in his legs. Now s/p aortobifemoral bypass graft on 12/13, recovering well.     Recs:  - continue regular diet  - Pain medications PRN  - No further Lasix as patient autodiuresing, recommend repleting K aggressively  - Continue neurovascular checks   - dc planning to acute rehab    Vascular Surgery  [id number]

## 2023-12-18 NOTE — H&P ADULT - NSHPREVIEWOFSYSTEMS_GEN_ALL_CORE
REVIEW OF SYSTEMS  Constitutional: No fever, No Chills, No fatigue  HEENT: No eye pain, No visual disturbances, No difficulty hearing  Pulm: No cough,  No shortness of breath  Cardio: No chest pain, No palpitations  GI:  No abdominal pain, No nausea, No vomiting, No diarrhea, No constipation  : No dysuria, No frequency, No hematuria  Neuro: No headaches, No memory loss, +loss of strength, No numbness, No tremors  Skin: No itching, No rashes, No lesions   Endo: No temperature intolerance  MSK: No joint pain, No joint swelling, No muscle pain, No Neck or back pain  Psych:  No depression, No anxiety REVIEW OF SYSTEMS  Constitutional: No fever, No Chills, No fatigue  HEENT: No eye pain, No visual disturbances, No difficulty hearing  Pulm: No cough,  No shortness of breath  Cardio: No chest pain, No palpitations  GI:  No abdominal pain, No nausea, No vomiting, No diarrhea, No constipation  : No dysuria, (+) frequency, No hematuria  Neuro: No headaches, No memory loss, (+) Loss of strength, No numbness, No tremors  Skin: No itching, No rashes, No lesions   Endo: No temperature intolerance  MSK: No joint pain, No joint swelling, No muscle pain, No Neck or back pain  Psych:  No depression, No anxiety

## 2023-12-18 NOTE — DISCHARGE NOTE PROVIDER - NSDCCPTREATMENT_GEN_ALL_CORE_FT
PRINCIPAL PROCEDURE  Procedure: Bypass graft, aortobifemoral, with non-vein  Findings and Treatment: Notify your surgeon and/or return to ER for temperatures greater than 101, chills sweats, pain not controlled with pain medications, significant swelling and/or skin changes in tone and/or color, significant numbness and/or tingling, significant weakness of extremity, persistent nausea and vomiting, or acutely concerning matters to you, that may require urgent medical attention.

## 2023-12-18 NOTE — PROGRESS NOTE ADULT - SUBJECTIVE AND OBJECTIVE BOX
Vascular Progress Note    S: Patient seen and examined on AM rounds. Reports feeling very good, in good spirits after NGT and morris removal, no new complaints. Pain is well-controlled. He is tolerating clears and passing gas, having BM. Denies chest pain, SOB, palpitations, HA, fever, chills, N/V.    O:  Physical Exam:  General Appearance: Appears well, NAD   Chest: Nonlabored breathing on RA  CV: Hemodynamically stable  Abdomen: Soft, appropriately tender, minimally distended. Dressing C/D/I with mild strikethrough  Extremities: Grossly symmetric, SCD's in place. Dressings C/D/I with mild strikethrough  Vascular: DP/PT ds+ bilaterally      Vital Signs Last 24 Hrs  T(C): 37.1 (18 Dec 2023 01:00), Max: 37.1 (18 Dec 2023 01:00)  T(F): 98.7 (18 Dec 2023 01:00), Max: 98.7 (18 Dec 2023 01:00)  HR: 82 (18 Dec 2023 01:00) (67 - 92)  BP: 127/75 (18 Dec 2023 01:00) (107/69 - 140/76)  BP(mean): 81 (17 Dec 2023 21:00) (78 - 102)  RR: 19 (18 Dec 2023 01:00) (16 - 26)  SpO2: 96% (18 Dec 2023 01:00) (92% - 96%)    Parameters below as of 18 Dec 2023 01:00  Patient On (Oxygen Delivery Method): room air        I&O's Detail    16 Dec 2023 07:01  -  17 Dec 2023 07:00  --------------------------------------------------------  IN:    IV PiggyBack: 766.5 mL    Oral Fluid: 600 mL  Total IN: 1366.5 mL    OUT:    Indwelling Catheter - Urethral (mL): 550 mL    Nasogastric/Oral tube (mL): 100 mL    Voided (mL): 1600 mL  Total OUT: 2250 mL    Total NET: -883.5 mL      17 Dec 2023 07:01  -  18 Dec 2023 05:02  --------------------------------------------------------  IN:    IV PiggyBack: 83.3 mL    Oral Fluid: 820 mL  Total IN: 903.3 mL    OUT:    Voided (mL): 1850 mL  Total OUT: 1850 mL    Total NET: -946.7 mL                                8.3    11.73 )-----------( 179      ( 17 Dec 2023 19:48 )             24.5       12-17    134<L>  |  102  |  9   ----------------------------<  108<H>  4.1   |  23  |  0.66    Ca    8.5      17 Dec 2023 19:48  Phos  2.8     12-17  Mg     2.0     12-17    TPro  5.9<L>  /  Alb  3.0<L>  /  TBili  0.5  /  DBili  x   /  AST  34  /  ALT  24  /  AlkPhos  160<H>  12-17

## 2023-12-18 NOTE — DISCHARGE NOTE PROVIDER - NSDCMRMEDTOKEN_GEN_ALL_CORE_FT
acetaminophen 500 mg oral tablet: 2 tab(s) orally every 6 hours As needed Mild Pain (1 - 3)  amLODIPine 10 mg oral tablet: 1 tab(s) orally once a day  aspirin 81 mg oral tablet: 1 tab(s) orally once a day (at bedtime)  atorvastatin 80 mg oral tablet: 1 tab(s) orally once a day (at bedtime)   acetaminophen 500 mg oral tablet: 2 tab(s) orally every 6 hours As needed Mild Pain (1 - 3)  amLODIPine 10 mg oral tablet: 1 tab(s) orally once a day  aspirin 81 mg oral tablet: 1 tab(s) orally once a day (at bedtime)  atorvastatin 80 mg oral tablet: 1 tab(s) orally once a day (at bedtime)  oxyCODONE 5 mg oral tablet: 1 tab(s) orally every 4 hours As needed Severe Pain (7 - 10)  pantoprazole 40 mg oral delayed release tablet: 1 tab(s) orally once a day (before a meal)   acetaminophen 500 mg oral tablet: 2 tab(s) orally every 6 hours As needed Mild Pain (1 - 3)  amLODIPine 10 mg oral tablet: 1 tab(s) orally once a day  aspirin 81 mg oral tablet: 1 tab(s) orally once a day (at bedtime)  atorvastatin 80 mg oral tablet: 1 tab(s) orally once a day (at bedtime)  oxyCODONE 5 mg oral tablet: 1 tab(s) orally every 4 hours As needed Severe Pain (7 - 10)  pantoprazole 40 mg oral delayed release tablet: 1 tab(s) orally once a day (before a meal)  PROzac 20 mg oral capsule: 1 cap(s) orally 2 times a day

## 2023-12-18 NOTE — PATIENT PROFILE ADULT - FALL HARM RISK - HARM RISK INTERVENTIONS
Assistance with ambulation/Assistance OOB with selected safe patient handling equipment/Communicate Risk of Fall with Harm to all staff/Monitor for mental status changes/Monitor gait and stability/Reinforce activity limits and safety measures with patient and family/Tailored Fall Risk Interventions/Toileting schedule using arm’s reach rule for commode and bathroom/Use of alarms - bed, chair and/or voice tab/Visual Cue: Yellow wristband and red socks/Bed in lowest position, wheels locked, appropriate side rails in place/Call bell, personal items and telephone in reach/Instruct patient to call for assistance before getting out of bed or chair/Non-slip footwear when patient is out of bed/[place] to call system/Physically safe environment - no spills, clutter or unnecessary equipment/Purposeful Proactive Rounding/Room/bathroom lighting operational, light cord in reach Assistance with ambulation/Assistance OOB with selected safe patient handling equipment/Communicate Risk of Fall with Harm to all staff/Monitor for mental status changes/Monitor gait and stability/Reinforce activity limits and safety measures with patient and family/Tailored Fall Risk Interventions/Toileting schedule using arm’s reach rule for commode and bathroom/Use of alarms - bed, chair and/or voice tab/Visual Cue: Yellow wristband and red socks/Bed in lowest position, wheels locked, appropriate side rails in place/Call bell, personal items and telephone in reach/Instruct patient to call for assistance before getting out of bed or chair/Non-slip footwear when patient is out of bed/[place] to call system/Physically safe environment - no spills, clutter or unnecessary equipment/Purposeful Proactive Rounding/Room/bathroom lighting operational, light cord in reach

## 2023-12-18 NOTE — DISCHARGE NOTE PROVIDER - CARE PROVIDER_API CALL
Dilip Heredia  Vascular Surgery  [address] [facility] [address] [id number]  [place] [address]  Phone: [phone]  Fax: [phone]  Follow Up Time: 2 weeks   Dilip Heredia  Vascular Surgery  [address] [facility] [id number]  [place] [address]  Phone: [phone]  Fax: [phone]  Follow Up Time: 2 weeks

## 2023-12-18 NOTE — H&P ADULT - NSHPADDITIONALINFOADULT_GEN_ALL_CORE
Saw and evaluated the patient independently, took history, examine, started an assessment and management plan. Spent 120 minutes. Excluding teaching time

## 2023-12-18 NOTE — H&P ADULT - NSHPLABSRESULTS_GEN_ALL_CORE
8.4    9.15  )-----------( 195      ( 18 Dec 2023 07:27 )             25.5     12-17    134<L>  |  102  |  9   ----------------------------<  108<H>  4.1   |  23  |  0.66    Ca    8.5      17 Dec 2023 19:48  Phos  2.8     12-17  Mg     2.0     12-17    TPro  5.9<L>  /  Alb  3.0<L>  /  TBili  0.5  /  DBili  x   /  AST  34  /  ALT  24  /  AlkPhos  160<H>  12-17      Urinalysis Basic - ( 17 Dec 2023 19:48 )    Color: x / Appearance: x / SG: x / pH: x  Gluc: 108 mg/dL / Ketone: x  / Bili: x / Urobili: x   Blood: x / Protein: x / Nitrite: x   Leuk Esterase: x / RBC: x / WBC x   Sq Epi: x / Non Sq Epi: x / Bacteria: x    CT Chest w/ IV contrast 12/15/23    IMPRESSION:  Bilateral lower lobe and lingular consolidation, likely representing   atelectasis.    Patent aortobifemoral bypass graft with postoperative changes and   pneumoperitoneum.    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 Mild stenosis of the celiac origin.   Moderate stenosis of the proximal SMA.    Cirrhosis.

## 2023-12-18 NOTE — H&P ADULT - NSHPPHYSICALEXAM_GEN_ALL_CORE
PHYSICAL EXAMINATION   VItals: T(C): 36.7 (12-18-23 @ 18:06), Max: 37.2 (12-18-23 @ 09:20)  HR: 75 (12-18-23 @ 18:06) (75 - 83)  BP: 143/80 (12-18-23 @ 18:06) (110/63 - 143/80)  RR: 17 (12-18-23 @ 18:06) (17 - 26)  SpO2: 99% (12-18-23 @ 18:06) (93% - 99%)    General: NAD, Resting Comfortable,                                  HEENT: NC/AT, EOM I, PERRLA, Normal Conjunctivae  Cardio: RRR, Normal S1-S2    Pulm: No Respiratory Distress,  Lungs CTAB                        Abdomen: ND/NT, Soft, BS+, surgical incision with staples              MSK:  Full ROM                                         Ext: No C/C/E, Pulses 2+ throughout, No calf tenderness    Skin:  Midline surgical incision with staples JAKE, bilateral groin incision with staples - dressing in place bilaterally                                                              Neurological Examination    Cognitive: AAO x 3                                                                         Attention: Intact   Judgment: Good evidence of judgement                                Mood/Affect: wnl                                                                           Communication:  Fluent,  No dysarthria   Swallow: intact                                                                    Sensory: Intact to light touch                                                                          Tone: normal Throughout   Balance: impaired    Motor    LEFT    UE: SF [5/5], EF [5/5], EE [5/5], WE [5/5],  [wnl]  RIGHT UE: SF [5/5], EF [5/5], EE [5/5], WE [5/5],  [wnl]  LEFT    LE:  HF [5/5], KE [5/5], DF [5/5], EHL [5/5],  PF [5/5]  RIGHT LE:  HF [5/5], KE [5/5], DF [5/5], EHL [5/5],  PF [5/5] PHYSICAL EXAMINATION   VItals: T(C): 36.7 (12-18-23 @ 18:06), Max: 37.2 (12-18-23 @ 09:20)  HR: 75 (12-18-23 @ 18:06) (75 - 83)  BP: 143/80 (12-18-23 @ 18:06) (110/63 - 143/80)  RR: 17 (12-18-23 @ 18:06) (17 - 26)  SpO2: 99% (12-18-23 @ 18:06) (93% - 99%)    General: NAD, Resting Comfortable,                                  HEENT: NC/AT, EOM I, PERRLA, Normal Conjunctivae  Cardio: RRR, Normal S1-S2    Pulm: No Respiratory Distress,  Lungs CTAB                        Abdomen: ND, NT, Soft, BS (+), surgical incision with staples  C/D/I            MSK:  Full ROM                                         Ext: No C/C/E, Pulses 2+ throughout, No calf tenderness    Skin:  Midline surgical incision with staples JAKE, bilateral groin incision with staples - dressing in place bilaterally                                                              Neurological Examination    Cognitive: AAO x 3                                                                         Attention: Intact   Judgment: Good evidence of judgement                                Mood/Affect: wnl                                                                           Communication:  Fluent,  No dysarthria   Swallow: intact                                                                    Sensory: Intact to light touch                                                                          Tone: normal Throughout   Balance: impaired    Motor    LEFT    UE: SF [5/5], EF [5/5], EE [5/5], WE [5/5],  [wnl]  RIGHT UE: SF [5/5], EF [5/5], EE [5/5], WE [5/5],  [wnl]  LEFT    LE:  HF [5/5], KE [5/5], DF [5/5], EHL [5/5],  PF [5/5]  RIGHT LE:  HF [5/5], KE [5/5], DF [5/5], EHL [5/5],  PF [5/5]

## 2023-12-18 NOTE — PROGRESS NOTE ADULT - PROVIDER SPECIALTY LIST ADULT
SICU
Vascular Surgery
SICU
SICU
Vascular Surgery
SICU
Vascular Surgery
SICU
Vascular Surgery
Vascular Surgery
Hospitalist
Internal Medicine
Hospitalist

## 2023-12-18 NOTE — H&P ADULT - NSHPSOCIALHISTORY_GEN_ALL_CORE
SOCIAL HISTORY  Smoking -   EtOH -   Marital Status -       FUNCTIONAL HISTORY  Previous Functional Status:  Independent in ambulation, ADL's, transfers prior to hospitalization  Patient reports living at home with family, +stairs to negotiate, PTA ind amb and ADLs, works in construction.    Current Functional Status:  Bed mobility: minAx2 person assist, decreased ability to use arms for pushing/pulling; decreased ability to use legs for bridging/pushing;  Transfers: Stacia sit to stand, stand to sit x2 person assist  Gait / ambulation: 5 steps bed to chair RW Stacia x2 person assist  ADL's: LBD mod-A x1 person assist   Speech: Regular Diet DASH/TLC SOCIAL HISTORY  Smoking - 1 PPD x 45 years  EtOH - 2-3 drinks per day  Marital Status -        FUNCTIONAL HISTORY  Previous Functional Status:  Independent in ambulation, ADL's, transfers prior to hospitalization  Patient reports living at home with family, +stairs to negotiate, PTA ind amb and ADLs, works in construction.    Current Functional Status:  Bed mobility: minAx2 person assist, decreased ability to use arms for pushing/pulling; decreased ability to use legs for bridging/pushing;  Transfers: Stacia sit to stand, stand to sit x2 person assist  Gait / ambulation: 5 steps bed to chair RW Stacia x2 person assist  ADL's: LBD mod-A x1 person assist   Speech: Regular Diet DASH/TLC SOCIAL HISTORY  Smoking - 1 PPD x 45 years  EtOH - 2-3 drinks per day for years   Marital Status -        FUNCTIONAL HISTORY  Previous Functional Status:  Independent in ambulation, ADL's, transfers prior to hospitalization  Patient reports living at home with family, +stairs to negotiate, PTA ind amb and ADLs, works in construction.    Current Functional Status:  Bed mobility: minAx2 person assist, decreased ability to use arms for pushing/pulling; decreased ability to use legs for bridging/pushing;  Transfers: Stacia sit to stand, stand to sit x2 person assist  Gait / ambulation: 5 steps bed to chair RW Stacia x2 person assist  ADL's: LBD mod-A x1 person assist   Speech: Regular Diet DASH/TLC

## 2023-12-18 NOTE — PROGRESS NOTE ADULT - ASSESSMENT
57 y/o male w/ PMHx CLTI ([place] 4), alcohol abuse w/ history of withdrawal seizures, HLD and smoking who presents for alcohol withdrawal and pain in his legs. Now s/p aortobifemoral bypass graft on 12/13, recovering well. Tolerating clears s/p NGT removal, +/+.    Recs:  - Progress to regular diet in PM if patient amenable  - Pain medications PRN  - No further Lasix as patient autodiuresing, recommend repleting K aggressively  - Continue neurovascular checks   - Patient on CIWA protocol  - SICU care     Vascular Surgery  [id number]  59 y/o male w/ PMHx CLTI ([place] 4), alcohol abuse w/ history of withdrawal seizures, HLD and smoking who presents for alcohol withdrawal and pain in his legs. Now s/p aortobifemoral bypass graft on 12/13, recovering well. Tolerating clears s/p NGT removal, +/+.    Recs:  - Progress to regular diet in PM if patient amenable  - Pain medications PRN  - No further Lasix as patient autodiuresing, recommend repleting K aggressively  - Continue neurovascular checks   - Patient on CIWA protocol  - SICU care     Vascular Surgery  [phone]

## 2023-12-18 NOTE — DISCHARGE NOTE PROVIDER - HOSPITAL COURSE
59 y/o male w/ PMHx CLTI ([place] 4), alcohol abuse w/ history of withdrawal seizures, HLD and smoking who presents for alcohol withdrawal and pain in his legs. Aorta-bifem bypass planned for monday 12/11 with Dr. Heredia. Patient was medically optimized before procedure. On 12/14 patient was taken to the operating room and underwent  S/P aortobifemoral bypass graft on 12/13. The patient tolerated the procedure well without complications, was extubated, and transferred to the PACU in stable condition. Patient was admitted to SICU for Q 1 hour vascular checks. 12/18 patient transferred from SICU to floor. Diet was advanced as tolerated and patient treated with pain control. Physical therapy worked with patient and recommended acute rehab. On day of discharge, the patient was tolerating diet, ambulating with assistance and pain controlled.           59 y/o male w/ PMHx CLTI ([place] 4), alcohol abuse w/ history of withdrawal seizures, HLD and smoking who presents for alcohol withdrawal and pain in his legs. Aorta-bifem bypass planned for monday 12/11 with Dr. Heredia. Patient was medically optimized before procedure. On 12/14 patient was taken to the operating room and underwent  S/P aortobifemoral bypass graft on 12/13. The patient tolerated the procedure well without complications, was extubated, and transferred to the PACU in stable condition. Patient was admitted to SICU for Q 1 hour vascular checks. 12/18 patient transferred from SICU to floor. Diet was advanced as tolerated and patient treated with pain control. Physical therapy worked with patient and recommended acute rehab. On day of discharge, the patient was tolerating diet, ambulating with assistance and pain controlled.

## 2023-12-18 NOTE — H&P ADULT - ASSESSMENT
57 y/o male w/ PMHx CLTI ([place] 4), alcohol abuse w/ history of withdrawal seizures, HLD and smoking who presents for alcohol withdrawal and pain in his legs to [facility] 12/8/23. S/p Aorta-bifem bypass 12/11.  Admitted for multidisciplinary rehab program    #S/p aortobifemoral bypass graft 12/14  c/w ASA 81mg PO QD   c/w atorvastatin 80mg PO at bedtime  #Comprehensive Multidisciplinary Rehab Program:  - Gait, ADL, Functional impairments  - PT/OT/ SLP 3 hours a day 5 days a week    #HTN  -c/w amlodipine 10mg PO QD     #Mood / Cognition:  - Neuropsych evaluation     #Sleep:  - Maintain quiet hours and low stim environment  - Melatonin PRN    #Pain:  -oxycodone 5mg PO Q4 PRN moderate pain   -oxycodone 10mg PO Q4 PRN severe pain  - Tylenol PRN  - avoid sedating meds that may affect cognitive recovery    #GI/Bowel:  - simethicone 80 mg chew PO Q6 PRN heartburn   - Senna 2 tabs daily  - GI ppx: pantoprazole 40mg PO before breakfast     #/Bladder:  - Monitor PVR if no void in 8h; SC for >400 cc  - Toileting schedule q4h    #Diet / Dysphagia:    - Diet: Regualr DASH/TLC   - ongoing SLP assessment  - Nutrition to follow    #Skin/ Pressure Injury Prevention:  - assessment on admission   - Incisions:  - Turn Q2hrs in bed while awake, OOB to Chair, PT/OT/SLP     #DVT prophylaxis:  - ASA 81mg PO QD   - lovenox 40mg SC QD     #Precautions/ Restrictions  - Falls,  Seizures  - Ortho:      Weight bearing status: no weight bearing restrictions    - COVID PCR:     --------------------------------------------  Outpatient Follow up:    Dilip Heredia  Vascular Surgery  [address] [place] [address], [address]  [place] [address]  Phone: [phone]  Fax: [phone]  Follow Up Time: 2 weeks    --------------------------------------------      MEDICAL PROGNOSIS: GOOD            REHAB POTENTIAL: GOOD             ESTIMATED DISPOSITION: HOME WITH HOME CARE            ELOS: 10-14 Days   EXPECTED THERAPY:     P.T. 1hr/day       O.T. 1hr/day      S.L.P. 1hr/day     P&O Unnecessary     EXP FREQUENCY: 5 days per 7 day period     PRESCREEN COMPARISON:   I have reviewed the prescreen information and I have found no relevant changes between the preadmission screening and my post admission evaluation     RATIONALE FOR INPATIENT ADMISSION - Patient demonstrates the following: (check all that apply)  [X] Medically appropriate for rehabilitation admission  [X] Has attainable rehab goals with an appropriate initial discharge plan  [X] Has rehabilitation potential (expected to make a significant improvement within a reasonable period of time)   [X] Requires close medical management by a rehab physician, rehab nursing care, Hospitalist and comprehensive interdisciplinary team (including PT, OT, & or SLP, Prosthetics and Orthotics)   57 y/o male w/ PMHx CLTI ([place] 4), alcohol abuse w/ history of withdrawal seizures, HLD and smoking who presents for alcohol withdrawal and pain in his legs to [facility] 12/8/23. S/p Aorta-bifem bypass 12/11.  Admitted for multidisciplinary rehab program    #S/p aortobifemoral bypass graft 12/14  c/w ASA 81mg PO QD   c/w atorvastatin 80mg PO at bedtime  #Comprehensive Multidisciplinary Rehab Program:  - Gait, ADL, Functional impairments  - PT/OT/ SLP 3 hours a day 5 days a week    #HTN  -c/w amlodipine 10mg PO QD     #Mood / Cognition:  - Neuropsych evaluation     #Sleep:  - Maintain quiet hours and low stim environment  - Melatonin PRN    #Pain:  -oxycodone 5mg PO Q4 PRN moderate pain   -oxycodone 10mg PO Q4 PRN severe pain  - Tylenol PRN  - avoid sedating meds that may affect cognitive recovery    #GI/Bowel:  - simethicone 80 mg chew PO Q6 PRN heartburn   - Senna 2 tabs daily  - GI ppx: pantoprazole 40mg PO before breakfast     #/Bladder:  - Monitor PVR if no void in 8h; SC for >400 cc  - Toileting schedule q4h    #Diet / Dysphagia:    - Diet: Regualr DASH/TLC   - ongoing SLP assessment  - Nutrition to follow    #Skin/ Pressure Injury Prevention:  - assessment on admission   - Incisions:  - Turn Q2hrs in bed while awake, OOB to Chair, PT/OT/SLP     #DVT prophylaxis:  - ASA 81mg PO QD   - lovenox 40mg SC QD     #Precautions/ Restrictions  - Falls,  Seizures  - Ortho:      Weight bearing status: no weight bearing restrictions    - COVID PCR:     --------------------------------------------  Outpatient Follow up:    Dilip Heredia  Vascular Surgery  [address] [place] [address], [address]  [place] [address]  Phone: [phone]  Fax: [phone]  Follow Up Time: 2 weeks    --------------------------------------------      MEDICAL PROGNOSIS: GOOD            REHAB POTENTIAL: GOOD             ESTIMATED DISPOSITION: HOME WITH HOME CARE            ELOS: 10-14 Days   EXPECTED THERAPY:     P.T. 1hr/day       O.T. 1hr/day      S.L.P. 1hr/day     P&O Unnecessary     EXP FREQUENCY: 5 days per 7 day period     PRESCREEN COMPARISON:   I have reviewed the prescreen information and I have found no relevant changes between the preadmission screening and my post admission evaluation     RATIONALE FOR INPATIENT ADMISSION - Patient demonstrates the following: (check all that apply)  [X] Medically appropriate for rehabilitation admission  [X] Has attainable rehab goals with an appropriate initial discharge plan  [X] Has rehabilitation potential (expected to make a significant improvement within a reasonable period of time)   [X] Requires close medical management by a rehab physician, rehab nursing care, Hospitalist and comprehensive interdisciplinary team (including PT, OT, & or SLP, Prosthetics and Orthotics)   59 y/o male w/ PMHx CLTI ([place] 4), alcohol abuse w/ history of withdrawal seizures, HLD and smoking who presents for alcohol withdrawal and pain in his legs to [facility] 12/8/23. S/p Aorta-bifem bypass 12/11.  Admitted for multidisciplinary rehab program    #S/p aortobifemoral bypass graft 12/14  c/w ASA 81mg PO QD   c/w atorvastatin 80mg PO at bedtime  -Midline abdominal Incision and bilateral groin incision with staples JAKE  #Comprehensive Multidisciplinary Rehab Program:  - Gait, ADL, Functional impairments  - PT/OT 3hours a day 5 days a week    #HTN  -c/w amlodipine 10mg PO QD   -monitor BP    #Mood / Cognition:  - Neuropsych evaluation   -Continue prozac 20mg daily     #Smoker  -1 PPD for 45 years  -Was taking nicotine patch at prior hospital pre-op   -Feeling withdrawal  -Restart nicotine patch 14mg daily     #Pain:  -oxycodone 5mg PO Q4 PRN moderate pain   -oxycodone 10mg PO Q4 PRN severe pain  - Tylenol PRN    #GI/Bowel:  -Prior history of colectomy with multiple BMs/day  - simethicone 80 mg chew PO Q6 PRN heartburn   -Hold bowel regimen due to multiple Bms daily   - GI ppx: pantoprazole 40mg PO before breakfast     #/Bladder:  - Toileting schedule q4h    #Diet / Dysphagia:    - Diet: Regular DASH/TLC   - Nutrition to follow    #Skin/ Pressure Injury Prevention:  -Skin intact except for surgical sites   - Incisions: Midline abdominal surgical incision with staples JAKE, bilateral groin incisions with staples JAKE    #DVT prophylaxis:  - ASA 81mg PO QD   - lovenox 40mg SC QD     #Precautions/ Restrictions  - Falls, Weight bearing status: no weight bearing restrictions      --------------------------------------------  Outpatient Follow up:    Dilip Heredia  Vascular Surgery  [address] [place] [address], [address]  [place] [address]  Phone: [phone]  Fax: [phone]  Follow Up Time: 2 weeks    --------------------------------------------      MEDICAL PROGNOSIS: GOOD            REHAB POTENTIAL: GOOD             ESTIMATED DISPOSITION: HOME WITH HOME CARE            ELOS: 10-14 Days   EXPECTED THERAPY:     P.T. 1hr/day       O.T. 1hr/day      S.L.P. 1hr/day     P&O Unnecessary     EXP FREQUENCY: 5 days per 7 day period     PRESCREEN COMPARISON:   I have reviewed the prescreen information and I have found no relevant changes between the preadmission screening and my post admission evaluation     RATIONALE FOR INPATIENT ADMISSION - Patient demonstrates the following: (check all that apply)  [X] Medically appropriate for rehabilitation admission  [X] Has attainable rehab goals with an appropriate initial discharge plan  [X] Has rehabilitation potential (expected to make a significant improvement within a reasonable period of time)   [X] Requires close medical management by a rehab physician, rehab nursing care, Hospitalist and comprehensive interdisciplinary team (including PT, OT, & or SLP, Prosthetics and Orthotics)   57 y/o male w/ PMHx CLTI ([place] 4), alcohol abuse w/ history of withdrawal seizures, HLD and smoking who presents for alcohol withdrawal and pain in his legs to [facility] 12/8/23. S/p Aorta-bifem bypass 12/11.  Admitted for multidisciplinary rehab program    #S/p aortobifemoral bypass graft 12/14  c/w ASA 81mg PO QD   c/w atorvastatin 80mg PO at bedtime  -Midline abdominal Incision and bilateral groin incision with staples JAKE  #Comprehensive Multidisciplinary Rehab Program:  - Gait, ADL, Functional impairments  - PT/OT 3hours a day 5 days a week    #HTN  -c/w amlodipine 10mg PO QD   -monitor BP    #Mood / Cognition:  - Neuropsych evaluation   -Continue prozac 20mg daily     #Smoker  -1 PPD for 45 years  -Was taking nicotine patch at prior hospital pre-op   -Feeling withdrawal  -Restart nicotine patch 14mg daily     #Pain:  -oxycodone 5mg PO Q4 PRN moderate pain   -oxycodone 10mg PO Q4 PRN severe pain  - Tylenol PRN    #GI/Bowel:  -Prior history of colectomy with multiple BMs/day  - simethicone 80 mg chew PO Q6 PRN heartburn   -Hold bowel regimen due to multiple Bms daily   - GI ppx: pantoprazole 40mg PO before breakfast     #/Bladder:  - Toileting schedule q4h    #Diet / Dysphagia:    - Diet: Regular DASH/TLC   - Nutrition to follow    #Skin/ Pressure Injury Prevention:  -Skin intact except for surgical sites   - Incisions: Midline abdominal surgical incision with staples JAKE, bilateral groin incisions with staples JAKE    #DVT prophylaxis:  - ASA 81mg PO QD   - lovenox 40mg SC QD     #Precautions/ Restrictions  - Falls, Weight bearing status: no weight bearing restrictions      --------------------------------------------  Outpatient Follow up:    Dilip Heredia  Vascular Surgery  [address] [place] [address], [address]  [place] [address]  Phone: [phone]  Fax: [phone]  Follow Up Time: 2 weeks    --------------------------------------------      MEDICAL PROGNOSIS: GOOD            REHAB POTENTIAL: GOOD             ESTIMATED DISPOSITION: HOME WITH HOME CARE            ELOS: 10-14 Days   EXPECTED THERAPY:     P.T. 1hr/day       O.T. 1hr/day      S.L.P. 1hr/day     P&O Unnecessary     EXP FREQUENCY: 5 days per 7 day period     PRESCREEN COMPARISON:   I have reviewed the prescreen information and I have found no relevant changes between the preadmission screening and my post admission evaluation     RATIONALE FOR INPATIENT ADMISSION - Patient demonstrates the following: (check all that apply)  [X] Medically appropriate for rehabilitation admission  [X] Has attainable rehab goals with an appropriate initial discharge plan  [X] Has rehabilitation potential (expected to make a significant improvement within a reasonable period of time)   [X] Requires close medical management by a rehab physician, rehab nursing care, Hospitalist and comprehensive interdisciplinary team (including PT, OT, & or SLP, Prosthetics and Orthotics)   59 y/o male w/ PMHx CLTI ([place] 4), alcohol abuse w/ history of withdrawal seizures, HLD and smoking who presents for alcohol withdrawal and pain in his legs to [facility] 12/8/23. S/p Aorta-bifem bypass 12/11.  Admitted for multidisciplinary rehab program    #S/p aortobifemoral bypass graft 12/14  c/w ASA 81mg PO QD   c/w atorvastatin 80mg PO at bedtime  -Midline abdominal Incision and bilateral groin incision with staples JAKE  #Comprehensive Multidisciplinary Rehab Program:  - Gait, ADL, Functional impairments  - PT/OT 3hours a day 5 days a week, 90 minutes each.    #HTN  -c/w amlodipine 10mg PO QD   -monitor BP    #Mood / Cognition:  - Neuropsychology evaluation PRN  - Prozac 20mg daily     #Smoker  -1 PPD for 45 years  -Was taking nicotine patch at prior hospital pre-op   -Feeling withdrawal  -Restart nicotine patch 14mg daily     #Pain:  -oxycodone 5mg PO Q4 PRN moderate pain   -oxycodone 10mg PO Q4 PRN severe pain  - Tylenol PRN    #GI/Bowel:  -Prior history of colectomy with multiple BMs/day  -Simethicone 80 mg chew PO Q6 PRN heartburn   -Hold bowel regimen due to multiple Bms daily   -GI ppx: pantoprazole 40mg PO before breakfast     #/Bladder:  - Toileting schedule q4h    #Diet / Dysphagia:    - Diet: Regular DASH/TLC   - Nutrition to follow    #Skin/ Pressure Injury Prevention:  -Skin intact except for surgical sites   - Incisions: Midline abdominal surgical incision with staples JAKE, bilateral groin incisions with staples JAKE    #DVT prophylaxis:  - ASA 81mg PO QD   - lovenox 40mg SC QD     #Precautions/ Restrictions  - Falls, Weight bearing status: no weight bearing restrictions      --------------------------------------------  Outpatient Follow up:    Dilip Heredia  Vascular Surgery  [address] [facility], [id number]  [place] [address]  Phone: [phone]  Fax: [phone]  Follow Up Time: 2 weeks    --------------------------------------------      MEDICAL PROGNOSIS: GOOD            REHAB POTENTIAL: GOOD             ESTIMATED DISPOSITION: HOME WITH HOME CARE            ELOS: 10-14 Days   EXPECTED THERAPY:     P.T. 1hr/day       O.T. 1hr/day      S.L.P. 1hr/day     P&O Unnecessary     EXP FREQUENCY: 5 days per 7 day period     PRESCREEN COMPARISON:   I have reviewed the prescreen information and I have found no relevant changes between the preadmission screening and my post admission evaluation     RATIONALE FOR INPATIENT ADMISSION - Patient demonstrates the following: (check all that apply)  [X] Medically appropriate for rehabilitation admission  [X] Has attainable rehab goals with an appropriate initial discharge plan  [X] Has rehabilitation potential (expected to make a significant improvement within a reasonable period of time)   [X] Requires close medical management by a rehab physician, rehab nursing care, Hospitalist and comprehensive interdisciplinary team (including PT, OT, & or SLP, Prosthetics and Orthotics)   57 y/o male w/ PMHx CLTI ([place] 4), alcohol abuse w/ history of withdrawal seizures, HLD and smoking who presents for alcohol withdrawal and pain in his legs to [facility] 12/8/23. S/p Aorta-bifem bypass 12/11.  Admitted for multidisciplinary rehab program    #S/p aortobifemoral bypass graft 12/14  c/w ASA 81mg PO QD   c/w atorvastatin 80mg PO at bedtime  -Midline abdominal Incision and bilateral groin incision with staples JAKE  #Comprehensive Multidisciplinary Rehab Program:  - Gait, ADL, Functional impairments  - PT/OT 3hours a day 5 days a week, 90 minutes each.    #HTN  -c/w amlodipine 10mg PO QD   -monitor BP    #Mood / Cognition:  - Neuropsychology evaluation PRN  - Prozac 20mg daily     #Smoker  -1 PPD for 45 years  -Was taking nicotine patch at prior hospital pre-op   -Feeling withdrawal  -Restart nicotine patch 14mg daily     #Pain:  -oxycodone 5mg PO Q4 PRN moderate pain   -oxycodone 10mg PO Q4 PRN severe pain  - Tylenol PRN    #GI/Bowel:  -Prior history of colectomy with multiple BMs/day  -Simethicone 80 mg chew PO Q6 PRN heartburn   -Hold bowel regimen due to multiple Bms daily   -GI ppx: pantoprazole 40mg PO before breakfast     #/Bladder:  - Toileting schedule q4h    #Diet / Dysphagia:    - Diet: Regular DASH/TLC   - Nutrition to follow    #Skin/ Pressure Injury Prevention:  -Skin intact except for surgical sites   - Incisions: Midline abdominal surgical incision with staples JAKE, bilateral groin incisions with staples JAKE    #DVT prophylaxis:  - ASA 81mg PO QD   - lovenox 40mg SC QD     #Precautions/ Restrictions  - Falls, Weight bearing status: no weight bearing restrictions      --------------------------------------------  Outpatient Follow up:    Dilip Heredia  Vascular Surgery  [address] [facility], [id number]  [place] [address]  Phone: [phone]  Fax: [phone]  Follow Up Time: 2 weeks    --------------------------------------------      MEDICAL PROGNOSIS: GOOD            REHAB POTENTIAL: GOOD             ESTIMATED DISPOSITION: HOME WITH HOME CARE            ELOS: 10-14 Days   EXPECTED THERAPY:     P.T. 1hr/day       O.T. 1hr/day      S.L.P. 1hr/day     P&O Unnecessary     EXP FREQUENCY: 5 days per 7 day period     PRESCREEN COMPARISON:   I have reviewed the prescreen information and I have found no relevant changes between the preadmission screening and my post admission evaluation     RATIONALE FOR INPATIENT ADMISSION - Patient demonstrates the following: (check all that apply)  [X] Medically appropriate for rehabilitation admission  [X] Has attainable rehab goals with an appropriate initial discharge plan  [X] Has rehabilitation potential (expected to make a significant improvement within a reasonable period of time)   [X] Requires close medical management by a rehab physician, rehab nursing care, Hospitalist and comprehensive interdisciplinary team (including PT, OT, & or SLP, Prosthetics and Orthotics)

## 2023-12-19 LAB
ALBUMIN SERPL ELPH-MCNC: 2.4 G/DL — LOW (ref 3.3–5)
ALBUMIN SERPL ELPH-MCNC: 2.4 G/DL — LOW (ref 3.3–5)
ALP SERPL-CCNC: 169 U/L — HIGH (ref 40–120)
ALP SERPL-CCNC: 169 U/L — HIGH (ref 40–120)
ALT FLD-CCNC: 28 U/L — SIGNIFICANT CHANGE UP (ref 10–45)
ALT FLD-CCNC: 28 U/L — SIGNIFICANT CHANGE UP (ref 10–45)
ANION GAP SERPL CALC-SCNC: 9 MMOL/L — SIGNIFICANT CHANGE UP (ref 5–17)
ANION GAP SERPL CALC-SCNC: 9 MMOL/L — SIGNIFICANT CHANGE UP (ref 5–17)
APPEARANCE UR: ABNORMAL
APPEARANCE UR: ABNORMAL
AST SERPL-CCNC: 23 U/L — SIGNIFICANT CHANGE UP (ref 10–40)
AST SERPL-CCNC: 23 U/L — SIGNIFICANT CHANGE UP (ref 10–40)
BASOPHILS # BLD AUTO: 0.04 K/UL — SIGNIFICANT CHANGE UP (ref 0–0.2)
BASOPHILS # BLD AUTO: 0.04 K/UL — SIGNIFICANT CHANGE UP (ref 0–0.2)
BASOPHILS NFR BLD AUTO: 0.4 % — SIGNIFICANT CHANGE UP (ref 0–2)
BASOPHILS NFR BLD AUTO: 0.4 % — SIGNIFICANT CHANGE UP (ref 0–2)
BILIRUB SERPL-MCNC: 0.7 MG/DL — SIGNIFICANT CHANGE UP (ref 0.2–1.2)
BILIRUB SERPL-MCNC: 0.7 MG/DL — SIGNIFICANT CHANGE UP (ref 0.2–1.2)
BILIRUB UR-MCNC: NEGATIVE — SIGNIFICANT CHANGE UP
BILIRUB UR-MCNC: NEGATIVE — SIGNIFICANT CHANGE UP
BUN SERPL-MCNC: 5 MG/DL — LOW (ref 7–23)
BUN SERPL-MCNC: 5 MG/DL — LOW (ref 7–23)
CALCIUM SERPL-MCNC: 8.8 MG/DL — SIGNIFICANT CHANGE UP (ref 8.4–10.5)
CALCIUM SERPL-MCNC: 8.8 MG/DL — SIGNIFICANT CHANGE UP (ref 8.4–10.5)
CHLORIDE SERPL-SCNC: 102 MMOL/L — SIGNIFICANT CHANGE UP (ref 96–108)
CHLORIDE SERPL-SCNC: 102 MMOL/L — SIGNIFICANT CHANGE UP (ref 96–108)
CO2 SERPL-SCNC: 23 MMOL/L — SIGNIFICANT CHANGE UP (ref 22–31)
CO2 SERPL-SCNC: 23 MMOL/L — SIGNIFICANT CHANGE UP (ref 22–31)
COLOR SPEC: YELLOW — SIGNIFICANT CHANGE UP
COLOR SPEC: YELLOW — SIGNIFICANT CHANGE UP
CREAT SERPL-MCNC: 0.59 MG/DL — SIGNIFICANT CHANGE UP (ref 0.5–1.3)
CREAT SERPL-MCNC: 0.59 MG/DL — SIGNIFICANT CHANGE UP (ref 0.5–1.3)
DIFF PNL FLD: NEGATIVE — SIGNIFICANT CHANGE UP
DIFF PNL FLD: NEGATIVE — SIGNIFICANT CHANGE UP
EGFR: 112 ML/MIN/1.73M2 — SIGNIFICANT CHANGE UP
EGFR: 112 ML/MIN/1.73M2 — SIGNIFICANT CHANGE UP
EOSINOPHIL # BLD AUTO: 0.34 K/UL — SIGNIFICANT CHANGE UP (ref 0–0.5)
EOSINOPHIL # BLD AUTO: 0.34 K/UL — SIGNIFICANT CHANGE UP (ref 0–0.5)
EOSINOPHIL NFR BLD AUTO: 3.6 % — SIGNIFICANT CHANGE UP (ref 0–6)
EOSINOPHIL NFR BLD AUTO: 3.6 % — SIGNIFICANT CHANGE UP (ref 0–6)
GLUCOSE SERPL-MCNC: 98 MG/DL — SIGNIFICANT CHANGE UP (ref 70–99)
GLUCOSE SERPL-MCNC: 98 MG/DL — SIGNIFICANT CHANGE UP (ref 70–99)
GLUCOSE UR QL: NEGATIVE MG/DL — SIGNIFICANT CHANGE UP
GLUCOSE UR QL: NEGATIVE MG/DL — SIGNIFICANT CHANGE UP
HCT VFR BLD CALC: 28.1 % — LOW (ref 39–50)
HCT VFR BLD CALC: 28.1 % — LOW (ref 39–50)
HGB BLD-MCNC: 9.6 G/DL — LOW (ref 13–17)
HGB BLD-MCNC: 9.6 G/DL — LOW (ref 13–17)
IMM GRANULOCYTES NFR BLD AUTO: 2.5 % — HIGH (ref 0–0.9)
IMM GRANULOCYTES NFR BLD AUTO: 2.5 % — HIGH (ref 0–0.9)
KETONES UR-MCNC: NEGATIVE MG/DL — SIGNIFICANT CHANGE UP
KETONES UR-MCNC: NEGATIVE MG/DL — SIGNIFICANT CHANGE UP
LEUKOCYTE ESTERASE UR-ACNC: NEGATIVE — SIGNIFICANT CHANGE UP
LEUKOCYTE ESTERASE UR-ACNC: NEGATIVE — SIGNIFICANT CHANGE UP
LYMPHOCYTES # BLD AUTO: 2.95 K/UL — SIGNIFICANT CHANGE UP (ref 1–3.3)
LYMPHOCYTES # BLD AUTO: 2.95 K/UL — SIGNIFICANT CHANGE UP (ref 1–3.3)
LYMPHOCYTES # BLD AUTO: 31.2 % — SIGNIFICANT CHANGE UP (ref 13–44)
LYMPHOCYTES # BLD AUTO: 31.2 % — SIGNIFICANT CHANGE UP (ref 13–44)
MCHC RBC-ENTMCNC: 31.5 PG — SIGNIFICANT CHANGE UP (ref 27–34)
MCHC RBC-ENTMCNC: 31.5 PG — SIGNIFICANT CHANGE UP (ref 27–34)
MCHC RBC-ENTMCNC: 34.2 GM/DL — SIGNIFICANT CHANGE UP (ref 32–36)
MCHC RBC-ENTMCNC: 34.2 GM/DL — SIGNIFICANT CHANGE UP (ref 32–36)
MCV RBC AUTO: 92.1 FL — SIGNIFICANT CHANGE UP (ref 80–100)
MCV RBC AUTO: 92.1 FL — SIGNIFICANT CHANGE UP (ref 80–100)
MONOCYTES # BLD AUTO: 1.18 K/UL — HIGH (ref 0–0.9)
MONOCYTES # BLD AUTO: 1.18 K/UL — HIGH (ref 0–0.9)
MONOCYTES NFR BLD AUTO: 12.5 % — SIGNIFICANT CHANGE UP (ref 2–14)
MONOCYTES NFR BLD AUTO: 12.5 % — SIGNIFICANT CHANGE UP (ref 2–14)
NEUTROPHILS # BLD AUTO: 4.71 K/UL — SIGNIFICANT CHANGE UP (ref 1.8–7.4)
NEUTROPHILS # BLD AUTO: 4.71 K/UL — SIGNIFICANT CHANGE UP (ref 1.8–7.4)
NEUTROPHILS NFR BLD AUTO: 49.8 % — SIGNIFICANT CHANGE UP (ref 43–77)
NEUTROPHILS NFR BLD AUTO: 49.8 % — SIGNIFICANT CHANGE UP (ref 43–77)
NITRITE UR-MCNC: NEGATIVE — SIGNIFICANT CHANGE UP
NITRITE UR-MCNC: NEGATIVE — SIGNIFICANT CHANGE UP
NRBC # BLD: 0 /100 WBCS — SIGNIFICANT CHANGE UP (ref 0–0)
NRBC # BLD: 0 /100 WBCS — SIGNIFICANT CHANGE UP (ref 0–0)
PH UR: 6 — SIGNIFICANT CHANGE UP (ref 5–8)
PH UR: 6 — SIGNIFICANT CHANGE UP (ref 5–8)
PLATELET # BLD AUTO: 256 K/UL — SIGNIFICANT CHANGE UP (ref 150–400)
PLATELET # BLD AUTO: 256 K/UL — SIGNIFICANT CHANGE UP (ref 150–400)
POTASSIUM SERPL-MCNC: 4 MMOL/L — SIGNIFICANT CHANGE UP (ref 3.5–5.3)
POTASSIUM SERPL-MCNC: 4 MMOL/L — SIGNIFICANT CHANGE UP (ref 3.5–5.3)
POTASSIUM SERPL-SCNC: 4 MMOL/L — SIGNIFICANT CHANGE UP (ref 3.5–5.3)
POTASSIUM SERPL-SCNC: 4 MMOL/L — SIGNIFICANT CHANGE UP (ref 3.5–5.3)
PROT SERPL-MCNC: 6.7 G/DL — SIGNIFICANT CHANGE UP (ref 6–8.3)
PROT SERPL-MCNC: 6.7 G/DL — SIGNIFICANT CHANGE UP (ref 6–8.3)
PROT UR-MCNC: NEGATIVE MG/DL — SIGNIFICANT CHANGE UP
PROT UR-MCNC: NEGATIVE MG/DL — SIGNIFICANT CHANGE UP
RBC # BLD: 3.05 M/UL — LOW (ref 4.2–5.8)
RBC # BLD: 3.05 M/UL — LOW (ref 4.2–5.8)
RBC # FLD: 13.9 % — SIGNIFICANT CHANGE UP (ref 10.3–14.5)
RBC # FLD: 13.9 % — SIGNIFICANT CHANGE UP (ref 10.3–14.5)
SODIUM SERPL-SCNC: 134 MMOL/L — LOW (ref 135–145)
SODIUM SERPL-SCNC: 134 MMOL/L — LOW (ref 135–145)
SP GR SPEC: 1.01 — SIGNIFICANT CHANGE UP (ref 1–1.03)
SP GR SPEC: 1.01 — SIGNIFICANT CHANGE UP (ref 1–1.03)
UROBILINOGEN FLD QL: 0.2 MG/DL — SIGNIFICANT CHANGE UP (ref 0.2–1)
UROBILINOGEN FLD QL: 0.2 MG/DL — SIGNIFICANT CHANGE UP (ref 0.2–1)
WBC # BLD: 9.46 K/UL — SIGNIFICANT CHANGE UP (ref 3.8–10.5)
WBC # BLD: 9.46 K/UL — SIGNIFICANT CHANGE UP (ref 3.8–10.5)
WBC # FLD AUTO: 9.46 K/UL — SIGNIFICANT CHANGE UP (ref 3.8–10.5)
WBC # FLD AUTO: 9.46 K/UL — SIGNIFICANT CHANGE UP (ref 3.8–10.5)

## 2023-12-19 PROCEDURE — 99222 1ST HOSP IP/OBS MODERATE 55: CPT | Mod: GC

## 2023-12-19 PROCEDURE — 99223 1ST HOSP IP/OBS HIGH 75: CPT

## 2023-12-19 RX ADMIN — ATORVASTATIN CALCIUM 80 MILLIGRAM(S): 80 TABLET, FILM COATED ORAL at 21:10

## 2023-12-19 RX ADMIN — PANTOPRAZOLE SODIUM 40 MILLIGRAM(S): 20 TABLET, DELAYED RELEASE ORAL at 06:14

## 2023-12-19 RX ADMIN — Medication 1 PATCH: at 09:31

## 2023-12-19 RX ADMIN — Medication 1 PATCH: at 11:41

## 2023-12-19 RX ADMIN — Medication 20 MILLIGRAM(S): at 11:40

## 2023-12-19 RX ADMIN — AMLODIPINE BESYLATE 10 MILLIGRAM(S): 2.5 TABLET ORAL at 08:21

## 2023-12-19 RX ADMIN — Medication 1 PATCH: at 12:00

## 2023-12-19 RX ADMIN — SIMETHICONE 80 MILLIGRAM(S): 80 TABLET, CHEWABLE ORAL at 01:14

## 2023-12-19 RX ADMIN — ENOXAPARIN SODIUM 40 MILLIGRAM(S): 100 INJECTION SUBCUTANEOUS at 17:32

## 2023-12-19 RX ADMIN — Medication 1 PATCH: at 19:15

## 2023-12-19 RX ADMIN — Medication 81 MILLIGRAM(S): at 11:40

## 2023-12-19 NOTE — PROGRESS NOTE ADULT - ASSESSMENT
57 y/o male w/ PMHx CLTI ([place] 4), alcohol abuse w/ history of withdrawal seizures, HLD and smoking who presents for alcohol withdrawal and pain in his legs to [facility] 12/8/23. S/p Aorta-bifem bypass 12/11.  Admitted for multidisciplinary rehab program- pt/ot/dvt ppx    #S/p aortobifemoral bypass graft 12/14  c/w ASA   c/w atorvastatin   pain meds  pt/ot/dvt ppx    #HTN  -c/w amlodipine   -monitor BP    #Mood / Cognition:  -Continue prozac     #Smoker  -1 PPD for 45 years  -nicotine patch 14mg daily   - cessation advised  -says - i haven't smoked in 2 weeks, i know it caused this ''    #Pain:  -oxycodone   - Tylenol PRN    #GERD  - simethicone PRN   - pantoprazole      #DVT prophylaxis:  - Lovenox     WILL FOLLOW  d/w rehab team  time spent in e/m visit- 80 min  59 y/o male w/ PMHx CLTI ([place] 4), alcohol abuse w/ history of withdrawal seizures, HLD and smoking who presents for alcohol withdrawal and pain in his legs to [facility] 12/8/23. S/p Aorta-bifem bypass 12/11.  Admitted for multidisciplinary rehab program- pt/ot/dvt ppx    #S/p aortobifemoral bypass graft 12/14  c/w ASA   c/w atorvastatin   pain meds  pt/ot/dvt ppx    #HTN  -c/w amlodipine   -monitor BP    #Mood / Cognition:  -Continue prozac     #Smoker  -1 PPD for 45 years  -nicotine patch 14mg daily   - cessation advised  -says - i haven't smoked in 2 weeks, i know it caused this ''    #Pain:  -oxycodone   - Tylenol PRN    #GERD  - simethicone PRN   - pantoprazole      #DVT prophylaxis:  - Lovenox     WILL FOLLOW  d/w rehab team  time spent in e/m visit- 80 min

## 2023-12-19 NOTE — CONSULT NOTE ADULT - ASSESSMENT
57 y/o male w/ PMHx CLTI ([place] 4), alcohol abuse w/ history of withdrawal seizures, HLD and smoking who presents for alcohol withdrawal and pain in his legs to [facility] 12/8/23. S/p Aorta-bifem bypass 12/11.  Admitted for multidisciplinary rehab program- pt/ot/dvt ppx    #S/p aortobifemoral bypass graft 12/14  c/w ASA   c/w atorvastatin   pain meds  pt/ot/dvt ppx    #HTN  -c/w amlodipine   -monitor BP    #Mood / Cognition:  -Continue prozac     #Smoker  -1 PPD for 45 years  -nicotine patch 14mg daily   - cessation advised  -says - i haven't smoked in 2 weeks, i know it caused this ''    #Pain:  -oxycodone   - Tylenol PRN    #GERD  - simethicone PRN   - pantoprazole      #DVT prophylaxis:  - Lovenox     WILL FOLLOW  d/w rehab team  time spent in e/m visit- 80 min    59 y/o male w/ PMHx CLTI ([place] 4), alcohol abuse w/ history of withdrawal seizures, HLD and smoking who presents for alcohol withdrawal and pain in his legs to [facility] 12/8/23. S/p Aorta-bifem bypass 12/11.  Admitted for multidisciplinary rehab program- pt/ot/dvt ppx    #S/p aortobifemoral bypass graft 12/14  c/w ASA   c/w atorvastatin   pain meds  pt/ot/dvt ppx    #HTN  -c/w amlodipine   -monitor BP    #Mood / Cognition:  -Continue prozac     #Smoker  -1 PPD for 45 years  -nicotine patch 14mg daily   - cessation advised  -says - i haven't smoked in 2 weeks, i know it caused this ''    #Pain:  -oxycodone   - Tylenol PRN    #GERD  - simethicone PRN   - pantoprazole      #DVT prophylaxis:  - Lovenox     WILL FOLLOW  d/w rehab team  time spent in e/m visit- 80 min

## 2023-12-19 NOTE — CONSULT NOTE ADULT - SUBJECTIVE AND OBJECTIVE BOX
58 year old male with PMH of CLTI (chronic limb threatening ischemia )([place] 4), alcohol abuse w/ history of withdrawal seizures, FAP s/p colectomy  2019, left ACL repair, reports brain bleed in 2021, HLD and PPD smoking history x 45 years who presented to [facility] on 12/9 for alcohol withdrawal and pain in his legs. He initially had an Aorta-bifem bypass planned on 12/11 with Dr. Heredia but was admitted for medical optimization. He was medically optimized before procedure and on 12/14 patient was taken to the operating room and underwent  aortobifemoral bypass graft. The patient tolerated the procedure well without complications, was extubated, and transferred to the PACU in stable condition. Patient was admitted to SICU for Q 1 hour vascular checks. 12/18 patient transferred from SICU to floor. Diet was advanced as tolerated and patient treated with pain control. Physical therapy worked with patient and recommended acute rehab. On day of discharge, the patient was tolerating diet, ambulating with assistance and pain controlled.    seen at the bedside, no new complaints, no overnight issues, no n/v, no sob, no dysuria.     Review of Systems: per hpi      PAST MEDICAL HISTORY:  Alcohol abuse     FAP (familial adenomatous polyposis)     HTN (hypertension).     PAST SURGICAL HISTORY:  H/O colectomy     Left ACL tear.     FAMILY HISTORY:  Family history of FAP (familial adenomatous polyposis).    SOCIAL HISTORY  Smoking - 1 PPD x 45 years  EtOH - 2-3 drinks per day  Marital Status -        Physical Exam:     Vital Signs Last 24 Hrs  T(C): 36.9 (19 Dec 2023 07:50), Max: 37.2 (18 Dec 2023 20:00)  T(F): 98.4 (19 Dec 2023 07:50), Max: 98.9 (18 Dec 2023 20:00)  HR: 73 (19 Dec 2023 07:50) (73 - 82)  BP: 118/73 (19 Dec 2023 07:50) (118/73 - 143/80)  BP(mean): --  RR: 15 (19 Dec 2023 07:50) (15 - 20)  SpO2: 97% (19 Dec 2023 07:50) (95% - 99%)    Parameters below as of 19 Dec 2023 07:50  Patient On (Oxygen Delivery Method): room air    GENERAL- NAD  EAR/NOSE/MOUTH/THROAT -  MMM  EYES- MIGUEL ÁNGEL, conjunctiva and Sclera clear  NECK- supple  RESPIRATORY-  clear to auscultation bilaterally, non laboured breathing  CARDIOVASCULAR - SIS2, RRR  GI - soft NT BS present, incision with intact dressing  EXTREMITIES- no pedal edema  NEUROLOGY- no gross focal deficits  PSYCHIATRY- AAO X 3                  9.6                  134  | 23   | 5            9.46  >-----------< 256     ------------------------< 98                    28.1                 4.0  | 102  | 0.59                                         Ca 8.8   Mg x     Ph x          Labs reviewed:     CXR personally reviewed: < from: Xray Chest 1 View- PORTABLE-Routine (Xray Chest 1 View- PORTABLE-Routine in AM.) (12.16.23 @ 07:15) >    IMPRESSION:  Left basilar patchy opacities, which may represent a combination of   atelectasis and/or effusion.    No pneumoperitoneum.    < end of copied text >      ECG reviewed and interpreted: < from: 12 Lead ECG (12.15.23 @ 01:24) >  Ventricular Rate 96 BPM    Atrial Rate 96 BPM    P-R Interval 136 ms    QRS Duration 94 ms    Q-T Interval 360 ms    QTC Calculation(Bazett) 454 ms    P Axis 42 degrees    R Axis 8 degrees    T Axis 28 degrees    Diagnosis Line NORMAL SINUS RHYTHM  NONSPECIFIC ST ABNORMALITY  ABNORMAL ECG  WHEN COMPARED WITH ECG OF 15-DEC-2023 01:23, (UNCONFIRMED)  NO SIGNIFICANT CHANGE WAS FOUND    < end of copied text >      < from: CT Chest w/ IV Cont (12.15.23 @ 02:52) >  IMPRESSION:  Bilateral lower lobe and lingular consolidation, likely representing   atelectasis.    Patent aortobifemoral bypass graft with postoperative changes and   pneumoperitoneum.    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 Mild stenosis of the celiac origin.   Moderate stenosis of the proximal SMA.    Cirrhosis.    < end of copied text >      MEDICATIONS  (STANDING):  amLODIPine   Tablet 10 milliGRAM(s) Oral daily  aspirin  chewable 81 milliGRAM(s) Oral daily  atorvastatin 80 milliGRAM(s) Oral at bedtime  enoxaparin Injectable 40 milliGRAM(s) SubCutaneous every 24 hours  FLUoxetine 20 milliGRAM(s) Oral daily  nicotine -  14 mG/24Hr(s) Patch 1 Patch Transdermal daily  pantoprazole    Tablet 40 milliGRAM(s) Oral before breakfast    MEDICATIONS  (PRN):  acetaminophen     Tablet .. 650 milliGRAM(s) Oral every 6 hours PRN Mild Pain (1 - 3)  oxyCODONE    IR 2.5 milliGRAM(s) Oral every 4 hours PRN Moderate Pain (4 - 6)  oxyCODONE    IR 5 milliGRAM(s) Oral every 4 hours PRN Severe Pain (7 - 10)  simethicone 80 milliGRAM(s) Chew every 6 hours PRN Heartburn       58 year old male with PMH of CLTI (chronic limb threatening ischemia )([place] 4), alcohol abuse w/ history of withdrawal seizures, FAP s/p colectomy  2019, left ACL repair, reports brain bleed in 2021, HLD and PPD smoking history x 45 years who presented to [facility] on 12/9 for alcohol withdrawal and pain in his legs. He initially had an Aorta-bifem bypass planned on 12/11 with Dr. Heredia but was admitted for medical optimization. He was medically optimized before procedure and on 12/14 patient was taken to the operating room and underwent  aortobifemoral bypass graft. The patient tolerated the procedure well without complications, was extubated, and transferred to the PACU in stable condition. Patient was admitted to SICU for Q 1 hour vascular checks. 12/18 patient transferred from SICU to floor. Diet was advanced as tolerated and patient treated with pain control. Physical therapy worked with patient and recommended acute rehab. On day of discharge, the patient was tolerating diet, ambulating with assistance and pain controlled.    seen at the bedside, no new complaints, no overnight issues, no n/v, no sob, no dysuria.     Review of Systems: per hpi      PAST MEDICAL HISTORY:  Alcohol abuse     FAP (familial adenomatous polyposis)     HTN (hypertension).     PAST SURGICAL HISTORY:  H/O colectomy     Left ACL tear.     FAMILY HISTORY:  Family history of FAP (familial adenomatous polyposis).    SOCIAL HISTORY  Smoking - 1 PPD x 45 years  EtOH - 2-3 drinks per day  Marital Status -        Physical Exam:     Vital Signs Last 24 Hrs  T(C): 36.9 (19 Dec 2023 07:50), Max: 37.2 (18 Dec 2023 20:00)  T(F): 98.4 (19 Dec 2023 07:50), Max: 98.9 (18 Dec 2023 20:00)  HR: 73 (19 Dec 2023 07:50) (73 - 82)  BP: 118/73 (19 Dec 2023 07:50) (118/73 - 143/80)  BP(mean): --  RR: 15 (19 Dec 2023 07:50) (15 - 20)  SpO2: 97% (19 Dec 2023 07:50) (95% - 99%)    Parameters below as of 19 Dec 2023 07:50  Patient On (Oxygen Delivery Method): room air    GENERAL- NAD  EAR/NOSE/MOUTH/THROAT -  MMM  EYES- MIGUEL ÁNGEL, conjunctiva and Sclera clear  NECK- supple  RESPIRATORY-  clear to auscultation bilaterally, non laboured breathing  CARDIOVASCULAR - SIS2, RRR  GI - soft NT BS present, incision with intact dressing  EXTREMITIES- no pedal edema  NEUROLOGY- no gross focal deficits  PSYCHIATRY- AAO X 3                  9.6                  134  | 23   | 5            9.46  >-----------< 256     ------------------------< 98                    28.1                 4.0  | 102  | 0.59                                         Ca 8.8   Mg x     Ph x          Labs reviewed:     CXR personally reviewed: < from: Xray Chest 1 View- PORTABLE-Routine (Xray Chest 1 View- PORTABLE-Routine in AM.) (12.16.23 @ 07:15) >    IMPRESSION:  Left basilar patchy opacities, which may represent a combination of   atelectasis and/or effusion.    No pneumoperitoneum.    < end of copied text >      ECG reviewed and interpreted: < from: 12 Lead ECG (12.15.23 @ 01:24) >  Ventricular Rate 96 BPM    Atrial Rate 96 BPM    P-R Interval 136 ms    QRS Duration 94 ms    Q-T Interval 360 ms    QTC Calculation(Bazett) 454 ms    P Axis 42 degrees    R Axis 8 degrees    T Axis 28 degrees    Diagnosis Line NORMAL SINUS RHYTHM  NONSPECIFIC ST ABNORMALITY  ABNORMAL ECG  WHEN COMPARED WITH ECG OF 15-DEC-2023 01:23, (UNCONFIRMED)  NO SIGNIFICANT CHANGE WAS FOUND    < end of copied text >      < from: CT Chest w/ IV Cont (12.15.23 @ 02:52) >  IMPRESSION:  Bilateral lower lobe and lingular consolidation, likely representing   atelectasis.    Patent aortobifemoral bypass graft with postoperative changes and   pneumoperitoneum.    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 Mild stenosis of the celiac origin.   Moderate stenosis of the proximal SMA.    Cirrhosis.    < end of copied text >      MEDICATIONS  (STANDING):  amLODIPine   Tablet 10 milliGRAM(s) Oral daily  aspirin  chewable 81 milliGRAM(s) Oral daily  atorvastatin 80 milliGRAM(s) Oral at bedtime  enoxaparin Injectable 40 milliGRAM(s) SubCutaneous every 24 hours  FLUoxetine 20 milliGRAM(s) Oral daily  nicotine -  14 mG/24Hr(s) Patch 1 Patch Transdermal daily  pantoprazole    Tablet 40 milliGRAM(s) Oral before breakfast    MEDICATIONS  (PRN):  acetaminophen     Tablet .. 650 milliGRAM(s) Oral every 6 hours PRN Mild Pain (1 - 3)  oxyCODONE    IR 2.5 milliGRAM(s) Oral every 4 hours PRN Moderate Pain (4 - 6)  oxyCODONE    IR 5 milliGRAM(s) Oral every 4 hours PRN Severe Pain (7 - 10)  simethicone 80 milliGRAM(s) Chew every 6 hours PRN Heartburn

## 2023-12-19 NOTE — PROGRESS NOTE ADULT - SUBJECTIVE AND OBJECTIVE BOX
58 year old male with PMH of CLTI (chronic limb threatening ischemia )([place] 4), alcohol abuse w/ history of withdrawal seizures, FAP s/p colectomy  2019, left ACL repair, reports brain bleed in 2021, HLD and PPD smoking history x 45 years who presented to [facility] on 12/9 for alcohol withdrawal and pain in his legs. He initially had an Aorta-bifem bypass planned on 12/11 with Dr. Heredia but was admitted for medical optimization. He was medically optimized before procedure and on 12/14 patient was taken to the operating room and underwent  aortobifemoral bypass graft. The patient tolerated the procedure well without complications, was extubated, and transferred to the PACU in stable condition. Patient was admitted to SICU for Q 1 hour vascular checks. 12/18 patient transferred from SICU to floor. Diet was advanced as tolerated and patient treated with pain control. Physical therapy worked with patient and recommended acute rehab. On day of discharge, the patient was tolerating diet, ambulating with assistance and pain controlled.    seen at the bedside, no new complaints, no overnight issues, no n/v, no sob, no dysuria.     Review of Systems: per hpi      PAST MEDICAL HISTORY:  Alcohol abuse     FAP (familial adenomatous polyposis)     HTN (hypertension).     PAST SURGICAL HISTORY:  H/O colectomy     Left ACL tear.     FAMILY HISTORY:  Family history of FAP (familial adenomatous polyposis).    SOCIAL HISTORY  Smoking - 1 PPD x 45 years  EtOH - 2-3 drinks per day  Marital Status -        Physical Exam:     Vital Signs Last 24 Hrs  T(C): 36.9 (19 Dec 2023 07:50), Max: 37.2 (18 Dec 2023 20:00)  T(F): 98.4 (19 Dec 2023 07:50), Max: 98.9 (18 Dec 2023 20:00)  HR: 73 (19 Dec 2023 07:50) (73 - 82)  BP: 118/73 (19 Dec 2023 07:50) (118/73 - 143/80)  BP(mean): --  RR: 15 (19 Dec 2023 07:50) (15 - 20)  SpO2: 97% (19 Dec 2023 07:50) (95% - 99%)    Parameters below as of 19 Dec 2023 07:50  Patient On (Oxygen Delivery Method): room air    GENERAL- NAD  EAR/NOSE/MOUTH/THROAT -  MMM  EYES- MIGUEL ÁNGEL, conjunctiva and Sclera clear  NECK- supple  RESPIRATORY-  clear to auscultation bilaterally, non laboured breathing  CARDIOVASCULAR - SIS2, RRR  GI - soft NT BS present, incision with intact dressing  EXTREMITIES- no pedal edema  NEUROLOGY- no gross focal deficits  PSYCHIATRY- AAO X 3                  9.6                  134  | 23   | 5            9.46  >-----------< 256     ------------------------< 98                    28.1                 4.0  | 102  | 0.59                                         Ca 8.8   Mg x     Ph x          Labs reviewed:     CXR personally reviewed: < from: Xray Chest 1 View- PORTABLE-Routine (Xray Chest 1 View- PORTABLE-Routine in AM.) (12.16.23 @ 07:15) >    IMPRESSION:  Left basilar patchy opacities, which may represent a combination of   atelectasis and/or effusion.    No pneumoperitoneum.    < end of copied text >      ECG reviewed and interpreted: < from: 12 Lead ECG (12.15.23 @ 01:24) >  Ventricular Rate 96 BPM    Atrial Rate 96 BPM    P-R Interval 136 ms    QRS Duration 94 ms    Q-T Interval 360 ms    QTC Calculation(Bazett) 454 ms    P Axis 42 degrees    R Axis 8 degrees    T Axis 28 degrees    Diagnosis Line NORMAL SINUS RHYTHM  NONSPECIFIC ST ABNORMALITY  ABNORMAL ECG  WHEN COMPARED WITH ECG OF 15-DEC-2023 01:23, (UNCONFIRMED)  NO SIGNIFICANT CHANGE WAS FOUND    < end of copied text >      < from: CT Chest w/ IV Cont (12.15.23 @ 02:52) >  IMPRESSION:  Bilateral lower lobe and lingular consolidation, likely representing   atelectasis.    Patent aortobifemoral bypass graft with postoperative changes and   pneumoperitoneum.    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 Mild stenosis of the celiac origin.   Moderate stenosis of the proximal SMA.    Cirrhosis.    < end of copied text >      MEDICATIONS  (STANDING):  amLODIPine   Tablet 10 milliGRAM(s) Oral daily  aspirin  chewable 81 milliGRAM(s) Oral daily  atorvastatin 80 milliGRAM(s) Oral at bedtime  enoxaparin Injectable 40 milliGRAM(s) SubCutaneous every 24 hours  FLUoxetine 20 milliGRAM(s) Oral daily  nicotine -  14 mG/24Hr(s) Patch 1 Patch Transdermal daily  pantoprazole    Tablet 40 milliGRAM(s) Oral before breakfast    MEDICATIONS  (PRN):  acetaminophen     Tablet .. 650 milliGRAM(s) Oral every 6 hours PRN Mild Pain (1 - 3)  oxyCODONE    IR 2.5 milliGRAM(s) Oral every 4 hours PRN Moderate Pain (4 - 6)  oxyCODONE    IR 5 milliGRAM(s) Oral every 4 hours PRN Severe Pain (7 - 10)  simethicone 80 milliGRAM(s) Chew every 6 hours PRN Heartburn   58 year old male with PMH of CLTI (chronic limb threatening ischemia )([place] 4), alcohol abuse w/ history of withdrawal seizures, FAP s/p colectomy  2019, left ACL repair, reports brain bleed in 2021, HLD and PPD smoking history x 45 years who presented to [facility] on 12/9 for alcohol withdrawal and pain in his legs. He initially had an Aorta-bifem bypass planned on 12/11 with Dr. Heredia but was admitted for medical optimization. He was medically optimized before procedure and on 12/14 patient was taken to the operating room and underwent  aortobifemoral bypass graft. The patient tolerated the procedure well without complications, was extubated, and transferred to the PACU in stable condition. Patient was admitted to SICU for Q 1 hour vascular checks. 12/18 patient transferred from SICU to floor. Diet was advanced as tolerated and patient treated with pain control. Physical therapy worked with patient and recommended acute rehab. On day of discharge, the patient was tolerating diet, ambulating with assistance and pain controlled.    seen at the bedside, no new complaints, no overnight issues, no n/v, no sob, no dysuria.     Review of Systems: per hpi      PAST MEDICAL HISTORY:  Alcohol abuse     FAP (familial adenomatous polyposis)     HTN (hypertension).     PAST SURGICAL HISTORY:  H/O colectomy     Left ACL tear.     FAMILY HISTORY:  Family history of FAP (familial adenomatous polyposis).    SOCIAL HISTORY  Smoking - 1 PPD x 45 years  EtOH - 2-3 drinks per day  Marital Status -        Physical Exam:     Vital Signs Last 24 Hrs  T(C): 36.9 (19 Dec 2023 07:50), Max: 37.2 (18 Dec 2023 20:00)  T(F): 98.4 (19 Dec 2023 07:50), Max: 98.9 (18 Dec 2023 20:00)  HR: 73 (19 Dec 2023 07:50) (73 - 82)  BP: 118/73 (19 Dec 2023 07:50) (118/73 - 143/80)  BP(mean): --  RR: 15 (19 Dec 2023 07:50) (15 - 20)  SpO2: 97% (19 Dec 2023 07:50) (95% - 99%)    Parameters below as of 19 Dec 2023 07:50  Patient On (Oxygen Delivery Method): room air    GENERAL- NAD  EAR/NOSE/MOUTH/THROAT -  MMM  EYES- MIGUEL ÁNGEL, conjunctiva and Sclera clear  NECK- supple  RESPIRATORY-  clear to auscultation bilaterally, non laboured breathing  CARDIOVASCULAR - SIS2, RRR  GI - soft NT BS present, incision with intact dressing  EXTREMITIES- no pedal edema  NEUROLOGY- no gross focal deficits  PSYCHIATRY- AAO X 3                  9.6                  134  | 23   | 5            9.46  >-----------< 256     ------------------------< 98                    28.1                 4.0  | 102  | 0.59                                         Ca 8.8   Mg x     Ph x          Labs reviewed:     CXR personally reviewed: < from: Xray Chest 1 View- PORTABLE-Routine (Xray Chest 1 View- PORTABLE-Routine in AM.) (12.16.23 @ 07:15) >    IMPRESSION:  Left basilar patchy opacities, which may represent a combination of   atelectasis and/or effusion.    No pneumoperitoneum.    < end of copied text >      ECG reviewed and interpreted: < from: 12 Lead ECG (12.15.23 @ 01:24) >  Ventricular Rate 96 BPM    Atrial Rate 96 BPM    P-R Interval 136 ms    QRS Duration 94 ms    Q-T Interval 360 ms    QTC Calculation(Bazett) 454 ms    P Axis 42 degrees    R Axis 8 degrees    T Axis 28 degrees    Diagnosis Line NORMAL SINUS RHYTHM  NONSPECIFIC ST ABNORMALITY  ABNORMAL ECG  WHEN COMPARED WITH ECG OF 15-DEC-2023 01:23, (UNCONFIRMED)  NO SIGNIFICANT CHANGE WAS FOUND    < end of copied text >      < from: CT Chest w/ IV Cont (12.15.23 @ 02:52) >  IMPRESSION:  Bilateral lower lobe and lingular consolidation, likely representing   atelectasis.    Patent aortobifemoral bypass graft with postoperative changes and   pneumoperitoneum.    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 Mild stenosis of the celiac origin.   Moderate stenosis of the proximal SMA.    Cirrhosis.    < end of copied text >      MEDICATIONS  (STANDING):  amLODIPine   Tablet 10 milliGRAM(s) Oral daily  aspirin  chewable 81 milliGRAM(s) Oral daily  atorvastatin 80 milliGRAM(s) Oral at bedtime  enoxaparin Injectable 40 milliGRAM(s) SubCutaneous every 24 hours  FLUoxetine 20 milliGRAM(s) Oral daily  nicotine -  14 mG/24Hr(s) Patch 1 Patch Transdermal daily  pantoprazole    Tablet 40 milliGRAM(s) Oral before breakfast    MEDICATIONS  (PRN):  acetaminophen     Tablet .. 650 milliGRAM(s) Oral every 6 hours PRN Mild Pain (1 - 3)  oxyCODONE    IR 2.5 milliGRAM(s) Oral every 4 hours PRN Moderate Pain (4 - 6)  oxyCODONE    IR 5 milliGRAM(s) Oral every 4 hours PRN Severe Pain (7 - 10)  simethicone 80 milliGRAM(s) Chew every 6 hours PRN Heartburn

## 2023-12-20 PROCEDURE — 99232 SBSQ HOSP IP/OBS MODERATE 35: CPT

## 2023-12-20 PROCEDURE — 99232 SBSQ HOSP IP/OBS MODERATE 35: CPT | Mod: GC

## 2023-12-20 RX ADMIN — Medication 1 PATCH: at 12:27

## 2023-12-20 RX ADMIN — Medication 81 MILLIGRAM(S): at 12:26

## 2023-12-20 RX ADMIN — Medication 1 PATCH: at 11:53

## 2023-12-20 RX ADMIN — ENOXAPARIN SODIUM 40 MILLIGRAM(S): 100 INJECTION SUBCUTANEOUS at 17:20

## 2023-12-20 RX ADMIN — Medication 1 PATCH: at 07:50

## 2023-12-20 RX ADMIN — ATORVASTATIN CALCIUM 80 MILLIGRAM(S): 80 TABLET, FILM COATED ORAL at 21:11

## 2023-12-20 RX ADMIN — Medication 20 MILLIGRAM(S): at 12:27

## 2023-12-20 RX ADMIN — Medication 1 PATCH: at 23:11

## 2023-12-20 RX ADMIN — AMLODIPINE BESYLATE 10 MILLIGRAM(S): 2.5 TABLET ORAL at 05:19

## 2023-12-20 RX ADMIN — PANTOPRAZOLE SODIUM 40 MILLIGRAM(S): 20 TABLET, DELAYED RELEASE ORAL at 05:19

## 2023-12-20 NOTE — PROGRESS NOTE ADULT - SUBJECTIVE AND OBJECTIVE BOX
58 year old male with PMH of CLTI (chronic limb threatening ischemia )([place] 4), alcohol abuse w/ history of withdrawal seizures, FAP s/p colectomy  2019, left ACL repair, reports brain bleed in 2021, HLD and PPD smoking history x 45 years who presented to [facility] on 12/9 for alcohol withdrawal and pain in his legs. He initially had an Aorta-bifem bypass planned on 12/11 with Dr. Heredia but was admitted for medical optimization. He was medically optimized before procedure and on 12/14 patient was taken to the operating room and underwent  aortobifemoral bypass graft. The patient tolerated the procedure well without complications, was extubated, and transferred to the PACU in stable condition. Patient was admitted to SICU for Q 1 hour vascular checks. 12/18 patient transferred from SICU to floor. Diet was advanced as tolerated and patient treated with pain control. Physical therapy worked with patient and recommended acute rehab. On day of discharge, the patient was tolerating diet, ambulating with assistance and pain controlled.    seen at the bedside, no new complaints, no overnight issues, no n/v, no sob, no dysuria.    Vital Signs Last 24 Hrs  T(C): 36.7 (20 Dec 2023 08:41), Max: 36.7 (19 Dec 2023 20:57)  T(F): 98 (20 Dec 2023 08:41), Max: 98 (19 Dec 2023 20:57)  HR: 81 (20 Dec 2023 08:41) (75 - 81)  BP: 111/72 (20 Dec 2023 08:41) (111/72 - 131/77)  BP(mean): --  RR: 16 (20 Dec 2023 08:41) (16 - 16)  SpO2: 97% (20 Dec 2023 08:41) (96% - 97%)    Parameters below as of 20 Dec 2023 08:41  Patient On (Oxygen Delivery Method): room air        GENERAL- NAD  EAR/NOSE/MOUTH/THROAT -  MMM  EYES- MIGUEL ÁNGEL, conjunctiva and Sclera clear  NECK- supple  RESPIRATORY-  clear to auscultation bilaterally, non laboured breathing  CARDIOVASCULAR - SIS2, RRR  GI - soft NT BS present, incision intact  EXTREMITIES- no pedal edema  NEUROLOGY- no gross focal deficits  PSYCHIATRY- AAO X 3                9.6                  134  | 23   | 5            9.46  >-----------< 256     ------------------------< 98                    28.1                 4.0  | 102  | 0.59                                         Ca 8.8   Mg x     Ph x          MEDICATIONS  (STANDING):  amLODIPine   Tablet 10 milliGRAM(s) Oral daily  aspirin  chewable 81 milliGRAM(s) Oral daily  atorvastatin 80 milliGRAM(s) Oral at bedtime  enoxaparin Injectable 40 milliGRAM(s) SubCutaneous every 24 hours  FLUoxetine 20 milliGRAM(s) Oral daily  nicotine -  14 mG/24Hr(s) Patch 1 Patch Transdermal daily  pantoprazole    Tablet 40 milliGRAM(s) Oral before breakfast    MEDICATIONS  (PRN):  acetaminophen     Tablet .. 650 milliGRAM(s) Oral every 6 hours PRN Mild Pain (1 - 3)  oxyCODONE    IR 5 milliGRAM(s) Oral every 4 hours PRN Severe Pain (7 - 10)  oxyCODONE    IR 2.5 milliGRAM(s) Oral every 4 hours PRN Moderate Pain (4 - 6)  simethicone 80 milliGRAM(s) Chew every 6 hours PRN Heartburn     58 year old male with PMH of CLTI (chronic limb threatening ischemia )([place] 4), alcohol abuse w/ history of withdrawal seizures, FAP s/p colectomy  2019, left ACL repair, reports brain bleed in 2021, HLD and PPD smoking history x 45 years who presented to [facility] on 12/9 for alcohol withdrawal and pain in his legs. He initially had an Aorta-bifem bypass planned on 12/11 with Dr. Heredia but was admitted for medical optimization. He was medically optimized before procedure and on 12/14 patient was taken to the operating room and underwent  aortobifemoral bypass graft. The patient tolerated the procedure well without complications, was extubated, and transferred to the PACU in stable condition. Patient was admitted to SICU for Q 1 hour vascular checks. 12/18 patient transferred from SICU to floor. Diet was advanced as tolerated and patient treated with pain control. Physical therapy worked with patient and recommended acute rehab. On day of discharge, the patient was tolerating diet, ambulating with assistance and pain controlled.    seen at the bedside, no new complaints, no overnight issues, no n/v, no sob, no dysuria.    Vital Signs Last 24 Hrs  T(C): 36.7 (20 Dec 2023 08:41), Max: 36.7 (19 Dec 2023 20:57)  T(F): 98 (20 Dec 2023 08:41), Max: 98 (19 Dec 2023 20:57)  HR: 81 (20 Dec 2023 08:41) (75 - 81)  BP: 111/72 (20 Dec 2023 08:41) (111/72 - 131/77)  BP(mean): --  RR: 16 (20 Dec 2023 08:41) (16 - 16)  SpO2: 97% (20 Dec 2023 08:41) (96% - 97%)    Parameters below as of 20 Dec 2023 08:41  Patient On (Oxygen Delivery Method): room air        GENERAL- NAD  EAR/NOSE/MOUTH/THROAT -  MMM  EYES- MIGUEL ÁNGEL, conjunctiva and Sclera clear  NECK- supple  RESPIRATORY-  clear to auscultation bilaterally, non laboured breathing  CARDIOVASCULAR - SIS2, RRR  GI - soft NT BS present, incision intact  EXTREMITIES- no pedal edema  NEUROLOGY- no gross focal deficits  PSYCHIATRY- AAO X 3                9.6                  134  | 23   | 5            9.46  >-----------< 256     ------------------------< 98                    28.1                 4.0  | 102  | 0.59                                         Ca 8.8   Mg x     Ph x          MEDICATIONS  (STANDING):  amLODIPine   Tablet 10 milliGRAM(s) Oral daily  aspirin  chewable 81 milliGRAM(s) Oral daily  atorvastatin 80 milliGRAM(s) Oral at bedtime  enoxaparin Injectable 40 milliGRAM(s) SubCutaneous every 24 hours  FLUoxetine 20 milliGRAM(s) Oral daily  nicotine -  14 mG/24Hr(s) Patch 1 Patch Transdermal daily  pantoprazole    Tablet 40 milliGRAM(s) Oral before breakfast    MEDICATIONS  (PRN):  acetaminophen     Tablet .. 650 milliGRAM(s) Oral every 6 hours PRN Mild Pain (1 - 3)  oxyCODONE    IR 5 milliGRAM(s) Oral every 4 hours PRN Severe Pain (7 - 10)  oxyCODONE    IR 2.5 milliGRAM(s) Oral every 4 hours PRN Moderate Pain (4 - 6)  simethicone 80 milliGRAM(s) Chew every 6 hours PRN Heartburn

## 2023-12-20 NOTE — PROGRESS NOTE ADULT - ASSESSMENT
57 y/o male w/ PMHx CLTI ([place] 4), alcohol abuse w/ history of withdrawal seizures, HLD and smoking who presents for alcohol withdrawal and pain in his legs to [facility] 12/8/23. S/p Aorta-bifem bypass 12/11.  Admitted for multidisciplinary rehab program- pt/ot/dvt ppx    #S/p aortobifemoral bypass graft 12/14  c/w ASA   c/w atorvastatin   pain meds  pt/ot/dvt ppx    #HTN  -c/w amlodipine   -monitor BP    #Mood / Cognition:  -Continue prozac     #Smoker  -1 PPD for 45 years  -nicotine patch 14mg daily   - cessation advised      #Pain:  -oxycodone   - Tylenol PRN    #GERD  - simethicone PRN   - pantoprazole      #DVT prophylaxis:  - Lovenox     WILL FOLLOW  d/w rehab team   57 y/o male w/ PMHx CLTI ([place] 4), alcohol abuse w/ history of withdrawal seizures, HLD and smoking who presents for alcohol withdrawal and pain in his legs to [facility] 12/8/23. S/p Aorta-bifem bypass 12/11.  Admitted for multidisciplinary rehab program- pt/ot/dvt ppx    #S/p aortobifemoral bypass graft 12/14  c/w ASA   c/w atorvastatin   pain meds  pt/ot/dvt ppx    #HTN  -c/w amlodipine   -monitor BP    #Mood / Cognition:  -Continue prozac     #Smoker  -1 PPD for 45 years  -nicotine patch 14mg daily   - cessation advised      #Pain:  -oxycodone   - Tylenol PRN    #GERD  - simethicone PRN   - pantoprazole      #DVT prophylaxis:  - Lovenox     WILL FOLLOW  d/w rehab team

## 2023-12-20 NOTE — PROGRESS NOTE ADULT - SUBJECTIVE AND OBJECTIVE BOX
CC:  A 58y old male admitted for Chronic limb threatening ischemia S/P aortobifemoral bypass    HPI:  58 year old male with PMH of CLTI (chronic limb threatening ischemia )([place] 4), alcohol abuse w/ history of withdrawal seizures, FAP s/p colectomy  2019, left ACL repair, reports brain bleed in 2021, HLD and PPD smoking history x 45 years who presented to [facility] on 12/9 for alcohol withdrawal and pain in his legs. He initially had an Aorta-bifem bypass planned on 12/11 with Dr. Heredia but was admitted for medical optimization. He was medically optimized before procedure and on 12/14 patient was taken to the operating room and underwent  aortobifemoral bypass graft. The patient tolerated the procedure well without complications, was extubated, and transferred to the PACU in stable condition. Patient was admitted to SICU for Q 1 hour vascular checks. 12/18 patient transferred from SICU to floor. Diet was advanced as tolerated and patient treated with pain control. Physical therapy worked with patient and recommended acute rehab. On day of discharge, the patient was tolerating diet, ambulating with assistance and pain controlled.    Patient was evaluated by PM&R and therapy for functional deficits and gait/ ADL impairments and recommended acute rehabilitation. Patient was medically optimized for discharge to [facility] on 12/18    Allergies:  No Known Allergies    Subjective:  - Patient was seen and examined at bed side, comfortable in his WC. No overnight events  - Slept well last night.  No new complaints  - Denies pain at this time  - GI/, Last BM (12/19), voiding without issues.   - Tolerating and participating in 3 hours of daily therapy.     Ros:  - Denies CP, palpitation, SOB, cough, fever, headache, dysuria, abdominal pain, V/N/D/C, joint pain or swelling     MEDICATIONS  (STANDING):  amLODIPine   Tablet 10 milliGRAM(s) Oral daily  aspirin  chewable 81 milliGRAM(s) Oral daily  atorvastatin 80 milliGRAM(s) Oral at bedtime  enoxaparin Injectable 40 milliGRAM(s) SubCutaneous every 24 hours  FLUoxetine 20 milliGRAM(s) Oral daily  nicotine -  14 mG/24Hr(s) Patch 1 Patch Transdermal daily  pantoprazole    Tablet 40 milliGRAM(s) Oral before breakfast    MEDICATIONS  (PRN):  acetaminophen     Tablet .. 650 milliGRAM(s) Oral every 6 hours PRN Mild Pain (1 - 3)  oxyCODONE    IR 2.5 milliGRAM(s) Oral every 4 hours PRN Moderate Pain (4 - 6)  oxyCODONE    IR 5 milliGRAM(s) Oral every 4 hours PRN Severe Pain (7 - 10)  simethicone 80 milliGRAM(s) Chew every 6 hours PRN Heartburn    LAB:                         9.6    9.46  )-----------( 256      ( 19 Dec 2023 06:00 )             28.1     12-19    134<L>  |  102  |  5<L>  ----------------------------<  98  4.0   |  23  |  0.59    Ca    8.8      19 Dec 2023 06:00    TPro  6.7  /  Alb  2.4<L>  /  TBili  0.7  /  DBili  x   /  AST  23  /  ALT  28  /  AlkPhos  169<H>  12-19    LIVER FUNCTIONS - ( 19 Dec 2023 06:00 )  Alb: 2.4 g/dL / Pro: 6.7 g/dL / ALK PHOS: 169 U/L / ALT: 28 U/L / AST: 23 U/L / GGT: x           CT Chest w/ IV contrast 12/15/23  IMPRESSION:  Bilateral lower lobe and lingular consolidation, likely representing   atelectasis.  Patent aortobifemoral bypass graft with postoperative changes and   pneumoperitoneum.  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 Mild stenosis of the celiac origin.   Moderate stenosis of the proximal SMA.  Cirrhosis.    PHYSICAL EXAMINATION   Vital Signs Last 24 Hrs  T(C): 36.7 (20 Dec 2023 08:41), Max: 36.7 (19 Dec 2023 20:57)  T(F): 98 (20 Dec 2023 08:41), Max: 98 (19 Dec 2023 20:57)  HR: 81 (20 Dec 2023 08:41) (75 - 81)  BP: 111/72 (20 Dec 2023 08:41) (111/72 - 131/77)  RR: 16 (20 Dec 2023 08:41) (16 - 16)  SpO2: 97% (20 Dec 2023 08:41) (96% - 97%)    General: NAD, Comfortable,                                  HEENT: NC/AT, EOM I, PERRLA, Normal Conjunctivae  Cardio: RRR, Normal S1-S2    Pulm: No Respiratory Distress,  Lungs CTAB                        Abdomen: ND, NT, Soft, BS (+), surgical incision with staples  C/D/I            MSK:  Full ROM                                         Ext: No C/C/E, Pulses 2+ throughout, No calf tenderness    Skin:  Midline surgical incision with staples JAKE, bilateral groin incision with staples - dressing in place bilaterally                                                              Neurological Examination    Cognitive: AAO x 3                                                                         Attention: Intact   Judgment: Good evidence of judgement                                Mood/Affect: wnl                                                                           Communication:  Fluent,  No dysarthria   Swallow: intact                                                                    Sensory: Intact to light touch                                                                            Motor    LEFT    UE: SF [5/5], EF [5/5], EE [5/5], WE [5/5],  [wnl]  RIGHT UE: SF [5/5], EF [5/5], EE [5/5], WE [5/5],  [wnl]  LEFT    LE:  HF [5/5], KE [5/5], DF [5/5], EHL [5/5],  PF [5/5]  RIGHT LE:  HF [5/5], KE [5/5], DF [5/5], EHL [5/5],  PF [5/5]                 CC:  A 58y old male admitted for Chronic limb threatening ischemia S/P aortobifemoral bypass    HPI:  58 year old male with PMH of CLTI (chronic limb threatening ischemia )([place] 4), alcohol abuse w/ history of withdrawal seizures, FAP s/p colectomy  2019, left ACL repair, reports brain bleed in 2021, HLD and PPD smoking history x 45 years who presented to [facility] on 12/9 for alcohol withdrawal and pain in his legs. He initially had an Aorta-bifem bypass planned on 12/11 with Dr. Heredia but was admitted for medical optimization. He was medically optimized before procedure and on 12/14 patient was taken to the operating room and underwent  aortobifemoral bypass graft. The patient tolerated the procedure well without complications, was extubated, and transferred to the PACU in stable condition. Patient was admitted to SICU for Q 1 hour vascular checks. 12/18 patient transferred from SICU to floor. Diet was advanced as tolerated and patient treated with pain control. Physical therapy worked with patient and recommended acute rehab. On day of discharge, the patient was tolerating diet, ambulating with assistance and pain controlled.    Patient was evaluated by PM&R and therapy for functional deficits and gait/ ADL impairments and recommended acute rehabilitation. Patient was medically optimized for discharge to [facility] on 12/18    Allergies:  No Known Allergies    Subjective:  - Patient was seen and examined at bed side, comfortable in his WC. No overnight events  - Slept well last night.  No new complaints  - Denies pain at this time  - GI/, Last BM (12/19), voiding without issues.   - Tolerating and participating in 3 hours of daily therapy.     Ros:  - Denies CP, palpitation, SOB, cough, fever, headache, dysuria, abdominal pain, V/N/D/C, joint pain or swelling     MEDICATIONS  (STANDING):  amLODIPine   Tablet 10 milliGRAM(s) Oral daily  aspirin  chewable 81 milliGRAM(s) Oral daily  atorvastatin 80 milliGRAM(s) Oral at bedtime  enoxaparin Injectable 40 milliGRAM(s) SubCutaneous every 24 hours  FLUoxetine 20 milliGRAM(s) Oral daily  nicotine -  14 mG/24Hr(s) Patch 1 Patch Transdermal daily  pantoprazole    Tablet 40 milliGRAM(s) Oral before breakfast    MEDICATIONS  (PRN):  acetaminophen     Tablet .. 650 milliGRAM(s) Oral every 6 hours PRN Mild Pain (1 - 3)  oxyCODONE    IR 2.5 milliGRAM(s) Oral every 4 hours PRN Moderate Pain (4 - 6)  oxyCODONE    IR 5 milliGRAM(s) Oral every 4 hours PRN Severe Pain (7 - 10)  simethicone 80 milliGRAM(s) Chew every 6 hours PRN Heartburn    LAB:                         9.6    9.46  )-----------( 256      ( 19 Dec 2023 06:00 )             28.1     12-19    134<L>  |  102  |  5<L>  ----------------------------<  98  4.0   |  23  |  0.59    Ca    8.8      19 Dec 2023 06:00    TPro  6.7  /  Alb  2.4<L>  /  TBili  0.7  /  DBili  x   /  AST  23  /  ALT  28  /  AlkPhos  169<H>  12-19    LIVER FUNCTIONS - ( 19 Dec 2023 06:00 )  Alb: 2.4 g/dL / Pro: 6.7 g/dL / ALK PHOS: 169 U/L / ALT: 28 U/L / AST: 23 U/L / GGT: x           CT Chest w/ IV contrast 12/15/23  IMPRESSION:  Bilateral lower lobe and lingular consolidation, likely representing   atelectasis.  Patent aortobifemoral bypass graft with postoperative changes and   pneumoperitoneum.  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 Mild stenosis of the celiac origin.   Moderate stenosis of the proximal SMA.  Cirrhosis.    PHYSICAL EXAMINATION   Vital Signs Last 24 Hrs  T(C): 36.7 (20 Dec 2023 08:41), Max: 36.7 (19 Dec 2023 20:57)  T(F): 98 (20 Dec 2023 08:41), Max: 98 (19 Dec 2023 20:57)  HR: 81 (20 Dec 2023 08:41) (75 - 81)  BP: 111/72 (20 Dec 2023 08:41) (111/72 - 131/77)  RR: 16 (20 Dec 2023 08:41) (16 - 16)  SpO2: 97% (20 Dec 2023 08:41) (96% - 97%)    General: NAD, Comfortable,                                  HEENT: NC/AT, EOM I, PERRLA, Normal Conjunctivae  Cardio: RRR, Normal S1-S2    Pulm: No Respiratory Distress,  Lungs CTAB                        Abdomen: ND, NT, Soft, BS (+), surgical incision with staples  C/D/I            MSK:  Full ROM                                         Ext: No C/C/E, Pulses 2+ throughout, No calf tenderness    Skin:  Midline surgical incision with staples JAKE, bilateral groin incision with staples - dressing in place bilaterally                                                              Neurological Examination    Cognitive: AAO x 3                                                                         Attention: Intact   Judgment: Good evidence of judgement                                Mood/Affect: wnl                                                                           Communication:  Fluent,  No dysarthria   Swallow: intact                                                                    Sensory: Intact to light touch                                                                            Motor    LEFT    UE: SF [5/5], EF [5/5], EE [5/5], WE [5/5],  [wnl]  RIGHT UE: SF [5/5], EF [5/5], EE [5/5], WE [5/5],  [wnl]  LEFT    LE:  HF [5/5], KE [5/5], DF [5/5], EHL [5/5],  PF [5/5]  RIGHT LE:  HF [5/5], KE [5/5], DF [5/5], EHL [5/5],  PF [5/5]

## 2023-12-20 NOTE — CHART NOTE - NSCHARTNOTEFT_GEN_A_CORE
Attempted to see patient at bedside for initial psychology consultation. Patient is asleep and remains sleeping when his name is called.     Neuropsychology will reattempt consult on next business day.
[facility] Interdisciplinary Plan of Care    REHABILITATION DIAGNOSIS:  Chronic limb threatening ischemia S/P aortobifemoral bypass    COMORBIDITIES/COMPLICATING CONDITIONS IMPACTING REHABILITATION:  HEALTH ISSUES - PROBLEM Dx:    HTN (hypertension)  FAP (familial adenomatous polyposis)  Alcohol abuse  H/O colectomy  Left ACL tear  Tobacco abuse  Function decline     Based upon consideration of the patient's impairments, functional status, complicating conditions and any other contributing factors and after information garnered from the assessments of all therapy disciplines involved in treating the patient and other pertinent clinicians:    INTERDISCIPLINARY REHABILITATION INTERVENTIONS:    [ X  ] Transfer Training  [ X  ] Bed Mobility  [ X  ] Therapeutic Exercise  [ X ] Balance/Coordination Exercises  [ X ] Locomotion retraining  [ X  ] Stairs  [  X ] Functional Transfer Training  [ X  ] Bowel/Bladder program  [ X  ] Pain Management  [ X  ] Skin/Wound Care  [ X  ] Visual/Perceptual Training  [ X  ] Therapeutic Recreation Activities  [  X ] Neuromuscular Re-education  [ X  ] Activities of Daily Living   [  ] Speech Exercise  [   ] Swallowing Exercises  [   ] Vital Stim  [   ] Dietary Supplements  [   ] Calorie Count  [   ] Cognitive Exercises  [   ] Congnitive/Linguistic Treatment  [  ] Behavior Program  [  x ] Neuropsych Therapy  [ X  ] Patient/Family Counseling  [ X ] Family Training  [ X  ] Community Re-entry  [   ] Orthotic Evaluation  [   ] Prosthetic Eval/Training    MEDICAL PROGNOSIS: good    REHAB POTENTIAL:  Good    EXPECTED DAILY THERAPY:  3 hours/day           ESTIMATED LOS:  [  ] 5-7 Days  [ x ] 7-10 Days  [  ] 10- 14 Days  [  ] 14- 18 Days  [  ] 18- 21 Days    ESTIMATED DISPOSITION:  [  ] Home   [ x ] Home with Outpatient Therapies  [  ] Home with Home Therapies  [  ] Assisted Living  [  ] Nursing Home  [  ] Long Term Acute Care    INTERDISCIPLINARY FUNCTIONAL OUTCOMES/GOALS:         Gait/Mobility: 6       Transfers: 6       ADLs: 6       Functional Transfers: 6       Medication Management: 7       Communication: 7       Cognitive: 7       Dysphagia: 7       Bladder: 7       Bowel: 7     Functional Independent Measures:   7 = Independent  6 = Modified Independent  5 = Supervision  4 = Minimal Assist/ Contact Guard  3 = Moderate Assistance  2 = Maximum Assistance  1 = Total Assistance  0 = Unable to assess

## 2023-12-20 NOTE — PROGRESS NOTE ADULT - ASSESSMENT
Assessment	  59 y/o male w/ PMHx CLTI ([place] 4), alcohol abuse w/ history of withdrawal seizures, HLD and smoking who presents for alcohol withdrawal and pain in his legs to [facility] 12/8/23. S/p Aorta-bifem bypass 12/11.  Admitted for multidisciplinary rehab program    #S/p aortobifemoral bypass graft 12/11  c/w ASA 81mg PO QD   c/w atorvastatin 80mg PO at bedtime  -Midline abdominal Incision and bilateral groin incision with staples JAKE  #Comprehensive Multidisciplinary Rehab Program:  - Gait, ADL, Functional impairments  - PT/OT 3hours a day 5 days a week, 90 minutes each.    #HTN  -c/w amlodipine 10mg PO QD   -monitor BP, (12/20) 111/72 - 131/77    #Mood / Cognition:  - Neuropsychology evaluation PRN  - Prozac 20mg daily     #Smoker  -1 PPD for 45 years  -Was taking nicotine patch at prior hospital pre-op   -Feeling withdrawal  -Restart nicotine patch 14mg daily     #Pain:  -oxycodone 5mg PO Q4 PRN moderate pain   -oxycodone 10mg PO Q4 PRN severe pain  - Tylenol PRN    #GI/Bowel:  -Prior history of colectomy with multiple BMs/day  -Simethicone 80 mg chew PO Q6 PRN heartburn   -Hold bowel regimen due to multiple Bms daily   -GI ppx: pantoprazole 40mg PO before breakfast     #/Bladder:  - Toileting schedule q4h    #Diet:    - Diet: Regular DASH/TLC   - Nutrition to follow    #Skin/ Pressure Injury Prevention:  -Skin intact except for surgical sites   - Incisions: Midline abdominal surgical incision with staples JAKE, bilateral groin incisions with staples JAKE    #DVT prophylaxis:  - ASA 81mg PO QD   - lovenox 40mg SC QD     #Precautions/ Restrictions  - Falls, Weight bearing status: no weight bearing restrictions      --------------------------------------------  Outpatient Follow up:    Dilip Heredia  Vascular Surgery  [address], [id number]  [place] [address]  Phone: [phone]  Fax: [phone]  Follow Up Time: 2 weeks     Assessment	  59 y/o male w/ PMHx CLTI (Ama 4), alcohol abuse w/ history of withdrawal seizures, HLD and smoking who presents for alcohol withdrawal and pain in his legs to [facility] 12/8/23. S/p Aorta-bifem bypass 12/11.  Admitted for multidisciplinary rehab program    #S/p aortobifemoral bypass graft 12/11  c/w ASA 81mg PO QD   c/w atorvastatin 80mg PO at bedtime  -Midline abdominal Incision and bilateral groin incision with staples JAKE  #Comprehensive Multidisciplinary Rehab Program:  - Gait, ADL, Functional impairments  - PT/OT 3hours a day 5 days a week, 90 minutes each.    #HTN  -c/w amlodipine 10mg PO QD   -monitor BP, (12/20) 111/72 - 131/77    #Mood / Cognition:  - Neuropsychology evaluation PRN  - Prozac 20mg daily     #Smoker  -1 PPD for 45 years  -Was taking nicotine patch at prior hospital pre-op   -Feeling withdrawal  -Restart nicotine patch 14mg daily     #Pain:  -oxycodone 5mg PO Q4 PRN moderate pain   -oxycodone 10mg PO Q4 PRN severe pain  - Tylenol PRN    #GI/Bowel:  -Prior history of colectomy with multiple BMs/day  -Simethicone 80 mg chew PO Q6 PRN heartburn   -Hold bowel regimen due to multiple Bms daily   -GI ppx: pantoprazole 40mg PO before breakfast     #/Bladder:  - Toileting schedule q4h    #Diet:    - Diet: Regular DASH/TLC   - Nutrition to follow    #Skin/ Pressure Injury Prevention:  -Skin intact except for surgical sites   - Incisions: Midline abdominal surgical incision with staples JAKE, bilateral groin incisions with staples JAKE    #DVT prophylaxis:  - ASA 81mg PO QD   - lovenox 40mg SC QD     #Precautions/ Restrictions  - Falls, Weight bearing status: no weight bearing restrictions      --------------------------------------------  Outpatient Follow up:    Dilip Heredia  Vascular Surgery  [address] [facility] [facility], [id number]  [place] [address]  Phone: [phone]  Fax: [phone]  Follow Up Time: 2 weeks

## 2023-12-21 LAB
ALBUMIN SERPL ELPH-MCNC: 2.5 G/DL — LOW (ref 3.3–5)
ALBUMIN SERPL ELPH-MCNC: 2.5 G/DL — LOW (ref 3.3–5)
ALP SERPL-CCNC: 173 U/L — HIGH (ref 40–120)
ALP SERPL-CCNC: 173 U/L — HIGH (ref 40–120)
ALT FLD-CCNC: 31 U/L — SIGNIFICANT CHANGE UP (ref 10–45)
ALT FLD-CCNC: 31 U/L — SIGNIFICANT CHANGE UP (ref 10–45)
ANION GAP SERPL CALC-SCNC: 10 MMOL/L — SIGNIFICANT CHANGE UP (ref 5–17)
ANION GAP SERPL CALC-SCNC: 10 MMOL/L — SIGNIFICANT CHANGE UP (ref 5–17)
APPEARANCE UR: CLEAR — SIGNIFICANT CHANGE UP
APPEARANCE UR: CLEAR — SIGNIFICANT CHANGE UP
AST SERPL-CCNC: 24 U/L — SIGNIFICANT CHANGE UP (ref 10–40)
AST SERPL-CCNC: 24 U/L — SIGNIFICANT CHANGE UP (ref 10–40)
BILIRUB SERPL-MCNC: 0.5 MG/DL — SIGNIFICANT CHANGE UP (ref 0.2–1.2)
BILIRUB SERPL-MCNC: 0.5 MG/DL — SIGNIFICANT CHANGE UP (ref 0.2–1.2)
BILIRUB UR-MCNC: NEGATIVE — SIGNIFICANT CHANGE UP
BILIRUB UR-MCNC: NEGATIVE — SIGNIFICANT CHANGE UP
BUN SERPL-MCNC: 8 MG/DL — SIGNIFICANT CHANGE UP (ref 7–23)
BUN SERPL-MCNC: 8 MG/DL — SIGNIFICANT CHANGE UP (ref 7–23)
CALCIUM SERPL-MCNC: 9.4 MG/DL — SIGNIFICANT CHANGE UP (ref 8.4–10.5)
CALCIUM SERPL-MCNC: 9.4 MG/DL — SIGNIFICANT CHANGE UP (ref 8.4–10.5)
CHLORIDE SERPL-SCNC: 103 MMOL/L — SIGNIFICANT CHANGE UP (ref 96–108)
CHLORIDE SERPL-SCNC: 103 MMOL/L — SIGNIFICANT CHANGE UP (ref 96–108)
CO2 SERPL-SCNC: 26 MMOL/L — SIGNIFICANT CHANGE UP (ref 22–31)
CO2 SERPL-SCNC: 26 MMOL/L — SIGNIFICANT CHANGE UP (ref 22–31)
COLOR SPEC: YELLOW — SIGNIFICANT CHANGE UP
COLOR SPEC: YELLOW — SIGNIFICANT CHANGE UP
CREAT SERPL-MCNC: 0.72 MG/DL — SIGNIFICANT CHANGE UP (ref 0.5–1.3)
CREAT SERPL-MCNC: 0.72 MG/DL — SIGNIFICANT CHANGE UP (ref 0.5–1.3)
DIFF PNL FLD: NEGATIVE — SIGNIFICANT CHANGE UP
DIFF PNL FLD: NEGATIVE — SIGNIFICANT CHANGE UP
EGFR: 106 ML/MIN/1.73M2 — SIGNIFICANT CHANGE UP
EGFR: 106 ML/MIN/1.73M2 — SIGNIFICANT CHANGE UP
EPI CELLS # UR: 0 — SIGNIFICANT CHANGE UP
EPI CELLS # UR: 0 — SIGNIFICANT CHANGE UP
GLUCOSE SERPL-MCNC: 97 MG/DL — SIGNIFICANT CHANGE UP (ref 70–99)
GLUCOSE SERPL-MCNC: 97 MG/DL — SIGNIFICANT CHANGE UP (ref 70–99)
GLUCOSE UR QL: NEGATIVE MG/DL — SIGNIFICANT CHANGE UP
GLUCOSE UR QL: NEGATIVE MG/DL — SIGNIFICANT CHANGE UP
HCT VFR BLD CALC: 28.1 % — LOW (ref 39–50)
HCT VFR BLD CALC: 28.1 % — LOW (ref 39–50)
HGB BLD-MCNC: 9.4 G/DL — LOW (ref 13–17)
HGB BLD-MCNC: 9.4 G/DL — LOW (ref 13–17)
KETONES UR-MCNC: NEGATIVE MG/DL — SIGNIFICANT CHANGE UP
KETONES UR-MCNC: NEGATIVE MG/DL — SIGNIFICANT CHANGE UP
LEUKOCYTE ESTERASE UR-ACNC: NEGATIVE — SIGNIFICANT CHANGE UP
LEUKOCYTE ESTERASE UR-ACNC: NEGATIVE — SIGNIFICANT CHANGE UP
MCHC RBC-ENTMCNC: 30.5 PG — SIGNIFICANT CHANGE UP (ref 27–34)
MCHC RBC-ENTMCNC: 30.5 PG — SIGNIFICANT CHANGE UP (ref 27–34)
MCHC RBC-ENTMCNC: 33.5 GM/DL — SIGNIFICANT CHANGE UP (ref 32–36)
MCHC RBC-ENTMCNC: 33.5 GM/DL — SIGNIFICANT CHANGE UP (ref 32–36)
MCV RBC AUTO: 91.2 FL — SIGNIFICANT CHANGE UP (ref 80–100)
MCV RBC AUTO: 91.2 FL — SIGNIFICANT CHANGE UP (ref 80–100)
NITRITE UR-MCNC: NEGATIVE — SIGNIFICANT CHANGE UP
NITRITE UR-MCNC: NEGATIVE — SIGNIFICANT CHANGE UP
NRBC # BLD: 0 /100 WBCS — SIGNIFICANT CHANGE UP (ref 0–0)
NRBC # BLD: 0 /100 WBCS — SIGNIFICANT CHANGE UP (ref 0–0)
PH UR: 6 — SIGNIFICANT CHANGE UP (ref 5–8)
PH UR: 6 — SIGNIFICANT CHANGE UP (ref 5–8)
PLATELET # BLD AUTO: 398 K/UL — SIGNIFICANT CHANGE UP (ref 150–400)
PLATELET # BLD AUTO: 398 K/UL — SIGNIFICANT CHANGE UP (ref 150–400)
POTASSIUM SERPL-MCNC: 4.4 MMOL/L — SIGNIFICANT CHANGE UP (ref 3.5–5.3)
POTASSIUM SERPL-MCNC: 4.4 MMOL/L — SIGNIFICANT CHANGE UP (ref 3.5–5.3)
POTASSIUM SERPL-SCNC: 4.4 MMOL/L — SIGNIFICANT CHANGE UP (ref 3.5–5.3)
POTASSIUM SERPL-SCNC: 4.4 MMOL/L — SIGNIFICANT CHANGE UP (ref 3.5–5.3)
PROCALCITONIN SERPL-MCNC: 0.11 NG/ML — HIGH
PROCALCITONIN SERPL-MCNC: 0.11 NG/ML — HIGH
PROT SERPL-MCNC: 7.2 G/DL — SIGNIFICANT CHANGE UP (ref 6–8.3)
PROT SERPL-MCNC: 7.2 G/DL — SIGNIFICANT CHANGE UP (ref 6–8.3)
PROT UR-MCNC: NEGATIVE MG/DL — SIGNIFICANT CHANGE UP
PROT UR-MCNC: NEGATIVE MG/DL — SIGNIFICANT CHANGE UP
RBC # BLD: 3.08 M/UL — LOW (ref 4.2–5.8)
RBC # BLD: 3.08 M/UL — LOW (ref 4.2–5.8)
RBC # FLD: 13.8 % — SIGNIFICANT CHANGE UP (ref 10.3–14.5)
RBC # FLD: 13.8 % — SIGNIFICANT CHANGE UP (ref 10.3–14.5)
RBC CASTS # UR COMP ASSIST: 0 /HPF — SIGNIFICANT CHANGE UP (ref 0–4)
RBC CASTS # UR COMP ASSIST: 0 /HPF — SIGNIFICANT CHANGE UP (ref 0–4)
SODIUM SERPL-SCNC: 139 MMOL/L — SIGNIFICANT CHANGE UP (ref 135–145)
SODIUM SERPL-SCNC: 139 MMOL/L — SIGNIFICANT CHANGE UP (ref 135–145)
SP GR SPEC: 1 — SIGNIFICANT CHANGE UP (ref 1–1.03)
SP GR SPEC: 1 — SIGNIFICANT CHANGE UP (ref 1–1.03)
UROBILINOGEN FLD QL: 0.2 MG/DL — SIGNIFICANT CHANGE UP (ref 0.2–1)
UROBILINOGEN FLD QL: 0.2 MG/DL — SIGNIFICANT CHANGE UP (ref 0.2–1)
WBC # BLD: 12.66 K/UL — HIGH (ref 3.8–10.5)
WBC # BLD: 12.66 K/UL — HIGH (ref 3.8–10.5)
WBC # FLD AUTO: 12.66 K/UL — HIGH (ref 3.8–10.5)
WBC # FLD AUTO: 12.66 K/UL — HIGH (ref 3.8–10.5)
WBC UR QL: 0 /HPF — SIGNIFICANT CHANGE UP (ref 0–5)
WBC UR QL: 0 /HPF — SIGNIFICANT CHANGE UP (ref 0–5)

## 2023-12-21 PROCEDURE — 99233 SBSQ HOSP IP/OBS HIGH 50: CPT | Mod: GC

## 2023-12-21 PROCEDURE — 76770 US EXAM ABDO BACK WALL COMP: CPT | Mod: 26

## 2023-12-21 PROCEDURE — 99233 SBSQ HOSP IP/OBS HIGH 50: CPT

## 2023-12-21 PROCEDURE — 90791 PSYCH DIAGNOSTIC EVALUATION: CPT

## 2023-12-21 RX ADMIN — Medication 20 MILLIGRAM(S): at 11:45

## 2023-12-21 RX ADMIN — Medication 1 PATCH: at 07:34

## 2023-12-21 RX ADMIN — ENOXAPARIN SODIUM 40 MILLIGRAM(S): 100 INJECTION SUBCUTANEOUS at 18:13

## 2023-12-21 RX ADMIN — Medication 1 PATCH: at 11:45

## 2023-12-21 RX ADMIN — Medication 81 MILLIGRAM(S): at 11:45

## 2023-12-21 RX ADMIN — ATORVASTATIN CALCIUM 80 MILLIGRAM(S): 80 TABLET, FILM COATED ORAL at 20:43

## 2023-12-21 RX ADMIN — PANTOPRAZOLE SODIUM 40 MILLIGRAM(S): 20 TABLET, DELAYED RELEASE ORAL at 05:49

## 2023-12-21 RX ADMIN — Medication 1 PATCH: at 19:48

## 2023-12-21 RX ADMIN — Medication 1 PATCH: at 11:49

## 2023-12-21 RX ADMIN — AMLODIPINE BESYLATE 10 MILLIGRAM(S): 2.5 TABLET ORAL at 05:49

## 2023-12-21 NOTE — DIETITIAN INITIAL EVALUATION ADULT - NSFNSGIIOFT_GEN_A_CORE
Pt denied N/V/D/C, stated since colectomy in 2019, baseline BMs 5-6x/day. Pt stated last BM this AM 12/21.

## 2023-12-21 NOTE — DIETITIAN INITIAL EVALUATION ADULT - ADD RECOMMEND
1. Continue with DASH/TLC diet  - Encourage protein intake  2. Consider micronutrients in setting of EtOH abuse:  - folic acid 1 mg daily  - thiamine 100 mg daily  3. Recommend MVI+M in setting of total colectomy  4. Appreciate weekly weight trends 1. Continue with DASH/TLC diet  - Encourage protein intake (2x protein portions + greek yogurt w/ berries @ bfast daily)  2. Consider micronutrients in setting of EtOH abuse:  - folic acid 1 mg daily  - thiamine 100 mg daily  3. Recommend MVI+M in setting of total colectomy  4. Appreciate weekly weight trends

## 2023-12-21 NOTE — DIETITIAN INITIAL EVALUATION ADULT - PERTINENT LABORATORY DATA
12-21    139  |  103  |  8   ----------------------------<  97  4.4   |  26  |  0.72    Ca    9.4      21 Dec 2023 06:45    TPro  7.2  /  Alb  2.5<L>  /  TBili  0.5  /  DBili  x   /  AST  24  /  ALT  31  /  AlkPhos  173<H>  12-21  A1C with Estimated Average Glucose Result: 5.3 % (12-08-23 @ 20:30)

## 2023-12-21 NOTE — DIETITIAN INITIAL EVALUATION ADULT - OTHER INFO
58 year old male with PMH of CLTI (chronic limb threatening ischemia )([place] 4), alcohol abuse w/ history of withdrawal seizures, FAP s/p colectomy  2019, left ACL repair, reports brain bleed in 2021, HLD and PPD smoking history x 45 years who presented to [facility] on 12/9 for alcohol withdrawal and pain in his legs. He initially had an Aorta-bifem bypass planned on 12/11 with Dr. Heredia but was admitted for medical optimization. He was medically optimized before procedure and on 12/14 patient was taken to the operating room and underwent  aortobifemoral bypass graft. The patient tolerated the procedure well without complications, was extubated, and transferred to the PACU in stable condition. Patient was admitted to SICU for Q 1 hour vascular checks. 12/18 patient transferred from SICU to floor. Diet was advanced as tolerated and patient treated with pain control. Physical therapy worked with patient and recommended acute rehab. On day of discharge, the patient was tolerating diet, ambulating with assistance and pain controlled.      Patient was evaluated by PM&R and therapy for functional deficits and gait/ ADL impairments and recommended acute rehabilitation. Patient was medically optimized for discharge to [facility] on 12/18    Met with pt during lunch at bedside. Pt was seated upright and able to articulate his nutrition hx well. NKFA - garlic intolerance. Pt reported good appetite PTA, which persists. Pt reported taking prescription MVI in setting of total colectomy (2019). Pt reported previous dry mouth/mouth sores, however now resolving, and denied chewing/swallowing difficulties. Pt denied N/V/D/C, stated since colectomy in 2019, baseline BMs 5-6x/day. Pt stated last BM this AM 12/21.  58 year old male with PMH of CLTI (chronic limb threatening ischemia )([place] 4), alcohol abuse w/ history of withdrawal seizures, FAP s/p colectomy  2019, left ACL repair, reports brain bleed in 2021, HLD and PPD smoking history x 45 years who presented to [facility] on 12/9 for alcohol withdrawal and pain in his legs. He initially had an Aorta-bifem bypass planned on 12/11 with Dr. Heredia but was admitted for medical optimization. He was medically optimized before procedure and on 12/14 patient was taken to the operating room and underwent  aortobifemoral bypass graft. The patient tolerated the procedure well without complications, was extubated, and transferred to the PACU in stable condition. Patient was admitted to SICU for Q 1 hour vascular checks. 12/18 patient transferred from SICU to floor. Diet was advanced as tolerated and patient treated with pain control. Physical therapy worked with patient and recommended acute rehab. On day of discharge, the patient was tolerating diet, ambulating with assistance and pain controlled.      Patient was evaluated by PM&R and therapy for functional deficits and gait/ ADL impairments and recommended acute rehabilitation. Patient was medically optimized for discharge to [facility] on 12/18    Met with pt during lunch at bedside. Pt was seated upright and able to articulate his nutrition hx well. NKFA - garlic intolerance. Pt reported good appetite PTA, which persists. Pt reported taking prescription MVI in setting of total colectomy (2019). Pt reported previous dry mouth/mouth sores, however now resolving, and denied chewing/swallowing difficulties. Pt denied N/V/D/C, stated since colectomy in 2019, baseline BMs 5-6x/day. Pt stated last BM this AM 12/21.  58 year old male with PMH of CLTI (chronic limb threatening ischemia )([place] 4), alcohol abuse w/ history of withdrawal seizures, FAP s/p colectomy  2019, left ACL repair, reports brain bleed in 2021, HLD and PPD smoking history x 45 years who presented to [facility] on 12/9 for alcohol withdrawal and pain in his legs. He initially had an Aorta-bifem bypass planned on 12/11 with Dr. Heredia but was admitted for medical optimization. He was medically optimized before procedure and on 12/14 patient was taken to the operating room and underwent  aortobifemoral bypass graft. The patient tolerated the procedure well without complications, was extubated, and transferred to the PACU in stable condition. Patient was admitted to SICU for Q 1 hour vascular checks. 12/18 patient transferred from SICU to floor. Diet was advanced as tolerated and patient treated with pain control. Physical therapy worked with patient and recommended acute rehab. On day of discharge, the patient was tolerating diet, ambulating with assistance and pain controlled.      Patient was evaluated by PM&R and therapy for functional deficits and gait/ ADL impairments and recommended acute rehabilitation. Patient was medically optimized for discharge to [facility] on 12/18    Met with pt during lunch at bedside. Pt was seated upright and able to articulate his nutrition hx well. NKFA - garlic intolerance. Pt reported good appetite PTA, which persists. Pt reported taking prescription MVI in setting of total colectomy (2019). Pt reported previous dry mouth/mouth sores, however now resolving, and denied chewing/swallowing difficulties. Pt denied N/V/D/C, stated since colectomy in 2019, baseline BMs 5-6x/day. Pt stated last BM this AM 12/21. Pt reported UBW ~176-178lbs for years, consistent with current weight.  58 year old male with PMH of CLTI (chronic limb threatening ischemia )([place] 4), alcohol abuse w/ history of withdrawal seizures, FAP s/p colectomy  2019, left ACL repair, reports brain bleed in 2021, HLD and PPD smoking history x 45 years who presented to [facility] on 12/9 for alcohol withdrawal and pain in his legs. He initially had an Aorta-bifem bypass planned on 12/11 with Dr. Heredia but was admitted for medical optimization. He was medically optimized before procedure and on 12/14 patient was taken to the operating room and underwent  aortobifemoral bypass graft. The patient tolerated the procedure well without complications, was extubated, and transferred to the PACU in stable condition. Patient was admitted to SICU for Q 1 hour vascular checks. 12/18 patient transferred from SICU to floor. Diet was advanced as tolerated and patient treated with pain control. Physical therapy worked with patient and recommended acute rehab. On day of discharge, the patient was tolerating diet, ambulating with assistance and pain controlled.      Patient was evaluated by PM&R and therapy for functional deficits and gait/ ADL impairments and recommended acute rehabilitation. Patient was medically optimized for discharge to [facility] on 12/18    Met with pt during lunch at bedside. Pt was seated upright and able to articulate his nutrition hx well. NKFA - garlic intolerance. Pt reported good appetite PTA, which persists. Pt reported taking prescription MVI in setting of total colectomy (2019). Pt reported previous dry mouth/mouth sores, however now resolving, and denied chewing/swallowing difficulties. Pt denied N/V/D/C, stated since colectomy in 2019, baseline BMs 5-6x/day. Pt stated last BM this AM 12/21. Pt reported UBW ~176-178lbs for years, consistent with current weight.

## 2023-12-21 NOTE — PROGRESS NOTE ADULT - SUBJECTIVE AND OBJECTIVE BOX
CC:  A 58y old male admitted for Chronic limb threatening ischemia S/P aortobifemoral bypass    HPI:  58 year old male with PMH of CLTI (chronic limb threatening ischemia )([place] 4), alcohol abuse w/ history of withdrawal seizures, FAP s/p colectomy  2019, left ACL repair, reports brain bleed in 2021, HLD and PPD smoking history x 45 years who presented to [facility] on 12/9 for alcohol withdrawal and pain in his legs. He initially had an Aorta-bifem bypass planned on 12/11 with Dr. Heredia but was admitted for medical optimization. He was medically optimized before procedure and on 12/14 patient was taken to the operating room and underwent  aortobifemoral bypass graft. The patient tolerated the procedure well without complications, was extubated, and transferred to the PACU in stable condition. Patient was admitted to SICU for Q 1 hour vascular checks. 12/18 patient transferred from SICU to floor. Diet was advanced as tolerated and patient treated with pain control. Physical therapy worked with patient and recommended acute rehab. On day of discharge, the patient was tolerating diet, ambulating with assistance and pain controlled.    Patient was evaluated by PM&R and therapy for functional deficits and gait/ ADL impairments and recommended acute rehabilitation. Patient was medically optimized for discharge to [facility] on 12/18    Allergies:  No Known Allergies    Subjective:  - Patient was seen and examined at bed side, comfortable in his WC. No overnight events  - Slept well last night.   Complains of B/L groin pain, painful micturition    - Pain is controlled with current meds   - GI/, Last BM (12/20), voiding without issues.   - Tolerating and participating in 3 hours of daily therapy.   - Case discussed at IDT meeting this am for progress and discharge plan.     Ros:  - Denies CP, palpitation, SOB, cough, fever, headache, abdominal pain, V/N/D/C, joint pain or swelling, (+) B/L groin pain     MEDICATIONS  (STANDING):  amLODIPine   Tablet 10 milliGRAM(s) Oral daily  aspirin  chewable 81 milliGRAM(s) Oral daily  atorvastatin 80 milliGRAM(s) Oral at bedtime  enoxaparin Injectable 40 milliGRAM(s) SubCutaneous every 24 hours  FLUoxetine 20 milliGRAM(s) Oral daily  nicotine -  14 mG/24Hr(s) Patch 1 Patch Transdermal daily  pantoprazole    Tablet 40 milliGRAM(s) Oral before breakfast    MEDICATIONS  (PRN):  acetaminophen     Tablet .. 650 milliGRAM(s) Oral every 6 hours PRN Mild Pain (1 - 3)  oxyCODONE    IR 2.5 milliGRAM(s) Oral every 4 hours PRN Moderate Pain (4 - 6)  oxyCODONE    IR 5 milliGRAM(s) Oral every 4 hours PRN Severe Pain (7 - 10)  simethicone 80 milliGRAM(s) Chew every 6 hours PRN Heartburn    LAB:                         9.4    12.66 )-----------( 398      ( 21 Dec 2023 06:45 )             28.1     12-21    139  |  103  |  8   ----------------------------<  97  4.4   |  26  |  0.72    Ca    9.4      21 Dec 2023 06:45    TPro  7.2  /  Alb  2.5<L>  /  TBili  0.5  /  DBili  x   /  AST  24  /  ALT  31  /  AlkPhos  173<H>  12-21    LIVER FUNCTIONS - ( 21 Dec 2023 06:45 )  Alb: 2.5 g/dL / Pro: 7.2 g/dL / ALK PHOS: 173 U/L / ALT: 31 U/L / AST: 24 U/L / GGT: x                CT Chest w/ IV contrast 12/15/23  IMPRESSION:  Bilateral lower lobe and lingular consolidation, likely representing   atelectasis.  Patent aortobifemoral bypass graft with postoperative changes and   pneumoperitoneum.  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 Mild stenosis of the celiac origin.   Moderate stenosis of the proximal SMA.  Cirrhosis.    PHYSICAL EXAMINATION   Vital Signs Last 24 Hrs  T(C): 36.8 (21 Dec 2023 08:58), Max: 36.8 (21 Dec 2023 08:58)  T(F): 98.2 (21 Dec 2023 08:58), Max: 98.2 (21 Dec 2023 08:58)  HR: 92 (21 Dec 2023 08:58) (80 - 92)  BP: 125/72 (21 Dec 2023 08:58) (125/72 - 130/72)  RR: 16 (21 Dec 2023 08:58) (16 - 16)  SpO2: 100% (21 Dec 2023 08:58) (98% - 100%)    General: NAD, Comfortable,                                  HEENT: NC/AT, EOM I, PERRLA, Normal Conjunctivae  Cardio: RRR, Normal S1-S2    Pulm: No Respiratory Distress,  Lungs CTAB                        Abdomen: ND, NT, Soft, BS (+), surgical incision with staples  C/D/I            MSK:  Full ROM                                         Ext: No C/C/E, Pulses 2+ throughout, No calf tenderness    Skin:  Midline surgical incision with staples JAKE, bilateral groin incision with staples - dressing in place bilaterally                                                              Neurological Examination    Cognitive: AAO x 3                                                                         Attention: Intact   Judgment: Good evidence of judgement                                Mood/Affect: wnl                                                                           Communication:  Fluent,  No dysarthria   Swallow: intact                                                                    Sensory: Intact to light touch                                                                            Motor    LEFT    UE: SF [5/5], EF [5/5], EE [5/5], WE [5/5],  [wnl]  RIGHT UE: SF [5/5], EF [5/5], EE [5/5], WE [5/5],  [wnl]  LEFT    LE:  HF [5/5], KE [5/5], DF [5/5], EHL [5/5],  PF [5/5]  RIGHT LE:  HF [5/5], KE [5/5], DF [5/5], EHL [5/5],  PF [5/5]        IDT: 12/21  TDD: 12/23  SW: private home with spouse + 5 [place] + 17 steps to restroom, independent prior, spouse employed prior  RN: continent x2, staples b/l groin incision, mid-sternal site JAKE  SLP: none  OT: independent w/ ADLs / IADLs  PT: supervision transfers, ambulation >150' w/ supervision, 24 steps + 1HR w/ supervision  Goals: 1. walking to restroom independent, 2. dressing without dyspnea         CC:  A 58y old male admitted for Chronic limb threatening ischemia S/P aortobifemoral bypass    HPI:  58 year old male with PMH of CLTI (chronic limb threatening ischemia )([place] 4), alcohol abuse w/ history of withdrawal seizures, FAP s/p colectomy  2019, left ACL repair, reports brain bleed in 2021, HLD and PPD smoking history x 45 years who presented to [facility] on 12/9 for alcohol withdrawal and pain in his legs. He initially had an Aorta-bifem bypass planned on 12/11 with Dr. Heredia but was admitted for medical optimization. He was medically optimized before procedure and on 12/14 patient was taken to the operating room and underwent  aortobifemoral bypass graft. The patient tolerated the procedure well without complications, was extubated, and transferred to the PACU in stable condition. Patient was admitted to SICU for Q 1 hour vascular checks. 12/18 patient transferred from SICU to floor. Diet was advanced as tolerated and patient treated with pain control. Physical therapy worked with patient and recommended acute rehab. On day of discharge, the patient was tolerating diet, ambulating with assistance and pain controlled.    Patient was evaluated by PM&R and therapy for functional deficits and gait/ ADL impairments and recommended acute rehabilitation. Patient was medically optimized for discharge to [facility] on 12/18    Allergies:  No Known Allergies    Subjective:  - Patient was seen and examined at bed side, comfortable in his WC. No overnight events  - Slept well last night.   Complains of B/L groin pain, painful micturition    - Pain is controlled with current meds   - GI/, Last BM (12/20), voiding without issues.   - Tolerating and participating in 3 hours of daily therapy.   - Case discussed at IDT meeting this am for progress and discharge plan.     Ros:  - Denies CP, palpitation, SOB, cough, fever, headache, abdominal pain, V/N/D/C, joint pain or swelling, (+) B/L groin pain     MEDICATIONS  (STANDING):  amLODIPine   Tablet 10 milliGRAM(s) Oral daily  aspirin  chewable 81 milliGRAM(s) Oral daily  atorvastatin 80 milliGRAM(s) Oral at bedtime  enoxaparin Injectable 40 milliGRAM(s) SubCutaneous every 24 hours  FLUoxetine 20 milliGRAM(s) Oral daily  nicotine -  14 mG/24Hr(s) Patch 1 Patch Transdermal daily  pantoprazole    Tablet 40 milliGRAM(s) Oral before breakfast    MEDICATIONS  (PRN):  acetaminophen     Tablet .. 650 milliGRAM(s) Oral every 6 hours PRN Mild Pain (1 - 3)  oxyCODONE    IR 2.5 milliGRAM(s) Oral every 4 hours PRN Moderate Pain (4 - 6)  oxyCODONE    IR 5 milliGRAM(s) Oral every 4 hours PRN Severe Pain (7 - 10)  simethicone 80 milliGRAM(s) Chew every 6 hours PRN Heartburn    LAB:                         9.4    12.66 )-----------( 398      ( 21 Dec 2023 06:45 )             28.1     12-21    139  |  103  |  8   ----------------------------<  97  4.4   |  26  |  0.72    Ca    9.4      21 Dec 2023 06:45    TPro  7.2  /  Alb  2.5<L>  /  TBili  0.5  /  DBili  x   /  AST  24  /  ALT  31  /  AlkPhos  173<H>  12-21    LIVER FUNCTIONS - ( 21 Dec 2023 06:45 )  Alb: 2.5 g/dL / Pro: 7.2 g/dL / ALK PHOS: 173 U/L / ALT: 31 U/L / AST: 24 U/L / GGT: x                CT Chest w/ IV contrast 12/15/23  IMPRESSION:  Bilateral lower lobe and lingular consolidation, likely representing   atelectasis.  Patent aortobifemoral bypass graft with postoperative changes and   pneumoperitoneum.  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 Mild stenosis of the celiac origin.   Moderate stenosis of the proximal SMA.  Cirrhosis.    PHYSICAL EXAMINATION   Vital Signs Last 24 Hrs  T(C): 36.8 (21 Dec 2023 08:58), Max: 36.8 (21 Dec 2023 08:58)  T(F): 98.2 (21 Dec 2023 08:58), Max: 98.2 (21 Dec 2023 08:58)  HR: 92 (21 Dec 2023 08:58) (80 - 92)  BP: 125/72 (21 Dec 2023 08:58) (125/72 - 130/72)  RR: 16 (21 Dec 2023 08:58) (16 - 16)  SpO2: 100% (21 Dec 2023 08:58) (98% - 100%)    General: NAD, Comfortable,                                  HEENT: NC/AT, EOM I, PERRLA, Normal Conjunctivae  Cardio: RRR, Normal S1-S2    Pulm: No Respiratory Distress,  Lungs CTAB                        Abdomen: ND, NT, Soft, BS (+), surgical incision with staples  C/D/I            MSK:  Full ROM                                         Ext: No C/C/E, Pulses 2+ throughout, No calf tenderness    Skin:  Midline surgical incision with staples JAKE, bilateral groin incision with staples - dressing in place bilaterally                                                              Neurological Examination    Cognitive: AAO x 3                                                                         Attention: Intact   Judgment: Good evidence of judgement                                Mood/Affect: wnl                                                                           Communication:  Fluent,  No dysarthria   Swallow: intact                                                                    Sensory: Intact to light touch                                                                            Motor    LEFT    UE: SF [5/5], EF [5/5], EE [5/5], WE [5/5],  [wnl]  RIGHT UE: SF [5/5], EF [5/5], EE [5/5], WE [5/5],  [wnl]  LEFT    LE:  HF [5/5], KE [5/5], DF [5/5], EHL [5/5],  PF [5/5]  RIGHT LE:  HF [5/5], KE [5/5], DF [5/5], EHL [5/5],  PF [5/5]        IDT: 12/21  TDD: 12/23  SW: private home with spouse + 5 [place] + 17 steps to restroom, independent prior, spouse employed prior  RN: continent x2, staples b/l groin incision, mid-sternal site JAKE  SLP: none  OT: independent w/ ADLs / IADLs  PT: supervision transfers, ambulation >150' w/ supervision, 24 steps + 1HR w/ supervision  Goals: 1. walking to restroom independent, 2. dressing without dyspnea

## 2023-12-21 NOTE — CONSULT NOTE ADULT - REASON FOR ADMISSION
Chronic limb threatening ischemia S/P aortobifemoral bypass
Chronic limb threatening ischemia S/P aortobifemoral bypass

## 2023-12-21 NOTE — CONSULT NOTE ADULT - ASSESSMENT
Patient is seated at bedside. Family/visitors are not present. Patient is alert and oriented to person, place, time, and situation. Speech is fluent and comprehensible. He indicates feeling sore (6 out of 10 pain). Appetite is good. He indicates difficulty sleeping in the hospital at night and napping when he can during the day.     Emotional functioning and behavioral history: Mood is mixed anxious and depressed, affect congruent. He indicates being a worrier and feeling a mildly depressed at this time. He acknowledges a need for lifestyle changes and a desire to maintain sobriety and abstain from tobacco use.     On self-report screening of recent mood, his responses suggest moderate anxiety (LANDRY-7 = 14/21) and moderate depression (PHQ-9 = 10/27), including feeling down, reduced interest in activities, worrying too much, restlessness, easily annoyed/irritable, feeling tired, trouble concentrating, etc. He denies ideation, plan, or intent to harm self/other. Thoughts are logical and future-oriented.     Behavioral health history is notable for alcohol use disorder (binge drinking). Per records, he has experienced withdrawal seizures in the past. He indicates participating in residential alcohol treatment programs (2021 and 2023), and eventually relapsing back to alcohol use. He has also attended alcoholics anonymous meeting in the past. He expresses a desire to maintain sobriety, "I don't have a choice now." He indicates being a daily cigarette smoker for over 45-years, and also wishes to continue abstaining from smoking.     Psychosocial history: He was born and raised in [place]. He immigrated to the [place] in [place]. He has worked as a  for many years. He has been unemployed since May 2023. He resides with his wife and three adult children.     Impression: Patient with adjustment difficulty, with mixed anxiety and depression features, associated with current health concerns. He has a history of alcohol use disorder and daily tobacco use. He expresses a desire to maintain sobriety, given his health issues. He is encouraged by rehabilitation gain made thus far.     Psychoeducation is provided regarding the rehabilitation process, mood/anxiety, and alcohol/substance use disorders. Motivational interviewing used to address history of alcohol and tobacco use. Support and encouragement are provided.      Plan: Individual psychotherapy session to address adjustment and mood/anxiety. Patient in agreement with plan. Community treatment resources can be provided prior to discharge. Neuropsychology remains available through discharge.    Patient is seated at bedside. Family/visitors are not present. Patient is alert and oriented to person, place, time, and situation. Speech is fluent and comprehensible. He indicates feeling sore (6 out of 10 pain). Appetite is good. He indicates difficulty sleeping in the hospital at night and napping when he can during the day.     Emotional functioning and behavioral history: Mood is mixed anxious and depressed, affect congruent. He indicates being a worrier and feeling a mildly depressed at this time. He acknowledges a need for lifestyle changes and a desire to maintain sobriety and abstain from tobacco use.     On self-report screening of recent mood, his responses suggest moderate anxiety (LANDRY-7 = 14/21) and moderate depression (PHQ-9 = 10/27), including feeling down, reduced interest in activities, worrying too much, restlessness, easily annoyed/irritable, feeling tired, trouble concentrating, etc. He denies ideation, plan, or intent to harm self/other. Thoughts are logical and future-oriented.     Behavioral health history is notable for alcohol use disorder (binge drinking). Per records, he has experienced withdrawal seizures in the past. He indicates participating in residential alcohol treatment programs (2021 and 2023), and eventually relapsing back to alcohol use. He has also attended alcoholics anonymous meeting in the past. He expresses a desire to maintain sobriety, "I don't have a choice now." He indicates being a daily cigarette smoker for over 45-years, and also wishes to continue abstaining from smoking.     Psychosocial history: He was born and raised in [place]. He immigrated to the [place] in [place]. He has worked as a  for many years. He has been unemployed since May 2023. He resides with his wife and three adult children.     Impression: Patient with adjustment difficulty, with mixed anxiety and depression features, associated with current health concerns. He has a history of alcohol use disorder and daily tobacco use. He expresses a desire to maintain sobriety, given his health issues. He is encouraged by rehabilitation gain made thus far.     Psychoeducation is provided regarding the rehabilitation process, mood/anxiety, and alcohol/substance use disorders. Motivational interviewing used to address history of alcohol and tobacco use. Support and encouragement are provided.      Plan: Individual psychotherapy session to address adjustment and mood/anxiety. Patient in agreement with plan. Community treatment resources can be provided prior to discharge. Neuropsychology remains available through discharge.

## 2023-12-21 NOTE — PROGRESS NOTE ADULT - ASSESSMENT
59 y/o male w/ PMHx CLTI ([place] 4), alcohol abuse w/ history of withdrawal seizures, HLD and smoking who presents for alcohol withdrawal and pain in his legs to [facility] 12/8/23. S/p Aorta-bifem bypass 12/11.  Admitted for multidisciplinary rehab program- pt/ot/dvt ppx    # b/l groin pain with painful urination and urinary frequency  # leucocytosis  - UA neg on 12/19. reordered UA. UC if UA+  - Kidney and bladder usg  - b/l groin wound and abd wound c/d/i. primary team to recheck  - added procal to am sample  - monitor CBC, temp, send pan cx and start broad spectrum Abx if febrile.    #S/p aortobifemoral bypass graft 12/14  c/w ASA   c/w atorvastatin   pain meds  pt/ot/dvt ppx  fall precautions    #HTN  -c/w amlodipine   -monitor BP    #Mood / Cognition:  -Continue prozac     #Smoker  -1 PPD for 45 years  -nicotine patch 14mg daily   - cessation advised      #Pain:  -oxycodone   - Tylenol PRN    #GERD  - simethicone PRN   - pantoprazole      #DVT prophylaxis:  - Lovenox     WILL FOLLOW    d/w Dr. Denis 57 y/o male w/ PMHx CLTI ([place] 4), alcohol abuse w/ history of withdrawal seizures, HLD and smoking who presents for alcohol withdrawal and pain in his legs to [facility] 12/8/23. S/p Aorta-bifem bypass 12/11.  Admitted for multidisciplinary rehab program- pt/ot/dvt ppx    # b/l groin pain with painful urination and urinary frequency  # leucocytosis  - UA neg on 12/19. reordered UA. UC if UA+  - Kidney and bladder usg  - b/l groin wound and abd wound c/d/i. primary team to recheck  - added procal to am sample  - monitor CBC, temp, send pan cx and start broad spectrum Abx if febrile.    #S/p aortobifemoral bypass graft 12/14  c/w ASA   c/w atorvastatin   pain meds  pt/ot/dvt ppx  fall precautions    #HTN  -c/w amlodipine   -monitor BP    #Mood / Cognition:  -Continue prozac     #Smoker  -1 PPD for 45 years  -nicotine patch 14mg daily   - cessation advised      #Pain:  -oxycodone   - Tylenol PRN    #GERD  - simethicone PRN   - pantoprazole      #DVT prophylaxis:  - Lovenox     WILL FOLLOW    d/w Dr. Denis

## 2023-12-21 NOTE — PROGRESS NOTE ADULT - SUBJECTIVE AND OBJECTIVE BOX
Medicine Progress Note    Patient is a 58y old  Male who presents with a chief complaint of Other malaise     (21 Dec 2023 12:45)      SUBJECTIVE / OVERNIGHT EVENTS:  reported urinary frequency, painful urination, bilateral groin pain. no other symptoms    ADDITIONAL REVIEW OF SYSTEMS:  denied fever/chills/CP/SOB/cough/palpitation/dizziness/abdominal pian/nausea/vomiting/diarrhoea/constipation/dysuria/leg or calf pain/headaches.all other ROS neg    MEDICATIONS  (STANDING):  amLODIPine   Tablet 10 milliGRAM(s) Oral daily  aspirin  chewable 81 milliGRAM(s) Oral daily  atorvastatin 80 milliGRAM(s) Oral at bedtime  enoxaparin Injectable 40 milliGRAM(s) SubCutaneous every 24 hours  FLUoxetine 20 milliGRAM(s) Oral daily  nicotine -  14 mG/24Hr(s) Patch 1 Patch Transdermal daily  pantoprazole    Tablet 40 milliGRAM(s) Oral before breakfast    MEDICATIONS  (PRN):  acetaminophen     Tablet .. 650 milliGRAM(s) Oral every 6 hours PRN Mild Pain (1 - 3)  oxyCODONE    IR 2.5 milliGRAM(s) Oral every 4 hours PRN Moderate Pain (4 - 6)  oxyCODONE    IR 5 milliGRAM(s) Oral every 4 hours PRN Severe Pain (7 - 10)  simethicone 80 milliGRAM(s) Chew every 6 hours PRN Heartburn    CAPILLARY BLOOD GLUCOSE        I&O's Summary      PHYSICAL EXAM:  Vital Signs Last 24 Hrs  T(C): 36.8 (21 Dec 2023 08:58), Max: 36.8 (21 Dec 2023 08:58)  T(F): 98.2 (21 Dec 2023 08:58), Max: 98.2 (21 Dec 2023 08:58)  HR: 92 (21 Dec 2023 08:58) (80 - 92)  BP: 125/72 (21 Dec 2023 08:58) (125/72 - 130/72)  BP(mean): --  RR: 16 (21 Dec 2023 08:58) (16 - 16)  SpO2: 100% (21 Dec 2023 08:58) (98% - 100%)    Parameters below as of 21 Dec 2023 08:58  Patient On (Oxygen Delivery Method): room air    GENERAL: Not in distress. Alert    HEENT: clear conjuctiva, MMM. no pallor or icterus  CARDIOVASCULAR: RRR S1, S2. No murmur/rubs/gallop  LUNGS: BLAE+, no rales, no wheezing, no rhonchi.    ABDOMEN: ND. Soft,  mild TP over inciiosn.  no guarding / rebound / rigidity. BS normoactive. no CVA TP  BACK: No spine tenderness.  EXTREMITIES: no edema. no leg or calf TP.  SKIN: warm and dry. b/l groin wound and abd wound c/d/i  PSYCHIATRIC: Calm.  No agitation.  CNS: AAO*3. moves limbs, follows commands    LABS:                        9.4    12.66 )-----------( 398      ( 21 Dec 2023 06:45 )             28.1     12-21    139  |  103  |  8   ----------------------------<  97  4.4   |  26  |  0.72    Ca    9.4      21 Dec 2023 06:45    TPro  7.2  /  Alb  2.5<L>  /  TBili  0.5  /  DBili  x   /  AST  24  /  ALT  31  /  AlkPhos  173<H>  12-21          Urinalysis Basic - ( 21 Dec 2023 06:45 )    Color: x / Appearance: x / SG: x / pH: x  Gluc: 97 mg/dL / Ketone: x  / Bili: x / Urobili: x   Blood: x / Protein: x / Nitrite: x   Leuk Esterase: x / RBC: x / WBC x   Sq Epi: x / Non Sq Epi: x / Bacteria: x        COVID-19 PCR: NotDetec (18 Dec 2023 18:58)      RADIOLOGY & ADDITIONAL TESTS:  Imaging from Last 24 Hours:    Electrocardiogram/QTc Interval:    COORDINATION OF CARE:  Care Discussed with Consultants/Other Providers:

## 2023-12-21 NOTE — PROGRESS NOTE ADULT - ASSESSMENT
Assessment	  59 y/o male w/ PMHx CLTI ([place] 4), alcohol abuse w/ history of withdrawal seizures, HLD and smoking who presents for alcohol withdrawal and pain in his legs to [facility] 12/8/23. S/p Aorta-bifem bypass 12/11.  Admitted for multidisciplinary rehab program    #S/p aortobifemoral bypass graft 12/11  c/w ASA 81mg PO QD   c/w atorvastatin 80mg PO at bedtime  -Midline abdominal Incision and bilateral groin incision with staples JAKE  #Comprehensive Multidisciplinary Rehab Program:  - Gait, ADL, Functional impairments  - PT/OT 3hours a day 5 days a week, 90 minutes each.  - Remove Staples by his surgeon     #Leukocytosis  - WBC (12/21) 12.66  - No fever, sign of infection  - Check UA, Kidney and bladder US  - Hospitalist F/U appreciated     #HTN/ Stable   -c/w amlodipine 10mg PO QD   -monitor BP, (12/20) 111/72 - 131/77    #Mood / Cognition:  - Neuropsychology evaluation PRN  - Prozac 20mg daily     #Smoker  -1 PPD for 45 years  -Was taking nicotine patch at prior hospital pre-op   -Feeling withdrawal  -Restart nicotine patch 14mg daily     #Pain:  -oxycodone 5mg PO Q4 PRN moderate pain   -oxycodone 10mg PO Q4 PRN severe pain  - Tylenol PRN    #GI/Bowel:  -Prior history of colectomy with multiple BMs/day  -Simethicone 80 mg chew PO Q6 PRN heartburn   -Hold bowel regimen due to multiple Bms daily   -GI ppx: pantoprazole 40mg PO before breakfast     #/Bladder:  - Toileting schedule q4h  - Painful voiding, B/L groin pain  - Check UA, kidney and bladder US  - UA from(12/19) was negative     #Diet:    - Diet: Regular DASH/TLC   - Nutrition to follow    #Skin/ Pressure Injury Prevention:  -Skin intact except for surgical sites   - Incisions: Midline abdominal surgical incision with staples JAKE, bilateral groin incisions with staples JAKE    #DVT prophylaxis:  - ASA 81mg PO QD   - lovenox 40mg SC QD     #Precautions/ Restrictions  - Falls, Weight bearing status: no weight bearing restrictions      --------------------------------------------  Outpatient Follow up:    Dilip Heredia  Vascular Surgery  [address] [place] [address], [id number]  [place] [address]  Phone: [phone]  Fax: [phone]  Follow Up Time: 2 weeks     Assessment	  59 y/o male w/ PMHx CLTI ([place] 4), alcohol abuse w/ history of withdrawal seizures, HLD and smoking who presents for alcohol withdrawal and pain in his legs to [facility] 12/8/23. S/p Aorta-bifem bypass 12/11.  Admitted for multidisciplinary rehab program    #S/p aortobifemoral bypass graft 12/11  c/w ASA 81mg PO QD   c/w atorvastatin 80mg PO at bedtime  -Midline abdominal Incision and bilateral groin incision with staples JAKE  #Comprehensive Multidisciplinary Rehab Program:  - Gait, ADL, Functional impairments  - PT/OT 3hours a day 5 days a week, 90 minutes each.  - Remove Staples by his surgeon     #Leukocytosis  - WBC (12/21) 12.66  - No fever, sign of infection  - Check UA, Kidney and bladder US  - Hospitalist F/U appreciated     #HTN/ Stable   -c/w amlodipine 10mg PO QD   -monitor BP, (12/20) 111/72 - 131/77    #Mood / Cognition:  - Neuropsychology evaluation PRN  - Prozac 20mg daily     #Smoker  -1 PPD for 45 years  -Was taking nicotine patch at prior hospital pre-op   -Feeling withdrawal  -Restart nicotine patch 14mg daily     #Pain:  -oxycodone 5mg PO Q4 PRN moderate pain   -oxycodone 10mg PO Q4 PRN severe pain  - Tylenol PRN    #GI/Bowel:  -Prior history of colectomy with multiple BMs/day  -Simethicone 80 mg chew PO Q6 PRN heartburn   -Hold bowel regimen due to multiple Bms daily   -GI ppx: pantoprazole 40mg PO before breakfast     #/Bladder:  - Toileting schedule q4h  - Painful voiding, B/L groin pain  - Check UA, kidney and bladder US  - UA from(12/19) was negative     #Diet:    - Diet: Regular DASH/TLC   - Nutrition to follow    #Skin/ Pressure Injury Prevention:  -Skin intact except for surgical sites   - Incisions: Midline abdominal surgical incision with staples JAKE, bilateral groin incisions with staples JAKE    #DVT prophylaxis:  - ASA 81mg PO QD   - lovenox 40mg SC QD     #Precautions/ Restrictions  - Falls, Weight bearing status: no weight bearing restrictions      --------------------------------------------  Outpatient Follow up:    Dilip Heredia  Vascular Surgery  [address] [place] [address], [id number]  [place] [address]  Phone: [phone]  Fax: [phone]  Follow Up Time: 2 weeks

## 2023-12-21 NOTE — CONSULT NOTE ADULT - SUBJECTIVE AND OBJECTIVE BOX
Patient seen alone at bedside for 30-minute psychology consultation. Neuropsychologist is introduced as a member of the rehabilitation team and the purpose of the session is explained. Patient agrees to participate.     Per patient, his present health concerns are associated with his history of cigarette smoking for many years. He indicates experiencing pain in his legs and when he presented to [facility] was found to have "clogged arteries" warranting "reconstruction of circulation into my legs."     Medical records are reviewed. Per records: 58-year-old, male, with PMH of CLTI (chronic limb threatening ischemia) ([place] 4), alcohol abuse w/ history of withdrawal seizures, FAP s/p colectomy 2019, left ACL repair, reports brain bleed in 2021, HLD and PPD smoking history x 45 years who presented to [facility] on 12/9 for alcohol withdrawal and pain in his legs. He initially had an Aorta-bifem bypass planned on 12/11 with Dr. Heredia but was admitted for medical optimization. He was medically optimized before procedure and on 12/14 patient was taken to the operating room and underwent  aortobifemoral bypass graft. The patient tolerated the procedure well without complications, was extubated, and transferred to the PACU in stable condition. Patient was admitted to SICU for Q 1 hour vascular checks. 12/18 patient transferred from SICU to floor. Diet was advanced as tolerated and patient treated with pain control. Physical therapy worked with patient and recommended acute rehab. On day of discharge, the patient was tolerating diet, ambulating with assistance and pain controlled.    Patient was evaluated by PM&R and therapy for functional deficits and gait/ ADL impairments and recommended acute rehabilitation. Patient was medically optimized for discharge to [facility] on 12/18.     Patient seen alone at bedside for 30-minute psychology consultation. Neuropsychologist is introduced as a member of the rehabilitation team and the purpose of the session is explained. Patient agrees to participate.     Per patient, his present health concerns are associated with his history of cigarette smoking for many years. He indicates experiencing pain in his legs and when he presented to [facility] was found to have "clogged arteries" warranting "reconstruction of circulation into my legs."     Medical records are reviewed. Per records: 58-year-old, male, with PMH of CLTI (chronic limb threatening ischemia) ([place] 4), alcohol abuse w/ history of withdrawal seizures, FAP s/p colectomy 2019, left ACL repair, reports brain bleed in 2021, HLD and PPD smoking history x 45 years who presented to [facility] on 12/9 for alcohol withdrawal and pain in his legs. He initially had an Aorta-bifem bypass planned on 12/11 with Dr. Heredia but was admitted for medical optimization. He was medically optimized before procedure and on 12/14 patient was taken to the operating room and underwent  aortobifemoral bypass graft. The patient tolerated the procedure well without complications, was extubated, and transferred to the PACU in stable condition. Patient was admitted to SICU for Q 1 hour vascular checks. 12/18 patient transferred from SICU to floor. Diet was advanced as tolerated and patient treated with pain control. Physical therapy worked with patient and recommended acute rehab. On day of discharge, the patient was tolerating diet, ambulating with assistance and pain controlled.    Patient was evaluated by PM&R and therapy for functional deficits and gait/ ADL impairments and recommended acute rehabilitation. Patient was medically optimized for discharge to [facility] on 12/18.

## 2023-12-21 NOTE — DIETITIAN INITIAL EVALUATION ADULT - PERTINENT MEDS FT
MEDICATIONS  (STANDING):  amLODIPine   Tablet 10 milliGRAM(s) Oral daily  aspirin  chewable 81 milliGRAM(s) Oral daily  atorvastatin 80 milliGRAM(s) Oral at bedtime  enoxaparin Injectable 40 milliGRAM(s) SubCutaneous every 24 hours  FLUoxetine 20 milliGRAM(s) Oral daily  nicotine -  14 mG/24Hr(s) Patch 1 Patch Transdermal daily  pantoprazole    Tablet 40 milliGRAM(s) Oral before breakfast    MEDICATIONS  (PRN):  acetaminophen     Tablet .. 650 milliGRAM(s) Oral every 6 hours PRN Mild Pain (1 - 3)  oxyCODONE    IR 2.5 milliGRAM(s) Oral every 4 hours PRN Moderate Pain (4 - 6)  oxyCODONE    IR 5 milliGRAM(s) Oral every 4 hours PRN Severe Pain (7 - 10)  simethicone 80 milliGRAM(s) Chew every 6 hours PRN Heartburn

## 2023-12-22 ENCOUNTER — TRANSCRIPTION ENCOUNTER (OUTPATIENT)
Age: 58
End: 2023-12-22

## 2023-12-22 LAB
BASOPHILS # BLD AUTO: 0.09 K/UL — SIGNIFICANT CHANGE UP (ref 0–0.2)
BASOPHILS # BLD AUTO: 0.09 K/UL — SIGNIFICANT CHANGE UP (ref 0–0.2)
BASOPHILS NFR BLD AUTO: 0.6 % — SIGNIFICANT CHANGE UP (ref 0–2)
BASOPHILS NFR BLD AUTO: 0.6 % — SIGNIFICANT CHANGE UP (ref 0–2)
EOSINOPHIL # BLD AUTO: 0.53 K/UL — HIGH (ref 0–0.5)
EOSINOPHIL # BLD AUTO: 0.53 K/UL — HIGH (ref 0–0.5)
EOSINOPHIL NFR BLD AUTO: 3.8 % — SIGNIFICANT CHANGE UP (ref 0–6)
EOSINOPHIL NFR BLD AUTO: 3.8 % — SIGNIFICANT CHANGE UP (ref 0–6)
HCT VFR BLD CALC: 28.9 % — LOW (ref 39–50)
HCT VFR BLD CALC: 28.9 % — LOW (ref 39–50)
HGB BLD-MCNC: 9.5 G/DL — LOW (ref 13–17)
HGB BLD-MCNC: 9.5 G/DL — LOW (ref 13–17)
IMM GRANULOCYTES NFR BLD AUTO: 2.3 % — HIGH (ref 0–0.9)
IMM GRANULOCYTES NFR BLD AUTO: 2.3 % — HIGH (ref 0–0.9)
LYMPHOCYTES # BLD AUTO: 29 % — SIGNIFICANT CHANGE UP (ref 13–44)
LYMPHOCYTES # BLD AUTO: 29 % — SIGNIFICANT CHANGE UP (ref 13–44)
LYMPHOCYTES # BLD AUTO: 4.05 K/UL — HIGH (ref 1–3.3)
LYMPHOCYTES # BLD AUTO: 4.05 K/UL — HIGH (ref 1–3.3)
MCHC RBC-ENTMCNC: 30.8 PG — SIGNIFICANT CHANGE UP (ref 27–34)
MCHC RBC-ENTMCNC: 30.8 PG — SIGNIFICANT CHANGE UP (ref 27–34)
MCHC RBC-ENTMCNC: 32.9 GM/DL — SIGNIFICANT CHANGE UP (ref 32–36)
MCHC RBC-ENTMCNC: 32.9 GM/DL — SIGNIFICANT CHANGE UP (ref 32–36)
MCV RBC AUTO: 93.8 FL — SIGNIFICANT CHANGE UP (ref 80–100)
MCV RBC AUTO: 93.8 FL — SIGNIFICANT CHANGE UP (ref 80–100)
MONOCYTES # BLD AUTO: 1.16 K/UL — HIGH (ref 0–0.9)
MONOCYTES # BLD AUTO: 1.16 K/UL — HIGH (ref 0–0.9)
MONOCYTES NFR BLD AUTO: 8.3 % — SIGNIFICANT CHANGE UP (ref 2–14)
MONOCYTES NFR BLD AUTO: 8.3 % — SIGNIFICANT CHANGE UP (ref 2–14)
NEUTROPHILS # BLD AUTO: 7.83 K/UL — HIGH (ref 1.8–7.4)
NEUTROPHILS # BLD AUTO: 7.83 K/UL — HIGH (ref 1.8–7.4)
NEUTROPHILS NFR BLD AUTO: 56 % — SIGNIFICANT CHANGE UP (ref 43–77)
NEUTROPHILS NFR BLD AUTO: 56 % — SIGNIFICANT CHANGE UP (ref 43–77)
NRBC # BLD: 0 /100 WBCS — SIGNIFICANT CHANGE UP (ref 0–0)
NRBC # BLD: 0 /100 WBCS — SIGNIFICANT CHANGE UP (ref 0–0)
PLATELET # BLD AUTO: 428 K/UL — HIGH (ref 150–400)
PLATELET # BLD AUTO: 428 K/UL — HIGH (ref 150–400)
RBC # BLD: 3.08 M/UL — LOW (ref 4.2–5.8)
RBC # BLD: 3.08 M/UL — LOW (ref 4.2–5.8)
RBC # FLD: 13.9 % — SIGNIFICANT CHANGE UP (ref 10.3–14.5)
RBC # FLD: 13.9 % — SIGNIFICANT CHANGE UP (ref 10.3–14.5)
WBC # BLD: 13.98 K/UL — HIGH (ref 3.8–10.5)
WBC # BLD: 13.98 K/UL — HIGH (ref 3.8–10.5)
WBC # FLD AUTO: 13.98 K/UL — HIGH (ref 3.8–10.5)
WBC # FLD AUTO: 13.98 K/UL — HIGH (ref 3.8–10.5)

## 2023-12-22 PROCEDURE — 99232 SBSQ HOSP IP/OBS MODERATE 35: CPT | Mod: GC

## 2023-12-22 PROCEDURE — 71045 X-RAY EXAM CHEST 1 VIEW: CPT | Mod: 26

## 2023-12-22 RX ORDER — ACETAMINOPHEN 500 MG
2 TABLET ORAL
Qty: 0 | Refills: 0 | DISCHARGE
Start: 2023-12-22

## 2023-12-22 RX ORDER — AMLODIPINE BESYLATE 2.5 MG/1
1 TABLET ORAL
Qty: 30 | Refills: 0
Start: 2023-12-22 | End: 2024-01-20

## 2023-12-22 RX ORDER — AMLODIPINE BESYLATE 2.5 MG/1
1 TABLET ORAL
Qty: 0 | Refills: 0 | DISCHARGE

## 2023-12-22 RX ORDER — FLUOXETINE HCL 10 MG
1 CAPSULE ORAL
Qty: 30 | Refills: 0
Start: 2023-12-22 | End: 2024-01-20

## 2023-12-22 RX ORDER — FLUOXETINE HCL 10 MG
1 CAPSULE ORAL
Refills: 0 | DISCHARGE

## 2023-12-22 RX ORDER — NICOTINE POLACRILEX 2 MG
1 GUM BUCCAL
Qty: 0 | Refills: 0 | DISCHARGE
Start: 2023-12-22

## 2023-12-22 RX ORDER — ASPIRIN/CALCIUM CARB/MAGNESIUM 324 MG
1 TABLET ORAL
Qty: 30 | Refills: 0
Start: 2023-12-22 | End: 2024-01-20

## 2023-12-22 RX ORDER — ASPIRIN/CALCIUM CARB/MAGNESIUM 324 MG
1 TABLET ORAL
Refills: 0 | DISCHARGE

## 2023-12-22 RX ORDER — SIMETHICONE 80 MG/1
1 TABLET, CHEWABLE ORAL
Qty: 0 | Refills: 0 | DISCHARGE
Start: 2023-12-22

## 2023-12-22 RX ORDER — PANTOPRAZOLE SODIUM 20 MG/1
1 TABLET, DELAYED RELEASE ORAL
Qty: 30 | Refills: 0
Start: 2023-12-22 | End: 2024-01-20

## 2023-12-22 RX ORDER — ATORVASTATIN CALCIUM 80 MG/1
1 TABLET, FILM COATED ORAL
Qty: 30 | Refills: 0
Start: 2023-12-22 | End: 2024-01-20

## 2023-12-22 RX ADMIN — ATORVASTATIN CALCIUM 80 MILLIGRAM(S): 80 TABLET, FILM COATED ORAL at 21:33

## 2023-12-22 RX ADMIN — Medication 20 MILLIGRAM(S): at 13:11

## 2023-12-22 RX ADMIN — Medication 1 PATCH: at 13:10

## 2023-12-22 RX ADMIN — OXYCODONE HYDROCHLORIDE 5 MILLIGRAM(S): 5 TABLET ORAL at 08:39

## 2023-12-22 RX ADMIN — PANTOPRAZOLE SODIUM 40 MILLIGRAM(S): 20 TABLET, DELAYED RELEASE ORAL at 05:45

## 2023-12-22 RX ADMIN — Medication 1 PATCH: at 19:18

## 2023-12-22 RX ADMIN — AMLODIPINE BESYLATE 10 MILLIGRAM(S): 2.5 TABLET ORAL at 05:45

## 2023-12-22 RX ADMIN — ENOXAPARIN SODIUM 40 MILLIGRAM(S): 100 INJECTION SUBCUTANEOUS at 17:58

## 2023-12-22 RX ADMIN — OXYCODONE HYDROCHLORIDE 5 MILLIGRAM(S): 5 TABLET ORAL at 07:39

## 2023-12-22 RX ADMIN — Medication 81 MILLIGRAM(S): at 13:11

## 2023-12-22 RX ADMIN — Medication 1 PATCH: at 13:11

## 2023-12-22 NOTE — DISCHARGE NOTE PROVIDER - NSDCCPCAREPLAN_GEN_ALL_CORE_FT
PRINCIPAL DISCHARGE DIAGNOSIS  Diagnosis: S/P aortobifemoral bypass surgery  Assessment and Plan of Treatment: Please continue ASA 81mg once a day and atorvastatin 80mg once a day at bedtime. Please follow up with vascular surgery for rest of management and care      SECONDARY DISCHARGE DIAGNOSES  Diagnosis: HTN (hypertension)  Assessment and Plan of Treatment: Please continue amlodipine 10mg once a day. Please follow up with PCP for rest of management and care.    Diagnosis: Alcoholism  Assessment and Plan of Treatment: Please follow with psychology for rest of management and care.

## 2023-12-22 NOTE — DISCHARGE NOTE NURSING/CASE MANAGEMENT/SOCIAL WORK - NSDCFUADDAPPT_GEN_ALL_CORE_FT
Patient : Heather Holcomb Age: 34 year old Sex: male   MRN: [id number] Encounter Date: 12/13/2017      History     Chief Complaint   Patient presents with   • Psychiatric Problem     HPI  Patient with history of chronic depression, was recently discharged from [facility] for the same issue 2 days ago, was prescribed provided with prescription for Seroquel, patient has not filled the medication, states that he lost the prescription/medication, is requesting refill of that, patient was triaged with behavioral health and psychiatrist here, was deemed appropriate for outpatient management, patient denies any suicidal ideation or homicidal ideation, dose of Seroquel was provided today. Patient was instructed follow-up with his PCP and outpatient psychiatrist for further evaluation and management.  Allergies   Allergen Reactions   • Seasonal        Prior to Admission Medications    BENZTROPINE (COGENTIN) 1 MG TABLET    Take 1 tablet by mouth every 12 hours. Indications: Extrapyramidal Reaction caused by Medications    DIVALPROEX (DEPAKOTE ER) 500 MG 24 HR ER TABLET    Take 4 tablets by mouth nightly. Indications: Manic Phase of Manic-Depression    GABAPENTIN (NEURONTIN) 300 MG CAPSULE    Take 2 capsules by mouth 3 times daily. Indications: Agitation, Anxiety    NYSTATIN (MYCOSTATIN) CREAM    Apply to rash twice a day as needed    OLANZAPINE (ZYPREXA) 10 MG TABLET    Take 10 mg by mouth nightly.    RISPERIDONE (RISPERDAL) 2 MG TABLET    Take 1 tablet by mouth 2 times daily. Indications: Manic-Depression    RISPERIDONE MICROSPHERES (RISPERDAL CONSTA) 50 MG INJECTION    Inject 50 mg into the muscle every 14 days. Indications: Manic-Depression    TRAZODONE (DESYREL) 150 MG TABLET    Take 1 tablet by mouth nightly as needed for Sleep. Indications: Trouble Sleeping       New Prescriptions    No medications on file       Past Medical History:   Diagnosis Date   • Arthritis    • Chronic pain    • Schizoaffective disorder     • Seizures (CMS/HCC)        No past surgical history on file.    No family history on file.    Social History   Substance Use Topics   • Smoking status: Current Every Day Smoker     Packs/day: 1.00     Types: Cigarettes   • Smokeless tobacco: Never Used   • Alcohol use 2.4 oz/week     4 Cans of beer per week      Comment: today       Review of Systems   Respiratory: Negative for cough and shortness of breath.    Cardiovascular: Negative for chest pain.   Musculoskeletal: Negative for back pain.   Psychiatric/Behavioral: Positive for dysphoric mood. Negative for self-injury and suicidal ideas.       Physical Exam     ED Triage Vitals [12/13/17 0110]   ED Triage Vitals Group      Temp 97.8 °F (36.6 °C)      Pulse 99      Resp 20      /85      SpO2 99 %      EtCO2 mmHg       Height 6' 4\" (1.93 m)      Weight 175 lb (79.4 kg)      Weight Scale Used ED Stated       Physical Exam   Constitutional: He is oriented to person, place, and time.   HENT:   Head: Normocephalic and atraumatic.   Cardiovascular: Normal rate and regular rhythm.    Pulmonary/Chest: Effort normal and breath sounds normal.   Abdominal: Soft.   Neurological: He is alert and oriented to person, place, and time.   Psychiatric: His speech is normal. He is not aggressive.       ED Course     Procedures    Lab Results     No results found for this visit on 12/13/17.    Radiology Results     Imaging Results    None         ED Medication Orders     Start Ordered     Status Ordering Provider    12/13/17 0208 12/13/17 0208  QUEtiapine (SEROquel) tablet 100 mg  ONCE      Last MAR action:  Given ANDRES TORRES    Clinical Impression     ED Diagnosis   1. Chronic depression         Disposition      2:31 AM Recheck on patient. Patient was provided with 1 dose of Seroquel in the ED, I spoke with the behavioral health specialist, who agrees with outpatient management with the patient, patient was provided with resources for that, he was recently  discharged from [facility] with the same. Denies any suicidal or homicidal ideation today. Discussed with patient ED findings and plan for discharge. Patient was given ED warnings, discharge instructions, and follow up information to go home with. Patient understands and agrees with plan for discharge. Any questions have been answered.    Discharge 12/13/2017  2:31 AM  Heather Holcomb discharge to home/self care.                    TAMIR Peraza  12/13/17 3050     Please schedule a f/u visit with your PCP 1-2 weeks of discharge:    MD: Dr. Micheal Velazco   Phone: [phone] Please schedule a f/u visit with your PCP 1-2 weeks of discharge:    MD: Dr. Micheal Velazco   Phone: [phone]

## 2023-12-22 NOTE — DISCHARGE NOTE PROVIDER - PROVIDER TOKENS
PROVIDER:[TOKEN:[[id number]],FOLLOWUP:[2 weeks]],PROVIDER:[TOKEN:[[id number]],FOLLOWUP:[1 month]] PROVIDER:[TOKEN:[[id number]],FOLLOWUP:[2 weeks]],PROVIDER:[TOKEN:[[id number]],FOLLOWUP:[1 month]]

## 2023-12-22 NOTE — DISCHARGE NOTE PROVIDER - CARE PROVIDERS DIRECT ADDRESSES
,[email or web address],DirectAddress_Unknown ,[email or web address],DirectAddress_Unknown

## 2023-12-22 NOTE — DISCHARGE NOTE PROVIDER - HOSPITAL COURSE
HPI:  58 year old male with PMH of CLTI (chronic limb threatening ischemia )(Ama 4), alcohol abuse w/ history of withdrawal seizures, FAP s/p colectomy  2019, left ACL repair, reports brain bleed in 2021, HLD and PPD smoking history x 45 years who presented to [facility] on 12/9 for alcohol withdrawal and pain in his legs. He initially had an Aorta-bifem bypass planned on 12/11 with Dr. Heredia but was admitted for medical optimization. He was medically optimized before procedure and on 12/14 patient was taken to the operating room and underwent  aortobifemoral bypass graft. The patient tolerated the procedure well without complications, was extubated, and transferred to the PACU in stable condition. Patient was admitted to SICU for Q 1 hour vascular checks. 12/18 patient transferred from SICU to floor. Diet was advanced as tolerated and patient treated with pain control. Physical therapy worked with patient and recommended acute rehab. On day of discharge, the patient was tolerating diet, ambulating with assistance and pain controlled.      Patient was evaluated by PM&R and therapy for functional deficits and gait/ ADL impairments and recommended acute rehabilitation. Patient was medically optimized for discharge to [facility] on 12/18   (18 Dec 2023 14:20)      Patient was evaluated by PM&R and therapy for gait/ADL impairments and recommended acute rehabilitation. Patient was medically optimized for discharge to [facility] on 12/18/23. Admitted with gait instability, ADL, and functional impairments.     IDT: 12/21  TDD: 12/23  Patient lives in private home with spouse + 5 [place] + 17 steps to restroom, independent prior to hospitalization, spouse employed prior  Patient continent x2 of bladder and bowel, staples b/l groin incision, mid-sternal site JAKE  Patient is independent w/ ADLs / IADLs  Patient supervision transfers, ambulation >150' w/ supervision, 24 steps + 1HR w/ supervision  Goals: 1. walking to restroom independent, 2. dressing without dyspnea      All other medical co-morbidities were stable. Patient tolerated course of inpatient PT/OT/SLP rehab with significant improvements and met rehab goals prior to discharge. Patient was medically cleared on 12/23 for discharge to home. HPI:  58 year old male with PMH of CLTI (chronic limb threatening ischemia )(Ama 4), alcohol abuse w/ history of withdrawal seizures, FAP s/p colectomy  2019, left ACL repair, reports brain bleed in 2021, HLD and PPD smoking history x 45 years who presented to [facility] on 12/9 for alcohol withdrawal and pain in his legs. He initially had an Aorta-bifem bypass planned on 12/11 with Dr. Heredia but was admitted for medical optimization. He was medically optimized before procedure and on 12/14 patient was taken to the operating room and underwent  aortobifemoral bypass graft. The patient tolerated the procedure well without complications, was extubated, and transferred to the PACU in stable condition. Patient was admitted to SICU for Q 1 hour vascular checks. 12/18 patient transferred from SICU to floor. Diet was advanced as tolerated and patient treated with pain control. Physical therapy worked with patient and recommended acute rehab. On day of discharge, the patient was tolerating diet, ambulating with assistance and pain controlled.      Patient was evaluated by PM&R and therapy for functional deficits and gait/ ADL impairments and recommended acute rehabilitation. Patient was medically optimized for discharge to [facility] on 12/18   (18 Dec 2023 14:20)      Patient was evaluated by PM&R and therapy for gait/ADL impairments and recommended acute rehabilitation. Patient was medically optimized for discharge to [facility] on 12/18/23. Admitted with gait instability, ADL, and functional impairments.     IDT: 12/21  TDD: 12/23  Patient lives in private home with spouse + 5 [place] + 17 steps to restroom, independent prior to hospitalization, spouse employed prior  Patient continent x2 of bladder and bowel, staples b/l groin incision, mid-sternal site JAKE  Patient is independent w/ ADLs / IADLs  Patient supervision transfers, ambulation >150' w/ supervision, 24 steps + 1HR w/ supervision  Goals: 1. walking to restroom independent, 2. dressing without dyspnea      All other medical co-morbidities were stable. Patient tolerated course of inpatient PT/OT/SLP rehab with significant improvements and met rehab goals prior to discharge. Patient was medically cleared on 12/23 for discharge to home. HPI:  58 year old male with PMH of CLTI (chronic limb threatening ischemia )(Ama 4), alcohol abuse w/ history of withdrawal seizures, FAP s/p colectomy  2019, left ACL repair, reports brain bleed in 2021, HLD and PPD smoking history x 45 years who presented to [facility] on 12/9 for alcohol withdrawal and pain in his legs. He initially had an Aorta-bifem bypass planned on 12/11 with Dr. Heredia but was admitted for medical optimization. He was medically optimized before procedure and on 12/14 patient was taken to the operating room and underwent  aortobifemoral bypass graft. The patient tolerated the procedure well without complications, was extubated, and transferred to the PACU in stable condition. Patient was admitted to SICU for Q 1 hour vascular checks. 12/18 patient transferred from SICU to floor. Diet was advanced as tolerated and patient treated with pain control. Physical therapy worked with patient and recommended acute rehab. On day of discharge, the patient was tolerating diet, ambulating with assistance and pain controlled.      Patient was evaluated by PM&R and therapy for functional deficits and gait/ ADL impairments and recommended acute rehabilitation. Patient was medically optimized for discharge to [facility] on 12/18    Patient was evaluated by PM&R and therapy for gait/ADL impairments and recommended acute rehabilitation. Patient was medically optimized for discharge to [facility] on 12/18/23. Admitted with gait instability, ADL, and functional impairments.     IDT: 12/21  TDD: 12/23  Patient lives in private home with spouse + 5 [place] + 17 steps to restroom, independent prior to hospitalization, spouse employed prior  Patient continent x2 of bladder and bowel, staples b/l groin incision, mid-sternal site JAKE  Patient is independent w/ ADLs / IADLs  Patient supervision transfers, ambulation >150' w/ supervision, 24 steps + 1HR w/ supervision  Goals: 1. walking to restroom independent, 2. dressing without dyspnea      All other medical co-morbidities were stable. Patient tolerated course of inpatient PT/OT/SLP rehab with significant improvements and met rehab goals prior to discharge. Patient was medically cleared on 12/23 for discharge to home. HPI:  58 year old male with PMH of CLTI (chronic limb threatening ischemia )(Ama 4), alcohol abuse w/ history of withdrawal seizures, FAP s/p colectomy  2019, left ACL repair, reports brain bleed in 2021, HLD and PPD smoking history x 45 years who presented to [facility] on 12/9 for alcohol withdrawal and pain in his legs. He initially had an Aorta-bifem bypass planned on 12/11 with Dr. Heredia but was admitted for medical optimization. He was medically optimized before procedure and on 12/14 patient was taken to the operating room and underwent  aortobifemoral bypass graft. The patient tolerated the procedure well without complications, was extubated, and transferred to the PACU in stable condition. Patient was admitted to SICU for Q 1 hour vascular checks. 12/18 patient transferred from SICU to floor. Diet was advanced as tolerated and patient treated with pain control. Physical therapy worked with patient and recommended acute rehab. On day of discharge, the patient was tolerating diet, ambulating with assistance and pain controlled.      Patient was evaluated by PM&R and therapy for functional deficits and gait/ ADL impairments and recommended acute rehabilitation. Patient was medically optimized for discharge to [facility] on 12/18    Patient was evaluated by PM&R and therapy for gait/ADL impairments and recommended acute rehabilitation. Patient was medically optimized for discharge to [facility] on 12/18/23. Admitted with gait instability, ADL, and functional impairments.     IDT: 12/21  TDD: 12/23  Patient lives in private home with spouse + 5 [place] + 17 steps to restroom, independent prior to hospitalization, spouse employed prior  Patient continent x2 of bladder and bowel, staples b/l groin incision, mid-sternal site JAKE  Patient is independent w/ ADLs / IADLs  Patient supervision transfers, ambulation >150' w/ supervision, 24 steps + 1HR w/ supervision  Goals: 1. walking to restroom independent, 2. dressing without dyspnea      All other medical co-morbidities were stable. Patient tolerated course of inpatient PT/OT/SLP rehab with significant improvements and met rehab goals prior to discharge. Patient was medically cleared on 12/23 for discharge to home.

## 2023-12-22 NOTE — DISCHARGE NOTE NURSING/CASE MANAGEMENT/SOCIAL WORK - PATIENT PORTAL LINK FT
You can access the FollowMyHealth Patient Portal offered by [facility] by registering at the following website: [email or web address]. By joining [employer]’s FollowMyHealth portal, you will also be able to view your health information using other applications (apps) compatible with our system. You can access the FollowMyHealth Patient Portal offered by [facility] by registering at the following website: [email or web address]. By joining [employer]’s FollowMyHealth portal, you will also be able to view your health information using other applications (apps) compatible with our system.

## 2023-12-22 NOTE — DISCHARGE NOTE NURSING/CASE MANAGEMENT/SOCIAL WORK - NSDCPEFALRISK_GEN_ALL_CORE
For information on Fall & Injury Prevention, visit: [email or web address] OR  [email or web address] OR  https://www.cdc.gov/steadi/patient.html For information on Fall & Injury Prevention, visit: [email or web address] OR  [email or web address] OR  https://www.cdc.gov/steadi/patient.html

## 2023-12-22 NOTE — PROGRESS NOTE ADULT - SUBJECTIVE AND OBJECTIVE BOX
CC:  A 58y old male admitted for Chronic limb threatening ischemia S/P aortobifemoral bypass    HPI:  58 year old male with PMH of CLTI (chronic limb threatening ischemia )([place]Ã­[place] 4), alcohol abuse w/ history of withdrawal seizures, FAP s/p colectomy  2019, left ACL repair, reports brain bleed in 2021, HLD and PPD smoking history x 45 years who presented to [facility] on 12/9 for alcohol withdrawal and pain in his legs. He initially had an Aorta-bifem bypass planned on 12/11 with Dr. Heredia but was admitted for medical optimization. He was medically optimized before procedure and on 12/14 patient was taken to the operating room and underwent  aortobifemoral bypass graft. The patient tolerated the procedure well without complications, was extubated, and transferred to the PACU in stable condition. Patient was admitted to SICU for Q 1 hour vascular checks. 12/18 patient transferred from SICU to floor. Diet was advanced as tolerated and patient treated with pain control. Physical therapy worked with patient and recommended acute rehab. On day of discharge, the patient was tolerating diet, ambulating with assistance and pain controlled.    Patient was evaluated by PM&R and therapy for functional deficits and gait/ ADL impairments and recommended acute rehabilitation. Patient was medically optimized for discharge to [facility] on 12/18    Allergies:  No Known Allergies    Subjective:  - Patient was seen and examined at bed side, comfortable in his bed. No overnight events  - Slept well last night.  Feels better, no new complaints   - Pain is controlled with current meds   - GI/, Last BM (12/20), voiding without issues.   - Tolerating and participating in 3 hours of daily therapy.  Made good gains   - Discharge for tomorrow to home discussed with patient and he is in agreement     Ros:  - Denies CP, palpitation, SOB, cough, fever, headache, abdominal pain, V/N/D/C, joint pain or swelling     MEDICATIONS  (STANDING):  amLODIPine   Tablet 10 milliGRAM(s) Oral daily  aspirin  chewable 81 milliGRAM(s) Oral daily  atorvastatin 80 milliGRAM(s) Oral at bedtime  enoxaparin Injectable 40 milliGRAM(s) SubCutaneous every 24 hours  FLUoxetine 20 milliGRAM(s) Oral daily  nicotine -  14 mG/24Hr(s) Patch 1 Patch Transdermal daily  pantoprazole    Tablet 40 milliGRAM(s) Oral before breakfast    MEDICATIONS  (PRN):  acetaminophen     Tablet .. 650 milliGRAM(s) Oral every 6 hours PRN Mild Pain (1 - 3)  oxyCODONE    IR 2.5 milliGRAM(s) Oral every 4 hours PRN Moderate Pain (4 - 6)  oxyCODONE    IR 5 milliGRAM(s) Oral every 4 hours PRN Severe Pain (7 - 10)  simethicone 80 milliGRAM(s) Chew every 6 hours PRN Heartburn    LAB:                         9.5    13.98 )-----------( 428      ( 22 Dec 2023 06:00 )             28.9         Procalcitonin 0.11 (12/21)                      9.4    12.66 )-----------( 398      ( 21 Dec 2023 06:45 )             28.1     12-21    139  |  103  |  8   ----------------------------<  97  4.4   |  26  |  0.72    Ca    9.4      21 Dec 2023 06:45    TPro  7.2  /  Alb  2.5<L>  /  TBili  0.5  /  DBili  x   /  AST  24  /  ALT  31  /  AlkPhos  173<H>  12-21    LIVER FUNCTIONS - ( 21 Dec 2023 06:45 )  Alb: 2.5 g/dL / Pro: 7.2 g/dL / ALK PHOS: 173 U/L / ALT: 31 U/L / AST: 24 U/L / GGT: x                CT Chest w/ IV contrast 12/15/23  IMPRESSION:  Bilateral lower lobe and lingular consolidation, likely representing   atelectasis.  Patent aortobifemoral bypass graft with postoperative changes and   pneumoperitoneum.  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 Mild stenosis of the celiac origin.   Moderate stenosis of the proximal SMA.  Cirrhosis.    PHYSICAL EXAMINATION   Vital Signs Last 24 Hrs  T(C): 36.7 (22 Dec 2023 07:36), Max: 36.7 (21 Dec 2023 20:30)  T(F): 98.1 (22 Dec 2023 07:36), Max: 98.1 (22 Dec 2023 07:36)  HR: 77 (22 Dec 2023 07:36) (77 - 87)  BP: 149/78 (22 Dec 2023 07:36) (142/79 - 156/72)  RR: 16 (22 Dec 2023 07:36) (16 - 16)  SpO2: 98% (22 Dec 2023 07:36) (98% - 98%)    General: NAD, Comfortable,                                  HEENT: NC/AT, EOM I, PERRLA, Normal Conjunctivae  Cardio: RRR, Normal S1-S2    Pulm: No Respiratory Distress,  Lungs CTAB                        Abdomen: ND, NT, Soft, BS (+), surgical incision with staples  C/D/I            MSK:  Full ROM                                         Ext: No C/C/E, Pulses 2+ throughout, No calf tenderness    Skin:  Midline surgical incision with staples JAKE, bilateral groin incision with staples open to air                                                               Neurological Examination    Cognitive: AAO x 3                                                                         Attention: Intact   Judgment: Good evidence of judgement                                Mood/Affect: wnl                                                                           Communication:  Fluent,  No dysarthria   Swallow: intact                                                                    Sensory: Intact to light touch                                                                            Motor    LEFT    UE: SF [5/5], EF [5/5], EE [5/5], WE [5/5],  [wnl]  RIGHT UE: SF [5/5], EF [5/5], EE [5/5], WE [5/5],  [wnl]  LEFT    LE:  HF [5/5], KE [5/5], DF [5/5], EHL [5/5],  PF [5/5]  RIGHT LE:  HF [5/5], KE [5/5], DF [5/5], EHL [5/5],  PF [5/5]        IDT: 12/21  TDD: 12/23  SW: private home with spouse + 5 [place] + 17 steps to restroom, independent prior, spouse employed prior  RN: continent x2, staples b/l groin incision, mid-sternal site JAKE  SLP: none  OT: independent w/ ADLs / IADLs  PT: supervision transfers, ambulation >150' w/ supervision, 24 steps + 1HR w/ supervision  Goals: 1. walking to restroom independent, 2. dressing without dyspnea         CC:  A 58y old male admitted for Chronic limb threatening ischemia S/P aortobifemoral bypass    HPI:  58 year old male with PMH of CLTI (chronic limb threatening ischemia )([place] 4), alcohol abuse w/ history of withdrawal seizures, FAP s/p colectomy  2019, left ACL repair, reports brain bleed in 2021, HLD and PPD smoking history x 45 years who presented to [facility] on 12/9 for alcohol withdrawal and pain in his legs. He initially had an Aorta-bifem bypass planned on 12/11 with Dr. Heredia but was admitted for medical optimization. He was medically optimized before procedure and on 12/14 patient was taken to the operating room and underwent  aortobifemoral bypass graft. The patient tolerated the procedure well without complications, was extubated, and transferred to the PACU in stable condition. Patient was admitted to SICU for Q 1 hour vascular checks. 12/18 patient transferred from SICU to floor. Diet was advanced as tolerated and patient treated with pain control. Physical therapy worked with patient and recommended acute rehab. On day of discharge, the patient was tolerating diet, ambulating with assistance and pain controlled.    Patient was evaluated by PM&R and therapy for functional deficits and gait/ ADL impairments and recommended acute rehabilitation. Patient was medically optimized for discharge to [facility] on 12/18    Allergies:  No Known Allergies    Subjective:  - Patient was seen and examined at bed side, comfortable in his bed. No overnight events  - Slept well last night.  Feels better, no new complaints   - Pain is controlled with current meds   - GI/, Last BM (12/20), voiding without issues.   - Tolerating and participating in 3 hours of daily therapy.  Made good gains   - Discharge for tomorrow to home discussed with patient and he is in agreement     Ros:  - Denies CP, palpitation, SOB, cough, fever, headache, abdominal pain, V/N/D/C, joint pain or swelling     MEDICATIONS  (STANDING):  amLODIPine   Tablet 10 milliGRAM(s) Oral daily  aspirin  chewable 81 milliGRAM(s) Oral daily  atorvastatin 80 milliGRAM(s) Oral at bedtime  enoxaparin Injectable 40 milliGRAM(s) SubCutaneous every 24 hours  FLUoxetine 20 milliGRAM(s) Oral daily  nicotine -  14 mG/24Hr(s) Patch 1 Patch Transdermal daily  pantoprazole    Tablet 40 milliGRAM(s) Oral before breakfast    MEDICATIONS  (PRN):  acetaminophen     Tablet .. 650 milliGRAM(s) Oral every 6 hours PRN Mild Pain (1 - 3)  oxyCODONE    IR 2.5 milliGRAM(s) Oral every 4 hours PRN Moderate Pain (4 - 6)  oxyCODONE    IR 5 milliGRAM(s) Oral every 4 hours PRN Severe Pain (7 - 10)  simethicone 80 milliGRAM(s) Chew every 6 hours PRN Heartburn    LAB:                         9.5    13.98 )-----------( 428      ( 22 Dec 2023 06:00 )             28.9         Procalcitonin 0.11 (12/21)                      9.4    12.66 )-----------( 398      ( 21 Dec 2023 06:45 )             28.1     12-21    139  |  103  |  8   ----------------------------<  97  4.4   |  26  |  0.72    Ca    9.4      21 Dec 2023 06:45    TPro  7.2  /  Alb  2.5<L>  /  TBili  0.5  /  DBili  x   /  AST  24  /  ALT  31  /  AlkPhos  173<H>  12-21    LIVER FUNCTIONS - ( 21 Dec 2023 06:45 )  Alb: 2.5 g/dL / Pro: 7.2 g/dL / ALK PHOS: 173 U/L / ALT: 31 U/L / AST: 24 U/L / GGT: x                CT Chest w/ IV contrast 12/15/23  IMPRESSION:  Bilateral lower lobe and lingular consolidation, likely representing   atelectasis.  Patent aortobifemoral bypass graft with postoperative changes and   pneumoperitoneum.  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 Mild stenosis of the celiac origin.   Moderate stenosis of the proximal SMA.  Cirrhosis.    PHYSICAL EXAMINATION   Vital Signs Last 24 Hrs  T(C): 36.7 (22 Dec 2023 07:36), Max: 36.7 (21 Dec 2023 20:30)  T(F): 98.1 (22 Dec 2023 07:36), Max: 98.1 (22 Dec 2023 07:36)  HR: 77 (22 Dec 2023 07:36) (77 - 87)  BP: 149/78 (22 Dec 2023 07:36) (142/79 - 156/72)  RR: 16 (22 Dec 2023 07:36) (16 - 16)  SpO2: 98% (22 Dec 2023 07:36) (98% - 98%)    General: NAD, Comfortable,                                  HEENT: NC/AT, EOM I, PERRLA, Normal Conjunctivae  Cardio: RRR, Normal S1-S2    Pulm: No Respiratory Distress,  Lungs CTAB                        Abdomen: ND, NT, Soft, BS (+), surgical incision with staples  C/D/I            MSK:  Full ROM                                         Ext: No C/C/E, Pulses 2+ throughout, No calf tenderness    Skin:  Midline surgical incision with staples JAKE, bilateral groin incision with staples open to air                                                               Neurological Examination    Cognitive: AAO x 3                                                                         Attention: Intact   Judgment: Good evidence of judgement                                Mood/Affect: wnl                                                                           Communication:  Fluent,  No dysarthria   Swallow: intact                                                                    Sensory: Intact to light touch                                                                            Motor    LEFT    UE: SF [5/5], EF [5/5], EE [5/5], WE [5/5],  [wnl]  RIGHT UE: SF [5/5], EF [5/5], EE [5/5], WE [5/5],  [wnl]  LEFT    LE:  HF [5/5], KE [5/5], DF [5/5], EHL [5/5],  PF [5/5]  RIGHT LE:  HF [5/5], KE [5/5], DF [5/5], EHL [5/5],  PF [5/5]        IDT: 12/21  TDD: 12/23  SW: private home with spouse + 5 [place] + 17 steps to restroom, independent prior, spouse employed prior  RN: continent x2, staples b/l groin incision, mid-sternal site JAKE  SLP: none  OT: independent w/ ADLs / IADLs  PT: supervision transfers, ambulation >150' w/ supervision, 24 steps + 1HR w/ supervision  Goals: 1. walking to restroom independent, 2. dressing without dyspnea

## 2023-12-22 NOTE — DISCHARGE NOTE PROVIDER - NSDCMRMEDTOKEN_GEN_ALL_CORE_FT
acetaminophen 325 mg oral tablet: 2 tab(s) orally every 6 hours As needed Mild Pain (1 - 3)  amLODIPine 10 mg oral tablet: 1 tab(s) orally once a day  aspirin 81 mg oral tablet, chewable: 1 tab(s) orally once a day  atorvastatin 80 mg oral tablet: 1 tab(s) orally once a day (at bedtime)  FLUoxetine 20 mg oral capsule: 1 cap(s) orally once a day  nicotine 14 mg/24 hr transdermal film, extended release: 1 patch transdermal once a day  oxyCODONE 5 mg oral tablet: 1 tab(s) orally every 4 hours As needed Severe Pain (7 - 10)  pantoprazole 40 mg oral delayed release tablet: 1 tab(s) orally once a day (before a meal)  simethicone 80 mg oral tablet, chewable: 1 tab(s) orally every 6 hours As needed Heartburn   acetaminophen 325 mg oral tablet: 2 tab(s) orally every 6 hours As needed Mild Pain (1 - 3)  amLODIPine 10 mg oral tablet: 1 tab(s) orally once a day  aspirin 81 mg oral tablet, chewable: 1 tab(s) orally once a day  atorvastatin 80 mg oral tablet: 1 tab(s) orally once a day (at bedtime)  FLUoxetine 20 mg oral capsule: 1 cap(s) orally once a day  nicotine 14 mg/24 hr transdermal film, extended release: 1 patch transdermal once a day  pantoprazole 40 mg oral delayed release tablet: 1 tab(s) orally once a day (before a meal)  simethicone 80 mg oral tablet, chewable: 1 tab(s) orally every 6 hours As needed Heartburn

## 2023-12-22 NOTE — PROGRESS NOTE ADULT - ASSESSMENT
Assessment	  57 y/o male w/ PMHx CLTI (Ama 4), alcohol abuse w/ history of withdrawal seizures, HLD and smoking who presents for alcohol withdrawal and pain in his legs to [facility] 12/8/23. S/p Aorta-bifem bypass 12/11.  Admitted for multidisciplinary rehab program    #S/p aortobifemoral bypass graft 12/11  c/w ASA 81mg PO QD   c/w atorvastatin 80mg PO at bedtime  -Midline abdominal Incision and bilateral groin incision with staples JAKE  #Comprehensive Multidisciplinary Rehab Program:  - Gait, ADL, Functional impairments  - PT/OT 3hours a day 5 days a week, 90 minutes each.  - Remove Staples by his surgeon     #Leukocytosis  - WBC (12/21) 12.66, (12/22) 13.98  - No fever, sign of infection  - Check UA, Kidney and bladder US --> UA is negative, Kidney US is normal.   - Hospitalist contacted to evaluate patient     #HTN/ Stable   -c/w amlodipine 10mg PO QD   -monitor BP, (12/20) 111/72 - 131/77    #Mood / Cognition:  - Neuropsychology evaluation PRN  - Prozac 20mg daily     #Smoker  -1 PPD for 45 years  -Was taking nicotine patch at prior hospital pre-op   -Feeling withdrawal  -Restart nicotine patch 14mg daily     #Pain:  -oxycodone 5mg PO Q4 PRN moderate pain   -oxycodone 10mg PO Q4 PRN severe pain  - Tylenol PRN    #GI/Bowel:  -Prior history of colectomy with multiple BMs/day  -Simethicone 80 mg chew PO Q6 PRN heartburn   -Hold bowel regimen due to multiple Bms daily   -GI ppx: pantoprazole 40mg PO before breakfast     #/Bladder:  - Toileting schedule q4h  - Painful voiding, B/L groin pain  - Check UA, kidney and bladder US  - UA from(12/19) was negative     #Diet:    - Diet: Regular DASH/TLC   - Nutrition to follow    #Skin/ Pressure Injury Prevention:  -Skin intact except for surgical sites   - Incisions: Midline abdominal surgical incision with staples JAKE, bilateral groin incisions with staples JAKE    #DVT prophylaxis:  - ASA 81mg PO QD   - lovenox 40mg SC QD     #Precautions/ Restrictions  - Falls, Weight bearing status: no weight bearing restrictions      --------------------------------------------  Outpatient Follow up:    Dilip Heredia  Vascular Surgery  [address] [id number]  [place] [address]  Phone: [phone]  Fax: [phone]  Follow Up Time: 2 weeks     Assessment	  57 y/o male w/ PMHx CLTI (Ama 4), alcohol abuse w/ history of withdrawal seizures, HLD and smoking who presents for alcohol withdrawal and pain in his legs to [facility] 12/8/23. S/p Aorta-bifem bypass 12/11.  Admitted for multidisciplinary rehab program    #S/p aortobifemoral bypass graft 12/11  c/w ASA 81mg PO QD   c/w atorvastatin 80mg PO at bedtime  -Midline abdominal Incision and bilateral groin incision with staples JAKE  #Comprehensive Multidisciplinary Rehab Program:  - Gait, ADL, Functional impairments  - PT/OT 3hours a day 5 days a week, 90 minutes each.  - Remove Staples by his surgeon     #Leukocytosis  - WBC (12/21) 12.66, (12/22) 13.98  - No fever, sign of infection  - Check UA, Kidney and bladder US --> UA is negative, Kidney US is normal.   - Hospitalist contacted to evaluate patient     #HTN/ Stable   -c/w amlodipine 10mg PO QD   -monitor BP, (12/20) 111/72 - 131/77    #Mood / Cognition:  - Neuropsychology evaluation PRN  - Prozac 20mg daily     #Smoker  -1 PPD for 45 years  -Was taking nicotine patch at prior hospital pre-op   -Feeling withdrawal  -Restart nicotine patch 14mg daily     #Pain:  -oxycodone 5mg PO Q4 PRN moderate pain   -oxycodone 10mg PO Q4 PRN severe pain  - Tylenol PRN    #GI/Bowel:  -Prior history of colectomy with multiple BMs/day  -Simethicone 80 mg chew PO Q6 PRN heartburn   -Hold bowel regimen due to multiple Bms daily   -GI ppx: pantoprazole 40mg PO before breakfast     #/Bladder:  - Toileting schedule q4h  - Painful voiding, B/L groin pain  - Check UA, kidney and bladder US  - UA from(12/19) was negative     #Diet:    - Diet: Regular DASH/TLC   - Nutrition to follow    #Skin/ Pressure Injury Prevention:  -Skin intact except for surgical sites   - Incisions: Midline abdominal surgical incision with staples JAKE, bilateral groin incisions with staples JAKE    #DVT prophylaxis:  - ASA 81mg PO QD   - lovenox 40mg SC QD     #Precautions/ Restrictions  - Falls, Weight bearing status: no weight bearing restrictions      --------------------------------------------  Outpatient Follow up:    Dilip Heredia  Vascular Surgery  [address] [facility], [id number]  [place] [address]  Phone: [phone]  Fax: [phone]  Follow Up Time: 2 weeks     Assessment	  59 y/o male w/ PMHx CLTI (Ama 4), alcohol abuse w/ history of withdrawal seizures, HLD and smoking who presents for alcohol withdrawal and pain in his legs to [facility] 12/8/23. S/p Aorta-bifem bypass 12/11.  Admitted for multidisciplinary rehab program    #S/p aortobifemoral bypass graft 12/11  c/w ASA 81mg PO QD   c/w atorvastatin 80mg PO at bedtime  -Midline abdominal Incision and bilateral groin incision with staples JAKE  #Comprehensive Multidisciplinary Rehab Program:  - Gait, ADL, Functional impairments  - PT/OT 3hours a day 5 days a week, 90 minutes each.  - Remove Staples by his surgeon     #Leukocytosis  - WBC (12/21) 12.66, (12/22) 13.98  - No fever, sign of infection  - Check UA, Kidney and bladder US --> UA is negative, Kidney US is normal.   - Chest X ray (12/22)   - Hospitalist contacted to evaluate patient     #HTN/ Stable   -c/w amlodipine 10mg PO QD   -monitor BP, (12/20) 111/72 - 131/77    #Mood / Cognition:  - Neuropsychology evaluation PRN  - Prozac 20mg daily     #Smoker  -1 PPD for 45 years  -Was taking nicotine patch at prior hospital pre-op   -Feeling withdrawal  -Restart nicotine patch 14mg daily     #Pain:  -oxycodone 5mg PO Q4 PRN moderate pain   -oxycodone 10mg PO Q4 PRN severe pain  - Tylenol PRN    #GI/Bowel:  -Prior history of colectomy with multiple BMs/day  -Simethicone 80 mg chew PO Q6 PRN heartburn   -Hold bowel regimen due to multiple Bms daily   -GI ppx: pantoprazole 40mg PO before breakfast     #/Bladder:  - Toileting schedule q4h  - Painful voiding, B/L groin pain  - Check UA, kidney and bladder US  - UA from(12/19) was negative     #Diet:    - Diet: Regular DASH/TLC   - Nutrition to follow    #Skin/ Pressure Injury Prevention:  -Skin intact except for surgical sites   - Incisions: Midline abdominal surgical incision with staples JAKE, bilateral groin incisions with staples JAKE    #DVT prophylaxis:  - ASA 81mg PO QD   - lovenox 40mg SC QD     #Precautions/ Restrictions  - Falls, Weight bearing status: no weight bearing restrictions      --------------------------------------------  Outpatient Follow up:    Dilip Heredia  Vascular Surgery  [address] [facility], [id number]  [place] [address]  Phone: [phone]  Fax: [phone]  Follow Up Time: 2 weeks     Assessment	  57 y/o male w/ PMHx CLTI (Ama 4), alcohol abuse w/ history of withdrawal seizures, HLD and smoking who presents for alcohol withdrawal and pain in his legs to [facility] 12/8/23. S/p Aorta-bifem bypass 12/11.  Admitted for multidisciplinary rehab program    #S/p aortobifemoral bypass graft 12/11  c/w ASA 81mg PO QD   c/w atorvastatin 80mg PO at bedtime  -Midline abdominal Incision and bilateral groin incision with staples JAKE  #Comprehensive Multidisciplinary Rehab Program:  - Gait, ADL, Functional impairments  - PT/OT 3hours a day 5 days a week, 90 minutes each.  - Remove Staples by his surgeon     #Leukocytosis  - WBC (12/21) 12.66, (12/22) 13.98  - No fever, sign of infection  - Check UA, Kidney and bladder US --> UA is negative, Kidney US is normal.   - Chest X ray (12/22)   - Hospitalist contacted to evaluate patient     #HTN/ Stable   -c/w amlodipine 10mg PO QD   -monitor BP, (12/20) 111/72 - 131/77    #Mood / Cognition:  - Neuropsychology evaluation PRN  - Prozac 20mg daily     #Smoker  -1 PPD for 45 years  -Was taking nicotine patch at prior hospital pre-op   -Feeling withdrawal  -Restart nicotine patch 14mg daily     #Pain:  -oxycodone 5mg PO Q4 PRN moderate pain   -oxycodone 10mg PO Q4 PRN severe pain  - Tylenol PRN    #GI/Bowel:  -Prior history of colectomy with multiple BMs/day  -Simethicone 80 mg chew PO Q6 PRN heartburn   -Hold bowel regimen due to multiple Bms daily   -GI ppx: pantoprazole 40mg PO before breakfast     #/Bladder:  - Toileting schedule q4h  - Painful voiding, B/L groin pain  - Check UA, kidney and bladder US  - UA from(12/19) was negative     #Diet:    - Diet: Regular DASH/TLC   - Nutrition to follow    #Skin/ Pressure Injury Prevention:  -Skin intact except for surgical sites   - Incisions: Midline abdominal surgical incision with staples JAKE, bilateral groin incisions with staples JAKE    #DVT prophylaxis:  - ASA 81mg PO QD   - lovenox 40mg SC QD     #Precautions/ Restrictions  - Falls, Weight bearing status: no weight bearing restrictions      --------------------------------------------  Outpatient Follow up:    Dilip Heredia  Vascular Surgery  [address] [facility], [id number]  [place] [address]  Phone: [phone]  Fax: [phone]  Follow Up Time: 2 weeks

## 2023-12-22 NOTE — DISCHARGE NOTE PROVIDER - NSDCFUADDAPPT_GEN_ALL_CORE_FT
Please schedule a f/u visit with your PCP 1-2 weeks of discharge:    MD: Dr. Micheal Velazco   Phone: [phone] Please schedule a f/u visit with your PCP 1-2 weeks of discharge:    MD: Dr. Micheal Velazco   Phone: [phone]

## 2023-12-22 NOTE — DISCHARGE NOTE PROVIDER - CARE PROVIDER_API CALL
Dilip Heredia  Vascular Surgery  [address] [facility], [address]  [place] [address]  Phone: [phone]  Fax: [phone]  Follow Up Time: 2 weeks    Mimi Denis  Physical/Rehab Medicine  [address] [place] [address]  Phone: [phone]  Fax: [phone]  Follow Up Time: 1 month   Dilip Heredia  Vascular Surgery  [address] [facility]  [place] [address]  Phone: [phone]  Fax: [phone]  Follow Up Time: 2 weeks    Mimi Denis  Physical/Rehab Medicine  [address] [place] [address]  Phone: [phone]  Fax: [phone]  Follow Up Time: 1 month

## 2023-12-23 VITALS
SYSTOLIC BLOOD PRESSURE: 143 MMHG | RESPIRATION RATE: 16 BRPM | TEMPERATURE: 97 F | HEART RATE: 86 BPM | DIASTOLIC BLOOD PRESSURE: 83 MMHG | OXYGEN SATURATION: 100 %

## 2023-12-23 PROCEDURE — 85025 COMPLETE CBC W/AUTO DIFF WBC: CPT

## 2023-12-23 PROCEDURE — 97112 NEUROMUSCULAR REEDUCATION: CPT

## 2023-12-23 PROCEDURE — 97535 SELF CARE MNGMENT TRAINING: CPT

## 2023-12-23 PROCEDURE — 80053 COMPREHEN METABOLIC PANEL: CPT

## 2023-12-23 PROCEDURE — 97116 GAIT TRAINING THERAPY: CPT

## 2023-12-23 PROCEDURE — 97110 THERAPEUTIC EXERCISES: CPT

## 2023-12-23 PROCEDURE — 81001 URINALYSIS AUTO W/SCOPE: CPT

## 2023-12-23 PROCEDURE — 36415 COLL VENOUS BLD VENIPUNCTURE: CPT

## 2023-12-23 PROCEDURE — 85027 COMPLETE CBC AUTOMATED: CPT

## 2023-12-23 PROCEDURE — 84145 PROCALCITONIN (PCT): CPT

## 2023-12-23 PROCEDURE — 76770 US EXAM ABDO BACK WALL COMP: CPT

## 2023-12-23 PROCEDURE — 97167 OT EVAL HIGH COMPLEX 60 MIN: CPT

## 2023-12-23 PROCEDURE — 99239 HOSP IP/OBS DSCHRG MGMT >30: CPT | Mod: GC

## 2023-12-23 PROCEDURE — 87635 SARS-COV-2 COVID-19 AMP PRB: CPT

## 2023-12-23 PROCEDURE — 97163 PT EVAL HIGH COMPLEX 45 MIN: CPT

## 2023-12-23 PROCEDURE — 97530 THERAPEUTIC ACTIVITIES: CPT

## 2023-12-23 PROCEDURE — 71045 X-RAY EXAM CHEST 1 VIEW: CPT

## 2023-12-23 RX ADMIN — Medication 20 MILLIGRAM(S): at 12:50

## 2023-12-23 RX ADMIN — Medication 1 PATCH: at 12:49

## 2023-12-23 RX ADMIN — Medication 1 PATCH: at 12:50

## 2023-12-23 RX ADMIN — PANTOPRAZOLE SODIUM 40 MILLIGRAM(S): 20 TABLET, DELAYED RELEASE ORAL at 06:28

## 2023-12-23 RX ADMIN — Medication 81 MILLIGRAM(S): at 12:50

## 2023-12-23 RX ADMIN — Medication 1 PATCH: at 08:43

## 2023-12-23 RX ADMIN — AMLODIPINE BESYLATE 10 MILLIGRAM(S): 2.5 TABLET ORAL at 06:29

## 2023-12-23 NOTE — PROGRESS NOTE ADULT - NSPROGADDITIONALINFOA_GEN_ALL_CORE
Spent 36 minutes on face-face visit, evaluate, examine and treat
Spent 36 minutes on face-face visit, evaluate, examine and treat
Spent 50 minutes on face-face visit, evaluating, examining, preparing discharge papers, discuss discharge meds and F/U visits.
Spent 1 hour on evaluating, examining and with team meeting where we discussed patient's progress and discharge plan.

## 2023-12-23 NOTE — PROGRESS NOTE ADULT - SUBJECTIVE AND OBJECTIVE BOX
CC:  A 58y old male admitted for Chronic limb threatening ischemia S/P aortobifemoral bypass    HPI:  58 year old male with PMH of CLTI (chronic limb threatening ischemia )(Bell 4), alcohol abuse w/ history of withdrawal seizures, FAP s/p colectomy  2019, left ACL repair, reports brain bleed in 2021, HLD and PPD smoking history x 45 years who presented to [facility] on 12/9 for alcohol withdrawal and pain in his legs. He initially had an Aorta-bifem bypass planned on 12/11 with Dr. Heredia but was admitted for medical optimization. He was medically optimized before procedure and on 12/14 patient was taken to the operating room and underwent  aortobifemoral bypass graft. The patient tolerated the procedure well without complications, was extubated, and transferred to the PACU in stable condition. Patient was admitted to SICU for Q 1 hour vascular checks. 12/18 patient transferred from SICU to floor. Diet was advanced as tolerated and patient treated with pain control. Physical therapy worked with patient and recommended acute rehab. On day of discharge, the patient was tolerating diet, ambulating with assistance and pain controlled.    Patient was evaluated by PM&R and therapy for functional deficits and gait/ ADL impairments and recommended acute rehabilitation. Patient was medically optimized for discharge to [facility] on 12/18    Allergies:  No Known Allergies    Subjective:  - Patient was seen and examined at bed side, happy to be discharged home today. No overnight events  - Slept well last night. no new complaints   - Pain is controlled with current meds   - GI/, BM uo to date, voiding without issues.   - Tolerating and participating in 3 hours of daily therapy.  Made good gains   - Discussed discharge meds and F/U visits with patient     Ros:  - Denies CP, palpitation, SOB, cough, fever, headache, abdominal pain, V/N/D/C, joint pain or swelling     MEDICATIONS  (STANDING):  amLODIPine   Tablet 10 milliGRAM(s) Oral daily  aspirin  chewable 81 milliGRAM(s) Oral daily  atorvastatin 80 milliGRAM(s) Oral at bedtime  enoxaparin Injectable 40 milliGRAM(s) SubCutaneous every 24 hours  FLUoxetine 20 milliGRAM(s) Oral daily  nicotine -  14 mG/24Hr(s) Patch 1 Patch Transdermal daily  pantoprazole    Tablet 40 milliGRAM(s) Oral before breakfast    MEDICATIONS  (PRN):  acetaminophen     Tablet .. 650 milliGRAM(s) Oral every 6 hours PRN Mild Pain (1 - 3)  oxyCODONE    IR 2.5 milliGRAM(s) Oral every 4 hours PRN Moderate Pain (4 - 6)  oxyCODONE    IR 5 milliGRAM(s) Oral every 4 hours PRN Severe Pain (7 - 10)  simethicone 80 milliGRAM(s) Chew every 6 hours PRN Heartburn    LAB:                         9.5    13.98 )-----------( 428      ( 22 Dec 2023 06:00 )             28.9         Procalcitonin 0.11 (12/21)                      9.4    12.66 )-----------( 398      ( 21 Dec 2023 06:45 )             28.1     12-21    139  |  103  |  8   ----------------------------<  97  4.4   |  26  |  0.72    Ca    9.4      21 Dec 2023 06:45    TPro  7.2  /  Alb  2.5<L>  /  TBili  0.5  /  DBili  x   /  AST  24  /  ALT  31  /  AlkPhos  173<H>  12-21    LIVER FUNCTIONS - ( 21 Dec 2023 06:45 )  Alb: 2.5 g/dL / Pro: 7.2 g/dL / ALK PHOS: 173 U/L / ALT: 31 U/L / AST: 24 U/L / GGT: x                CT Chest w/ IV contrast 12/15/23  IMPRESSION:  Bilateral lower lobe and lingular consolidation, likely representing   atelectasis.  Patent aortobifemoral bypass graft with postoperative changes and   pneumoperitoneum.  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 Mild stenosis of the celiac origin.   Moderate stenosis of the proximal SMA.  Cirrhosis.    PHYSICAL EXAMINATION   Vital Signs Last 24 Hrs  T(C): 36.3 (23 Dec 2023 08:42), Max: 36.7 (22 Dec 2023 20:54)  T(F): 97.4 (23 Dec 2023 08:42), Max: 98.1 (22 Dec 2023 20:54)  HR: 86 (23 Dec 2023 08:42) (78 - 86)  BP: 143/83 (23 Dec 2023 08:42) (123/74 - 143/83)  RR: 16 (23 Dec 2023 08:42) (16 - 16)  SpO2: 100% (23 Dec 2023 08:42) (98% - 100%)    General: NAD, Comfortable,                                  HEENT: NC/AT, EOM I, PERRLA   Cardio: RRR, Normal S1-S2    Pulm: No Respiratory Distress,  Lungs CTAB                        Abdomen: ND, NT, Soft, BS (+), surgical incision with staples  C/D/I            MSK:  Full ROM                                         Ext: No C/C/E, Pulses 2+ throughout, No calf tenderness    Skin:  Midline surgical incision with staples JAKE, bilateral groin incision with staples open to air                                                               Neurological Examination    Cognitive: AAO x 3                                                                         Attention: Intact   Judgment: Good evidence of judgement                                Mood/Affect: wnl                                                                           Communication:  Fluent,  No dysarthria   Swallow: intact                                                                    Sensory: Intact to light touch                                                                            Motor    LEFT    UE: SF [5/5], EF [5/5], EE [5/5], WE [5/5],  [wnl]  RIGHT UE: SF [5/5], EF [5/5], EE [5/5], WE [5/5],  [wnl]  LEFT    LE:  HF [5/5], KE [5/5], DF [5/5], EHL [5/5],  PF [5/5]  RIGHT LE:  HF [5/5], KE [5/5], DF [5/5], EHL [5/5],  PF [5/5]        IDT: 12/21  TDD: 12/23  SW: private home with spouse + 5 [place] + 17 steps to restroom, independent prior, spouse employed prior  RN: continent x2, staples b/l groin incision, mid-sternal site JAKE  SLP: none  OT: independent w/ ADLs / IADLs  PT: supervision transfers, ambulation >150' w/ supervision, 24 steps + 1HR w/ supervision  Goals: 1. walking to restroom independent, 2. dressing without dyspnea         CC:  A 58y old male admitted for Chronic limb threatening ischemia S/P aortobifemoral bypass    HPI:  58 year old male with PMH of CLTI (chronic limb threatening ischemia )(Summers 4), alcohol abuse w/ history of withdrawal seizures, FAP s/p colectomy  2019, left ACL repair, reports brain bleed in 2021, HLD and PPD smoking history x 45 years who presented to [facility] on 12/9 for alcohol withdrawal and pain in his legs. He initially had an Aorta-bifem bypass planned on 12/11 with Dr. Heredia but was admitted for medical optimization. He was medically optimized before procedure and on 12/14 patient was taken to the operating room and underwent  aortobifemoral bypass graft. The patient tolerated the procedure well without complications, was extubated, and transferred to the PACU in stable condition. Patient was admitted to SICU for Q 1 hour vascular checks. 12/18 patient transferred from SICU to floor. Diet was advanced as tolerated and patient treated with pain control. Physical therapy worked with patient and recommended acute rehab. On day of discharge, the patient was tolerating diet, ambulating with assistance and pain controlled.    Patient was evaluated by PM&R and therapy for functional deficits and gait/ ADL impairments and recommended acute rehabilitation. Patient was medically optimized for discharge to [facility] on 12/18    Allergies:  No Known Allergies    Subjective:  - Patient was seen and examined at bed side, happy to be discharged home today. No overnight events  - Slept well last night. no new complaints   - Pain is controlled with current meds   - GI/, BM uo to date, voiding without issues.   - Tolerating and participating in 3 hours of daily therapy.  Made good gains   - Discussed discharge meds and F/U visits with patient     Ros:  - Denies CP, palpitation, SOB, cough, fever, headache, abdominal pain, V/N/D/C, joint pain or swelling     MEDICATIONS  (STANDING):  amLODIPine   Tablet 10 milliGRAM(s) Oral daily  aspirin  chewable 81 milliGRAM(s) Oral daily  atorvastatin 80 milliGRAM(s) Oral at bedtime  enoxaparin Injectable 40 milliGRAM(s) SubCutaneous every 24 hours  FLUoxetine 20 milliGRAM(s) Oral daily  nicotine -  14 mG/24Hr(s) Patch 1 Patch Transdermal daily  pantoprazole    Tablet 40 milliGRAM(s) Oral before breakfast    MEDICATIONS  (PRN):  acetaminophen     Tablet .. 650 milliGRAM(s) Oral every 6 hours PRN Mild Pain (1 - 3)  oxyCODONE    IR 2.5 milliGRAM(s) Oral every 4 hours PRN Moderate Pain (4 - 6)  oxyCODONE    IR 5 milliGRAM(s) Oral every 4 hours PRN Severe Pain (7 - 10)  simethicone 80 milliGRAM(s) Chew every 6 hours PRN Heartburn    LAB:                         9.5    13.98 )-----------( 428      ( 22 Dec 2023 06:00 )             28.9         Procalcitonin 0.11 (12/21)                      9.4    12.66 )-----------( 398      ( 21 Dec 2023 06:45 )             28.1     12-21    139  |  103  |  8   ----------------------------<  97  4.4   |  26  |  0.72    Ca    9.4      21 Dec 2023 06:45    TPro  7.2  /  Alb  2.5<L>  /  TBili  0.5  /  DBili  x   /  AST  24  /  ALT  31  /  AlkPhos  173<H>  12-21    LIVER FUNCTIONS - ( 21 Dec 2023 06:45 )  Alb: 2.5 g/dL / Pro: 7.2 g/dL / ALK PHOS: 173 U/L / ALT: 31 U/L / AST: 24 U/L / GGT: x                CT Chest w/ IV contrast 12/15/23  IMPRESSION:  Bilateral lower lobe and lingular consolidation, likely representing   atelectasis.  Patent aortobifemoral bypass graft with postoperative changes and   pneumoperitoneum.  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 Mild stenosis of the celiac origin.   Moderate stenosis of the proximal SMA.  Cirrhosis.    PHYSICAL EXAMINATION   Vital Signs Last 24 Hrs  T(C): 36.3 (23 Dec 2023 08:42), Max: 36.7 (22 Dec 2023 20:54)  T(F): 97.4 (23 Dec 2023 08:42), Max: 98.1 (22 Dec 2023 20:54)  HR: 86 (23 Dec 2023 08:42) (78 - 86)  BP: 143/83 (23 Dec 2023 08:42) (123/74 - 143/83)  RR: 16 (23 Dec 2023 08:42) (16 - 16)  SpO2: 100% (23 Dec 2023 08:42) (98% - 100%)    General: NAD, Comfortable,                                  HEENT: NC/AT, EOM I, PERRLA   Cardio: RRR, Normal S1-S2    Pulm: No Respiratory Distress,  Lungs CTAB                        Abdomen: ND, NT, Soft, BS (+), surgical incision with staples  C/D/I            MSK:  Full ROM                                         Ext: No C/C/E, Pulses 2+ throughout, No calf tenderness    Skin:  Midline surgical incision with staples JAKE, bilateral groin incision with staples open to air                                                               Neurological Examination    Cognitive: AAO x 3                                                                         Attention: Intact   Judgment: Good evidence of judgement                                Mood/Affect: wnl                                                                           Communication:  Fluent,  No dysarthria   Swallow: intact                                                                    Sensory: Intact to light touch                                                                            Motor    LEFT    UE: SF [5/5], EF [5/5], EE [5/5], WE [5/5],  [wnl]  RIGHT UE: SF [5/5], EF [5/5], EE [5/5], WE [5/5],  [wnl]  LEFT    LE:  HF [5/5], KE [5/5], DF [5/5], EHL [5/5],  PF [5/5]  RIGHT LE:  HF [5/5], KE [5/5], DF [5/5], EHL [5/5],  PF [5/5]        IDT: 12/21  TDD: 12/23  SW: private home with spouse + 5 [place] + 17 steps to restroom, independent prior, spouse employed prior  RN: continent x2, staples b/l groin incision, mid-sternal site JAKE  SLP: none  OT: independent w/ ADLs / IADLs  PT: supervision transfers, ambulation >150' w/ supervision, 24 steps + 1HR w/ supervision  Goals: 1. walking to restroom independent, 2. dressing without dyspnea

## 2023-12-23 NOTE — PROGRESS NOTE ADULT - ASSESSMENT
Assessment	  57 y/o male w/ PMHx CLTI (Ama 4), alcohol abuse w/ history of withdrawal seizures, HLD and smoking who presents for alcohol withdrawal and pain in his legs to [facility] 12/8/23. S/p Aorta-bifem bypass 12/11.  Admitted for multidisciplinary rehab program    #S/p aortobifemoral bypass graft 12/11  c/w ASA 81mg PO QD   c/w atorvastatin 80mg PO at bedtime  -Midline abdominal Incision and bilateral groin incision with staples JAKE  #Comprehensive Multidisciplinary Rehab Program:  - Gait, ADL, Functional impairments  - PT/OT 3hours a day 5 days a week, 90 minutes each. Will be continued after discharge   - Remove Staples by his surgeon     #Leukocytosis  - WBC (12/21) 12.66, (12/22) 13.98  - No fever, sign of infection  - Check UA, Kidney and bladder US --> UA is negative, Kidney US is normal.   - Chest X ray (12/22) residual left basal infiltrate   - Discharge on Augmentin 875 mg x 2 daily x 7 days.    - Hospitalist contacted to evaluate patient     #HTN/ Stable   -c/w amlodipine 10mg PO QD   -monitor BP, (12/20) 111/72 - 131/77    #Mood / Cognition:  - Neuropsychology evaluation PRN  - Prozac 20mg daily     #Smoker  -1 PPD for 45 years  -Was taking nicotine patch at prior hospital pre-op   -Feeling withdrawal  -Restart nicotine patch 14mg daily     #Pain:  -oxycodone 5mg PO Q4 PRN moderate pain   -oxycodone 10mg PO Q4 PRN severe pain  - Tylenol PRN    #GI/Bowel:  -Prior history of colectomy with multiple BMs/day  -Simethicone 80 mg chew PO Q6 PRN heartburn   -Hold bowel regimen due to multiple Bms daily   -GI ppx: pantoprazole 40mg PO before breakfast     #/Bladder:  - Toileting schedule q4h  - Painful voiding, B/L groin pain    #Diet:    - Diet: Regular DASH/TLC   - Nutrition to follow    #Skin/ Pressure Injury Prevention:  -Skin intact except for surgical sites   - Incisions: Midline abdominal surgical incision with staples JAKE, bilateral groin incisions with staples JAKE    #DVT prophylaxis:  - ASA 81mg PO QD   - lovenox 40mg SC QD     #Precautions/ Restrictions  - Falls, Weight bearing status: no weight bearing restrictions      --------------------------------------------  Outpatient Follow up:    Dilip Heredia  Vascular Surgery  [address] [facility] [address] [id number]  [place] [address]  Phone: [phone]  Fax: [phone]  Follow Up Time: 2 weeks     Assessment	  57 y/o male w/ PMHx CLTI (Ama 4), alcohol abuse w/ history of withdrawal seizures, HLD and smoking who presents for alcohol withdrawal and pain in his legs to [facility] 12/8/23. S/p Aorta-bifem bypass 12/11.  Admitted for multidisciplinary rehab program    #S/p aortobifemoral bypass graft 12/11  c/w ASA 81mg PO QD   c/w atorvastatin 80mg PO at bedtime  -Midline abdominal Incision and bilateral groin incision with staples JAKE  #Comprehensive Multidisciplinary Rehab Program:  - Gait, ADL, Functional impairments  - PT/OT 3hours a day 5 days a week, 90 minutes each. Will be continued after discharge   - Remove Staples by his surgeon     #Leukocytosis  - WBC (12/21) 12.66, (12/22) 13.98  - No fever, sign of infection  - Check UA, Kidney and bladder US --> UA is negative, Kidney US is normal.   - Chest X ray (12/22) residual left basal infiltrate   - Discharge on Augmentin 875 mg x 2 daily x 7 days.    - Hospitalist contacted to evaluate patient     #HTN/ Stable   -c/w amlodipine 10mg PO QD   -monitor BP, (12/20) 111/72 - 131/77    #Mood / Cognition:  - Neuropsychology evaluation PRN  - Prozac 20mg daily     #Smoker  -1 PPD for 45 years  -Was taking nicotine patch at prior hospital pre-op   -Feeling withdrawal  -Restart nicotine patch 14mg daily     #Pain:  -oxycodone 5mg PO Q4 PRN moderate pain   -oxycodone 10mg PO Q4 PRN severe pain  - Tylenol PRN    #GI/Bowel:  -Prior history of colectomy with multiple BMs/day  -Simethicone 80 mg chew PO Q6 PRN heartburn   -Hold bowel regimen due to multiple Bms daily   -GI ppx: pantoprazole 40mg PO before breakfast     #/Bladder:  - Toileting schedule q4h  - Painful voiding, B/L groin pain    #Diet:    - Diet: Regular DASH/TLC   - Nutrition to follow    #Skin/ Pressure Injury Prevention:  -Skin intact except for surgical sites   - Incisions: Midline abdominal surgical incision with staples JAKE, bilateral groin incisions with staples JAKE    #DVT prophylaxis:  - ASA 81mg PO QD   - lovenox 40mg SC QD     #Precautions/ Restrictions  - Falls, Weight bearing status: no weight bearing restrictions      --------------------------------------------  Outpatient Follow up:    Dilip Heredia  Vascular Surgery  [address] [facility] [id number]  [place] [address]  Phone: [phone]  Fax: [phone]  Follow Up Time: 2 weeks

## 2023-12-23 NOTE — PROGRESS NOTE ADULT - REASON FOR ADMISSION
Chronic limb threatening ischemia S/P aortobifemoral bypass
aortobifemoral bypass
Chronic limb threatening ischemia S/P aortobifemoral bypass

## 2023-12-29 ENCOUNTER — APPOINTMENT (OUTPATIENT)
Dept: VASCULAR SURGERY | Facility: CLINIC | Age: 58
End: 2023-12-29
Payer: COMMERCIAL

## 2023-12-29 VITALS
DIASTOLIC BLOOD PRESSURE: 77 MMHG | WEIGHT: 178 LBS | HEART RATE: 76 BPM | SYSTOLIC BLOOD PRESSURE: 143 MMHG | TEMPERATURE: 97.8 F | HEIGHT: 71 IN | BODY MASS INDEX: 24.92 KG/M2

## 2023-12-29 PROCEDURE — 99024 POSTOP FOLLOW-UP VISIT: CPT

## 2024-01-04 NOTE — ASSESSMENT
[FreeTextEntry1] : 57 y/o presenting in follow up after an vnwua-kq-[id number] bypass surgery on 12/13. He is recovering well. We removed half of the abdominal staples today. The groin staples will be removed next week. Overall recovering well. Abstaining from ETOH.   Plan:  Will follow up next week for further staple removal  Counseled on abstaining from ETOH

## 2024-01-04 NOTE — PHYSICAL EXAM
[Respiratory Effort] : normal respiratory effort [Normal Rate and Rhythm] : normal rate and rhythm [2+] : left 2+ [1+] : left 1+ [No Rash or Lesion] : No rash or lesion [Alert] : alert [Oriented to Person] : oriented to person [Oriented to Place] : oriented to place [Oriented to Time] : oriented to time [Calm] : calm [JVD] : no jugular venous distention  [Ankle Swelling (On Exam)] : not present [Varicose Veins Of Lower Extremities] : not present [] : not present [Abdomen Masses] : No abdominal masses [Abdomen Tenderness] : ~T ~M No abdominal tenderness [de-identified] : NAD [de-identified] : Midline incision with staples, no drainage  [de-identified] : Bilateral groin incisions with staples, c/d/i

## 2024-01-04 NOTE — HISTORY OF PRESENT ILLNESS
[FreeTextEntry1] : 57 y/o presenting in follow up after an litvz-ge-wuohaiw bypass surgery on 12/13. Reports improvement in lower extremity pain. Walking half a mile at time with his dog. Tolerating a diet. No fevers or chills. Does have incision mid line pain. No drainage from abdominal or groin incisions.

## 2024-01-05 ENCOUNTER — APPOINTMENT (OUTPATIENT)
Dept: VASCULAR SURGERY | Facility: CLINIC | Age: 59
End: 2024-01-05

## 2024-01-12 ENCOUNTER — APPOINTMENT (OUTPATIENT)
Dept: VASCULAR SURGERY | Facility: CLINIC | Age: 59
End: 2024-01-12

## 2024-01-12 NOTE — ASSESSMENT
[FreeTextEntry1] : PAD (peripheral artery disease) (443.9) (I73.9) 57 y/o presenting in follow up after an jxosb-kk-[id number] bypass surgery on 12/13. He is recovering well. We removed half of the abdominal staples today. The groin staples will be removed next week. Overall recovering well. Abstaining from ETOH. Staples removed.   Plan:  Will plan for surveillance imaging  Counseled on abstaining from ETOH.

## 2024-01-17 ENCOUNTER — APPOINTMENT (OUTPATIENT)
Dept: VASCULAR SURGERY | Facility: CLINIC | Age: 59
End: 2024-01-17
Payer: COMMERCIAL

## 2024-01-17 VITALS
HEART RATE: 121 BPM | HEIGHT: 71 IN | SYSTOLIC BLOOD PRESSURE: 119 MMHG | BODY MASS INDEX: 24.5 KG/M2 | TEMPERATURE: 98.1 F | WEIGHT: 175 LBS | DIASTOLIC BLOOD PRESSURE: 82 MMHG

## 2024-01-17 DIAGNOSIS — I73.9 PERIPHERAL VASCULAR DISEASE, UNSPECIFIED: ICD-10-CM

## 2024-01-17 PROCEDURE — 93978 VASCULAR STUDY: CPT

## 2024-01-17 PROCEDURE — 99024 POSTOP FOLLOW-UP VISIT: CPT

## 2024-01-17 RX ORDER — ATORVASTATIN CALCIUM 80 MG/1
80 TABLET, FILM COATED ORAL
Qty: 90 | Refills: 3 | Status: ACTIVE | COMMUNITY
Start: 2024-01-17 | End: 1900-01-01

## 2024-01-18 PROBLEM — I73.9 PAD (PERIPHERAL ARTERY DISEASE): Status: ACTIVE | Noted: 2024-01-04

## 2024-01-18 NOTE — HISTORY OF PRESENT ILLNESS
[FreeTextEntry1] : 59 y/o presenting in follow up after an yrjmk-tp-uvboxlj bypass surgery on 12/13. Reports improvement in lower extremity pain. Walking half a mile at time with his dog. Tolerating a diet. No fevers or chills. Does have incision mid line pain. No drainage from abdominal or groin incisions.    [de-identified] : 1/17/2024 Pt is here for post op visit. He is s/p qhfhq-uq-rpwozvj bypass on 12/13/2024. He is here to remove staples and to evaluate bypass patency. Abdominal and bilateral groin incisions c/d/i. No bleeding or drainage. He states his walking has improved. Now walking 10 blocks 3x/day. He previously could walk up to 6 blocks before bilateral calves cramp up. Denies rest pain or nonhealing wounds. He takes low dose asa and atorvastatin daily. He suffers from alcoholism and is currently sober. He states he joined AA program.

## 2024-01-18 NOTE — PHYSICAL EXAM
[JVD] : no jugular venous distention  [2+] : left 2+ [1+] : left 1+ [Ankle Swelling (On Exam)] : not present [Varicose Veins Of Lower Extremities] : not present [] : not present [Abdomen Masses] : No abdominal masses [Abdomen Tenderness] : ~T ~M No abdominal tenderness [No Rash or Lesion] : No rash or lesion [Alert] : alert [Oriented to Person] : oriented to person [Oriented to Place] : oriented to place [Oriented to Time] : oriented to time [Calm] : calm [de-identified] : NAD [de-identified] : NCAT [de-identified] : unlabored breathing [de-identified] : Midline incision c/d/i [de-identified] : Bilateral groin incisions c/d/i

## 2024-01-18 NOTE — PROCEDURE
[FreeTextEntry1] : all staples removed from abdomen and bilateral groins betadine, benzoin and steri strips applied

## 2024-01-18 NOTE — ASSESSMENT
[FreeTextEntry1] : 59 y/o presenting in follow up after an iroaw-rv-gqgwmzl bypass surgery on 12/13. He is recovering well. All staples removed today. Overall recovering well. Abstaining from ETOH.    Plan:  Conservative medical management- exercise regimen, foot care and protection Continue asa and atorvastatin (refill sent) Counseled on abstaining from ETOH  Return to office in 6 months July 2024 to eval aortobifemoral ptfe bypass [Arterial/Venous Disease] : arterial/venous disease [Medication Management] : medication management

## 2024-01-18 NOTE — DATA REVIEWED
[FreeTextEntry1] : 1/17/2024 AID duplex patent [id number] PTFE bypass without stenosis large left groin hematoma 3.8 x 3.8 cm

## 2024-02-01 ENCOUNTER — APPOINTMENT (OUTPATIENT)
Dept: PHYSICAL MEDICINE AND REHAB | Facility: CLINIC | Age: 59
End: 2024-02-01

## 2024-02-03 ENCOUNTER — EMERGENCY (EMERGENCY)
Facility: HOSPITAL | Age: 59
LOS: 1 days | Discharge: HOME | End: 2024-02-03
Attending: EMERGENCY MEDICINE
Payer: MEDICAID

## 2024-02-03 VITALS
TEMPERATURE: 98 F | RESPIRATION RATE: 18 BRPM | SYSTOLIC BLOOD PRESSURE: 128 MMHG | HEART RATE: 83 BPM | OXYGEN SATURATION: 97 % | DIASTOLIC BLOOD PRESSURE: 73 MMHG

## 2024-02-03 VITALS
DIASTOLIC BLOOD PRESSURE: 98 MMHG | HEART RATE: 96 BPM | TEMPERATURE: 97 F | OXYGEN SATURATION: 98 % | SYSTOLIC BLOOD PRESSURE: 158 MMHG | WEIGHT: 160.06 LBS | RESPIRATION RATE: 18 BRPM

## 2024-02-03 DIAGNOSIS — S83.512A SPRAIN OF ANTERIOR CRUCIATE LIGAMENT OF LEFT KNEE, INITIAL ENCOUNTER: Chronic | ICD-10-CM

## 2024-02-03 DIAGNOSIS — Z90.49 ACQUIRED ABSENCE OF OTHER SPECIFIED PARTS OF DIGESTIVE TRACT: Chronic | ICD-10-CM

## 2024-02-03 PROCEDURE — 99053 MED SERV 10PM-8AM 24 HR FAC: CPT

## 2024-02-03 PROCEDURE — 99285 EMERGENCY DEPT VISIT HI MDM: CPT

## 2024-02-03 PROCEDURE — 99284 EMERGENCY DEPT VISIT MOD MDM: CPT

## 2024-02-03 RX ORDER — FLUOXETINE HCL 10 MG
20 CAPSULE ORAL ONCE
Refills: 0 | Status: COMPLETED | OUTPATIENT
Start: 2024-02-03 | End: 2024-02-03

## 2024-02-03 RX ORDER — ASPIRIN/CALCIUM CARB/MAGNESIUM 324 MG
81 TABLET ORAL DAILY
Refills: 0 | Status: DISCONTINUED | OUTPATIENT
Start: 2024-02-03 | End: 2024-02-07

## 2024-02-03 RX ORDER — AMLODIPINE BESYLATE 2.5 MG/1
10 TABLET ORAL ONCE
Refills: 0 | Status: COMPLETED | OUTPATIENT
Start: 2024-02-03 | End: 2024-02-03

## 2024-02-03 RX ORDER — ATORVASTATIN CALCIUM 80 MG/1
80 TABLET, FILM COATED ORAL AT BEDTIME
Refills: 0 | Status: DISCONTINUED | OUTPATIENT
Start: 2024-02-03 | End: 2024-02-07

## 2024-02-03 RX ORDER — PANTOPRAZOLE SODIUM 20 MG/1
40 TABLET, DELAYED RELEASE ORAL
Refills: 0 | Status: DISCONTINUED | OUTPATIENT
Start: 2024-02-03 | End: 2024-02-07

## 2024-02-03 RX ORDER — ATORVASTATIN CALCIUM 80 MG/1
80 TABLET, FILM COATED ORAL AT BEDTIME
Refills: 0 | Status: DISCONTINUED | OUTPATIENT
Start: 2024-02-03 | End: 2024-02-03

## 2024-02-03 RX ORDER — ASPIRIN/CALCIUM CARB/MAGNESIUM 324 MG
81 TABLET ORAL DAILY
Refills: 0 | Status: DISCONTINUED | OUTPATIENT
Start: 2024-02-03 | End: 2024-02-03

## 2024-02-03 RX ORDER — NICOTINE POLACRILEX 2 MG
1 GUM BUCCAL ONCE
Refills: 0 | Status: COMPLETED | OUTPATIENT
Start: 2024-02-03 | End: 2024-02-03

## 2024-02-03 RX ORDER — PANTOPRAZOLE SODIUM 20 MG/1
40 TABLET, DELAYED RELEASE ORAL
Refills: 0 | Status: DISCONTINUED | OUTPATIENT
Start: 2024-02-03 | End: 2024-02-03

## 2024-02-03 RX ORDER — SIMETHICONE 80 MG/1
80 TABLET, CHEWABLE ORAL ONCE
Refills: 0 | Status: COMPLETED | OUTPATIENT
Start: 2024-02-03 | End: 2024-02-03

## 2024-02-03 RX ADMIN — PANTOPRAZOLE SODIUM 40 MILLIGRAM(S): 20 TABLET, DELAYED RELEASE ORAL at 02:54

## 2024-02-03 RX ADMIN — PANTOPRAZOLE SODIUM 40 MILLIGRAM(S): 20 TABLET, DELAYED RELEASE ORAL at 03:38

## 2024-02-03 RX ADMIN — Medication 81 MILLIGRAM(S): at 02:53

## 2024-02-03 RX ADMIN — Medication 10 MILLIGRAM(S): at 02:54

## 2024-02-03 RX ADMIN — Medication 20 MILLIGRAM(S): at 02:53

## 2024-02-03 RX ADMIN — Medication 1 PATCH: at 02:53

## 2024-02-03 RX ADMIN — SIMETHICONE 80 MILLIGRAM(S): 80 TABLET, CHEWABLE ORAL at 03:11

## 2024-02-03 RX ADMIN — ATORVASTATIN CALCIUM 80 MILLIGRAM(S): 80 TABLET, FILM COATED ORAL at 03:38

## 2024-02-03 RX ADMIN — AMLODIPINE BESYLATE 10 MILLIGRAM(S): 2.5 TABLET ORAL at 02:54

## 2024-02-03 RX ADMIN — ATORVASTATIN CALCIUM 80 MILLIGRAM(S): 80 TABLET, FILM COATED ORAL at 02:54

## 2024-02-03 RX ADMIN — AMLODIPINE BESYLATE 10 MILLIGRAM(S): 2.5 TABLET ORAL at 03:38

## 2024-02-03 RX ADMIN — Medication 81 MILLIGRAM(S): at 03:38

## 2024-02-03 NOTE — ED ADULT NURSE NOTE - CHIEF COMPLAINT QUOTE
from [facility] booking brought in  for evaluation , pt had open heart surgery 3-4 weeks ago and no medications taken

## 2024-02-03 NOTE — ED PROVIDER NOTE - CLINICAL SUMMARY MEDICAL DECISION MAKING FREE TEXT BOX
patient accompanied by police, here for medications.  Med list taken from patients other chart [id number]

## 2024-02-03 NOTE — ED PROVIDER NOTE - OBJECTIVE STATEMENT
58 year old male with PMH of CLTI (chronic limb threatening ischemia sp Aorta-bifem bypass 12/14/23, , alcohol abuse w/ history of withdrawal seizures, FAP s/p colectomy  2019, left ACL repair, reports brain bleed in 2021, HLD and PPD smoking history x 45 years brought to ED by police for medications before arraignment.  Pt states that his wife called the police when he started drinking today and EMS at the station sent him to the ER because he left the house without his medication.    (Patient has severe chart MRN [id number])

## 2024-02-03 NOTE — ED PROVIDER NOTE - NSFOLLOWUPINSTRUCTIONS_ED_ALL_ED_FT
Alcohol Use Disorder    WHAT YOU NEED TO KNOW:    Alcohol use disorder (AUD) is problem drinking. AUD includes alcohol abuse and alcohol dependency.     DISCHARGE INSTRUCTIONS:    Seek care immediately if:     Your heart is beating faster than usual.      You have hallucinations.      You cannot remember what happens while you are drinking.      You have seizures.    Contact your healthcare provider if:     You are anxious and have nausea.      Your hands are shaky and you are sweating heavily.      You have questions or concerns about your condition or care.    Follow up with your healthcare provider as directed: Do not try to stop drinking on your own. Your healthcare provider may need to help you withdraw from alcohol safely. He may need to admit you to the hospital. You may also need any of the following treatments:    Medicines to decrease your craving for alcohol      Support groups such as Alcoholics Anonymous       Therapy from a psychiatrist or psychologist       Admission to an inpatient facility for treatment for severe AUD    Interested in discussing options to reduce your alcohol or drug use?      [facility]: [phone]   [facility] Substance Abuse Services: [phone], option #2   Methadone Maintenance & Ambulatory Opiate Detox: [phone]  [facility]: [phone]  [facility]: [phone]  [facility]: [phone]    [facility]: [phone], option #2   [facility]: [phone]    [facility]: [phone]    [facility] Central Intake: [phone]  [facility] Chemical Dependency/Ancillary Withdrawal: [phone]  [facility] Methadone Maintenance: [phone]    [facility]: [phone]  [facility] Addiction Treatment Services: [phone]    [facility] HopeLine: 9-033-4-HOPENY    [facility] Office of Alcoholism and Substance Abuse Services (OASAS): https://www.oasas.[place].gov/providerdirectory/  [facility] for Addiction Services and Psychotherapy Interventions Research (CASPIR)  www.[facility].org     Interested in discussing options to reduce your tobacco use?    [facility] for Tobacco Control:  [phone]  [facility] QUITLINE: 9-716-XY-QUITS ([phone])    Interested in learning more about substance use?      http://rethinkingdrinking.niaaa.nih.gov   https://www.drugabuse.gov/patients-families     Learn more about opioid overdose prevention programs in [facility]:  http://www.[facility].ny.[facility]/diseases/aids/general/opioid_overdose_prevention/

## 2024-02-03 NOTE — ED PROVIDER NOTE - PHYSICAL EXAMINATION
General: discheleved, mildly intoxcated, appears older than stated age   HEENT: normocephalic, atraumatic   Respiratory: normal work of breathing  MSK: no swelling or tenderness of lower extremities, moving all extremities spontaneously   Skin: warm, dry  Neuro: A&Ox3, cranial nerves II-XII intact, 5/5 strength in all extremities, no sensory deficits, normal gait   Psych: appropriate affect

## 2024-02-03 NOTE — ED PROVIDER NOTE - PATIENT PORTAL LINK FT
You can access the FollowMyHealth Patient Portal offered by [facility] by registering at the following website: [email or web address]. By joining [employer]’s FollowMyHealth portal, you will also be able to view your health information using other applications (apps) compatible with our system.

## 2024-02-03 NOTE — ED ADULT TRIAGE NOTE - CHIEF COMPLAINT QUOTE
from [facility] booking brought in  for evaluation , pt had open heart surgery 3-4 weeks ago and no medications taken

## 2024-02-03 NOTE — ED ADULT TRIAGE NOTE - TEMPERATURE IN CELSIUS (DEGREES C)
36.3
Airway patent, TM normal bilaterally, normal appearing mouth, nose, throat, neck supple with full range of motion, no cervical adenopathy.

## 2024-02-03 NOTE — ED ADULT NURSE NOTE - NSFALLUNIVINTERV_ED_ALL_ED
Bed/Stretcher in lowest position, wheels locked, appropriate side rails in place/Call bell, personal items and telephone in reach/Instruct patient to call for assistance before getting out of bed/chair/stretcher/Non-slip footwear applied when patient is off stretcher/[place] to call system/Physically safe environment - no spills, clutter or unnecessary equipment/Purposeful proactive rounding/Room/bathroom lighting operational, light cord in reach

## 2024-02-05 NOTE — DISCHARGE NOTE PROVIDER - EXTENDED VTE YES NO FOR MLM ENOXAPARIN
Depression Response  Patient completed the PHQ-9 assessment for depression and scored >9? Yes    Question 9 on the PHQ-9 was positive for suicidality? Yes    Does patient have current mental health provider? No, looking into getting one. Started lexapro and high dose vitamin D about 1 month ago.    Is this a virtual visit? No    I personally notified the following: visit provider Dr. Efrem Winter, [place]             
,

## 2024-03-01 NOTE — H&P ADULT - NSICDXPASTMEDICALHX_GEN_ALL_CORE_FT
PAST MEDICAL HISTORY:  Alcohol abuse     FAP (familial adenomatous polyposis)     HTN (hypertension)     
Unknown
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
## 2024-07-19 ENCOUNTER — APPOINTMENT (OUTPATIENT)
Dept: VASCULAR SURGERY | Facility: CLINIC | Age: 59
End: 2024-07-19

## 2024-09-06 ENCOUNTER — APPOINTMENT (OUTPATIENT)
Dept: VASCULAR SURGERY | Facility: CLINIC | Age: 59
End: 2024-09-06
Payer: COMMERCIAL

## 2024-09-06 ENCOUNTER — APPOINTMENT (OUTPATIENT)
Dept: VASCULAR SURGERY | Facility: CLINIC | Age: 59
End: 2024-09-06

## 2024-09-06 VITALS
BODY MASS INDEX: 26.6 KG/M2 | TEMPERATURE: 98.4 F | DIASTOLIC BLOOD PRESSURE: 75 MMHG | SYSTOLIC BLOOD PRESSURE: 126 MMHG | WEIGHT: 190 LBS | HEART RATE: 79 BPM | HEIGHT: 71 IN

## 2024-09-06 DIAGNOSIS — I73.9 PERIPHERAL VASCULAR DISEASE, UNSPECIFIED: ICD-10-CM

## 2024-09-06 PROCEDURE — 99214 OFFICE O/P EST MOD 30 MIN: CPT

## 2024-09-06 PROCEDURE — 93979 VASCULAR STUDY: CPT

## 2024-09-06 PROCEDURE — 93922 UPR/L XTREMITY ART 2 LEVELS: CPT

## 2024-09-06 RX ORDER — CILOSTAZOL 100 MG/1
100 TABLET ORAL TWICE DAILY
Qty: 60 | Refills: 6 | Status: ACTIVE | COMMUNITY
Start: 2024-09-06 | End: 1900-01-01

## 2024-09-06 NOTE — PHYSICAL EXAM
[Normal Breath Sounds] : Normal breath sounds [2+] : left 2+ [0] : left 0 [Calm] : calm [JVD] : no jugular venous distention  [Ankle Swelling (On Exam)] : not present [Varicose Veins Of Lower Extremities] : not present [] : not present [Abdomen Masses] : No abdominal masses [Abdomen Tenderness] : ~T ~M No abdominal tenderness [Skin Ulcer] : no ulcer [Alert] : not alert [de-identified] : well-appearing male in no acute distress [de-identified] : well-healed femoral cutdown with palpable pulses [de-identified] : well-healed midline incision [de-identified] : full motor in all extremities tingling to light touch of the left anterior thigh and left plantar foot, otherwise intact to touch.

## 2024-09-06 NOTE — PHYSICAL EXAM
[Normal Breath Sounds] : Normal breath sounds [2+] : left 2+ [0] : left 0 [Calm] : calm [JVD] : no jugular venous distention  [Ankle Swelling (On Exam)] : not present [Varicose Veins Of Lower Extremities] : not present [] : not present [Abdomen Masses] : No abdominal masses [Abdomen Tenderness] : ~T ~M No abdominal tenderness [Skin Ulcer] : no ulcer [Alert] : not alert [de-identified] : well-appearing male in no acute distress [de-identified] : well-healed femoral cutdown with palpable pulses [de-identified] : well-healed midline incision [de-identified] : full motor in all extremities tingling to light touch of the left anterior thigh and left plantar foot, otherwise intact to touch.

## 2024-09-06 NOTE — HISTORY OF PRESENT ILLNESS
[FreeTextEntry1] : Mr. Florentino Francisco is a 59 year old male with a history of chronic alcohol use and PAD complicated by left leg rest pain s/p aortobifemoral bypass graft on 12/13/2023.    [de-identified] : Patient reports that since January, he has had persistent claudication symptoms worse in the right leg than the left. He can walk one block before pausing to rest. He also has intermittent coldness and numbness on the soles of both feet. Since the surgery, he has had paresthesias and numbness on the left groin overlying and around the femoral cutdown incision that has not improved. Also, patient reports symptoms of erectile dysfunction, butt claudication, and perineal coolness since surgery. He denies foot wounds or rest pain.  Mr. Francisco was successful with smoking cessation from January to August but recently started smoking cigarettes two weeks ago.

## 2024-09-06 NOTE — END OF VISIT
[] : Resident [FreeTextEntry3] : pt w bilat claudication, known sfa disease.  aortobiprofunda bypass is widely patent, no stenosis identified attempting smoking cessation asa,statin he has poor target for distal bypass, he has extensive sfa/pop/tib disease  will try walking program, pletal [Time Spent: ___ minutes] : I have spent [unfilled] minutes of time on the encounter which excludes teaching and separately reported services.

## 2024-09-06 NOTE — HISTORY OF PRESENT ILLNESS
[FreeTextEntry1] : Mr. Florentino Francisco is a 59 year old male with a history of chronic alcohol use and PAD complicated by left leg rest pain s/p aortobifemoral bypass graft on 12/13/2023.    [de-identified] : Patient reports that since January, he has had persistent claudication symptoms worse in the right leg than the left. He can walk one block before pausing to rest. He also has intermittent coldness and numbness on the soles of both feet. Since the surgery, he has had paresthesias and numbness on the left groin overlying and around the femoral cutdown incision that has not improved. Also, patient reports symptoms of erectile dysfunction, butt claudication, and perineal coolness since surgery. He denies foot wounds or rest pain.  Mr. Francisco was successful with smoking cessation from January to August but recently started smoking cigarettes two weeks ago.

## 2024-09-06 NOTE — ASSESSMENT
[Medication Management] : medication management [Smoking Cessation] : smoking cessation [Leg Bypass] : leg bypass [FreeTextEntry1] : Mr. Florentino Francisco is a 59 year old male with a history of chronic alcohol use and PAD complicated by left leg rest pain s/p aortobifemoral bypass graft on 12/13/2023. He endorses persistent R>L claudication consistent with known bilateral SFA disease as well as pelvic claudication, likely in the setting of retrograde flow following bypass surgery. Duplex demonstrates patent graft. Patient was counseled on the benefits versus risks of non-operative versus surgical management for his claudication and reassured that claudication is unlikely to progress to CLTI.  PLAN: - Recommended walking therapy  - Initiate Pletal BID. Counseled patient to take with food - Follow-up in office in 6 months with abdominal Duplex -  Advised smoking cessation

## 2024-11-12 NOTE — ED PROVIDER NOTE - PROGRESS NOTE
Imaging reviewed showing degenerative narrowing of the knee joint.  Would recommend continued diet and exercise with a goal of weight loss.  If pain persist we can consider referral to orthopedic surgery. Stable.

## 2024-11-19 ENCOUNTER — INPATIENT (INPATIENT)
Facility: HOSPITAL | Age: 59
LOS: 3 days | Discharge: HOME | DRG: 312 | End: 2024-11-23
Attending: HOSPITALIST | Admitting: HOSPITALIST
Payer: MEDICAID

## 2024-11-19 VITALS
OXYGEN SATURATION: 94 % | DIASTOLIC BLOOD PRESSURE: 80 MMHG | TEMPERATURE: 99 F | HEART RATE: 122 BPM | SYSTOLIC BLOOD PRESSURE: 148 MMHG | RESPIRATION RATE: 18 BRPM

## 2024-11-19 DIAGNOSIS — Z75.8 OTHER PROBLEMS RELATED TO MEDICAL FACILITIES AND OTHER HEALTH CARE: ICD-10-CM

## 2024-11-19 DIAGNOSIS — Z86.59 PERSONAL HISTORY OF OTHER MENTAL AND BEHAVIORAL DISORDERS: ICD-10-CM

## 2024-11-19 DIAGNOSIS — Z90.49 ACQUIRED ABSENCE OF OTHER SPECIFIED PARTS OF DIGESTIVE TRACT: Chronic | ICD-10-CM

## 2024-11-19 DIAGNOSIS — R55 SYNCOPE AND COLLAPSE: ICD-10-CM

## 2024-11-19 DIAGNOSIS — E87.8 OTHER DISORDERS OF ELECTROLYTE AND FLUID BALANCE, NOT ELSEWHERE CLASSIFIED: ICD-10-CM

## 2024-11-19 DIAGNOSIS — Z87.898 PERSONAL HISTORY OF OTHER SPECIFIED CONDITIONS: ICD-10-CM

## 2024-11-19 DIAGNOSIS — R74.01 ELEVATION OF LEVELS OF LIVER TRANSAMINASE LEVELS: ICD-10-CM

## 2024-11-19 DIAGNOSIS — S83.512A SPRAIN OF ANTERIOR CRUCIATE LIGAMENT OF LEFT KNEE, INITIAL ENCOUNTER: Chronic | ICD-10-CM

## 2024-11-19 DIAGNOSIS — I61.9 NONTRAUMATIC INTRACEREBRAL HEMORRHAGE, UNSPECIFIED: ICD-10-CM

## 2024-11-19 DIAGNOSIS — I10 ESSENTIAL (PRIMARY) HYPERTENSION: ICD-10-CM

## 2024-11-19 DIAGNOSIS — F10.239 ALCOHOL DEPENDENCE WITH WITHDRAWAL, UNSPECIFIED: ICD-10-CM

## 2024-11-19 DIAGNOSIS — Z29.9 ENCOUNTER FOR PROPHYLACTIC MEASURES, UNSPECIFIED: ICD-10-CM

## 2024-11-19 LAB
ALBUMIN SERPL ELPH-MCNC: 3.4 G/DL — LOW (ref 3.5–5)
ALBUMIN SERPL ELPH-MCNC: 3.6 G/DL — SIGNIFICANT CHANGE UP (ref 3.5–5)
ALP SERPL-CCNC: 166 U/L — HIGH (ref 40–120)
ALP SERPL-CCNC: 175 U/L — HIGH (ref 40–120)
ALT FLD-CCNC: 70 U/L DA — HIGH (ref 10–60)
ALT FLD-CCNC: 81 U/L DA — HIGH (ref 10–60)
ANION GAP SERPL CALC-SCNC: 11 MMOL/L — SIGNIFICANT CHANGE UP (ref 5–17)
ANION GAP SERPL CALC-SCNC: 7 MMOL/L — SIGNIFICANT CHANGE UP (ref 5–17)
AST SERPL-CCNC: 109 U/L — HIGH (ref 10–40)
AST SERPL-CCNC: 120 U/L — HIGH (ref 10–40)
BASOPHILS # BLD AUTO: 0.02 K/UL — SIGNIFICANT CHANGE UP (ref 0–0.2)
BASOPHILS # BLD AUTO: 0.02 K/UL — SIGNIFICANT CHANGE UP (ref 0–0.2)
BASOPHILS NFR BLD AUTO: 0.3 % — SIGNIFICANT CHANGE UP (ref 0–2)
BASOPHILS NFR BLD AUTO: 0.4 % — SIGNIFICANT CHANGE UP (ref 0–2)
BILIRUB SERPL-MCNC: 0.9 MG/DL — SIGNIFICANT CHANGE UP (ref 0.2–1.2)
BILIRUB SERPL-MCNC: 0.9 MG/DL — SIGNIFICANT CHANGE UP (ref 0.2–1.2)
BUN SERPL-MCNC: 5 MG/DL — LOW (ref 7–18)
BUN SERPL-MCNC: 6 MG/DL — LOW (ref 7–18)
CALCIUM SERPL-MCNC: 9 MG/DL — SIGNIFICANT CHANGE UP (ref 8.4–10.5)
CALCIUM SERPL-MCNC: 9.1 MG/DL — SIGNIFICANT CHANGE UP (ref 8.4–10.5)
CHLORIDE SERPL-SCNC: 100 MMOL/L — SIGNIFICANT CHANGE UP (ref 96–108)
CHLORIDE SERPL-SCNC: 97 MMOL/L — SIGNIFICANT CHANGE UP (ref 96–108)
CK SERPL-CCNC: 196 U/L — SIGNIFICANT CHANGE UP (ref 35–232)
CO2 SERPL-SCNC: 28 MMOL/L — SIGNIFICANT CHANGE UP (ref 22–31)
CO2 SERPL-SCNC: 28 MMOL/L — SIGNIFICANT CHANGE UP (ref 22–31)
CREAT SERPL-MCNC: 0.64 MG/DL — SIGNIFICANT CHANGE UP (ref 0.5–1.3)
CREAT SERPL-MCNC: 0.85 MG/DL — SIGNIFICANT CHANGE UP (ref 0.5–1.3)
EGFR: 100 ML/MIN/1.73M2 — SIGNIFICANT CHANGE UP
EGFR: 100 ML/MIN/1.73M2 — SIGNIFICANT CHANGE UP
EGFR: 109 ML/MIN/1.73M2 — SIGNIFICANT CHANGE UP
EGFR: 109 ML/MIN/1.73M2 — SIGNIFICANT CHANGE UP
EOSINOPHIL # BLD AUTO: 0 K/UL — SIGNIFICANT CHANGE UP (ref 0–0.5)
EOSINOPHIL # BLD AUTO: 0.01 K/UL — SIGNIFICANT CHANGE UP (ref 0–0.5)
EOSINOPHIL NFR BLD AUTO: 0 % — SIGNIFICANT CHANGE UP (ref 0–6)
EOSINOPHIL NFR BLD AUTO: 0.1 % — SIGNIFICANT CHANGE UP (ref 0–6)
ETHANOL SERPL-MCNC: 6 MG/DL — SIGNIFICANT CHANGE UP (ref 0–10)
GLUCOSE SERPL-MCNC: 105 MG/DL — HIGH (ref 70–99)
GLUCOSE SERPL-MCNC: 155 MG/DL — HIGH (ref 70–99)
HCT VFR BLD CALC: 39.9 % — SIGNIFICANT CHANGE UP (ref 39–50)
HCT VFR BLD CALC: 41.8 % — SIGNIFICANT CHANGE UP (ref 39–50)
HGB BLD-MCNC: 14.4 G/DL — SIGNIFICANT CHANGE UP (ref 13–17)
HGB BLD-MCNC: 15.1 G/DL — SIGNIFICANT CHANGE UP (ref 13–17)
IMM GRANULOCYTES NFR BLD AUTO: 0.4 % — SIGNIFICANT CHANGE UP (ref 0–0.9)
IMM GRANULOCYTES NFR BLD AUTO: 0.4 % — SIGNIFICANT CHANGE UP (ref 0–0.9)
LACTATE SERPL-SCNC: 1.4 MMOL/L — SIGNIFICANT CHANGE UP (ref 0.7–2)
LYMPHOCYTES # BLD AUTO: 0.87 K/UL — LOW (ref 1–3.3)
LYMPHOCYTES # BLD AUTO: 1.41 K/UL — SIGNIFICANT CHANGE UP (ref 1–3.3)
LYMPHOCYTES # BLD AUTO: 12.1 % — LOW (ref 13–44)
LYMPHOCYTES # BLD AUTO: 25.4 % — SIGNIFICANT CHANGE UP (ref 13–44)
MAGNESIUM SERPL-MCNC: 1.4 MG/DL — LOW (ref 1.6–2.6)
MAGNESIUM SERPL-MCNC: 1.4 MG/DL — LOW (ref 1.6–2.6)
MCHC RBC-ENTMCNC: 29.8 PG — SIGNIFICANT CHANGE UP (ref 27–34)
MCHC RBC-ENTMCNC: 29.8 PG — SIGNIFICANT CHANGE UP (ref 27–34)
MCHC RBC-ENTMCNC: 36.1 G/DL — HIGH (ref 32–36)
MCHC RBC-ENTMCNC: 36.1 G/DL — HIGH (ref 32–36)
MCV RBC AUTO: 82.4 FL — SIGNIFICANT CHANGE UP (ref 80–100)
MCV RBC AUTO: 82.6 FL — SIGNIFICANT CHANGE UP (ref 80–100)
MONOCYTES # BLD AUTO: 0.28 K/UL — SIGNIFICANT CHANGE UP (ref 0–0.9)
MONOCYTES # BLD AUTO: 0.36 K/UL — SIGNIFICANT CHANGE UP (ref 0–0.9)
MONOCYTES NFR BLD AUTO: 3.9 % — SIGNIFICANT CHANGE UP (ref 2–14)
MONOCYTES NFR BLD AUTO: 6.5 % — SIGNIFICANT CHANGE UP (ref 2–14)
NEUTROPHILS # BLD AUTO: 3.75 K/UL — SIGNIFICANT CHANGE UP (ref 1.8–7.4)
NEUTROPHILS # BLD AUTO: 5.96 K/UL — SIGNIFICANT CHANGE UP (ref 1.8–7.4)
NEUTROPHILS NFR BLD AUTO: 67.3 % — SIGNIFICANT CHANGE UP (ref 43–77)
NEUTROPHILS NFR BLD AUTO: 83.2 % — HIGH (ref 43–77)
NRBC # BLD: 0 /100 WBCS — SIGNIFICANT CHANGE UP (ref 0–0)
NRBC # BLD: 0 /100 WBCS — SIGNIFICANT CHANGE UP (ref 0–0)
NRBC BLD-RTO: 0 /100 WBCS — SIGNIFICANT CHANGE UP (ref 0–0)
NRBC BLD-RTO: 0 /100 WBCS — SIGNIFICANT CHANGE UP (ref 0–0)
PHOSPHATE SERPL-MCNC: 1.3 MG/DL — LOW (ref 2.5–4.5)
PHOSPHATE SERPL-MCNC: 1.7 MG/DL — LOW (ref 2.5–4.5)
PLATELET # BLD AUTO: 81 K/UL — LOW (ref 150–400)
PLATELET # BLD AUTO: 90 K/UL — LOW (ref 150–400)
POTASSIUM SERPL-MCNC: 2.9 MMOL/L — CRITICAL LOW (ref 3.5–5.3)
POTASSIUM SERPL-MCNC: 3.2 MMOL/L — LOW (ref 3.5–5.3)
POTASSIUM SERPL-SCNC: 2.9 MMOL/L — CRITICAL LOW (ref 3.5–5.3)
POTASSIUM SERPL-SCNC: 3.2 MMOL/L — LOW (ref 3.5–5.3)
PROLACTIN SERPL-MCNC: 19.8 NG/ML — HIGH (ref 4.1–18.4)
PROT SERPL-MCNC: 7.2 G/DL — SIGNIFICANT CHANGE UP (ref 6–8.3)
PROT SERPL-MCNC: 7.9 G/DL — SIGNIFICANT CHANGE UP (ref 6–8.3)
RBC # BLD: 4.84 M/UL — SIGNIFICANT CHANGE UP (ref 4.2–5.8)
RBC # BLD: 5.06 M/UL — SIGNIFICANT CHANGE UP (ref 4.2–5.8)
RBC # FLD: 15.7 % — HIGH (ref 10.3–14.5)
RBC # FLD: 15.8 % — HIGH (ref 10.3–14.5)
SODIUM SERPL-SCNC: 135 MMOL/L — SIGNIFICANT CHANGE UP (ref 135–145)
SODIUM SERPL-SCNC: 136 MMOL/L — SIGNIFICANT CHANGE UP (ref 135–145)
WBC # BLD: 5.56 K/UL — SIGNIFICANT CHANGE UP (ref 3.8–10.5)
WBC # BLD: 7.17 K/UL — SIGNIFICANT CHANGE UP (ref 3.8–10.5)
WBC # FLD AUTO: 5.56 K/UL — SIGNIFICANT CHANGE UP (ref 3.8–10.5)
WBC # FLD AUTO: 7.17 K/UL — SIGNIFICANT CHANGE UP (ref 3.8–10.5)

## 2024-11-19 PROCEDURE — 99223 1ST HOSP IP/OBS HIGH 75: CPT | Mod: GC

## 2024-11-19 PROCEDURE — 99285 EMERGENCY DEPT VISIT HI MDM: CPT

## 2024-11-19 PROCEDURE — 70450 CT HEAD/BRAIN W/O DYE: CPT | Mod: 26

## 2024-11-19 RX ORDER — B1/B2/B3/B5/B6/B12/VIT C/FOLIC 500-0.5 MG
1 TABLET ORAL DAILY
Refills: 0 | Status: DISCONTINUED | OUTPATIENT
Start: 2024-11-19 | End: 2024-11-23

## 2024-11-19 RX ORDER — MAGNESIUM SULFATE 500 MG/ML
2 SYRINGE (ML) INJECTION ONCE
Refills: 0 | Status: COMPLETED | OUTPATIENT
Start: 2024-11-19 | End: 2024-11-19

## 2024-11-19 RX ORDER — LORAZEPAM 4 MG/ML
2 VIAL (ML) INJECTION
Refills: 0 | Status: DISCONTINUED | OUTPATIENT
Start: 2024-11-19 | End: 2024-11-23

## 2024-11-19 RX ORDER — ONDANSETRON HCL/PF 4 MG/2 ML
4 VIAL (ML) INJECTION EVERY 8 HOURS
Refills: 0 | Status: DISCONTINUED | OUTPATIENT
Start: 2024-11-19 | End: 2024-11-19

## 2024-11-19 RX ORDER — CILOSTAZOL 50 MG/1
100 TABLET ORAL
Refills: 0 | Status: DISCONTINUED | OUTPATIENT
Start: 2024-11-19 | End: 2024-11-23

## 2024-11-19 RX ORDER — ACETAMINOPHEN 500 MG/5ML
650 LIQUID (ML) ORAL EVERY 6 HOURS
Refills: 0 | Status: DISCONTINUED | OUTPATIENT
Start: 2024-11-19 | End: 2024-11-23

## 2024-11-19 RX ORDER — LORAZEPAM 4 MG/ML
4 VIAL (ML) INJECTION EVERY 4 HOURS
Refills: 0 | Status: DISCONTINUED | OUTPATIENT
Start: 2024-11-19 | End: 2024-11-19

## 2024-11-19 RX ORDER — LORAZEPAM 4 MG/ML
1 VIAL (ML) INJECTION EVERY 4 HOURS
Refills: 0 | Status: DISCONTINUED | OUTPATIENT
Start: 2024-11-22 | End: 2024-11-22

## 2024-11-19 RX ORDER — SODIUM CHLORIDE 9 G/1000ML
1000 INJECTION, SOLUTION INTRAVENOUS ONCE
Refills: 0 | Status: COMPLETED | OUTPATIENT
Start: 2024-11-19 | End: 2024-11-19

## 2024-11-19 RX ORDER — INFLUENZA A VIRUS A/IDAHO/07/2018 (H1N1) ANTIGEN (MDCK CELL DERIVED, PROPIOLACTONE INACTIVATED, INFLUENZA A VIRUS A/INDIANA/08/2018 (H3N2) ANTIGEN (MDCK CELL DERIVED, PROPIOLACTONE INACTIVATED), INFLUENZA B VIRUS B/SINGAPORE/INFTT-16-0610/2016 ANTIGEN (MDCK CELL DERIVED, PROPIOLACTONE INACTIVATED), INFLUENZA B VIRUS B/IOWA/06/2017 ANTIGEN (MDCK CELL DERIVED, PROPIOLACTONE INACTIVATED) 15; 15; 15; 15 UG/.5ML; UG/.5ML; UG/.5ML; UG/.5ML
0.5 INJECTION, SUSPENSION INTRAMUSCULAR ONCE
Refills: 0 | Status: DISCONTINUED | OUTPATIENT
Start: 2024-11-19 | End: 2024-11-23

## 2024-11-19 RX ORDER — LORAZEPAM 4 MG/ML
VIAL (ML) INJECTION
Refills: 0 | Status: DISCONTINUED | OUTPATIENT
Start: 2024-11-21 | End: 2024-11-23

## 2024-11-19 RX ORDER — LORAZEPAM 4 MG/ML
0.5 VIAL (ML) INJECTION EVERY 12 HOURS
Refills: 0 | Status: DISCONTINUED | OUTPATIENT
Start: 2024-11-24 | End: 2024-11-23

## 2024-11-19 RX ORDER — MAGNESIUM, ALUMINUM HYDROXIDE 200-200 MG
30 TABLET,CHEWABLE ORAL EVERY 4 HOURS
Refills: 0 | Status: DISCONTINUED | OUTPATIENT
Start: 2024-11-19 | End: 2024-11-19

## 2024-11-19 RX ORDER — MELATONIN 5 MG
3 TABLET ORAL AT BEDTIME
Refills: 0 | Status: DISCONTINUED | OUTPATIENT
Start: 2024-11-19 | End: 2024-11-19

## 2024-11-19 RX ORDER — LORAZEPAM 4 MG/ML
1 VIAL (ML) INJECTION ONCE
Refills: 0 | Status: DISCONTINUED | OUTPATIENT
Start: 2024-11-19 | End: 2024-11-19

## 2024-11-19 RX ORDER — POTASSIUM PHOSPHATE, MONOBASIC POTASSIUM PHOSPHATE, DIBASIC INJECTION, 236; 224 MG/ML; MG/ML
30 SOLUTION, CONCENTRATE INTRAVENOUS ONCE
Refills: 0 | Status: COMPLETED | OUTPATIENT
Start: 2024-11-19 | End: 2024-11-19

## 2024-11-19 RX ORDER — FLUOXETINE HYDROCHLORIDE 20 MG/1
20 CAPSULE ORAL DAILY
Refills: 0 | Status: DISCONTINUED | OUTPATIENT
Start: 2024-11-19 | End: 2024-11-21

## 2024-11-19 RX ORDER — LORAZEPAM 4 MG/ML
1 VIAL (ML) INJECTION EVERY 12 HOURS
Refills: 0 | Status: DISCONTINUED | OUTPATIENT
Start: 2024-11-23 | End: 2024-11-23

## 2024-11-19 RX ORDER — MAGNESIUM SULFATE 500 MG/ML
2 SYRINGE (ML) INJECTION ONCE
Refills: 0 | Status: DISCONTINUED | OUTPATIENT
Start: 2024-11-19 | End: 2024-11-19

## 2024-11-19 RX ORDER — FOLIC ACID 1 MG/1
1 TABLET ORAL DAILY
Refills: 0 | Status: DISCONTINUED | OUTPATIENT
Start: 2024-11-19 | End: 2024-11-23

## 2024-11-19 RX ORDER — METOCLOPRAMIDE HCL 10 MG
10 TABLET ORAL ONCE
Refills: 0 | Status: COMPLETED | OUTPATIENT
Start: 2024-11-19 | End: 2024-11-19

## 2024-11-19 RX ORDER — LORAZEPAM 4 MG/ML
3 VIAL (ML) INJECTION EVERY 4 HOURS
Refills: 0 | Status: DISCONTINUED | OUTPATIENT
Start: 2024-11-20 | End: 2024-11-20

## 2024-11-19 RX ORDER — LORAZEPAM 4 MG/ML
2 VIAL (ML) INJECTION EVERY 4 HOURS
Refills: 0 | Status: DISCONTINUED | OUTPATIENT
Start: 2024-11-21 | End: 2024-11-21

## 2024-11-19 RX ORDER — SODIUM PHOSPHATE,DIBASIC DIHYD
15 POWDER (GRAM) MISCELLANEOUS ONCE
Refills: 0 | Status: DISCONTINUED | OUTPATIENT
Start: 2024-11-19 | End: 2024-11-19

## 2024-11-19 RX ADMIN — FLUOXETINE HYDROCHLORIDE 20 MILLIGRAM(S): 20 CAPSULE ORAL at 17:21

## 2024-11-19 RX ADMIN — Medication 4 MILLIGRAM(S): at 17:20

## 2024-11-19 RX ADMIN — Medication 100 MILLIEQUIVALENT(S): at 04:00

## 2024-11-19 RX ADMIN — POTASSIUM PHOSPHATE, MONOBASIC POTASSIUM PHOSPHATE, DIBASIC INJECTION, 83.33 MILLIMOLE(S): 236; 224 SOLUTION, CONCENTRATE INTRAVENOUS at 12:03

## 2024-11-19 RX ADMIN — Medication 105 MILLIGRAM(S): at 14:11

## 2024-11-19 RX ADMIN — Medication 25 GRAM(S): at 12:04

## 2024-11-19 RX ADMIN — Medication 40 MILLIEQUIVALENT(S): at 02:45

## 2024-11-19 RX ADMIN — SODIUM CHLORIDE 1000 MILLILITER(S): 9 INJECTION, SOLUTION INTRAVENOUS at 01:17

## 2024-11-19 RX ADMIN — SODIUM CHLORIDE 1000 MILLILITER(S): 9 INJECTION, SOLUTION INTRAVENOUS at 05:50

## 2024-11-19 RX ADMIN — CILOSTAZOL 100 MILLIGRAM(S): 50 TABLET ORAL at 17:19

## 2024-11-19 RX ADMIN — Medication 105 MILLIGRAM(S): at 06:47

## 2024-11-19 RX ADMIN — Medication 40 MILLIEQUIVALENT(S): at 06:47

## 2024-11-19 RX ADMIN — Medication 100 MILLIEQUIVALENT(S): at 05:30

## 2024-11-19 RX ADMIN — Medication 105 MILLIGRAM(S): at 21:29

## 2024-11-19 RX ADMIN — Medication 4 MILLIGRAM(S): at 06:47

## 2024-11-19 RX ADMIN — Medication 1 MILLIGRAM(S): at 01:17

## 2024-11-19 RX ADMIN — Medication 4 MILLIGRAM(S): at 12:03

## 2024-11-19 RX ADMIN — FOLIC ACID 1 MILLIGRAM(S): 1 TABLET ORAL at 12:03

## 2024-11-19 RX ADMIN — Medication 4 MILLIGRAM(S): at 21:34

## 2024-11-19 RX ADMIN — Medication 100 MILLIEQUIVALENT(S): at 02:42

## 2024-11-19 RX ADMIN — Medication 10 MILLIGRAM(S): at 01:17

## 2024-11-20 LAB
ALBUMIN SERPL ELPH-MCNC: 3.2 G/DL — LOW (ref 3.5–5)
ALP SERPL-CCNC: 173 U/L — HIGH (ref 40–120)
ALT FLD-CCNC: 98 U/L DA — HIGH (ref 10–60)
ANION GAP SERPL CALC-SCNC: 7 MMOL/L — SIGNIFICANT CHANGE UP (ref 5–17)
AST SERPL-CCNC: 205 U/L — HIGH (ref 10–40)
BILIRUB SERPL-MCNC: 0.7 MG/DL — SIGNIFICANT CHANGE UP (ref 0.2–1.2)
BUN SERPL-MCNC: 9 MG/DL — SIGNIFICANT CHANGE UP (ref 7–18)
CALCIUM SERPL-MCNC: 9.3 MG/DL — SIGNIFICANT CHANGE UP (ref 8.4–10.5)
CHLORIDE SERPL-SCNC: 109 MMOL/L — HIGH (ref 96–108)
CO2 SERPL-SCNC: 23 MMOL/L — SIGNIFICANT CHANGE UP (ref 22–31)
CREAT SERPL-MCNC: 0.82 MG/DL — SIGNIFICANT CHANGE UP (ref 0.5–1.3)
EGFR: 101 ML/MIN/1.73M2 — SIGNIFICANT CHANGE UP
EGFR: 101 ML/MIN/1.73M2 — SIGNIFICANT CHANGE UP
GLUCOSE SERPL-MCNC: 80 MG/DL — SIGNIFICANT CHANGE UP (ref 70–99)
HCT VFR BLD CALC: 40.6 % — SIGNIFICANT CHANGE UP (ref 39–50)
HGB BLD-MCNC: 14.2 G/DL — SIGNIFICANT CHANGE UP (ref 13–17)
INR BLD: 0.96 RATIO — SIGNIFICANT CHANGE UP (ref 0.85–1.16)
MAGNESIUM SERPL-MCNC: 1.8 MG/DL — SIGNIFICANT CHANGE UP (ref 1.6–2.6)
MCHC RBC-ENTMCNC: 29 PG — SIGNIFICANT CHANGE UP (ref 27–34)
MCHC RBC-ENTMCNC: 35 G/DL — SIGNIFICANT CHANGE UP (ref 32–36)
MCV RBC AUTO: 82.9 FL — SIGNIFICANT CHANGE UP (ref 80–100)
NRBC # BLD: 0 /100 WBCS — SIGNIFICANT CHANGE UP (ref 0–0)
NRBC BLD-RTO: 0 /100 WBCS — SIGNIFICANT CHANGE UP (ref 0–0)
PHOSPHATE SERPL-MCNC: 3.7 MG/DL — SIGNIFICANT CHANGE UP (ref 2.5–4.5)
PLATELET # BLD AUTO: 64 K/UL — LOW (ref 150–400)
POTASSIUM SERPL-MCNC: 3 MMOL/L — LOW (ref 3.5–5.3)
POTASSIUM SERPL-SCNC: 3 MMOL/L — LOW (ref 3.5–5.3)
PROT SERPL-MCNC: 7 G/DL — SIGNIFICANT CHANGE UP (ref 6–8.3)
PROTHROM AB SERPL-ACNC: 11.2 SEC — SIGNIFICANT CHANGE UP (ref 9.9–13.4)
RBC # BLD: 4.9 M/UL — SIGNIFICANT CHANGE UP (ref 4.2–5.8)
RBC # FLD: 16.2 % — HIGH (ref 10.3–14.5)
SODIUM SERPL-SCNC: 139 MMOL/L — SIGNIFICANT CHANGE UP (ref 135–145)
WBC # BLD: 5.87 K/UL — SIGNIFICANT CHANGE UP (ref 3.8–10.5)
WBC # FLD AUTO: 5.87 K/UL — SIGNIFICANT CHANGE UP (ref 3.8–10.5)

## 2024-11-20 PROCEDURE — 99233 SBSQ HOSP IP/OBS HIGH 50: CPT

## 2024-11-20 PROCEDURE — 76700 US EXAM ABDOM COMPLETE: CPT | Mod: 26

## 2024-11-20 RX ORDER — SODIUM CHLORIDE 9 G/1000ML
500 INJECTION, SOLUTION INTRAVENOUS
Refills: 0 | Status: DISCONTINUED | OUTPATIENT
Start: 2024-11-20 | End: 2024-11-23

## 2024-11-20 RX ORDER — SODIUM CHLORIDE 9 G/1000ML
1000 INJECTION, SOLUTION INTRAVENOUS
Refills: 0 | Status: DISCONTINUED | OUTPATIENT
Start: 2024-11-20 | End: 2024-11-23

## 2024-11-20 RX ADMIN — SODIUM CHLORIDE 500 MILLILITER(S): 9 INJECTION, SOLUTION INTRAVENOUS at 11:06

## 2024-11-20 RX ADMIN — FOLIC ACID 1 MILLIGRAM(S): 1 TABLET ORAL at 12:18

## 2024-11-20 RX ADMIN — Medication 105 MILLIGRAM(S): at 06:23

## 2024-11-20 RX ADMIN — Medication 3 MILLIGRAM(S): at 06:27

## 2024-11-20 RX ADMIN — Medication 1 TABLET(S): at 12:18

## 2024-11-20 RX ADMIN — Medication 3 MILLIGRAM(S): at 02:27

## 2024-11-20 RX ADMIN — Medication 3 MILLIGRAM(S): at 18:50

## 2024-11-20 RX ADMIN — Medication 105 MILLIGRAM(S): at 22:33

## 2024-11-20 RX ADMIN — FLUOXETINE HYDROCHLORIDE 20 MILLIGRAM(S): 20 CAPSULE ORAL at 13:21

## 2024-11-20 RX ADMIN — Medication 40 MILLIEQUIVALENT(S): at 12:18

## 2024-11-20 RX ADMIN — SODIUM CHLORIDE 100 MILLILITER(S): 9 INJECTION, SOLUTION INTRAVENOUS at 12:29

## 2024-11-20 RX ADMIN — Medication 40 MILLIEQUIVALENT(S): at 16:59

## 2024-11-20 RX ADMIN — CILOSTAZOL 100 MILLIGRAM(S): 50 TABLET ORAL at 18:51

## 2024-11-20 RX ADMIN — CILOSTAZOL 100 MILLIGRAM(S): 50 TABLET ORAL at 06:23

## 2024-11-20 RX ADMIN — Medication 3 MILLIGRAM(S): at 14:46

## 2024-11-20 RX ADMIN — Medication 3 MILLIGRAM(S): at 22:34

## 2024-11-20 RX ADMIN — Medication 105 MILLIGRAM(S): at 15:05

## 2024-11-21 ENCOUNTER — TRANSCRIPTION ENCOUNTER (OUTPATIENT)
Age: 59
End: 2024-11-21

## 2024-11-21 LAB
ALBUMIN SERPL ELPH-MCNC: 2.9 G/DL — LOW (ref 3.5–5)
ALP SERPL-CCNC: 155 U/L — HIGH (ref 40–120)
ALT FLD-CCNC: 106 U/L DA — HIGH (ref 10–60)
ANION GAP SERPL CALC-SCNC: 7 MMOL/L — SIGNIFICANT CHANGE UP (ref 5–17)
AST SERPL-CCNC: 155 U/L — HIGH (ref 10–40)
BILIRUB SERPL-MCNC: 0.6 MG/DL — SIGNIFICANT CHANGE UP (ref 0.2–1.2)
BUN SERPL-MCNC: 9 MG/DL — SIGNIFICANT CHANGE UP (ref 7–18)
CALCIUM SERPL-MCNC: 9.1 MG/DL — SIGNIFICANT CHANGE UP (ref 8.4–10.5)
CHLORIDE SERPL-SCNC: 111 MMOL/L — HIGH (ref 96–108)
CO2 SERPL-SCNC: 21 MMOL/L — LOW (ref 22–31)
CREAT SERPL-MCNC: 0.7 MG/DL — SIGNIFICANT CHANGE UP (ref 0.5–1.3)
EGFR: 106 ML/MIN/1.73M2 — SIGNIFICANT CHANGE UP
EGFR: 106 ML/MIN/1.73M2 — SIGNIFICANT CHANGE UP
GI PCR PANEL: SIGNIFICANT CHANGE UP
GLUCOSE SERPL-MCNC: 110 MG/DL — HIGH (ref 70–99)
HCT VFR BLD CALC: 36.8 % — LOW (ref 39–50)
HGB BLD-MCNC: 12.9 G/DL — LOW (ref 13–17)
MCHC RBC-ENTMCNC: 29.9 PG — SIGNIFICANT CHANGE UP (ref 27–34)
MCHC RBC-ENTMCNC: 35.1 G/DL — SIGNIFICANT CHANGE UP (ref 32–36)
MCV RBC AUTO: 85.2 FL — SIGNIFICANT CHANGE UP (ref 80–100)
NRBC # BLD: 0 /100 WBCS — SIGNIFICANT CHANGE UP (ref 0–0)
NRBC BLD-RTO: 0 /100 WBCS — SIGNIFICANT CHANGE UP (ref 0–0)
PLATELET # BLD AUTO: 53 K/UL — LOW (ref 150–400)
POTASSIUM SERPL-MCNC: 3.3 MMOL/L — LOW (ref 3.5–5.3)
POTASSIUM SERPL-SCNC: 3.3 MMOL/L — LOW (ref 3.5–5.3)
PROT SERPL-MCNC: 6.4 G/DL — SIGNIFICANT CHANGE UP (ref 6–8.3)
RBC # BLD: 4.32 M/UL — SIGNIFICANT CHANGE UP (ref 4.2–5.8)
RBC # FLD: 16.5 % — HIGH (ref 10.3–14.5)
SODIUM SERPL-SCNC: 139 MMOL/L — SIGNIFICANT CHANGE UP (ref 135–145)
WBC # BLD: 5.69 K/UL — SIGNIFICANT CHANGE UP (ref 3.8–10.5)
WBC # FLD AUTO: 5.69 K/UL — SIGNIFICANT CHANGE UP (ref 3.8–10.5)

## 2024-11-21 PROCEDURE — 99233 SBSQ HOSP IP/OBS HIGH 50: CPT

## 2024-11-21 PROCEDURE — 90792 PSYCH DIAG EVAL W/MED SRVCS: CPT

## 2024-11-21 RX ORDER — SERTRALINE 100 MG/1
50 TABLET, FILM COATED ORAL DAILY
Refills: 0 | Status: DISCONTINUED | OUTPATIENT
Start: 2024-11-21 | End: 2024-11-23

## 2024-11-21 RX ORDER — OXYCODONE HYDROCHLORIDE AND ACETAMINOPHEN 10; 325 MG/1; MG/1
1 TABLET ORAL ONCE
Refills: 0 | Status: DISCONTINUED | OUTPATIENT
Start: 2024-11-21 | End: 2024-11-21

## 2024-11-21 RX ORDER — MAGNESIUM, ALUMINUM HYDROXIDE 200-200 MG
30 TABLET,CHEWABLE ORAL ONCE
Refills: 0 | Status: COMPLETED | OUTPATIENT
Start: 2024-11-21 | End: 2024-11-21

## 2024-11-21 RX ADMIN — SODIUM CHLORIDE 100 MILLILITER(S): 9 INJECTION, SOLUTION INTRAVENOUS at 00:54

## 2024-11-21 RX ADMIN — Medication 40 MILLIGRAM(S): at 06:31

## 2024-11-21 RX ADMIN — Medication 40 MILLIEQUIVALENT(S): at 09:30

## 2024-11-21 RX ADMIN — Medication 105 MILLIGRAM(S): at 06:37

## 2024-11-21 RX ADMIN — Medication 30 MILLILITER(S): at 00:54

## 2024-11-21 RX ADMIN — OXYCODONE HYDROCHLORIDE AND ACETAMINOPHEN 1 TABLET(S): 10; 325 TABLET ORAL at 07:27

## 2024-11-21 RX ADMIN — SERTRALINE 50 MILLIGRAM(S): 100 TABLET, FILM COATED ORAL at 18:11

## 2024-11-21 RX ADMIN — CILOSTAZOL 100 MILLIGRAM(S): 50 TABLET ORAL at 06:31

## 2024-11-21 RX ADMIN — FOLIC ACID 1 MILLIGRAM(S): 1 TABLET ORAL at 11:27

## 2024-11-21 RX ADMIN — Medication 105 MILLIGRAM(S): at 13:42

## 2024-11-21 RX ADMIN — Medication 2 MILLIGRAM(S): at 10:32

## 2024-11-21 RX ADMIN — Medication 2 MILLIGRAM(S): at 18:11

## 2024-11-21 RX ADMIN — Medication 1 TABLET(S): at 11:27

## 2024-11-21 RX ADMIN — Medication 2 MILLIGRAM(S): at 06:33

## 2024-11-21 RX ADMIN — FLUOXETINE HYDROCHLORIDE 20 MILLIGRAM(S): 20 CAPSULE ORAL at 11:27

## 2024-11-21 RX ADMIN — Medication 2 MILLIGRAM(S): at 21:15

## 2024-11-21 RX ADMIN — Medication 2 MILLIGRAM(S): at 14:10

## 2024-11-21 RX ADMIN — CILOSTAZOL 100 MILLIGRAM(S): 50 TABLET ORAL at 17:33

## 2024-11-21 RX ADMIN — Medication 2 MILLIGRAM(S): at 02:38

## 2024-11-22 DIAGNOSIS — F33.2 MAJOR DEPRESSIVE DISORDER, RECURRENT SEVERE WITHOUT PSYCHOTIC FEATURES: ICD-10-CM

## 2024-11-22 DIAGNOSIS — D69.6 THROMBOCYTOPENIA, UNSPECIFIED: ICD-10-CM

## 2024-11-22 LAB
ALBUMIN SERPL ELPH-MCNC: 3.1 G/DL — LOW (ref 3.5–5)
ALP SERPL-CCNC: 157 U/L — HIGH (ref 40–120)
ALT FLD-CCNC: 125 U/L DA — HIGH (ref 10–60)
ANION GAP SERPL CALC-SCNC: 6 MMOL/L — SIGNIFICANT CHANGE UP (ref 5–17)
ANISOCYTOSIS BLD QL: SLIGHT — SIGNIFICANT CHANGE UP
AST SERPL-CCNC: 144 U/L — HIGH (ref 10–40)
BILIRUB SERPL-MCNC: 0.7 MG/DL — SIGNIFICANT CHANGE UP (ref 0.2–1.2)
BUN SERPL-MCNC: 9 MG/DL — SIGNIFICANT CHANGE UP (ref 7–18)
CALCIUM SERPL-MCNC: 8.6 MG/DL — SIGNIFICANT CHANGE UP (ref 8.4–10.5)
CHLORIDE SERPL-SCNC: 111 MMOL/L — HIGH (ref 96–108)
CO2 SERPL-SCNC: 20 MMOL/L — LOW (ref 22–31)
CREAT SERPL-MCNC: 0.71 MG/DL — SIGNIFICANT CHANGE UP (ref 0.5–1.3)
EGFR: 106 ML/MIN/1.73M2 — SIGNIFICANT CHANGE UP
EGFR: 106 ML/MIN/1.73M2 — SIGNIFICANT CHANGE UP
GLUCOSE SERPL-MCNC: 97 MG/DL — SIGNIFICANT CHANGE UP (ref 70–99)
HCT VFR BLD CALC: 39 % — SIGNIFICANT CHANGE UP (ref 39–50)
HGB BLD-MCNC: 13.4 G/DL — SIGNIFICANT CHANGE UP (ref 13–17)
MAGNESIUM SERPL-MCNC: 1.5 MG/DL — LOW (ref 1.6–2.6)
MANUAL SMEAR VERIFICATION: SIGNIFICANT CHANGE UP
MCHC RBC-ENTMCNC: 29 PG — SIGNIFICANT CHANGE UP (ref 27–34)
MCHC RBC-ENTMCNC: 34.4 G/DL — SIGNIFICANT CHANGE UP (ref 32–36)
MCV RBC AUTO: 84.4 FL — SIGNIFICANT CHANGE UP (ref 80–100)
MICROCYTES BLD QL: SLIGHT — SIGNIFICANT CHANGE UP
NRBC # BLD: 0 /100 WBCS — SIGNIFICANT CHANGE UP (ref 0–0)
NRBC BLD-RTO: 0 /100 WBCS — SIGNIFICANT CHANGE UP (ref 0–0)
OVALOCYTES BLD QL SMEAR: SLIGHT — SIGNIFICANT CHANGE UP
PHOSPHATE SERPL-MCNC: 3.2 MG/DL — SIGNIFICANT CHANGE UP (ref 2.5–4.5)
PLAT MORPH BLD: NORMAL — SIGNIFICANT CHANGE UP
PLATELET # BLD AUTO: 62 K/UL — LOW (ref 150–400)
PLATELET COUNT - ESTIMATE: ABNORMAL
POTASSIUM SERPL-MCNC: 3.5 MMOL/L — SIGNIFICANT CHANGE UP (ref 3.5–5.3)
POTASSIUM SERPL-SCNC: 3.5 MMOL/L — SIGNIFICANT CHANGE UP (ref 3.5–5.3)
PROT SERPL-MCNC: 7 G/DL — SIGNIFICANT CHANGE UP (ref 6–8.3)
RBC # BLD: 4.62 M/UL — SIGNIFICANT CHANGE UP (ref 4.2–5.8)
RBC # FLD: 16.8 % — HIGH (ref 10.3–14.5)
RBC BLD AUTO: ABNORMAL
SODIUM SERPL-SCNC: 137 MMOL/L — SIGNIFICANT CHANGE UP (ref 135–145)
WBC # BLD: 6.72 K/UL — SIGNIFICANT CHANGE UP (ref 3.8–10.5)
WBC # FLD AUTO: 6.72 K/UL — SIGNIFICANT CHANGE UP (ref 3.8–10.5)

## 2024-11-22 PROCEDURE — 99233 SBSQ HOSP IP/OBS HIGH 50: CPT

## 2024-11-22 RX ORDER — LACTOBACILLUS ACIDOPHILUS/PECT 75 MM-100
1 CAPSULE ORAL
Refills: 0 | Status: DISCONTINUED | OUTPATIENT
Start: 2024-11-22 | End: 2024-11-23

## 2024-11-22 RX ORDER — MAGNESIUM OXIDE 400 MG
400 TABLET ORAL
Refills: 0 | Status: DISCONTINUED | OUTPATIENT
Start: 2024-11-22 | End: 2024-11-23

## 2024-11-22 RX ORDER — MAGNESIUM SULFATE 500 MG/ML
2 SYRINGE (ML) INJECTION ONCE
Refills: 0 | Status: COMPLETED | OUTPATIENT
Start: 2024-11-22 | End: 2024-11-22

## 2024-11-22 RX ADMIN — Medication 105 MILLIGRAM(S): at 22:46

## 2024-11-22 RX ADMIN — Medication 400 MILLIGRAM(S): at 12:03

## 2024-11-22 RX ADMIN — CILOSTAZOL 100 MILLIGRAM(S): 50 TABLET ORAL at 06:00

## 2024-11-22 RX ADMIN — Medication 1 MILLIGRAM(S): at 02:14

## 2024-11-22 RX ADMIN — Medication 40 MILLIGRAM(S): at 06:01

## 2024-11-22 RX ADMIN — Medication 400 MILLIGRAM(S): at 21:11

## 2024-11-22 RX ADMIN — FOLIC ACID 1 MILLIGRAM(S): 1 TABLET ORAL at 11:57

## 2024-11-22 RX ADMIN — Medication 105 MILLIGRAM(S): at 14:26

## 2024-11-22 RX ADMIN — Medication 1 MILLIGRAM(S): at 17:13

## 2024-11-22 RX ADMIN — Medication 1 TABLET(S): at 11:57

## 2024-11-22 RX ADMIN — Medication 1 MILLIGRAM(S): at 22:46

## 2024-11-22 RX ADMIN — CILOSTAZOL 100 MILLIGRAM(S): 50 TABLET ORAL at 17:15

## 2024-11-22 RX ADMIN — SERTRALINE 50 MILLIGRAM(S): 100 TABLET, FILM COATED ORAL at 12:13

## 2024-11-22 RX ADMIN — Medication 1 MILLIGRAM(S): at 12:05

## 2024-11-22 RX ADMIN — Medication 1 TABLET(S): at 22:46

## 2024-11-23 ENCOUNTER — TRANSCRIPTION ENCOUNTER (OUTPATIENT)
Age: 59
End: 2024-11-23

## 2024-11-23 VITALS
OXYGEN SATURATION: 97 % | TEMPERATURE: 98 F | DIASTOLIC BLOOD PRESSURE: 80 MMHG | HEART RATE: 78 BPM | SYSTOLIC BLOOD PRESSURE: 140 MMHG | RESPIRATION RATE: 18 BRPM

## 2024-11-23 LAB
ALBUMIN SERPL ELPH-MCNC: 3.2 G/DL — LOW (ref 3.5–5)
ALP SERPL-CCNC: 158 U/L — HIGH (ref 40–120)
ALT FLD-CCNC: 167 U/L DA — HIGH (ref 10–60)
ANION GAP SERPL CALC-SCNC: 6 MMOL/L — SIGNIFICANT CHANGE UP (ref 5–17)
AST SERPL-CCNC: 167 U/L — HIGH (ref 10–40)
BILIRUB SERPL-MCNC: 0.6 MG/DL — SIGNIFICANT CHANGE UP (ref 0.2–1.2)
BUN SERPL-MCNC: 11 MG/DL — SIGNIFICANT CHANGE UP (ref 7–18)
CALCIUM SERPL-MCNC: 9.6 MG/DL — SIGNIFICANT CHANGE UP (ref 8.4–10.5)
CHLORIDE SERPL-SCNC: 113 MMOL/L — HIGH (ref 96–108)
CO2 SERPL-SCNC: 18 MMOL/L — LOW (ref 22–31)
CREAT SERPL-MCNC: 0.74 MG/DL — SIGNIFICANT CHANGE UP (ref 0.5–1.3)
EGFR: 104 ML/MIN/1.73M2 — SIGNIFICANT CHANGE UP
EGFR: 104 ML/MIN/1.73M2 — SIGNIFICANT CHANGE UP
GLUCOSE SERPL-MCNC: 117 MG/DL — HIGH (ref 70–99)
MAGNESIUM SERPL-MCNC: 1.9 MG/DL — SIGNIFICANT CHANGE UP (ref 1.6–2.6)
PHOSPHATE SERPL-MCNC: 3.4 MG/DL — SIGNIFICANT CHANGE UP (ref 2.5–4.5)
POTASSIUM SERPL-MCNC: 3.6 MMOL/L — SIGNIFICANT CHANGE UP (ref 3.5–5.3)
POTASSIUM SERPL-SCNC: 3.6 MMOL/L — SIGNIFICANT CHANGE UP (ref 3.5–5.3)
PROT SERPL-MCNC: 7.6 G/DL — SIGNIFICANT CHANGE UP (ref 6–8.3)
SODIUM SERPL-SCNC: 137 MMOL/L — SIGNIFICANT CHANGE UP (ref 135–145)

## 2024-11-23 PROCEDURE — 36415 COLL VENOUS BLD VENIPUNCTURE: CPT

## 2024-11-23 PROCEDURE — 84100 ASSAY OF PHOSPHORUS: CPT

## 2024-11-23 PROCEDURE — 80053 COMPREHEN METABOLIC PANEL: CPT

## 2024-11-23 PROCEDURE — 85025 COMPLETE CBC W/AUTO DIFF WBC: CPT

## 2024-11-23 PROCEDURE — 82962 GLUCOSE BLOOD TEST: CPT

## 2024-11-23 PROCEDURE — 85610 PROTHROMBIN TIME: CPT

## 2024-11-23 PROCEDURE — 87507 IADNA-DNA/RNA PROBE TQ 12-25: CPT

## 2024-11-23 PROCEDURE — 70450 CT HEAD/BRAIN W/O DYE: CPT | Mod: MC

## 2024-11-23 PROCEDURE — 83735 ASSAY OF MAGNESIUM: CPT

## 2024-11-23 PROCEDURE — 99239 HOSP IP/OBS DSCHRG MGMT >30: CPT

## 2024-11-23 PROCEDURE — 96375 TX/PRO/DX INJ NEW DRUG ADDON: CPT

## 2024-11-23 PROCEDURE — 96374 THER/PROPH/DIAG INJ IV PUSH: CPT

## 2024-11-23 PROCEDURE — 85027 COMPLETE CBC AUTOMATED: CPT

## 2024-11-23 PROCEDURE — 82550 ASSAY OF CK (CPK): CPT

## 2024-11-23 PROCEDURE — 80307 DRUG TEST PRSMV CHEM ANLYZR: CPT

## 2024-11-23 PROCEDURE — 76700 US EXAM ABDOM COMPLETE: CPT

## 2024-11-23 PROCEDURE — 84146 ASSAY OF PROLACTIN: CPT

## 2024-11-23 PROCEDURE — 83605 ASSAY OF LACTIC ACID: CPT

## 2024-11-23 PROCEDURE — 99285 EMERGENCY DEPT VISIT HI MDM: CPT | Mod: 25

## 2024-11-23 RX ORDER — SERTRALINE 100 MG/1
1 TABLET, FILM COATED ORAL
Qty: 30 | Refills: 0
Start: 2024-11-23 | End: 2024-12-22

## 2024-11-23 RX ORDER — ACETAMINOPHEN 500 MG/5ML
2 LIQUID (ML) ORAL
Qty: 0 | Refills: 0 | DISCHARGE
Start: 2024-11-23

## 2024-11-23 RX ORDER — B1/B2/B3/B5/B6/B12/VIT C/FOLIC 500-0.5 MG
1 TABLET ORAL
Qty: 30 | Refills: 0
Start: 2024-11-23 | End: 2024-12-22

## 2024-11-23 RX ADMIN — Medication 40 MILLIGRAM(S): at 06:39

## 2024-11-23 RX ADMIN — Medication 1 TABLET(S): at 12:36

## 2024-11-23 RX ADMIN — Medication 400 MILLIGRAM(S): at 08:57

## 2024-11-23 RX ADMIN — Medication 105 MILLIGRAM(S): at 05:36

## 2024-11-23 RX ADMIN — SERTRALINE 50 MILLIGRAM(S): 100 TABLET, FILM COATED ORAL at 12:35

## 2024-11-23 RX ADMIN — FOLIC ACID 1 MILLIGRAM(S): 1 TABLET ORAL at 12:35

## 2024-11-23 RX ADMIN — CILOSTAZOL 100 MILLIGRAM(S): 50 TABLET ORAL at 05:36

## 2024-11-23 RX ADMIN — Medication 1 TABLET(S): at 08:57

## 2024-11-26 RX ORDER — CILOSTAZOL 50 MG/1
1 TABLET ORAL
Refills: 0 | DISCHARGE

## 2024-11-26 RX ORDER — FOLIC ACID 1 MG/1
1 TABLET ORAL
Refills: 0 | DISCHARGE

## 2024-11-26 RX ORDER — FLUOXETINE HYDROCHLORIDE 20 MG/1
1 CAPSULE ORAL
Refills: 0 | DISCHARGE

## 2024-11-26 RX ORDER — ATORVASTATIN CALCIUM 80 MG/1
1 TABLET, FILM COATED ORAL
Refills: 0 | DISCHARGE

## 2024-12-01 ENCOUNTER — EMERGENCY (EMERGENCY)
Facility: HOSPITAL | Age: 59
LOS: 1 days | Discharge: HOME | End: 2024-12-01
Attending: STUDENT IN AN ORGANIZED HEALTH CARE EDUCATION/TRAINING PROGRAM
Payer: MEDICAID

## 2024-12-01 VITALS
OXYGEN SATURATION: 96 % | SYSTOLIC BLOOD PRESSURE: 114 MMHG | DIASTOLIC BLOOD PRESSURE: 67 MMHG | TEMPERATURE: 96 F | WEIGHT: 210.1 LBS | HEIGHT: 72 IN | RESPIRATION RATE: 16 BRPM | HEART RATE: 68 BPM

## 2024-12-01 DIAGNOSIS — Z90.49 ACQUIRED ABSENCE OF OTHER SPECIFIED PARTS OF DIGESTIVE TRACT: Chronic | ICD-10-CM

## 2024-12-01 DIAGNOSIS — S83.512A SPRAIN OF ANTERIOR CRUCIATE LIGAMENT OF LEFT KNEE, INITIAL ENCOUNTER: Chronic | ICD-10-CM

## 2024-12-01 PROBLEM — Z86.59 PERSONAL HISTORY OF OTHER MENTAL AND BEHAVIORAL DISORDERS: Chronic | Status: ACTIVE | Noted: 2024-11-19

## 2024-12-01 PROBLEM — I61.9 NONTRAUMATIC INTRACEREBRAL HEMORRHAGE, UNSPECIFIED: Chronic | Status: ACTIVE | Noted: 2024-11-19

## 2024-12-01 PROBLEM — F10.20 ALCOHOL DEPENDENCE, UNCOMPLICATED: Chronic | Status: ACTIVE | Noted: 2024-11-19

## 2024-12-01 PROBLEM — I10 ESSENTIAL (PRIMARY) HYPERTENSION: Chronic | Status: ACTIVE | Noted: 2024-11-19

## 2024-12-01 PROCEDURE — 73090 X-RAY EXAM OF FOREARM: CPT | Mod: 26,LT

## 2024-12-01 PROCEDURE — 73110 X-RAY EXAM OF WRIST: CPT

## 2024-12-01 PROCEDURE — 71045 X-RAY EXAM CHEST 1 VIEW: CPT | Mod: 26

## 2024-12-01 PROCEDURE — 70450 CT HEAD/BRAIN W/O DYE: CPT | Mod: 26,MC

## 2024-12-01 PROCEDURE — 90715 TDAP VACCINE 7 YRS/> IM: CPT

## 2024-12-01 PROCEDURE — 90471 IMMUNIZATION ADMIN: CPT

## 2024-12-01 PROCEDURE — 71045 X-RAY EXAM CHEST 1 VIEW: CPT

## 2024-12-01 PROCEDURE — 99284 EMERGENCY DEPT VISIT MOD MDM: CPT | Mod: 25

## 2024-12-01 PROCEDURE — 12002 RPR S/N/AX/GEN/TRNK2.6-7.5CM: CPT

## 2024-12-01 PROCEDURE — 72125 CT NECK SPINE W/O DYE: CPT | Mod: 26,MC

## 2024-12-01 PROCEDURE — 73090 X-RAY EXAM OF FOREARM: CPT

## 2024-12-01 PROCEDURE — 73110 X-RAY EXAM OF WRIST: CPT | Mod: 26,LT

## 2024-12-01 PROCEDURE — 70450 CT HEAD/BRAIN W/O DYE: CPT | Mod: MC

## 2024-12-01 PROCEDURE — 73080 X-RAY EXAM OF ELBOW: CPT | Mod: 26,LT

## 2024-12-01 PROCEDURE — 73080 X-RAY EXAM OF ELBOW: CPT

## 2024-12-01 PROCEDURE — 72125 CT NECK SPINE W/O DYE: CPT | Mod: MC

## 2024-12-01 RX ORDER — POVIDONE-IODINE 7.5 %
1 SPONGE TOPICAL ONCE
Refills: 0 | Status: COMPLETED | OUTPATIENT
Start: 2024-12-01 | End: 2024-12-01

## 2024-12-01 RX ORDER — TETANUS TOXOID, REDUCED DIPHTHERIA TOXOID AND ACELLULAR PERTUSSIS VACCINE, ADSORBED 5; 2.5; 8; 8; 2.5 [IU]/.5ML; [IU]/.5ML; UG/.5ML; UG/.5ML; UG/.5ML
0.5 SUSPENSION INTRAMUSCULAR ONCE
Refills: 0 | Status: COMPLETED | OUTPATIENT
Start: 2024-12-01 | End: 2024-12-01

## 2024-12-01 RX ORDER — BACITRACIN ZINC 500 UNIT/G
1 OINTMENT (GRAM) TOPICAL ONCE
Refills: 0 | Status: COMPLETED | OUTPATIENT
Start: 2024-12-01 | End: 2024-12-01

## 2024-12-01 RX ADMIN — Medication 1 APPLICATION(S): at 18:15

## 2024-12-01 RX ADMIN — TETANUS TOXOID, REDUCED DIPHTHERIA TOXOID AND ACELLULAR PERTUSSIS VACCINE, ADSORBED 0.5 MILLILITER(S): 5; 2.5; 8; 8; 2.5 SUSPENSION INTRAMUSCULAR at 15:03

## 2024-12-01 NOTE — ED ADULT NURSE NOTE - OBJECTIVE STATEMENT
Patient BIB EMS S/P fall due to alcohol intoxication, arousal to verbal stimuli unable provide history, unsure of head trauma. sustained  laceration to L forearm.

## 2024-12-01 NOTE — ED PROVIDER NOTE - PHYSICAL EXAMINATION
No significant past surgical history
GENERAL: +INTOX  HEAD:  Atraumatic, Normocephalic  EYES: EOMI, PERRLA, conjunctiva and sclera clear  ENT: MMM; oropharynx clear  NECK: Supple, No JVD  CHEST/LUNG: Clear to auscultation bilaterally; No wheeze  HEART: Regular rate and rhythm; No murmurs, rubs, or gallops  ABDOMEN: Soft, Nontender, Nondistended; Bowel sounds present  EXTREMITIES:  2+ Peripheral Pulses, No clubbing, cyanosis, or edema  PSYCH: AAOx3  NEUROLOGY: no focal motor or sensory deficits. 5/5 muscle strength in all extremities.   SKIN: Irregular laceration L dorsal forearm, abrasion L olecranon.

## 2024-12-01 NOTE — ED ADULT NURSE NOTE - NSFALLDEVICES_ED_ALL_ED

## 2024-12-01 NOTE — ED PROVIDER NOTE - PATIENT PORTAL LINK FT
You can access the FollowMyHealth Patient Portal offered by [facility] by registering at the following website: [email or web address]. By joining [employer]’s FollowMyHealth portal, you will also be able to view your health information using other applications (apps) compatible with our system.
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
## 2024-12-01 NOTE — ED PROVIDER NOTE - PROGRESS NOTE DETAILS
Gulshan-: Patient received at signout, seen and evaluated bedside.  Patient comfortable no acute distress.  Patient tolerating oral intake and ambulatory to assistance.  Discussed outpatient follow-up, return for suture removal, return precautions, patient understood and agreeable with plan.

## 2024-12-01 NOTE — ED PROVIDER NOTE - CLINICAL SUMMARY MEDICAL DECISION MAKING FREE TEXT BOX
58 y/o M PMH of HTN, HLD, ICH ( 2-3 y ago), depression, alcohol use / dependance ( Hx of seizure x 4 in the past, denies ICU, intubation) p/w after fall while intoxicated.   CTH/Spine  CXR  Update Tdap  Lac Repair  Hips FROM, No other signs of trauma aside from laceration/abrasion described above.

## 2024-12-01 NOTE — ED PROVIDER NOTE - NSFOLLOWUPINSTRUCTIONS_ED_ALL_ED_FT
Please return in 7 days for suture removal   Local wound care- keep wound dry for the next 24 hours  Seek Medical attention or return to the emergency department for any new or worsening symptoms, such as redness, pus from the incision site  Refrain from excess alcohol use.

## 2024-12-01 NOTE — ED ADULT NURSE NOTE - TEMPLATE
Nory from [facility] in [place], [place] called back and stated that patient accepted dental appointment on November 28th, 2022 at 8:00 AM.  
Fall

## 2024-12-01 NOTE — ED ADULT NURSE NOTE - NSFALLHARMRISKINTERV_ED_ALL_ED
Assistance OOB with selected safe patient handling equipment if applicable/Assistance with ambulation/Communicate risk of Fall with Harm to all staff, patient, and family/Monitor gait and stability/Monitor for mental status changes and reorient to person, place, and time, as needed/Provide visual cue: red socks, yellow wristband, yellow gown, etc/Reinforce activity limits and safety measures with patient and family/Toileting schedule using arm’s reach rule for commode and bathroom/Use of alarms - bed, stretcher, chair and/or video monitoring/Bed in lowest position, wheels locked, appropriate side rails in place/Call bell, personal items and telephone in reach/Instruct patient to call for assistance before getting out of bed/chair/stretcher/Non-slip footwear applied when patient is off stretcher/[place] to call system/Physically safe environment - no spills, clutter or unnecessary equipment/Purposeful Proactive Rounding/Room/bathroom lighting operational, light cord in reach

## 2024-12-01 NOTE — ED ADULT NURSE NOTE - CAS ELECT INFOMATION PROVIDED
Patient instructed to follow up with PCP, to return to urgent care or ED in 7days to remove stitches, patient verbalized understanding. Patient A/Ox3, speaking coherently. and walking with steady gait./DC instructions

## 2025-03-04 ENCOUNTER — EMERGENCY (EMERGENCY)
Facility: HOSPITAL | Age: 60
LOS: 1 days | Discharge: HOME | End: 2025-03-04
Attending: EMERGENCY MEDICINE
Payer: SELF-PAY

## 2025-03-04 VITALS
TEMPERATURE: 98 F | SYSTOLIC BLOOD PRESSURE: 122 MMHG | OXYGEN SATURATION: 97 % | HEART RATE: 90 BPM | RESPIRATION RATE: 18 BRPM | DIASTOLIC BLOOD PRESSURE: 68 MMHG

## 2025-03-04 VITALS
DIASTOLIC BLOOD PRESSURE: 85 MMHG | HEART RATE: 70 BPM | RESPIRATION RATE: 16 BRPM | TEMPERATURE: 98 F | OXYGEN SATURATION: 97 % | SYSTOLIC BLOOD PRESSURE: 150 MMHG

## 2025-03-04 DIAGNOSIS — S83.512A SPRAIN OF ANTERIOR CRUCIATE LIGAMENT OF LEFT KNEE, INITIAL ENCOUNTER: Chronic | ICD-10-CM

## 2025-03-04 DIAGNOSIS — Z90.49 ACQUIRED ABSENCE OF OTHER SPECIFIED PARTS OF DIGESTIVE TRACT: Chronic | ICD-10-CM

## 2025-03-04 LAB
ALBUMIN SERPL ELPH-MCNC: 3.2 G/DL — LOW (ref 3.5–5)
ALP SERPL-CCNC: 97 U/L — SIGNIFICANT CHANGE UP (ref 40–120)
ALT FLD-CCNC: 33 U/L DA — SIGNIFICANT CHANGE UP (ref 10–60)
AMPHET UR-MCNC: NEGATIVE — SIGNIFICANT CHANGE UP
ANION GAP SERPL CALC-SCNC: 10 MMOL/L — SIGNIFICANT CHANGE UP (ref 5–17)
AST SERPL-CCNC: 39 U/L — SIGNIFICANT CHANGE UP (ref 10–40)
BARBITURATES UR SCN-MCNC: NEGATIVE — SIGNIFICANT CHANGE UP
BASOPHILS # BLD AUTO: 0.07 K/UL — SIGNIFICANT CHANGE UP (ref 0–0.2)
BASOPHILS NFR BLD AUTO: 1 % — SIGNIFICANT CHANGE UP (ref 0–2)
BENZODIAZ UR-MCNC: NEGATIVE — SIGNIFICANT CHANGE UP
BILIRUB SERPL-MCNC: 0.4 MG/DL — SIGNIFICANT CHANGE UP (ref 0.2–1.2)
BUN SERPL-MCNC: 4 MG/DL — LOW (ref 7–18)
CALCIUM SERPL-MCNC: 8.5 MG/DL — SIGNIFICANT CHANGE UP (ref 8.4–10.5)
CHLORIDE SERPL-SCNC: 109 MMOL/L — HIGH (ref 96–108)
CO2 SERPL-SCNC: 26 MMOL/L — SIGNIFICANT CHANGE UP (ref 22–31)
COCAINE METAB.OTHER UR-MCNC: NEGATIVE — SIGNIFICANT CHANGE UP
CREAT SERPL-MCNC: 0.64 MG/DL — SIGNIFICANT CHANGE UP (ref 0.5–1.3)
EGFR: 108 ML/MIN/1.73M2 — SIGNIFICANT CHANGE UP
EOSINOPHIL # BLD AUTO: 0.03 K/UL — SIGNIFICANT CHANGE UP (ref 0–0.5)
EOSINOPHIL NFR BLD AUTO: 0.4 % — SIGNIFICANT CHANGE UP (ref 0–6)
ETHANOL SERPL-MCNC: 436 MG/DL — HIGH (ref 0–10)
GLUCOSE SERPL-MCNC: 85 MG/DL — SIGNIFICANT CHANGE UP (ref 70–99)
HCT VFR BLD CALC: 44.9 % — SIGNIFICANT CHANGE UP (ref 39–50)
HGB BLD-MCNC: 15.4 G/DL — SIGNIFICANT CHANGE UP (ref 13–17)
IMM GRANULOCYTES NFR BLD AUTO: 0.1 % — SIGNIFICANT CHANGE UP (ref 0–0.9)
LYMPHOCYTES # BLD AUTO: 5.09 K/UL — HIGH (ref 1–3.3)
LYMPHOCYTES # BLD AUTO: 72.6 % — HIGH (ref 13–44)
MANUAL SMEAR VERIFICATION: SIGNIFICANT CHANGE UP
MCHC RBC-ENTMCNC: 30.9 PG — SIGNIFICANT CHANGE UP (ref 27–34)
MCHC RBC-ENTMCNC: 34.3 G/DL — SIGNIFICANT CHANGE UP (ref 32–36)
MCV RBC AUTO: 90.2 FL — SIGNIFICANT CHANGE UP (ref 80–100)
METHADONE UR-MCNC: NEGATIVE — SIGNIFICANT CHANGE UP
MONOCYTES # BLD AUTO: 0.51 K/UL — SIGNIFICANT CHANGE UP (ref 0–0.9)
MONOCYTES NFR BLD AUTO: 7.3 % — SIGNIFICANT CHANGE UP (ref 2–14)
NEUTROPHILS # BLD AUTO: 1.3 K/UL — LOW (ref 1.8–7.4)
NEUTROPHILS NFR BLD AUTO: 18.6 % — LOW (ref 43–77)
NRBC BLD AUTO-RTO: 0 /100 WBCS — SIGNIFICANT CHANGE UP (ref 0–0)
OPIATES UR-MCNC: NEGATIVE — SIGNIFICANT CHANGE UP
PCP SPEC-MCNC: SIGNIFICANT CHANGE UP
PCP UR-MCNC: NEGATIVE — SIGNIFICANT CHANGE UP
PLAT MORPH BLD: NORMAL — SIGNIFICANT CHANGE UP
PLATELET # BLD AUTO: 183 K/UL — SIGNIFICANT CHANGE UP (ref 150–400)
PLATELET COUNT - ESTIMATE: NORMAL — SIGNIFICANT CHANGE UP
POTASSIUM SERPL-MCNC: 3.6 MMOL/L — SIGNIFICANT CHANGE UP (ref 3.5–5.3)
POTASSIUM SERPL-SCNC: 3.6 MMOL/L — SIGNIFICANT CHANGE UP (ref 3.5–5.3)
PROT SERPL-MCNC: 7.6 G/DL — SIGNIFICANT CHANGE UP (ref 6–8.3)
RBC # BLD: 4.98 M/UL — SIGNIFICANT CHANGE UP (ref 4.2–5.8)
RBC # FLD: 14.5 % — SIGNIFICANT CHANGE UP (ref 10.3–14.5)
RBC BLD AUTO: NORMAL — SIGNIFICANT CHANGE UP
SODIUM SERPL-SCNC: 145 MMOL/L — SIGNIFICANT CHANGE UP (ref 135–145)
THC UR QL: NEGATIVE — SIGNIFICANT CHANGE UP
WBC # BLD: 7.01 K/UL — SIGNIFICANT CHANGE UP (ref 3.8–10.5)
WBC # FLD AUTO: 7.01 K/UL — SIGNIFICANT CHANGE UP (ref 3.8–10.5)

## 2025-03-04 PROCEDURE — 80053 COMPREHEN METABOLIC PANEL: CPT

## 2025-03-04 PROCEDURE — 99284 EMERGENCY DEPT VISIT MOD MDM: CPT | Mod: 25

## 2025-03-04 PROCEDURE — 96375 TX/PRO/DX INJ NEW DRUG ADDON: CPT

## 2025-03-04 PROCEDURE — 99285 EMERGENCY DEPT VISIT HI MDM: CPT

## 2025-03-04 PROCEDURE — 96374 THER/PROPH/DIAG INJ IV PUSH: CPT

## 2025-03-04 PROCEDURE — 80307 DRUG TEST PRSMV CHEM ANLYZR: CPT

## 2025-03-04 PROCEDURE — 96361 HYDRATE IV INFUSION ADD-ON: CPT

## 2025-03-04 PROCEDURE — 36415 COLL VENOUS BLD VENIPUNCTURE: CPT

## 2025-03-04 PROCEDURE — 85025 COMPLETE CBC W/AUTO DIFF WBC: CPT

## 2025-03-04 RX ORDER — HALOPERIDOL 10 MG/1
5 TABLET ORAL ONCE
Refills: 0 | Status: COMPLETED | OUTPATIENT
Start: 2025-03-04 | End: 2025-03-04

## 2025-03-04 RX ORDER — LORAZEPAM 4 MG/ML
2 VIAL (ML) INJECTION ONCE
Refills: 0 | Status: DISCONTINUED | OUTPATIENT
Start: 2025-03-04 | End: 2025-03-04

## 2025-03-04 RX ADMIN — Medication 2 MILLIGRAM(S): at 12:42

## 2025-03-04 RX ADMIN — HALOPERIDOL 5 MILLIGRAM(S): 10 TABLET ORAL at 12:44

## 2025-03-04 RX ADMIN — Medication 1000 MILLILITER(S): at 11:29

## 2025-03-04 RX ADMIN — Medication 1000 MILLILITER(S): at 15:15

## 2025-03-04 NOTE — ED ADULT NURSE NOTE - NSFALLHARMRISKINTERV_ED_ALL_ED
Assistance OOB with selected safe patient handling equipment if applicable/Assistance with ambulation/Communicate risk of Fall with Harm to all staff, patient, and family/Monitor gait and stability/Monitor for mental status changes and reorient to person, place, and time, as needed/Provide visual cue: red socks, yellow wristband, yellow gown, etc/Reinforce activity limits and safety measures with patient and family/Toileting schedule using arm’s reach rule for commode and bathroom/Use of alarms - bed, stretcher, chair and/or video monitoring/Bed in lowest position, wheels locked, appropriate side rails in place/Call bell, personal items and telephone in reach/Instruct patient to call for assistance before getting out of bed/chair/stretcher/Non-slip footwear applied when patient is off stretcher/[place] to call system/Physically safe environment - no spills, clutter or unnecessary equipment/Purposeful Proactive Rounding/Room/bathroom lighting operational, light cord in reach

## 2025-03-04 NOTE — ED ADULT NURSE NOTE - ED STAT RN HANDOFF DETAILS
1240 pm - pt is very aggressive , agitated - s/e by DR MONREAL _sedated as ordered ., pt is axox2 at present time , still very agitated .

## 2025-03-04 NOTE — ED PROVIDER NOTE - CLINICAL SUMMARY MEDICAL DECISION MAKING FREE TEXT BOX
60-year-old male with alcohol use disorder presented with acute alcohol intoxication>   Laboratory significant for alcohol level of 436.  Received fluids after  a short period of observation, the patient awoke, wanted his close and called me a "scumbag".  A code gray was called.  He was sedated with Haldol and Ativan.  Patient will require observation and reassessment for clinical sobriety.   social work has been consulted.  Patient care signed out pending social work recommendations, reevaluation for clinical sobriety.

## 2025-03-04 NOTE — ED ADULT NURSE NOTE - OBJECTIVE STATEMENT
0930 am - Received pt - with  alcohol intoxication , confused , refused to answer to all question , YG / RED SOCKS / BED ALARM applied / side rails up

## 2025-03-04 NOTE — ED PROVIDER NOTE - OBJECTIVE STATEMENT
60-year-old male presents with acute altered mental status secondary to acute alcohol intoxication.  Patient is a very poor historian. 60-year-old male presents with acute altered mental status secondary to acute alcohol intoxication.  Family had called EMS for him. Patient is a very poor historian.

## 2025-03-04 NOTE — ED PROVIDER NOTE - NSFOLLOWUPINSTRUCTIONS_ED_ALL_ED_FT
Alcohol Abuse    Alcohol intoxication occurs when the amount of alcohol that a person has consumed impairs his or her ability to mentally and physically function. Chronic alcohol consumption can also lead to a variety of health issues including neurological disease, stomach disease, heart disease, liver disease, etc. Do not drive after drinking alcohol. Drinking enough alcohol to end up in an Emergency Room suggests you may have an alcohol abuse problem. Seek help at a drug addiction center.    SEEK IMMEDIATE MEDICAL CARE IF YOU HAVE ANY OF THE FOLLOWING SYMPTOMS: seizures, vomiting blood, blood in your stool, lightheadedness/dizziness, or becoming shaky to tremulous when you stop drinking.     You have been given information necessary to follow up with the  [facility] ([facility]) Crisis center & other outpatient  psychiatric clinics within your community    • [facility] walk in Crisis centre  [address] [place] [address] [phone] https://www.[facility]/behavioral-health/programs-services/adult-behavioral-health-crisis-center  Hours of operation:  Day	                                        Hours  Sunday                                  Closed  Monday                                9am - 3pm  Tuesday                                9am - 3pm  Wednesday                          9am - 3pm  Thursday                               9am - 3pm  Friday                                    9am - 3pm  Saturday                                Closed    .....additionally if your current problem is associated with drug or alcohol abuse further information can be obtained at the Drug Abuse Evaluation Health Referral Servce (DAEHRS)    • [facility] clinic [address] [place] [address] [phone] https://www.[facility]/behavioral-health/programs-services/drug-abuse-evaluation-health-referral-service    Additionally if more support and information and help is needed in the area of suicide prevention pleas3 feel free to contact :   • [place] [address]  Phone: 8-257-549-WWHL (3900)  Web Address: http://www.suicidepreventionlifeline.org  • Suicide Awareness Voices of Education  [address]  [place], [place]  Phone: 1-980.251.8096  Web Address: http://www.save.org    Se le ha proporcionado la información necesaria para realizar un seguimiento con el centro de crisis del [facility] ([facility]) y otras clínicas psiquiátricas ambulatorias dentro de gibson comunidad.    • [facility] camina en el centro de crisis [address] [place] [address] [phone] https://www.[facility]/behavioral-health/programs-services/adult-behavioral-health-crisis-center  Horas de operación:  Horas del día  Jordan cerrado  Lunes 9 am - 3 pm  Chu 9am - 3pm  Miércoles 9 am - 3 pm  Jueves 9 am - 3 pm  Viernes 9am - 3pm  Sábado cerrado    ..... además, si gibson problema actual está asociado con el abuso de drogas o alcohol, se puede obtener más información en el Servicio de Referencia de Leanne de Evaluación de Abuso de Drogas (DAEHRS)    • Clínica DAEHRS [address] [place] [address] [phone] https://www.[facility]/behavioral-health/programs-services/drug-abuse-evaluation-health-referral-service    Además, si se necesita más apoyo, información y ayuda en el área de la prevención del suicidio, por favor, no dude en comunicarse con:  • Línea directa para la prevención del [place] [address]  Teléfono: [phone] ([phone])  Dirección web: http://www.suicidepreventionlifeline.org  • Voces de educación de concienciación sobre el suicidio  [address]  [place], [place] [address]  Teléfono: [phone]  Dirección web: http://www.save.org

## 2025-03-04 NOTE — ED PROVIDER NOTE - PROGRESS NOTE DETAILS
Gulshan-DO: Patient received at signout, seen and evaluated bedside.  Patient clinically sober, ambulatory with steady gait, tolerating oral intake.  Social work consulted, patient declining outpatient resources, requesting DC.  No psychotic features.  Will provide outpatient resources/return precautions.

## 2025-03-04 NOTE — ED PROVIDER NOTE - NSFOLLOWUPCLINICS_GEN_ALL_ED_FT
[facility]  Detox  [address] [place] [address]  Phone: [phone]  Fax: [phone]    [facility]  Detox  [address] [place] [address]  Phone: [phone]  Fax: [phone]

## 2025-03-04 NOTE — ED PROVIDER NOTE - PHYSICAL EXAMINATION
General: Patient well appearing, vital signs within normal limits  HEENT: MMM, trachea midline  Respiratory: No respiratory distress  Neuro: Moves all extremities, GCS 10  Skin: No rashes or lesions

## 2025-03-04 NOTE — CHART NOTE - NSCHARTNOTEFT_GEN_A_CORE
Consult : alcohol use disorder    Pt is a 60 yr old male who was brought to the ED by EMS with complaint of alcohol intoxication. Dominic met with Pt at bedside re consult, he appeared alert /oriented x3. Dominic introduced self and supportive role explained.    Pt declined sbirt screening, refused active referral to treatment and SA resources.     Emotional support and metro card offered.     Physician updated and Pt was socially cleared.

## 2025-03-06 ENCOUNTER — EMERGENCY (EMERGENCY)
Facility: HOSPITAL | Age: 60
LOS: 1 days | Discharge: HOME | End: 2025-03-06
Attending: STUDENT IN AN ORGANIZED HEALTH CARE EDUCATION/TRAINING PROGRAM
Payer: COMMERCIAL

## 2025-03-06 VITALS
HEIGHT: 68 IN | HEART RATE: 91 BPM | WEIGHT: 190.04 LBS | SYSTOLIC BLOOD PRESSURE: 153 MMHG | TEMPERATURE: 98 F | RESPIRATION RATE: 18 BRPM | DIASTOLIC BLOOD PRESSURE: 87 MMHG | OXYGEN SATURATION: 95 %

## 2025-03-06 DIAGNOSIS — S83.512A SPRAIN OF ANTERIOR CRUCIATE LIGAMENT OF LEFT KNEE, INITIAL ENCOUNTER: Chronic | ICD-10-CM

## 2025-03-06 DIAGNOSIS — Z90.49 ACQUIRED ABSENCE OF OTHER SPECIFIED PARTS OF DIGESTIVE TRACT: Chronic | ICD-10-CM

## 2025-03-06 PROCEDURE — 99283 EMERGENCY DEPT VISIT LOW MDM: CPT

## 2025-03-06 PROCEDURE — 99285 EMERGENCY DEPT VISIT HI MDM: CPT

## 2025-03-06 PROCEDURE — 82962 GLUCOSE BLOOD TEST: CPT

## 2025-03-06 NOTE — ED PROVIDER NOTE - PATIENT PORTAL LINK FT
You can access the FollowMyHealth Patient Portal offered by [facility] by registering at the following website: [email or web address]. By joining [employer]’s FollowMyHealth portal, you will also be able to view your health information using other applications (apps) compatible with our system.

## 2025-03-06 NOTE — ED ADULT NURSE NOTE - OBJECTIVE STATEMENT
Patient brought in by EMS for alcohol intoxication. Patient presents altered, smell of etoh breath, patient placed on fall precautions, EMS reports fingerstick of 95.

## 2025-03-06 NOTE — ED PROVIDER NOTE - CLINICAL SUMMARY MEDICAL DECISION MAKING FREE TEXT BOX
60-year-old male history of alcohol abuse presents via EMS for reported alcohol intoxication.  Patient unable to contribute to history.  Arrives with nonactionable vital signs, BGL 91.  Nonfocal neurologic exam benign abdomen clear cardiopulmonary exam no evidence of trauma.  Patient recently seen here 2 days ago for the same with nonactionable lab work at that time.  Will allow patient to become clinically sober and reassess.

## 2025-03-06 NOTE — ED ADULT NURSE NOTE - NSFALLHARMRISKINTERV_ED_ALL_ED

## 2025-03-06 NOTE — ED PROVIDER NOTE - PHYSICAL EXAMINATION
General: intoxicated, odor of alcohol  HEENT: NCAT, PERRL, no conjunctival pallor   Cardiac: RRR, no murmurs, 2+ peripheral pulses  Resp: CTAB  Abdomen: soft, non-distended, bowel sounds present, no ttp, no rebound or guarding. no organomegaly  Extremities: no ecchymosis lacerations or contusions. no ttp. no peripheral edema, calf tenderness, or leg size discrepancies  Skin: no rashes.   Neuro: AAOx2, 5+motor, sensation grossly intact CN 2-12 intact  Psych: calm

## 2025-03-06 NOTE — ED PROVIDER NOTE - NSFOLLOWUPINSTRUCTIONS_ED_ALL_ED_FT
You were seen in the emergency department for: alcohol intoxication  Please see attached for resources regarding detox.  We recommend you follow up with: your primary care doctor.     Please return to the Emergency Department if you experience any of the following symptoms:   - Shortness of breath or trouble breathing  - Pressure, pain or tightness in the chest  - Face drooping, arm weakness or speech difficulty  - Persistence of severe vomiting  - Head injury or loss of consciousness  - Nonstop bleeding or an open wound    (1) Follow up with your primary care physician within the next 24-48 hours as discussed. In addition, we did not find evidence of a life threatening illness on your testing here today, but listed below are the specialists that will be necessary to see as an outpatient to continue the workup.  Please call the numbers listed below or [phone] to set up the necessary appointments.  (2) Take Tylenol (up to 1000mg or 1 g)  and/or Motrin (up to 600mg) up to every 6 hours as needed for pain.   (3) If you had an IV (intravenous) line placed, it was removed. Sometimes, after IV removal, that area can be tender for a few days; if it develops redness and swelling, those could be signs of infection; in which case, return to the Emergency Department for assessment.  (4) Please continue taking all of your home medications as directed.

## 2025-03-07 ENCOUNTER — APPOINTMENT (OUTPATIENT)
Dept: VASCULAR SURGERY | Facility: CLINIC | Age: 60
End: 2025-03-07

## 2025-03-07 VITALS
HEART RATE: 95 BPM | DIASTOLIC BLOOD PRESSURE: 76 MMHG | RESPIRATION RATE: 18 BRPM | TEMPERATURE: 98 F | SYSTOLIC BLOOD PRESSURE: 129 MMHG | OXYGEN SATURATION: 95 %

## 2025-03-30 ENCOUNTER — EMERGENCY (EMERGENCY)
Facility: HOSPITAL | Age: 60
LOS: 1 days | Discharge: HOME | End: 2025-03-30
Attending: STUDENT IN AN ORGANIZED HEALTH CARE EDUCATION/TRAINING PROGRAM
Payer: COMMERCIAL

## 2025-03-30 VITALS
HEIGHT: 68 IN | TEMPERATURE: 98 F | SYSTOLIC BLOOD PRESSURE: 141 MMHG | OXYGEN SATURATION: 96 % | WEIGHT: 195.11 LBS | DIASTOLIC BLOOD PRESSURE: 87 MMHG | HEART RATE: 77 BPM | RESPIRATION RATE: 16 BRPM

## 2025-03-30 DIAGNOSIS — Z90.49 ACQUIRED ABSENCE OF OTHER SPECIFIED PARTS OF DIGESTIVE TRACT: Chronic | ICD-10-CM

## 2025-03-30 DIAGNOSIS — S83.512A SPRAIN OF ANTERIOR CRUCIATE LIGAMENT OF LEFT KNEE, INITIAL ENCOUNTER: Chronic | ICD-10-CM

## 2025-03-30 PROCEDURE — 99285 EMERGENCY DEPT VISIT HI MDM: CPT

## 2025-03-30 PROCEDURE — 99283 EMERGENCY DEPT VISIT LOW MDM: CPT

## 2025-03-30 PROCEDURE — 82962 GLUCOSE BLOOD TEST: CPT

## 2025-03-30 NOTE — ED ADULT NURSE NOTE - NSFALLHARMRISKINTERV_ED_ALL_ED
Assistance OOB with selected safe patient handling equipment if applicable/Assistance with ambulation/Communicate risk of Fall with Harm to all staff, patient, and family/Monitor gait and stability/Monitor for mental status changes and reorient to person, place, and time, as needed/Move patient closer to nursing station/within visual sight of ED staff/Provide visual cue: red socks, yellow wristband, yellow gown, etc/Reinforce activity limits and safety measures with patient and family/Toileting schedule using arm’s reach rule for commode and bathroom/Use of alarms - bed, stretcher, chair and/or video monitoring/Bed in lowest position, wheels locked, appropriate side rails in place/Call bell, personal items and telephone in reach/Instruct patient to call for assistance before getting out of bed/chair/stretcher/Non-slip footwear applied when patient is off stretcher/Ball to call system/Physically safe environment - no spills, clutter or unnecessary equipment/Purposeful Proactive Rounding/Room/bathroom lighting operational, light cord in reach

## 2025-03-30 NOTE — ED ADULT NURSE NOTE - NS PRO PASSIVE SMOKE EXP
[facility])     Acute anterior wall MI (Prisma Health Oconee Memorial Mustapha Ledezma MD at [facility] OR    COLONOSCOPY N/A 08/26/2020    COLONOSCOPY POLYPECTOMY SNARE/COLD BIOPSY performed by Jefry Ayala MD at [facility] ENDOSCOPY    CORONARY ANGIOPLASTY WITH STENT PLACEMENT  07/2008    PCI LAD    CORONARY ANGIOPLASTY WITH STENT PLACEMENT  07/21/2010    two vessel PCI (SUKUMAR mid LAD, SUKUMAR proximal D1)    DIAGNOSTIC CARDIAC CATH LAB PROCEDURE  7/18/2008, 7/2010    OTHER SURGICAL HISTORY  03/21/2018    spinal cord stimulator/Dr. Berger    [place] [facility]/RPLCMT [facility] DIR/INDUXIVE COUPLING  Unknown

## 2025-03-30 NOTE — ED PROVIDER NOTE - OBJECTIVE STATEMENT
59 y/o M PMH of EtOH abuse presenting with intoxication     Several previous ER visits; BIBEMS for intoxication without reported apparent trauma.

## 2025-03-30 NOTE — ED PROVIDER NOTE - PHYSICAL EXAMINATION
GENERAL: lethargic, intoxicated; unkempt.   HEAD:  Atraumatic, Normocephalic  ENT: MMM; oropharynx clear  NECK: Supple, No JVD  CHEST/LUNG: Clear to auscultation bilaterally; No wheeze  HEART: Regular rate and rhythm; No murmurs, rubs, or gallops  ABDOMEN: Soft, Nontender, Nondistended; Bowel sounds present  EXTREMITIES:  2+ Peripheral Pulses, No clubbing, cyanosis, or edema  NEUROLOGY: no focal motor or sensory deficits  SKIN: No rashes or lesions

## 2025-03-30 NOTE — ED PROVIDER NOTE - PATIENT PORTAL LINK FT
You can access the FollowMyHealth Patient Portal offered by [facility] by registering at the following website: [email or web address]. By joining [employer]’s FollowMyHealth portal, you will also be able to view your health information using other applications (apps) compatible with our system.

## 2025-03-31 VITALS
DIASTOLIC BLOOD PRESSURE: 89 MMHG | OXYGEN SATURATION: 96 % | HEART RATE: 101 BPM | TEMPERATURE: 98 F | RESPIRATION RATE: 16 BRPM | SYSTOLIC BLOOD PRESSURE: 144 MMHG

## 2025-05-09 ENCOUNTER — APPOINTMENT (OUTPATIENT)
Dept: VASCULAR SURGERY | Facility: CLINIC | Age: 60
End: 2025-05-09
Payer: COMMERCIAL

## 2025-05-09 ENCOUNTER — APPOINTMENT (OUTPATIENT)
Dept: VASCULAR SURGERY | Facility: CLINIC | Age: 60
End: 2025-05-09

## 2025-05-09 VITALS
BODY MASS INDEX: 24.36 KG/M2 | HEART RATE: 74 BPM | DIASTOLIC BLOOD PRESSURE: 93 MMHG | HEIGHT: 71 IN | TEMPERATURE: 98 F | WEIGHT: 174 LBS | SYSTOLIC BLOOD PRESSURE: 156 MMHG

## 2025-05-09 DIAGNOSIS — I73.9 PERIPHERAL VASCULAR DISEASE, UNSPECIFIED: ICD-10-CM

## 2025-05-09 PROCEDURE — 93978 VASCULAR STUDY: CPT

## 2025-05-09 PROCEDURE — 93926 LOWER EXTREMITY STUDY: CPT

## 2025-05-09 PROCEDURE — 99214 OFFICE O/P EST MOD 30 MIN: CPT

## 2025-08-14 ENCOUNTER — EMERGENCY (EMERGENCY)
Facility: HOSPITAL | Age: 60
LOS: 1 days | End: 2025-08-14
Attending: STUDENT IN AN ORGANIZED HEALTH CARE EDUCATION/TRAINING PROGRAM
Payer: COMMERCIAL

## 2025-08-14 VITALS
DIASTOLIC BLOOD PRESSURE: 75 MMHG | OXYGEN SATURATION: 94 % | TEMPERATURE: 98 F | WEIGHT: 315 LBS | HEART RATE: 95 BPM | RESPIRATION RATE: 18 BRPM | SYSTOLIC BLOOD PRESSURE: 122 MMHG

## 2025-08-14 DIAGNOSIS — S83.512A SPRAIN OF ANTERIOR CRUCIATE LIGAMENT OF LEFT KNEE, INITIAL ENCOUNTER: Chronic | ICD-10-CM

## 2025-08-14 DIAGNOSIS — Z90.49 ACQUIRED ABSENCE OF OTHER SPECIFIED PARTS OF DIGESTIVE TRACT: Chronic | ICD-10-CM

## 2025-08-14 LAB
ALBUMIN SERPL ELPH-MCNC: 3.5 G/DL — SIGNIFICANT CHANGE UP (ref 3.5–5)
ALP SERPL-CCNC: 154 U/L — HIGH (ref 40–120)
ALT FLD-CCNC: 169 U/L DA — HIGH (ref 10–60)
ANION GAP SERPL CALC-SCNC: 8 MMOL/L — SIGNIFICANT CHANGE UP (ref 5–17)
APAP SERPL-MCNC: <2 UG/ML — LOW (ref 10–30)
APTT BLD: 28.7 SEC — SIGNIFICANT CHANGE UP (ref 26.1–36.8)
AST SERPL-CCNC: 247 U/L — HIGH (ref 10–40)
BASOPHILS # BLD AUTO: 0.1 K/UL — SIGNIFICANT CHANGE UP (ref 0–0.2)
BASOPHILS NFR BLD AUTO: 1.4 % — SIGNIFICANT CHANGE UP (ref 0–2)
BILIRUB SERPL-MCNC: 0.4 MG/DL — SIGNIFICANT CHANGE UP (ref 0.2–1.2)
BUN SERPL-MCNC: 11 MG/DL — SIGNIFICANT CHANGE UP (ref 7–18)
CALCIUM SERPL-MCNC: 8.1 MG/DL — LOW (ref 8.4–10.5)
CHLORIDE SERPL-SCNC: 110 MMOL/L — HIGH (ref 96–108)
CK SERPL-CCNC: 98 U/L — SIGNIFICANT CHANGE UP (ref 35–232)
CO2 SERPL-SCNC: 22 MMOL/L — SIGNIFICANT CHANGE UP (ref 22–31)
CREAT SERPL-MCNC: 0.75 MG/DL — SIGNIFICANT CHANGE UP (ref 0.5–1.3)
EGFR: 103 ML/MIN/1.73M2 — SIGNIFICANT CHANGE UP
EGFR: 103 ML/MIN/1.73M2 — SIGNIFICANT CHANGE UP
EOSINOPHIL # BLD AUTO: 0.03 K/UL — SIGNIFICANT CHANGE UP (ref 0–0.5)
EOSINOPHIL NFR BLD AUTO: 0.4 % — SIGNIFICANT CHANGE UP (ref 0–6)
ETHANOL SERPL-MCNC: 549 MG/DL — HIGH (ref 0–10)
GLUCOSE SERPL-MCNC: 47 MG/DL — CRITICAL LOW (ref 70–99)
HCT VFR BLD CALC: 45.2 % — SIGNIFICANT CHANGE UP (ref 39–50)
HGB BLD-MCNC: 15.8 G/DL — SIGNIFICANT CHANGE UP (ref 13–17)
IMM GRANULOCYTES NFR BLD AUTO: 0.6 % — SIGNIFICANT CHANGE UP (ref 0–0.9)
INR BLD: 0.96 RATIO — SIGNIFICANT CHANGE UP (ref 0.85–1.16)
LIDOCAIN IGE QN: 60 U/L — SIGNIFICANT CHANGE UP (ref 13–75)
LYMPHOCYTES # BLD AUTO: 2.97 K/UL — SIGNIFICANT CHANGE UP (ref 1–3.3)
LYMPHOCYTES # BLD AUTO: 40.9 % — SIGNIFICANT CHANGE UP (ref 13–44)
MAGNESIUM SERPL-MCNC: 1.8 MG/DL — SIGNIFICANT CHANGE UP (ref 1.6–2.6)
MCHC RBC-ENTMCNC: 32.4 PG — SIGNIFICANT CHANGE UP (ref 27–34)
MCHC RBC-ENTMCNC: 35 G/DL — SIGNIFICANT CHANGE UP (ref 32–36)
MCV RBC AUTO: 92.6 FL — SIGNIFICANT CHANGE UP (ref 80–100)
MONOCYTES # BLD AUTO: 0.38 K/UL — SIGNIFICANT CHANGE UP (ref 0–0.9)
MONOCYTES NFR BLD AUTO: 5.2 % — SIGNIFICANT CHANGE UP (ref 2–14)
NEUTROPHILS # BLD AUTO: 3.74 K/UL — SIGNIFICANT CHANGE UP (ref 1.8–7.4)
NEUTROPHILS NFR BLD AUTO: 51.5 % — SIGNIFICANT CHANGE UP (ref 43–77)
NRBC BLD AUTO-RTO: 0 /100 WBCS — SIGNIFICANT CHANGE UP (ref 0–0)
PLATELET # BLD AUTO: 138 K/UL — LOW (ref 150–400)
POTASSIUM SERPL-MCNC: 3.7 MMOL/L — SIGNIFICANT CHANGE UP (ref 3.5–5.3)
POTASSIUM SERPL-SCNC: 3.7 MMOL/L — SIGNIFICANT CHANGE UP (ref 3.5–5.3)
PROT SERPL-MCNC: 7.9 G/DL — SIGNIFICANT CHANGE UP (ref 6–8.3)
PROTHROM AB SERPL-ACNC: 11.2 SEC — SIGNIFICANT CHANGE UP (ref 9.9–13.4)
RBC # BLD: 4.88 M/UL — SIGNIFICANT CHANGE UP (ref 4.2–5.8)
RBC # FLD: 14.2 % — SIGNIFICANT CHANGE UP (ref 10.3–14.5)
SALICYLATES SERPL-MCNC: 6.1 MG/DL — SIGNIFICANT CHANGE UP (ref 2.8–20)
SODIUM SERPL-SCNC: 140 MMOL/L — SIGNIFICANT CHANGE UP (ref 135–145)
TROPONIN I, HIGH SENSITIVITY RESULT: 8.2 NG/L — SIGNIFICANT CHANGE UP
WBC # BLD: 7.26 K/UL — SIGNIFICANT CHANGE UP (ref 3.8–10.5)
WBC # FLD AUTO: 7.26 K/UL — SIGNIFICANT CHANGE UP (ref 3.8–10.5)

## 2025-08-14 PROCEDURE — 93010 ELECTROCARDIOGRAM REPORT: CPT

## 2025-08-14 PROCEDURE — 80053 COMPREHEN METABOLIC PANEL: CPT

## 2025-08-14 PROCEDURE — 83735 ASSAY OF MAGNESIUM: CPT

## 2025-08-14 PROCEDURE — 99284 EMERGENCY DEPT VISIT MOD MDM: CPT

## 2025-08-14 PROCEDURE — 96374 THER/PROPH/DIAG INJ IV PUSH: CPT

## 2025-08-14 PROCEDURE — 93005 ELECTROCARDIOGRAM TRACING: CPT

## 2025-08-14 PROCEDURE — 83690 ASSAY OF LIPASE: CPT

## 2025-08-14 PROCEDURE — 82962 GLUCOSE BLOOD TEST: CPT

## 2025-08-14 PROCEDURE — 99285 EMERGENCY DEPT VISIT HI MDM: CPT | Mod: 25

## 2025-08-14 PROCEDURE — 80307 DRUG TEST PRSMV CHEM ANLYZR: CPT

## 2025-08-14 PROCEDURE — 36415 COLL VENOUS BLD VENIPUNCTURE: CPT

## 2025-08-14 PROCEDURE — 84484 ASSAY OF TROPONIN QUANT: CPT

## 2025-08-14 PROCEDURE — 82550 ASSAY OF CK (CPK): CPT

## 2025-08-14 PROCEDURE — 85025 COMPLETE CBC W/AUTO DIFF WBC: CPT

## 2025-08-14 PROCEDURE — 85730 THROMBOPLASTIN TIME PARTIAL: CPT

## 2025-08-14 PROCEDURE — 85610 PROTHROMBIN TIME: CPT

## 2025-08-14 RX ORDER — DEXTROSE 50 % IN WATER 50 %
25 SYRINGE (ML) INTRAVENOUS ONCE
Refills: 0 | Status: COMPLETED | OUTPATIENT
Start: 2025-08-14 | End: 2025-08-14

## 2025-08-14 RX ADMIN — Medication 25 GRAM(S): at 21:53

## 2025-08-15 VITALS
DIASTOLIC BLOOD PRESSURE: 73 MMHG | SYSTOLIC BLOOD PRESSURE: 118 MMHG | TEMPERATURE: 98 F | OXYGEN SATURATION: 95 % | RESPIRATION RATE: 18 BRPM | HEART RATE: 85 BPM

## (undated) DEVICE — SUT SOFSILK 3-0 18" TIES

## (undated) DEVICE — DRAPE INSTRUMENT POUCH 6.75" X 11"

## (undated) DEVICE — VENODYNE/SCD SLEEVE CALF LARGE

## (undated) DEVICE — DRAPE MAYO STAND 30"

## (undated) DEVICE — DRAPE TOWEL BLUE 17" X 24"

## (undated) DEVICE — DRSG AQUACEL 3.5 X 6"

## (undated) DEVICE — DRAPE IOBAN 23" X 23"

## (undated) DEVICE — MEDICATION LABELS W MARKER

## (undated) DEVICE — GLV 7.5 PROTEXIS (WHITE)

## (undated) DEVICE — DRAPE 1/2 SHEET 40X57"

## (undated) DEVICE — RETAINER VICERA FISH LG

## (undated) DEVICE — DRSG AQUACEL 3.5 X 14"

## (undated) DEVICE — DRSG STERISTRIPS 0.5 X 4"

## (undated) DEVICE — STEALTH CLAMP INSERT FIBRA/FIBRA 90MM

## (undated) DEVICE — SUT PDS II 1 48" TP-1

## (undated) DEVICE — SOL INJ NS 0.9% 500ML 1-PORT

## (undated) DEVICE — WARMING BLANKET LOWER ADULT

## (undated) DEVICE — BLADE SCALPEL SAFETYLOCK #10

## (undated) DEVICE — SUT SOFSILK 4-0 30" TIES

## (undated) DEVICE — SUT PROLENE 3-0 36" MH

## (undated) DEVICE — SUT SOFSILK 3-0 30" TIES

## (undated) DEVICE — WARMING BLANKET FULL UNDERBODY

## (undated) DEVICE — DRSG TAPE UMBILICAL COTTON 2" X 30 X 1/8"

## (undated) DEVICE — SOL IRR POUR NS 0.9% 500ML

## (undated) DEVICE — SUT SOFSILK 2-0 18" TIES

## (undated) DEVICE — SYR LUER LOK 20CC

## (undated) DEVICE — SUT SOFSILK 2-0 30" TIES

## (undated) DEVICE — SOL IRR POUR H2O 250ML

## (undated) DEVICE — SUT PROLENE 4-0 36" BB

## (undated) DEVICE — BLADE SCALPEL SAFETYLOCK #11

## (undated) DEVICE — SUT PROLENE 3-0 36" SH

## (undated) DEVICE — SUT PLEDGET 9MM X 4MM X 1.5MM

## (undated) DEVICE — LAP PAD 18 X 18"

## (undated) DEVICE — SUT POLYSORB 2-0 36" GS-21 UNDYED

## (undated) DEVICE — SUCTION YANKAUER NO CONTROL VENT

## (undated) DEVICE — URETERAL CATH RED RUBBER 14FR (GREEN)

## (undated) DEVICE — ELCTR BOVIE PENCIL HANDPIECE

## (undated) DEVICE — PREP CHLORAPREP HI-LITE ORANGE 26ML

## (undated) DEVICE — TUBING BRAT 2 SUCTION ASSEMBLY TWIST LOCK

## (undated) DEVICE — SUT SOFSILK 2-0 30" V-20

## (undated) DEVICE — SUT SOFSILK 4-0 18" TIES

## (undated) DEVICE — SUT PROLENE 6-0 30" C-1

## (undated) DEVICE — ELCTR BVI ACCU-TEMP CAUTERY SHAFT FINE TIP 1/2"

## (undated) DEVICE — MARKING PEN W RULER

## (undated) DEVICE — SUT ETHIBOND 2-0 36" SH

## (undated) DEVICE — WARMING BLANKET UPPER ADULT

## (undated) DEVICE — STEALTH CLAMP INSERT SOFT/SOFT 30MM

## (undated) DEVICE — STEALTH CLAMP INSERT FIBRA/FIBRA 60MM

## (undated) DEVICE — FOLEY CATH 2-WAY 28FR 30CC SILICONE

## (undated) DEVICE — DRAPE 3/4 SHEET W REINFORCEMENT 56X77"

## (undated) DEVICE — STAPLER SKIN VISI-STAT 35 WIDE

## (undated) DEVICE — VESSEL LOOP MAXI-RED  0.120" X 16"

## (undated) DEVICE — SUT PROLENE 6-0 18" C-1

## (undated) DEVICE — DRSG OPSITE 13.75 X 4"

## (undated) DEVICE — GLV 7 PROTEXIS (WHITE)

## (undated) DEVICE — SUT PROLENE 6-0 30" BV-1

## (undated) DEVICE — SUT PROLENE 4-0 36" SH

## (undated) DEVICE — Device

## (undated) DEVICE — URETERAL CATH RED RUBBER 16FR (ORANGE)

## (undated) DEVICE — SUT SOFSILK 0 18" TIES

## (undated) DEVICE — FOLEY TRAY 16FR 5CC LTX UMETER CLOSED

## (undated) DEVICE — SUT PROLENE 6-0 4-18" BV-1

## (undated) DEVICE — SUT PROLENE 7-0 24" BV175-8 MULTIPASS

## (undated) DEVICE — GOWN TRIMAX LG

## (undated) DEVICE — POSITIONER FOAM EGG CRATE ULNAR 2PCS (PINK)

## (undated) DEVICE — SPECIMEN CONTAINER 100ML

## (undated) DEVICE — SYR LUER LOK 30CC

## (undated) DEVICE — SUT SILK 0 30" TIES